# Patient Record
Sex: FEMALE | Race: WHITE | NOT HISPANIC OR LATINO | Employment: OTHER | URBAN - METROPOLITAN AREA
[De-identification: names, ages, dates, MRNs, and addresses within clinical notes are randomized per-mention and may not be internally consistent; named-entity substitution may affect disease eponyms.]

---

## 2019-01-01 RX ORDER — LISINOPRIL 20 MG/1
TABLET ORAL
Qty: 90 TABLET | Refills: 2 | Status: SHIPPED | OUTPATIENT
Start: 2019-01-01 | End: 2020-01-01

## 2019-01-17 ENCOUNTER — HOSPITAL ENCOUNTER (OUTPATIENT)
Dept: CARDIOLOGY | Facility: HOSPITAL | Age: 84
Discharge: HOME | End: 2019-01-17
Attending: INTERNAL MEDICINE | Admitting: INTERNAL MEDICINE

## 2019-05-14 ENCOUNTER — HOSPITAL ENCOUNTER (OUTPATIENT)
Dept: MAMMOGRAPHY | Facility: HOSPITAL | Age: 84
Discharge: HOME | End: 2019-05-14
Attending: FAMILY MEDICINE | Admitting: FAMILY MEDICINE

## 2019-07-03 ENCOUNTER — TELEPHONE (OUTPATIENT)
Dept: CARDIOLOGY | Facility: CLINIC | Age: 84
End: 2019-07-03

## 2019-07-03 RX ORDER — LISINOPRIL 20 MG/1
20 TABLET ORAL DAILY
Qty: 90 TABLET | Refills: 0 | Status: SHIPPED | OUTPATIENT
Start: 2019-07-03 | End: 2019-10-07 | Stop reason: SDUPTHER

## 2019-07-03 RX ORDER — LISINOPRIL 20 MG/1
TABLET ORAL
COMMUNITY
Start: 2019-04-01 | End: 2019-07-03 | Stop reason: SDUPTHER

## 2019-07-22 ENCOUNTER — CLINICAL SUPPORT NO REQUIREMENTS (OUTPATIENT)
Dept: CARDIOLOGY | Facility: CLINIC | Age: 84
End: 2019-07-22

## 2019-07-22 DIAGNOSIS — Z95.0 PACEMAKER: Primary | ICD-10-CM

## 2019-07-22 DIAGNOSIS — R00.1 BRADYCARDIA: ICD-10-CM

## 2019-07-22 PROCEDURE — 93296 REM INTERROG EVL PM/IDS: CPT | Performed by: INTERNAL MEDICINE

## 2019-07-22 PROCEDURE — 93294 REM INTERROG EVL PM/LDLS PM: CPT | Performed by: INTERNAL MEDICINE

## 2019-09-09 ENCOUNTER — OFFICE VISIT (OUTPATIENT)
Dept: ORTHOPEDIC SURGERY | Facility: CLINIC | Age: 84
End: 2019-09-09

## 2019-09-09 VITALS
HEART RATE: 62 BPM | BODY MASS INDEX: 21.34 KG/M2 | SYSTOLIC BLOOD PRESSURE: 145 MMHG | DIASTOLIC BLOOD PRESSURE: 72 MMHG | HEIGHT: 66 IN | WEIGHT: 132.8 LBS

## 2019-09-09 DIAGNOSIS — M19.012 PRIMARY LOCALIZED OSTEOARTHROSIS OF LEFT SHOULDER REGION: ICD-10-CM

## 2019-09-09 DIAGNOSIS — M25.512 ACUTE PAIN OF LEFT SHOULDER: Primary | ICD-10-CM

## 2019-09-09 PROBLEM — Z95.0 PRESENCE OF CARDIAC PACEMAKER: Status: ACTIVE | Noted: 2017-08-14

## 2019-09-09 PROCEDURE — 99203 OFFICE O/P NEW LOW 30 MIN: CPT | Performed by: FAMILY MEDICINE

## 2019-09-09 PROCEDURE — 20610 DRAIN/INJ JOINT/BURSA W/O US: CPT | Performed by: FAMILY MEDICINE

## 2019-09-09 RX ORDER — ACEBUTOLOL HYDROCHLORIDE 200 MG/1
200 CAPSULE ORAL 2 TIMES DAILY
COMMUNITY
Start: 2019-08-12 | End: 2020-01-01

## 2019-09-09 RX ORDER — PRAVASTATIN SODIUM 20 MG
20 TABLET ORAL DAILY
COMMUNITY
Start: 2019-08-26 | End: 2019-12-02 | Stop reason: SDUPTHER

## 2019-09-09 RX ORDER — TRAMADOL HYDROCHLORIDE 50 MG/1
TABLET ORAL AS NEEDED
COMMUNITY
Start: 2019-08-28 | End: 2019-10-09

## 2019-09-09 RX ORDER — ASPIRIN 81 MG/1
TABLET ORAL
COMMUNITY
Start: 2014-10-28 | End: 2019-10-09

## 2019-09-09 NOTE — PROGRESS NOTES
"Primary Care Sports Medicine Office Visit Note     Patient ID: Olga Curtis is a 84 y.o. female.    Chief Complaint:  Chief Complaint   Patient presents with   • Left Shoulder - Initial Evaluation     HPI:    Ms. Olga Curtis is a 84 y.o. female who presents to the clinic today for L shoulder decreased ROM and pain. She states that she fell off of a ten foot later, many years ago and has had pain and decreased strength and range of motion of the shoulder since then.  Most recently she was evaluated by her PCP who took x-ray and MRI.  Unfortunately cannot see these images today.      Past Medical History:   Diagnosis Date   • Aortic stenosis 10/28/2014   • Atrial premature complex 12/18/2015   • Chronic coronary artery disease 10/28/2014   • Dyslipidemia 10/28/2014   • Hypertension 10/28/2014   • Nonsustained ventricular tachycardia (CMS/HCC) 12/18/2015   • Shortness of breath 9/12/2016       Past Surgical History:   Procedure Laterality Date   • APPENDECTOMY     • CARDIAC PACEMAKER PLACEMENT  2017   • CARDIAC SURGERY  1999    Bypass surgery   • HERNIA REPAIR         History reviewed. No pertinent family history.  Social History     Occupational History   • Not on file   Tobacco Use   • Smoking status: Never Smoker   Substance and Sexual Activity   • Alcohol use: No     Frequency: Never   • Drug use: Not on file   • Sexual activity: Not on file      Review of Systems   Constitutional: Negative for activity change and fever.   Respiratory: Negative for cough and shortness of breath.    Cardiovascular: Negative for chest pain.   Gastrointestinal: Negative for constipation, diarrhea, nausea and vomiting.   Musculoskeletal: Positive for arthralgias.   Skin: Negative for color change and rash.   Neurological: Negative for weakness.   Hematological: Does not bruise/bleed easily.     Objective:    /72 (BP Location: Left arm, Patient Position: Sitting, Cuff Size: Adult)   Pulse 62   Ht 167.6 cm (66\")   Wt 60.2 kg " (132 lb 12.8 oz)   BMI 21.43 kg/m²     Physical Examination:  Physical Exam   Constitutional: She appears well-developed and well-nourished. No distress.   HENT:   Head: Normocephalic and atraumatic.   Eyes: Conjunctivae are normal.   Cardiovascular: Intact distal pulses.   Pulmonary/Chest: Effort normal. No respiratory distress.   Neurological: She is alert.   Skin: Skin is warm. Capillary refill takes less than 2 seconds. She is not diaphoretic.   Nursing note and vitals reviewed.    Left Shoulder Exam     Tenderness   Left shoulder tenderness location: globally to the L shoulder.    Range of Motion   Active abduction: 80   Passive abduction: 120   Extension: 50   External rotation: 70   Forward flexion: 100   Internal rotation 0 degrees: normal     Muscle Strength   Abduction: 3/5   Internal rotation: 5/5   External rotation: 0/5   Supraspinatus: 0/5   Subscapularis: 4/5   Biceps: 5/5     Tests   Apprehension: negative  Rodrigez test: positive  Cross arm: negative  Impingement: positive  Drop arm: negative    Other   Erythema: absent  Sensation: normal  Pulse: present     Comments:  Moderate palpable fluid collection of the anterior and posterior portions of there GH joint. 2cm x 2cm anterior the the humeral head, and similar sized fluctuant lesion to the posterior aspect, large joint effusion.           Imaging:  Repeat x-ray here in our office reveals severe and significant osteoarthritis with teardrop osteophytes anterior aspect of the humeral head.  She also has humeralization of the inferior aspect of the acromion.  There is no rotator interval.    Assessment and Plan:    1. Acute pain of left shoulder  - XR Shoulder 2+ View Left    2. Primary localized osteoarthrosis of left shoulder region  After discussion of risk and benefits and other options, patient would like to move forward with ultrasound-guided glenohumeral joint aspiration injection.  7 cc of clear nonpurulent nonbloody fluid was removed.  Please  "see procedure note.  Otherwise will continue conservative management, gentle ROM exercises.  RTC in 3 months or sooner as needed.      Yohan VEGA \"Chance\" Radu RAVI, , CAQSM  09/09/19  2:01 PM    Disclaimer: Please note that areas of this note were completed with computer voice recognition software.  Quite often unanticipated grammatical, syntax, homophones, and other interpretive errors are inadvertently transcribed by the computer software. Please excuse any errors that have escaped final proofreading.  "

## 2019-09-09 NOTE — PROCEDURES
"Large Joint Arthrocentesis: L glenohumeral  Date/Time: 9/9/2019 3:08 PM  Consent given by: patient  Site marked: site marked  Timeout: Immediately prior to procedure a time out was called to verify the correct patient, procedure, equipment, support staff and site/side marked as required   Supporting Documentation  Indications: pain and joint swelling   Procedure Details  Location: shoulder - L glenohumeral  Preparation: Patient was prepped and draped in the usual sterile fashion  Needle size: 22 G  Approach: posterior  Medication group details: 6cc of 1% lidocaine without epi, and 2cc of 40mg Kenalog.  Aspirate amount: 8 mL  Aspirate: clear and yellow  Patient tolerance: patient tolerated the procedure well with no immediate complications (blood loss, negligable. Pt admits near immediate pain relief with gentle ROM post-injection.)      Yohan VEGA \"Rainer\" Radu RAVI DO, CAQSM  09/09/19  3:09 PM            "

## 2019-09-30 PROBLEM — I49.5 SICK SINUS SYNDROME (HCC): Status: ACTIVE | Noted: 2017-08-14

## 2019-10-07 ENCOUNTER — TELEPHONE (OUTPATIENT)
Dept: CARDIOLOGY | Facility: CLINIC | Age: 84
End: 2019-10-07

## 2019-10-07 RX ORDER — LISINOPRIL 20 MG/1
20 TABLET ORAL DAILY
Qty: 90 TABLET | Refills: 0 | Status: SHIPPED | OUTPATIENT
Start: 2019-10-07 | End: 2019-01-01

## 2019-10-07 NOTE — TELEPHONE ENCOUNTER
Requesting refill on lisinopril. [facility]. Said she called last week, and has not heard back.

## 2019-10-09 ENCOUNTER — HOSPITAL ENCOUNTER (OUTPATIENT)
Facility: HOSPITAL | Age: 84
Setting detail: OBSERVATION
Discharge: HOME | End: 2019-10-09
Attending: INTERNAL MEDICINE | Admitting: INTERNAL MEDICINE

## 2019-10-09 ENCOUNTER — APPOINTMENT (OUTPATIENT)
Dept: GENERAL RADIOLOGY | Facility: HOSPITAL | Age: 84
End: 2019-10-09

## 2019-10-09 VITALS
OXYGEN SATURATION: 91 % | SYSTOLIC BLOOD PRESSURE: 122 MMHG | HEART RATE: 69 BPM | DIASTOLIC BLOOD PRESSURE: 67 MMHG | BODY MASS INDEX: 20.73 KG/M2 | RESPIRATION RATE: 18 BRPM | HEIGHT: 66 IN | TEMPERATURE: 97.6 F | WEIGHT: 128.97 LBS

## 2019-10-09 DIAGNOSIS — L29.9 PRURITUS: ICD-10-CM

## 2019-10-09 DIAGNOSIS — E87.1 HYPONATREMIA: ICD-10-CM

## 2019-10-09 DIAGNOSIS — R06.2 WHEEZING: ICD-10-CM

## 2019-10-09 DIAGNOSIS — T78.40XA ALLERGIC REACTION, INITIAL ENCOUNTER: Primary | ICD-10-CM

## 2019-10-09 LAB
ANION GAP SERPL CALCULATED.3IONS-SCNC: 15.6 MMOL/L (ref 5–15)
ANION GAP SERPL CALCULATED.3IONS-SCNC: 16.5 MMOL/L (ref 5–15)
BASOPHILS # BLD AUTO: 0.1 10*3/MM3 (ref 0–0.2)
BASOPHILS NFR BLD AUTO: 0.9 % (ref 0–1.5)
BUN BLD-MCNC: 15 MG/DL (ref 8–20)
BUN BLD-MCNC: 18 MG/DL (ref 8–20)
BUN/CREAT SERPL: 15 (ref 5.4–26.2)
BUN/CREAT SERPL: 16.4 (ref 5.4–26.2)
CALCIUM SPEC-SCNC: 9.2 MG/DL (ref 8.9–10.3)
CALCIUM SPEC-SCNC: 9.8 MG/DL (ref 8.9–10.3)
CHLORIDE SERPL-SCNC: 93 MMOL/L (ref 101–111)
CHLORIDE SERPL-SCNC: 96 MMOL/L (ref 101–111)
CO2 SERPL-SCNC: 22 MMOL/L (ref 22–32)
CO2 SERPL-SCNC: 24 MMOL/L (ref 22–32)
CREAT BLD-MCNC: 1 MG/DL (ref 0.4–1)
CREAT BLD-MCNC: 1.1 MG/DL (ref 0.4–1)
DEPRECATED RDW RBC AUTO: 47.3 FL (ref 37–54)
EOSINOPHIL # BLD AUTO: 0 10*3/MM3 (ref 0–0.4)
EOSINOPHIL NFR BLD AUTO: 0.2 % (ref 0.3–6.2)
ERYTHROCYTE [DISTWIDTH] IN BLOOD BY AUTOMATED COUNT: 14.6 % (ref 12.3–15.4)
GFR SERPL CREATININE-BSD FRML MDRD: 47 ML/MIN/1.73
GFR SERPL CREATININE-BSD FRML MDRD: 53 ML/MIN/1.73
GLUCOSE BLD-MCNC: 107 MG/DL (ref 65–99)
GLUCOSE BLD-MCNC: 144 MG/DL (ref 65–99)
HCT VFR BLD AUTO: 43.2 % (ref 34–46.6)
HGB BLD-MCNC: 14.5 G/DL (ref 12–15.9)
LYMPHOCYTES # BLD AUTO: 1.9 10*3/MM3 (ref 0.7–3.1)
LYMPHOCYTES NFR BLD AUTO: 14 % (ref 19.6–45.3)
MCH RBC QN AUTO: 31.5 PG (ref 26.6–33)
MCHC RBC AUTO-ENTMCNC: 33.6 G/DL (ref 31.5–35.7)
MCV RBC AUTO: 93.6 FL (ref 79–97)
MONOCYTES # BLD AUTO: 1.2 10*3/MM3 (ref 0.1–0.9)
MONOCYTES NFR BLD AUTO: 8.8 % (ref 5–12)
NEUTROPHILS # BLD AUTO: 10.2 10*3/MM3 (ref 1.7–7)
NEUTROPHILS NFR BLD AUTO: 76.1 % (ref 42.7–76)
NRBC BLD AUTO-RTO: 0 /100 WBC (ref 0–0.2)
PLATELET # BLD AUTO: 307 10*3/MM3 (ref 140–450)
PMV BLD AUTO: 8.4 FL (ref 6–12)
POTASSIUM BLD-SCNC: 4.6 MMOL/L (ref 3.6–5.1)
POTASSIUM BLD-SCNC: 5.5 MMOL/L (ref 3.6–5.1)
RBC # BLD AUTO: 4.62 10*6/MM3 (ref 3.77–5.28)
SODIUM BLD-SCNC: 128 MMOL/L (ref 136–144)
SODIUM BLD-SCNC: 129 MMOL/L (ref 136–144)
WBC NRBC COR # BLD: 13.4 10*3/MM3 (ref 3.4–10.8)

## 2019-10-09 PROCEDURE — 85025 COMPLETE CBC W/AUTO DIFF WBC: CPT | Performed by: NURSE PRACTITIONER

## 2019-10-09 PROCEDURE — 96375 TX/PRO/DX INJ NEW DRUG ADDON: CPT

## 2019-10-09 PROCEDURE — 94640 AIRWAY INHALATION TREATMENT: CPT

## 2019-10-09 PROCEDURE — 99235 HOSP IP/OBS SAME DATE MOD 70: CPT | Performed by: INTERNAL MEDICINE

## 2019-10-09 PROCEDURE — 25010000002 METHYLPREDNISOLONE PER 125 MG: Performed by: NURSE PRACTITIONER

## 2019-10-09 PROCEDURE — 96374 THER/PROPH/DIAG INJ IV PUSH: CPT

## 2019-10-09 PROCEDURE — G0378 HOSPITAL OBSERVATION PER HR: HCPCS

## 2019-10-09 PROCEDURE — 80048 BASIC METABOLIC PNL TOTAL CA: CPT | Performed by: INTERNAL MEDICINE

## 2019-10-09 PROCEDURE — 80048 BASIC METABOLIC PNL TOTAL CA: CPT | Performed by: NURSE PRACTITIONER

## 2019-10-09 PROCEDURE — 25010000002 DIPHENHYDRAMINE PER 50 MG: Performed by: NURSE PRACTITIONER

## 2019-10-09 PROCEDURE — 99284 EMERGENCY DEPT VISIT MOD MDM: CPT

## 2019-10-09 PROCEDURE — 71045 X-RAY EXAM CHEST 1 VIEW: CPT

## 2019-10-09 RX ORDER — ACETAMINOPHEN 160 MG/5ML
650 SOLUTION ORAL EVERY 4 HOURS PRN
Status: DISCONTINUED | OUTPATIENT
Start: 2019-10-09 | End: 2019-10-09 | Stop reason: HOSPADM

## 2019-10-09 RX ORDER — SODIUM CHLORIDE 0.9 % (FLUSH) 0.9 %
10 SYRINGE (ML) INJECTION EVERY 12 HOURS SCHEDULED
Status: DISCONTINUED | OUTPATIENT
Start: 2019-10-09 | End: 2019-10-09 | Stop reason: HOSPADM

## 2019-10-09 RX ORDER — ASPIRIN 81 MG/1
81 TABLET ORAL DAILY
Status: DISCONTINUED | OUTPATIENT
Start: 2019-10-09 | End: 2019-10-09 | Stop reason: HOSPADM

## 2019-10-09 RX ORDER — LISINOPRIL 20 MG/1
20 TABLET ORAL DAILY
Status: DISCONTINUED | OUTPATIENT
Start: 2019-10-09 | End: 2019-10-09 | Stop reason: HOSPADM

## 2019-10-09 RX ORDER — ONDANSETRON 2 MG/ML
4 INJECTION INTRAMUSCULAR; INTRAVENOUS EVERY 6 HOURS PRN
Status: DISCONTINUED | OUTPATIENT
Start: 2019-10-09 | End: 2019-10-09 | Stop reason: HOSPADM

## 2019-10-09 RX ORDER — SODIUM CHLORIDE 0.9 % (FLUSH) 0.9 %
10 SYRINGE (ML) INJECTION AS NEEDED
Status: DISCONTINUED | OUTPATIENT
Start: 2019-10-09 | End: 2019-10-09 | Stop reason: HOSPADM

## 2019-10-09 RX ORDER — DIPHENHYDRAMINE HCL 25 MG
25 TABLET ORAL 3 TIMES DAILY
Qty: 6 TABLET | Refills: 0 | Status: SHIPPED | OUTPATIENT
Start: 2019-10-09 | End: 2019-10-11

## 2019-10-09 RX ORDER — FAMOTIDINE 20 MG/1
20 TABLET, FILM COATED ORAL
Status: DISCONTINUED | OUTPATIENT
Start: 2019-10-09 | End: 2019-10-09 | Stop reason: HOSPADM

## 2019-10-09 RX ORDER — ASPIRIN 81 MG/1
81 TABLET ORAL DAILY
COMMUNITY
End: 2019-12-09

## 2019-10-09 RX ORDER — METHYLPREDNISOLONE SODIUM SUCCINATE 125 MG/2ML
125 INJECTION, POWDER, LYOPHILIZED, FOR SOLUTION INTRAMUSCULAR; INTRAVENOUS ONCE
Status: COMPLETED | OUTPATIENT
Start: 2019-10-09 | End: 2019-10-09

## 2019-10-09 RX ORDER — ALBUTEROL SULFATE 2.5 MG/3ML
2.5 SOLUTION RESPIRATORY (INHALATION) ONCE
Status: COMPLETED | OUTPATIENT
Start: 2019-10-09 | End: 2019-10-09

## 2019-10-09 RX ORDER — PREDNISONE 10 MG/1
TABLET ORAL
Qty: 7 TABLET | Refills: 0 | Status: SHIPPED | OUTPATIENT
Start: 2019-10-09 | End: 2019-10-14

## 2019-10-09 RX ORDER — ACETAMINOPHEN 650 MG/1
650 SUPPOSITORY RECTAL EVERY 4 HOURS PRN
Status: DISCONTINUED | OUTPATIENT
Start: 2019-10-09 | End: 2019-10-09 | Stop reason: HOSPADM

## 2019-10-09 RX ORDER — DIPHENHYDRAMINE HCL 25 MG
25 TABLET ORAL EVERY 4 HOURS PRN
Status: DISCONTINUED | OUTPATIENT
Start: 2019-10-09 | End: 2019-10-09 | Stop reason: HOSPADM

## 2019-10-09 RX ORDER — DIPHENHYDRAMINE HYDROCHLORIDE 50 MG/ML
25 INJECTION INTRAMUSCULAR; INTRAVENOUS ONCE
Status: COMPLETED | OUTPATIENT
Start: 2019-10-09 | End: 2019-10-09

## 2019-10-09 RX ORDER — ACETAMINOPHEN 325 MG/1
650 TABLET ORAL EVERY 4 HOURS PRN
Status: DISCONTINUED | OUTPATIENT
Start: 2019-10-09 | End: 2019-10-09 | Stop reason: HOSPADM

## 2019-10-09 RX ORDER — ONDANSETRON 4 MG/1
4 TABLET, FILM COATED ORAL EVERY 6 HOURS PRN
Status: DISCONTINUED | OUTPATIENT
Start: 2019-10-09 | End: 2019-10-09 | Stop reason: HOSPADM

## 2019-10-09 RX ORDER — ATORVASTATIN CALCIUM 10 MG/1
10 TABLET, FILM COATED ORAL NIGHTLY
Status: DISCONTINUED | OUTPATIENT
Start: 2019-10-09 | End: 2019-10-09 | Stop reason: HOSPADM

## 2019-10-09 RX ADMIN — Medication 10 ML: at 08:23

## 2019-10-09 RX ADMIN — ASPIRIN 81 MG: 81 TABLET, DELAYED RELEASE ORAL at 08:23

## 2019-10-09 RX ADMIN — LISINOPRIL 20 MG: 20 TABLET ORAL at 08:23

## 2019-10-09 RX ADMIN — ALBUTEROL SULFATE 2.5 MG: 2.5 SOLUTION RESPIRATORY (INHALATION) at 00:52

## 2019-10-09 RX ADMIN — DIPHENHYDRAMINE HYDROCHLORIDE 25 MG: 50 INJECTION, SOLUTION INTRAMUSCULAR; INTRAVENOUS at 00:55

## 2019-10-09 RX ADMIN — FAMOTIDINE 20 MG: 20 TABLET ORAL at 08:23

## 2019-10-09 RX ADMIN — METHYLPREDNISOLONE SODIUM SUCCINATE 125 MG: 125 INJECTION, POWDER, FOR SOLUTION INTRAMUSCULAR; INTRAVENOUS at 00:55

## 2019-10-09 NOTE — DISCHARGE SUMMARY
Date of Admission: 10/9/2019    Date of Discharge:  10/9/2019    Length of stay:  LOS: 0 days     Presenting Problem/History of Present Illness  Active Hospital Problems    Diagnosis  POA   • Allergic reaction [T78.40XA]  Yes      Resolved Hospital Problems   No resolved problems to display.          Hospital Course  Patient is admission/discharge same day.  Refer to H&P for details.  Chief Complaint   Patient presents with   • Rash         Allergic reaction to unknown inciting factor.  Patient's cannot pinpoint any new change in her schedule or diet.  She denies any new medications.  I would like to blame lisinopril but there is no classic findings of angioedema.  She really took her lisinopril this morning and has no reaction so far.  Also she took lisinopril early yesterday morning her symptoms started 7 PM in the evening.  She is convinced that nothing is clear causing her skin rash or type I allergic reaction.  I will continue steroids and and a tapering fashion in a few more days of Benadryl.  She is advised not to drive.  Her daughter at the bedside and aware of the findings of hyponatremia which is unexplained at this time but repeat evaluation is pending and will follow up closely with primary care physician is strongly advised.  Patient is quite eager to go home today before lunch.  She is aware of the plan and the consequences of possible recurrences of her allergies need to follow hyponatremia and its important to avoid significant changes.      Patient has chronic medical problems including aortic stenosis history of nonsustained V. tach's coronary artery disease bypass surgery pacemaker and hypertension.  She is a stable from that standpoint and no reported arrhythmias so far.      Past Medical History:     Past Medical History:   Diagnosis Date   • Aortic stenosis 10/28/2014   • Atrial premature complex 12/18/2015   • Chronic coronary artery disease 10/28/2014   • Dyslipidemia 10/28/2014   •  Hypertension 10/28/2014   • Nonsustained ventricular tachycardia (CMS/HCC) 12/18/2015   • Shortness of breath 9/12/2016       Past Surgical History:     Past Surgical History:   Procedure Laterality Date   • APPENDECTOMY     • CARDIAC PACEMAKER PLACEMENT  2017   • CARDIAC SURGERY  1999    Bypass surgery   • HERNIA REPAIR         Social History:   Social History     Socioeconomic History   • Marital status:      Spouse name: Not on file   • Number of children: Not on file   • Years of education: Not on file   • Highest education level: Not on file   Tobacco Use   • Smoking status: Never Smoker   • Smokeless tobacco: Never Used   Substance and Sexual Activity   • Alcohol use: No     Frequency: Never   • Drug use: No   • Sexual activity: Defer       Procedures Performed         Vital Signs  Temp:  [97 °F (36.1 °C)-98.2 °F (36.8 °C)] 98.2 °F (36.8 °C)  Heart Rate:  [69-76] 69  Resp:  [16-18] 18  BP: (119-146)/(58-98) 138/71    Physical Exam:  Physical Exam   Constitutional: She is oriented to person, place, and time. She appears well-developed and well-nourished. No distress.   HENT:   Head: Normocephalic and atraumatic.   Right Ear: External ear normal.   Left Ear: External ear normal.   Nose: Nose normal.   Mouth/Throat: Oropharynx is clear and moist. No oropharyngeal exudate.   Eyes: Conjunctivae and EOM are normal. Pupils are equal, round, and reactive to light. Right eye exhibits no discharge. Left eye exhibits no discharge. No scleral icterus.   Neck: Normal range of motion. No JVD present. No tracheal deviation present. No thyromegaly present.   Cardiovascular: Normal rate, regular rhythm, normal heart sounds and intact distal pulses. Exam reveals no gallop and no friction rub.   No murmur heard.  Pulmonary/Chest: Effort normal and breath sounds normal. No stridor. No respiratory distress. She has no wheezes. She has no rales. She exhibits no tenderness.   Abdominal: Soft. Bowel sounds are normal. She  exhibits no distension and no mass. There is no tenderness. There is no rebound and no guarding. No hernia.   Musculoskeletal: Normal range of motion. She exhibits no edema, tenderness or deformity.   Lymphadenopathy:     She has no cervical adenopathy.   Neurological: She is alert and oriented to person, place, and time. No cranial nerve deficit or sensory deficit. She exhibits normal muscle tone. Coordination normal.   Skin: Skin is warm and dry. No rash noted. She is not diaphoretic. No erythema.   Psychiatric: She has a normal mood and affect. Her behavior is normal.   Nursing note and vitals reviewed.      Discharge Diagnosis:     Allergic reaction      Present on Admission:  • Allergic reaction      Discharge Disposition  Home or Self Care       Discharge Medications      New Medications      Instructions Start Date   diphenhydrAMINE 25 MG tablet  Commonly known as:  BENADRYL   25 mg, Oral, 3 Times Daily      predniSONE 10 MG tablet  Commonly known as:  DELTASONE   Take 4 tablets by mouth Daily for 2 days, THEN 2 tablets Daily for 2 days, THEN 1 tablet Daily for 2 days.   Start Date:  10/9/2019        Continue These Medications      Instructions Start Date   acebutolol 200 MG capsule  Commonly known as:  SECTRAL   200 mg, Oral, 2 Times Daily      aspirin 81 MG EC tablet   81 mg, Oral, Daily      lisinopril 20 MG tablet  Commonly known as:  PRINIVIL,ZESTRIL   20 mg, Oral, Daily      pravastatin 20 MG tablet  Commonly known as:  PRAVACHOL   20 mg, Oral, Daily                 Consults:   Consults     Date and Time Order Name Status Description    10/9/2019 0222 Hospitalist (on-call MD unless specified) Completed           Pertinent Test Results:     I reviewed the patient's new clinical results.    Results from last 7 days   Lab Units 10/09/19  0054   WBC 10*3/mm3 13.40*   HEMOGLOBIN g/dL 14.5   HEMATOCRIT % 43.2   PLATELETS 10*3/mm3 307     Results from last 7 days   Lab Units 10/09/19  0054   SODIUM mmol/L 128*    POTASSIUM mmol/L 4.6   CHLORIDE mmol/L 93*   CO2 mmol/L 24.0   BUN mg/dL 15   CREATININE mg/dL 1.00   GLUCOSE mg/dL 107*   CALCIUM mg/dL 9.2     Results from last 7 days   Lab Units 10/09/19  0054   SODIUM mmol/L 128*   POTASSIUM mmol/L 4.6   CHLORIDE mmol/L 93*   CO2 mmol/L 24.0   BUN mg/dL 15   CREATININE mg/dL 1.00   CALCIUM mg/dL 9.2   GLUCOSE mg/dL 107*         Lab Results   Component Value Date    CALCIUM 9.2 10/09/2019    PHOS 4.7 2017     No results found for: HGBA1C          Microbiology Results (last 10 days)     ** No results found for the last 240 hours. **          ECG/EMG Results (most recent)     None               Results for orders placed during the hospital encounter of 19   Adult Transthoracic Echo Complete W/ Cont if Necessary Per Protocol    Narrative                           Adult Echocardiogram Report        [facility]  [address] [place] [address]    [id number]        Name: TENA LANTIGUA EStudy Date: 2019 08:54 AM            BP: 129/78 mmHg  MRN: [id number]      Patient Location:   : 1935     Gender: Female                             Height: 66 in  Age: 83 yrs         Account#: [id number]                      Weight: 140 lb  Reason For Study: PM,AS,CAD,CAG                                BSA: 1.7 m2  Ordering Physician:  SAVANNAH REINA  Referring Physician:  KEVIN WRIGHT  Performed By: MB      M-Mode/2-D Measurements:  LVIDd: 4.3 cm       (3.7-5.7) LVPWd: 1.00 cm       (0.8-1.2)  LVIDs: 2.1 cm       (2.3-3.9)   ACS:               (1.6-3.7) IVSd: 1.1 cm         (0.7-1.2)  LA dimension: 5.0 cm(1.9-4.0) RVDd: 2.4 cm         (0.7-2.4)  FS: 51.5 %          (21-40%)  Ao root diam: 3.1 cm (2.0-3.7)    Comments  Technically adequate study.  Mitral valve is calcified with moderate mitral regurgitation.  Tricuspid valve is normal with mild to moderate tricuspid regurgitation.  Aortic valve is calcified with moderate to severe aortic stenosis  and moderate  aortic insufficiency.  Left atrium is severely dilated. Aortic root is normal in size. Right  ventricle is normal in size.  LV size and contractility appears normal. EF is about 60-65%.  No pericardial effusion noted.      Interpretation    MMode/2D Measurements & Calculations  ESV(Teich): 13.9 ml  EF(Teich): 83.0 %                       Ao root area: 7.3 cm2  Asc Aorta Diam: 3.7 cm                  LVOT diam: 2.1 cm    EDV(MOD-sp4): 57.0 ml  ESV(MOD-sp4): 11.7 ml  EF(MOD-sp4): 79.5 %    Doppler Measurements & Calculations  MV E max deon: 98.8 cm/sec               MV max P.9 mmHg  MV A max deon: 66.9 cm/sec               MV mean P.4 mmHg  MV E/A: 1.5  MV dec slope: 475.5 cm/sec2             Ao V2 max: 439.6 cm/sec  MV dec time: 0.21 sec                   Ao max P.3 mmHg                                          Ao V2 mean: 325.7 cm/sec                                          Ao mean P.8 mmHg                                          Ao V2 VTI: 110.0 cm                                          CHARIS(I,D): 0.95 cm2                                            CHARIS(V,D): 0.99 cm2  AI max deon: 435.1 cm/sec                LV V1 max P.3 mmHg  AI max P.7 mmHg                    LV V1 mean PG: 3.3 mmHg  AI dec slope: 309.8 cm/sec2             LV V1 max: 125.0 cm/sec  AI dec time: 1.4 sec                    LV V1 mean: 85.7 cm/sec  AI P1/2t: 411.4 msec                    LV V1 VTI: 30.0 cm  PA max P.3 mmHg                     PI end-d deon: 115.7 cm/sec  TR max deon: 294.6 cm/sec  TR max P.1 mmHg  RVSP(TR): 45.1 mmHg      _______________________________________________________________________________    Electronically signed by: Donato Cevallos MD  on 2019 03:50  PM         Xr Chest 1 View    Result Date: 10/9/2019  1.No definite acute cardiopulmonary process identified. 2.Interstitial markings within left lower lung appear stable from 2017, likely representing  chronic pulmonary changes.  Electronically Signed By-DR. Dick Castro MD On:10/9/2019 7:25 AM This report was finalized on [id number] by DR. Dick Castro MD.        Condition on Discharge:    Stable    Discharge Diet:     Activity at Discharge:     Follow-up Appointments  Future Appointments   Date Time Provider Department Center   10/14/2019  2:00 PM [facility] [place] [facility]   10/14/2019  2:30 PM Wyatt Kwok MD [facility]   12/9/2019 12:30 PM [facility],  [facility] None     Additional Instructions for the Follow-ups that You Need to Schedule     Discharge Follow-up with PCP   As directed       Currently Documented PCP:    Tigre Duran MD    PCP Phone Number:    [phone]     Follow Up Details:  in am               Test Results Pending at Discharge   Order Current Status    Basic Metabolic Panel Collected (10/09/19 0950)           Risk for Readmission (LACE) Score: 1 (10/9/2019  6:00 AM)          Connor Lake MD  10/09/19  9:57 AM    Time: Greater than 30 minutes in discharge planning

## 2019-10-09 NOTE — PROGRESS NOTES
Discharge Planning Assessment   Julian     Patient Name: Olga Curtis  MRN: [id number]  Today's Date: 10/9/2019    Admit Date: 10/9/2019    Discharge Needs Assessment     Row Name 10/09/19 1038       Living Environment    Lives With  spouse    Current Living Arrangements  home/apartment/condo    Primary Care Provided by  self    Provides Primary Care For  no one    Family Caregiver if Needed  spouse    Quality of Family Relationships  supportive    Able to Return to Prior Arrangements  yes       Resource/Environmental Concerns    Resource/Environmental Concerns  none    Transportation Concerns  car, none       Transition Planning    Patient/Family Anticipates Transition to  home with family    Patient/Family Anticipated Services at Transition  none    Transportation Anticipated  family or friend will provide       Discharge Needs Assessment    Readmission Within the Last 30 Days  no previous admission in last 30 days    Concerns to be Addressed  no discharge needs identified;denies needs/concerns at this time    Equipment Currently Used at Home  walker, standard;cane, straight    Anticipated Changes Related to Illness  none    Equipment Needed After Discharge  none    Discharge Coordination/Progress  Patient states no trouble affording medications, PCP Tigre Duran and cardiologist Dr. Kwok.        Discharge Plan     Row Name 10/09/19 1039       Plan    Plan  Anticipate routine home, denies needs at this time.    Patient/Family in Agreement with Plan  yes    Plan Comments  Patient states she will have a ride home at [place], states her daughter works here at hospital and will be the one taking her home.        Expected Discharge Date and Time     Expected Discharge Date Expected Discharge Time    Oct 9, 2019         Melody Recinos RN       Office Phone: [phone]  Office Cell: [phone]

## 2019-10-09 NOTE — ED PROVIDER NOTES
Subjective   Context: 84-year-old female patient presents the ER with complaint of acute onset of rash, itching; patient reports she was at her house, she states that she developed some itching to her lower abdomen, she states that whenever she evaluated she noticed it was red, she had a rash; she reports that the rash spread down her legs, in her abdomen and went to her arms and chest.  She states itching locally.  She reports no new contacts with products, medications, she states that she had a usual food for dinner tonight.  She reports that she is recently had sinus congestion and a cough but reports that this was evaluated by her primary care physician, the patient had taken some over the counter cold medication but reports this was 3 weeks ago.  She states that she is continued to have an occasional cough.  Denies anginal equivalent chest pain tachycardia irregular heartbeat no fainting or near fainting episodes.  She denies voice muffling, reports no pulling or drooling of oral secretions      Location:  body  Quality:itching  Timin  Duration: constant  Aggravating:unkn  Alleviating:no relief with benadryl cream            Review of Systems   Constitutional: Negative for chills, fatigue and fever.   HENT: Negative for congestion, dental problem, ear discharge, ear pain, mouth sores, sore throat, trouble swallowing and voice change.    Eyes: Negative for photophobia, pain, discharge and visual disturbance.   Respiratory: Positive for cough and wheezing. Negative for chest tightness and shortness of breath.    Cardiovascular: Negative for chest pain, palpitations and leg swelling.   Gastrointestinal: Negative for abdominal pain, diarrhea, nausea and vomiting.   Genitourinary: Negative for decreased urine volume, difficulty urinating, dysuria, flank pain, hematuria and urgency.   Musculoskeletal: Negative for arthralgias, back pain, myalgias, neck pain and neck stiffness.   Skin: Positive for rash.  Negative for color change, pallor and wound.   Neurological: Negative for dizziness, weakness, light-headedness, numbness and headaches.   Hematological: Negative for adenopathy. Does not bruise/bleed easily.     Prior to Admission medications    Medication Sig Start Date End Date Taking? Authorizing Provider   acebutolol (SECTRAL) 200 MG capsule Take 200 mg by mouth 2 (Two) Times a Day. 8/12/19  Yes Yeyo Bergman MD   aspirin 81 MG EC tablet Take 81 mg by mouth Daily.   Yes Yeyo Bergman MD   lisinopril (PRINIVIL,ZESTRIL) 20 MG tablet Take 1 tablet by mouth Daily. 10/7/19  Yes Wyatt Kwok MD   pravastatin (PRAVACHOL) 20 MG tablet Take 20 mg by mouth Daily. 8/26/19  Yes Yeyo Bergman MD   aspirin (ASPIR-LOW) 81 MG EC tablet ASPIR-LOW 81 MG TBEC 10/28/14 10/9/19  Yeyo Bergman MD   traMADol (ULTRAM) 50 MG tablet Take  by mouth As Needed. 8/28/19 10/9/19  Yeyo Bergman MD       Past Medical History:   Diagnosis Date   • Aortic stenosis 10/28/2014   • Atrial premature complex 12/18/2015   • Chronic coronary artery disease 10/28/2014   • Dyslipidemia 10/28/2014   • Hypertension 10/28/2014   • Nonsustained ventricular tachycardia (CMS/HCC) 12/18/2015   • Shortness of breath 9/12/2016       Allergies   Allergen Reactions   • Sulfa Antibiotics Hives       Past Surgical History:   Procedure Laterality Date   • APPENDECTOMY     • CARDIAC PACEMAKER PLACEMENT  2017   • CARDIAC SURGERY  1999    Bypass surgery   • HERNIA REPAIR         History reviewed. No pertinent family history.    Social History     Socioeconomic History   • Marital status:      Spouse name: Not on file   • Number of children: Not on file   • Years of education: Not on file   • Highest education level: Not on file   Tobacco Use   • Smoking status: Never Smoker   • Smokeless tobacco: Never Used   Substance and Sexual Activity   • Alcohol use: No     Frequency: Never   • Drug use: No   • Sexual activity: Defer            Objective   Physical Exam       Vital signs and triage nurse note reviewed.   Constitutional: Awake, alert; well-developed and well-nourished. No acute distress is noted.   HEENT: Normocephalic, atraumatic, no facial symmetry edema or erythema; pupils are PERRL with intact EOM; oropharynx is pink and moist without exudate or erythema.   Neck: Supple, full range of motion without pain;  Cardiovascular: Regular rate and rhythm, normal S1-S2.   Pulmonary: Respiratory effort regular nonlabored, breath sounds clear to auscultation all fields.   Abdomen: Soft, nontender nondistended with normoactive bowel sounds; no rebound or guarding.   Musculoskeletal: Independent range of motion of all extremities with no palpable tenderness or edema.   Neuro: Alert oriented x3, speech is clear and appropriate, GCS 15   Skin:  Fleshtone warm, dry, intact; no erythematous or petechial rash or lesion    Procedures           ED Course        No radiology results for the last day  Results for orders placed or performed during the hospital encounter of 10/09/19   Basic Metabolic Panel   Result Value Ref Range    Glucose 107 (H) 65 - 99 mg/dL    BUN 15 8 - 20 mg/dL    Creatinine 1.00 0.40 - 1.00 mg/dL    Sodium 128 (L) 136 - 144 mmol/L    Potassium 4.6 3.6 - 5.1 mmol/L    Chloride 93 (L) 101 - 111 mmol/L    CO2 24.0 22.0 - 32.0 mmol/L    Calcium 9.2 8.9 - 10.3 mg/dL    eGFR Non African Amer 53 (L) >60 mL/min/1.73    BUN/Creatinine Ratio 15.0 5.4 - 26.2    Anion Gap 15.6 (H) 5.0 - 15.0 mmol/L   CBC Auto Differential   Result Value Ref Range    WBC 13.40 (H) 3.40 - 10.80 10*3/mm3    RBC 4.62 3.77 - 5.28 10*6/mm3    Hemoglobin 14.5 12.0 - 15.9 g/dL    Hematocrit 43.2 34.0 - 46.6 %    MCV 93.6 79.0 - 97.0 fL    MCH 31.5 26.6 - 33.0 pg    MCHC 33.6 31.5 - 35.7 g/dL    RDW 14.6 12.3 - 15.4 %    RDW-SD 47.3 37.0 - 54.0 fl    MPV 8.4 6.0 - 12.0 fL    Platelets 307 140 - 450 10*3/mm3    Neutrophil % 76.1 (H) 42.7 - 76.0 %    Lymphocyte %  "14.0 (L) 19.6 - 45.3 %    Monocyte % 8.8 5.0 - 12.0 %    Eosinophil % 0.2 (L) 0.3 - 6.2 %    Basophil % 0.9 0.0 - 1.5 %    Neutrophils, Absolute 10.20 (H) 1.70 - 7.00 10*3/mm3    Lymphocytes, Absolute 1.90 0.70 - 3.10 10*3/mm3    Monocytes, Absolute 1.20 (H) 0.10 - 0.90 10*3/mm3    Eosinophils, Absolute 0.00 0.00 - 0.40 10*3/mm3    Basophils, Absolute 0.10 0.00 - 0.20 10*3/mm3    nRBC 0.0 0.0 - 0.2 /100 WBC     Medications   sodium chloride 0.9 % flush 10 mL (not administered)   famotidine (PEPCID) tablet 20 mg (not administered)   influenza vac split quad (FLUZONE,FLUARIX,AFLURIA) injection 0.5 mL (not administered)   albuterol (PROVENTIL) nebulizer solution 0.083% 2.5 mg/3mL (2.5 mg Nebulization Given 10/9/19 0052)   diphenhydrAMINE (BENADRYL) injection 25 mg (25 mg Intravenous Given 10/9/19 0055)   methylPREDNISolone sodium succinate (SOLU-Medrol) injection 125 mg (125 mg Intravenous Given 10/9/19 0055)     /67 (BP Location: Left arm, Patient Position: Lying)   Pulse 70   Temp 97 °F (36.1 °C) (Oral)   Resp 16   Ht 167.6 cm (66\")   Wt 58.5 kg (128 lb 15.5 oz)   SpO2 97%   BMI 20.82 kg/m²             MDM  Number of Diagnoses or Management Options  Allergic reaction, initial encounter:   Pruritus:   Wheezing:      Amount and/or Complexity of Data Reviewed  Clinical lab tests: ordered and reviewed  Discuss the patient with other providers: (Hospitalist:  )    Risk of Complications, Morbidity, and/or Mortality  General comments: Comorbidities:  Past medical history, allergies, current medications family history social history noted above    Review and summarization of lab specimens, radiology:  ED tests reviewed by me    Differentials: Anaphylaxis, allergic reaction, pneumonia, bronchitis, urticaria, pruritus, airway obstruction; this list is not all inclusive and does not constitute the entirety of considered causes              Repeat examination, the patient reports that the itching is improved but " reports she she still has redness.  She denies any anginal equivalent chest pain tachycardia irregular heartbeat or shortness of breath; no further scratching is noted; after DuoNeb treatment the patient has absence of expiratory wheezing but some coarse rhonchi is noted;      Reviewed with patient, at this time recommendation is made for admission the hospital for further evaluation of her wheezing, control of her itching and evaluation to ensure that her allergic reaction does not progress she agrees with this recommendation.    She reports that she has had a cough for some time and reminds that she has had a recent upper respiratory infection; chest x-ray did not show any acute cardiopulmonary abnormalities.  Patient was discussed with hospitalist service will be admitted to the hospitalist service in stable condition for 23-hour observation.      Final diagnoses:   Allergic reaction, initial encounter   Pruritus   Wheezing   Hyponatremia              Honey Washington NP  10/09/19 0411

## 2019-10-09 NOTE — PLAN OF CARE
Problem: Patient Care Overview  Goal: Plan of Care Review  Outcome: Ongoing (interventions implemented as appropriate)   10/09/19 2155   Coping/Psychosocial   Plan of Care Reviewed With patient   Plan of Care Review   Progress improving   OTHER   Outcome Summary patient denies pain, shortness of breath or itching at this time. will monitor     Goal: Individualization and Mutuality  Outcome: Ongoing (interventions implemented as appropriate)    Goal: Discharge Needs Assessment  Outcome: Ongoing (interventions implemented as appropriate)    Goal: Interprofessional Rounds/Family Conf  Outcome: Ongoing (interventions implemented as appropriate)      Problem: Hypertensive Disease/Crisis (Arterial) (Adult)  Goal: Signs and Symptoms of Listed Potential Problems Will be Absent, Minimized or Managed (Hypertensive Disease/Crisis)  Outcome: Ongoing (interventions implemented as appropriate)

## 2019-10-09 NOTE — H&P
[facility] Medicine Services            Primary Care Provider:  Tigre Duran MD    Patient Care Team:  Tigre Duran MD as PCP - General  Tigre Duran MD as PCP - Claims Attributed  Tigre Duran MD as PCP - Family Medicine    CHIEF COMPLAINT:     Chief Complaint   Patient presents with   • Rash           HISTORY OF PRESENT ILLNESS:    HPI    Patient presented with a severe hives and rash involving almost all her body with wheezing yesterday.  She denies any lip swelling or tongue swelling no difficulty with breathing or stridor.  She was evidently ER given steroids Benadryl.  She is not able to pinpoint the etiology of her allergies.  Nothing has changed in her routine so far.  She denies any new medications.  She was admitted overnight and actually took her lisinopril this morning and so far she is doing okay.  She is very eager to go to home and attend a lunch meeting with her  and other friends this afternoon.  It was noted that her sodium level is low but she does not take any diuretics and denies any fluid losses anywhere.  Her symptoms almost resolved by this time.  Patient had been very eager to go home today and repeat BMP is ordered stat to evaluate for sodium level.  Her daughter at the bedside and aware of the plan and she will need to follow-up with PCP recheck her sodium level in the morning.  Patient may be able to go home later today if sodium level is not worsening.  She is advised to avoid driving until seen by primary care physician.  She on a monitor.  She has mild leukocytosis but no fever.  There is no clear signs of infection on clinical examination.  She reported recent pneumonia.  Her chest x-ray showed chronic changes without acute infiltrative process.      Past Medical History:   Diagnosis Date   • Aortic stenosis 10/28/2014   • Atrial premature complex 12/18/2015   • Chronic coronary artery disease 10/28/2014   • Dyslipidemia 10/28/2014   •  "Hypertension 10/28/2014   • Nonsustained ventricular tachycardia (CMS/HCC) 12/18/2015   • Shortness of breath 9/12/2016       Past Surgical History:   Procedure Laterality Date   • APPENDECTOMY     • CARDIAC PACEMAKER PLACEMENT  2017   • CARDIAC SURGERY  1999    Bypass surgery   • HERNIA REPAIR         History reviewed. No pertinent family history.    Social History     Tobacco Use   • Smoking status: Never Smoker   • Smokeless tobacco: Never Used   Substance Use Topics   • Alcohol use: No     Frequency: Never   • Drug use: No       Medications Prior to Admission   Medication Sig Dispense Refill Last Dose   • acebutolol (SECTRAL) 200 MG capsule Take 200 mg by mouth 2 (Two) Times a Day.   Taking   • aspirin 81 MG EC tablet Take 81 mg by mouth Daily.      • lisinopril (PRINIVIL,ZESTRIL) 20 MG tablet Take 1 tablet by mouth Daily. 90 tablet 0    • pravastatin (PRAVACHOL) 20 MG tablet Take 20 mg by mouth Daily.   Taking       Allergies:  Sulfa antibiotics    There is no immunization history for the selected administration types on file for this patient.        REVIEW OF SYSTEMS:     Review of Systems   Constitution: Negative.   HENT: Negative.    Eyes: Negative.    Cardiovascular: Negative.    Respiratory: Negative.    Endocrine: Negative.    Hematologic/Lymphatic: Negative.    Skin: Negative.    Musculoskeletal: Negative.    Gastrointestinal: Negative.    Genitourinary: Negative.    Neurological: Negative.    Psychiatric/Behavioral: Negative.    Allergic/Immunologic: Negative.    All other systems reviewed and are negative.      Vital Signs  Temp:  [97 °F (36.1 °C)-98.2 °F (36.8 °C)] 98.2 °F (36.8 °C)  Heart Rate:  [69-76] 69  Resp:  [16-18] 18  BP: (119-146)/(58-98) 138/71    Flowsheet Rows      First Filed Value   Admission Height  167.6 cm (66\") Documented at 10/09/2019 0021   Admission Weight  59 kg (130 lb) Documented at 10/09/2019 0021           Physical Exam:    Physical Exam   Constitutional: She is oriented to " person, place, and time. She appears well-developed and well-nourished. No distress.   HENT:   Head: Normocephalic and atraumatic.   Right Ear: External ear normal.   Left Ear: External ear normal.   Nose: Nose normal.   Mouth/Throat: Oropharynx is clear and moist. No oropharyngeal exudate.   Eyes: Conjunctivae and EOM are normal. Pupils are equal, round, and reactive to light. Right eye exhibits no discharge. Left eye exhibits no discharge. No scleral icterus.   Neck: Normal range of motion. No JVD present. No tracheal deviation present. No thyromegaly present.   Cardiovascular: Normal rate, regular rhythm, normal heart sounds and intact distal pulses. Exam reveals no gallop and no friction rub.   No murmur heard.  Pulmonary/Chest: Effort normal and breath sounds normal. No stridor. No respiratory distress. She has no wheezes. She has no rales. She exhibits no tenderness.   Only wheezing mild wheezing.  No rales.   Abdominal: Soft. Bowel sounds are normal. She exhibits no distension and no mass. There is no tenderness. There is no rebound and no guarding. No hernia.   Musculoskeletal: Normal range of motion. She exhibits no edema, tenderness or deformity.   Lymphadenopathy:     She has no cervical adenopathy.   Neurological: She is alert and oriented to person, place, and time. No cranial nerve deficit or sensory deficit. She exhibits normal muscle tone. Coordination normal.   Skin: Skin is warm and dry. No rash noted. She is not diaphoretic. No erythema.   No hives or angioedema.  Patient has multiple areas of ecchymotic/bruised patches on upper extremities.  She relates that to aspirin   Psychiatric: She has a normal mood and affect. Her behavior is normal.   Nursing note and vitals reviewed.      Emotional Behavior:   Normal   Debilities:  Age appropriate      Results Review:      I reviewed the patient's new clinical results.    Lab Results (most recent)     Procedure Component Value Units Date/Time    Basic  Metabolic Panel [[id number]]  (Abnormal) Collected:  10/09/19 0054    Specimen:  Blood Updated:  10/09/19 0117     Glucose 107 mg/dL      BUN 15 mg/dL      Creatinine 1.00 mg/dL      Sodium 128 mmol/L      Potassium 4.6 mmol/L      Chloride 93 mmol/L      CO2 24.0 mmol/L      Calcium 9.2 mg/dL      eGFR Non African Amer 53 mL/min/1.73      BUN/Creatinine Ratio 15.0     Anion Gap 15.6 mmol/L     Narrative:       The MDRD GFR formula is only valid for adults with stable renal function between ages 18 and 70.    CBC & Differential [[id number]] Collected:  10/09/19 0054    Specimen:  Blood Updated:  10/09/19 0101    Narrative:       The following orders were created for panel order CBC & Differential.  Procedure                               Abnormality         Status                     ---------                               -----------         ------                     CBC Auto Differential[[id number]]        Abnormal            Final result                 Please view results for these tests on the individual orders.    CBC Auto Differential [[id number]]  (Abnormal) Collected:  10/09/19 0054    Specimen:  Blood Updated:  10/09/19 0101     WBC 13.40 10*3/mm3      RBC 4.62 10*6/mm3      Hemoglobin 14.5 g/dL      Hematocrit 43.2 %      MCV 93.6 fL      MCH 31.5 pg      MCHC 33.6 g/dL      RDW 14.6 %      RDW-SD 47.3 fl      MPV 8.4 fL      Platelets 307 10*3/mm3      Neutrophil % 76.1 %      Lymphocyte % 14.0 %      Monocyte % 8.8 %      Eosinophil % 0.2 %      Basophil % 0.9 %      Neutrophils, Absolute 10.20 10*3/mm3      Lymphocytes, Absolute 1.90 10*3/mm3      Monocytes, Absolute 1.20 10*3/mm3      Eosinophils, Absolute 0.00 10*3/mm3      Basophils, Absolute 0.10 10*3/mm3      nRBC 0.0 /100 WBC           Imaging Results (most recent)     Procedure Component Value Units Date/Time    XR Chest 1 View [[id number]] Collected:  10/09/19 0722     Updated:  10/09/19 0727    Narrative:       XR CHEST 1 VW-     Date of Exam:  10/9/2019 1:45 AM     Indication: wheezing, allergic reaction; T78.40XA-Allergy, unspecified,  initial encounter; L29.9-Pruritus, unspecified; R06.2-Wheezing.     Comparison Exams: 2017     Technique: Single AP chest radiograph     FINDINGS:  Median sternotomy wires appear intact. A left subclavian pacemaker is in  place. There are interstitial markings within the left lower lung that  appear stable from prior exam. No new focal airspace consolidation is  identified. The heart and mediastinal contours appear stable. The  pulmonary vasculature appears normal. There are degenerative changes  along both shoulders.       Impression:       1.No definite acute cardiopulmonary process identified.  2.Interstitial markings within left lower lung appear stable from 2017,  likely representing chronic pulmonary changes.     Electronically Signed By-DR. Dick Castro MD On:10/9/2019 7:25 AM  This report was finalized on [id number] by DR. Dick Castro MD.        reviewed    ECG/EMG Results (most recent)     None        reviewed         Results for orders placed during the hospital encounter of 19   Adult Transthoracic Echo Complete W/ Cont if Necessary Per Protocol    Narrative                           Adult Echocardiogram Report        [facility]  [address] [place] [address]    [id number]        Name: TENA LANTIGUA EStudy Date: 2019 08:54 AM            BP: 129/78 mmHg  MRN: [id number]      Patient Location:   : 1935     Gender: Female                             Height: 66 in  Age: 83 yrs         Account#: [id number]                      Weight: 140 lb  Reason For Study: PM,AS,CAD,CAG                                BSA: 1.7 m2  Ordering Physician:  SAVANNAH REINA  Referring Physician:  KEVIN WRIGHT  Performed By: MB      M-Mode/2-D Measurements:  LVIDd: 4.3 cm       (3.7-5.7) LVPWd: 1.00 cm       (0.8-1.2)  LVIDs: 2.1 cm       (2.3-3.9)   ACS:                (1.6-3.7) IVSd: 1.1 cm         (0.7-1.2)  LA dimension: 5.0 cm(1.9-4.0) RVDd: 2.4 cm         (0.7-2.4)  FS: 51.5 %          (21-40%)  Ao root diam: 3.1 cm (2.0-3.7)    Comments  Technically adequate study.  Mitral valve is calcified with moderate mitral regurgitation.  Tricuspid valve is normal with mild to moderate tricuspid regurgitation.  Aortic valve is calcified with moderate to severe aortic stenosis and moderate  aortic insufficiency.  Left atrium is severely dilated. Aortic root is normal in size. Right  ventricle is normal in size.  LV size and contractility appears normal. EF is about 60-65%.  No pericardial effusion noted.      Interpretation    MMode/2D Measurements & Calculations  ESV(Teich): 13.9 ml  EF(Teich): 83.0 %                       Ao root area: 7.3 cm2  Asc Aorta Diam: 3.7 cm                  LVOT diam: 2.1 cm    EDV(MOD-sp4): 57.0 ml  ESV(MOD-sp4): 11.7 ml  EF(MOD-sp4): 79.5 %    Doppler Measurements & Calculations  MV E max deon: 98.8 cm/sec               MV max P.9 mmHg  MV A max deon: 66.9 cm/sec               MV mean P.4 mmHg  MV E/A: 1.5  MV dec slope: 475.5 cm/sec2             Ao V2 max: 439.6 cm/sec  MV dec time: 0.21 sec                   Ao max P.3 mmHg                                          Ao V2 mean: 325.7 cm/sec                                          Ao mean P.8 mmHg                                          Ao V2 VTI: 110.0 cm                                          CHARIS(I,D): 0.95 cm2                                            CHARIS(V,D): 0.99 cm2  AI max deon: 435.1 cm/sec                LV V1 max P.3 mmHg  AI max P.7 mmHg                    LV V1 mean PG: 3.3 mmHg  AI dec slope: 309.8 cm/sec2             LV V1 max: 125.0 cm/sec  AI dec time: 1.4 sec                    LV V1 mean: 85.7 cm/sec  AI P1/2t: 411.4 msec                    LV V1 VTI: 30.0 cm  PA max P.3 mmHg                     PI end-d deon: 115.7 cm/sec  TR max deon: 294.6 cm/sec  TR  max P.1 mmHg  RVSP(TR): 45.1 mmHg      _______________________________________________________________________________    Electronically signed by: Donato Cevallos MD  on 2019 03:50  PM         XR Chest 1 View  Narrative: XR CHEST 1 VW-     Date of Exam: 10/9/2019 1:45 AM     Indication: wheezing, allergic reaction; T78.40XA-Allergy, unspecified,  initial encounter; L29.9-Pruritus, unspecified; R06.2-Wheezing.     Comparison Exams: 2017     Technique: Single AP chest radiograph     FINDINGS:  Median sternotomy wires appear intact. A left subclavian pacemaker is in  place. There are interstitial markings within the left lower lung that  appear stable from prior exam. No new focal airspace consolidation is  identified. The heart and mediastinal contours appear stable. The  pulmonary vasculature appears normal. There are degenerative changes  along both shoulders.     Impression: 1.No definite acute cardiopulmonary process identified.  2.Interstitial markings within left lower lung appear stable from 2017,  likely representing chronic pulmonary changes.     Electronically Signed By-DR. Dick Castro MD On:10/9/2019 7:25 AM  This report was finalized on [id number] by DR. Dick Castro MD.      Assessment/Plan       Allergic reaction          Assessment/Plan:     Allergic reaction to unknown inciting factor.  Patient's cannot pinpoint any new change in her schedule or diet.  She denies any new medications.  I would like to blame lisinopril but there is no classic findings of angioedema.  She really took her lisinopril this morning and has no reaction so far.  Also she took lisinopril early yesterday morning her symptoms started 7 PM in the evening.  She is convinced that nothing is clear causing her skin rash or type I allergic reaction.  I will continue steroids and and a tapering fashion in a few more days of Benadryl.  She is advised not to drive.  Her daughter at the  bedside and aware of the findings of hyponatremia which is unexplained at this time but repeat evaluation is pending and will follow up closely with primary care physician is strongly advised.  Patient is quite eager to go home today before lunch.  She is aware of the plan and the consequences of possible recurrences of her allergies need to follow hyponatremia and its important to avoid significant changes.      Patient has chronic medical problems including aortic stenosis history of nonsustained V. tach's coronary artery disease bypass surgery pacemaker and hypertension.  She is a stable from that standpoint and no reported arrhythmias so far.    Disposition    Home with family    I discussed the patients findings and my recommendations with patient and family.     Connor aLke MD  10/09/19  9:33 AM

## 2019-10-11 ENCOUNTER — CLINICAL SUPPORT NO REQUIREMENTS (OUTPATIENT)
Dept: CARDIOLOGY | Facility: CLINIC | Age: 84
End: 2019-10-11

## 2019-10-11 DIAGNOSIS — Z95.0 PACEMAKER: Primary | ICD-10-CM

## 2019-10-11 DIAGNOSIS — I49.5 SICK SINUS SYNDROME (HCC): ICD-10-CM

## 2019-10-14 ENCOUNTER — CLINICAL SUPPORT NO REQUIREMENTS (OUTPATIENT)
Dept: CARDIOLOGY | Facility: CLINIC | Age: 84
End: 2019-10-14

## 2019-10-14 ENCOUNTER — OFFICE VISIT (OUTPATIENT)
Dept: CARDIOLOGY | Facility: CLINIC | Age: 84
End: 2019-10-14

## 2019-10-14 VITALS
DIASTOLIC BLOOD PRESSURE: 68 MMHG | WEIGHT: 130.5 LBS | OXYGEN SATURATION: 97 % | HEART RATE: 69 BPM | SYSTOLIC BLOOD PRESSURE: 142 MMHG | HEIGHT: 66 IN | BODY MASS INDEX: 20.97 KG/M2

## 2019-10-14 DIAGNOSIS — E78.5 DYSLIPIDEMIA: ICD-10-CM

## 2019-10-14 DIAGNOSIS — I10 ESSENTIAL HYPERTENSION: ICD-10-CM

## 2019-10-14 DIAGNOSIS — Z95.0 PRESENCE OF CARDIAC PACEMAKER: ICD-10-CM

## 2019-10-14 DIAGNOSIS — I49.5 TACHY-BRADY SYNDROME (HCC): Primary | ICD-10-CM

## 2019-10-14 DIAGNOSIS — I49.5 SICK SINUS SYNDROME (HCC): ICD-10-CM

## 2019-10-14 DIAGNOSIS — Z95.0 PRESENCE OF CARDIAC PACEMAKER: Primary | ICD-10-CM

## 2019-10-14 DIAGNOSIS — I35.0 NONRHEUMATIC AORTIC VALVE STENOSIS: ICD-10-CM

## 2019-10-14 DIAGNOSIS — I49.5 TACHY-BRADY SYNDROME (HCC): ICD-10-CM

## 2019-10-14 DIAGNOSIS — I25.10 CHRONIC CORONARY ARTERY DISEASE: ICD-10-CM

## 2019-10-14 PROCEDURE — 93280 PM DEVICE PROGR EVAL DUAL: CPT | Performed by: INTERNAL MEDICINE

## 2019-10-14 PROCEDURE — 99214 OFFICE O/P EST MOD 30 MIN: CPT | Performed by: INTERNAL MEDICINE

## 2019-10-14 NOTE — PROGRESS NOTES
"    Subjective:     Encounter Date:10/14/2019      Patient ID: Olga Curtis is a 84 y.o. female.    Chief Complaint:  History of Present Illness 84-year-old white female with history of  tachycardia syndrome status post pacemaker placement history of aortic valve stenosis coronary disease hypertension dyslipidemia presents to my office for follow-up.  Patient recently had a fall when she tripped and fell down.  Patient does not have any symptoms of chest pain or shortness of breath at rest on exertion.  No complaint of any PND orthopnea.  No palpitations dizziness syncope or swelling of the feet.  She had a pacemaker checked and was noted to have atrial fibrillation for more than 13 hours on occasions.  She is taking her medicines regularly.    The following portions of the patient's history were reviewed and updated as appropriate: allergies, current medications, past family history, past medical history, past social history, past surgical history and problem list.  Past Medical History:   Diagnosis Date   • Aortic stenosis 10/28/2014   • Atrial premature complex 12/18/2015   • Chronic coronary artery disease 10/28/2014   • Dyslipidemia 10/28/2014   • Hypertension 10/28/2014   • Nonsustained ventricular tachycardia (CMS/HCC) 12/18/2015   • Shortness of breath 9/12/2016     Past Surgical History:   Procedure Laterality Date   • APPENDECTOMY     • CARDIAC PACEMAKER PLACEMENT  2017   • CARDIAC SURGERY  1999    Bypass surgery   • HERNIA REPAIR       /68   Pulse 69   Ht 167.6 cm (66\")   Wt 59.2 kg (130 lb 8 oz)   SpO2 97%   BMI 21.06 kg/m²   History reviewed. No pertinent family history.    Current Outpatient Medications:   •  acebutolol (SECTRAL) 200 MG capsule, Take 200 mg by mouth 2 (Two) Times a Day., Disp: , Rfl:   •  aspirin 81 MG EC tablet, Take 81 mg by mouth Daily., Disp: , Rfl:   •  lisinopril (PRINIVIL,ZESTRIL) 20 MG tablet, Take 1 tablet by mouth Daily., Disp: 90 tablet, Rfl: 0  •  pravastatin " (PRAVACHOL) 20 MG tablet, Take 20 mg by mouth Daily., Disp: , Rfl:   Allergies   Allergen Reactions   • Sulfa Antibiotics Hives     Social History     Socioeconomic History   • Marital status:      Spouse name: Not on file   • Number of children: Not on file   • Years of education: Not on file   • Highest education level: Not on file   Tobacco Use   • Smoking status: Never Smoker   • Smokeless tobacco: Never Used   Substance and Sexual Activity   • Alcohol use: No     Frequency: Never   • Drug use: No   • Sexual activity: Defer     Review of Systems   Constitution: Negative for fever and malaise/fatigue.   HENT: Negative for ear pain and nosebleeds.    Eyes: Negative for blurred vision and double vision.   Cardiovascular: Negative for chest pain, dyspnea on exertion and palpitations.   Respiratory: Negative for cough and shortness of breath.    Skin: Negative for rash.   Musculoskeletal: Negative for joint pain.   Gastrointestinal: Negative for abdominal pain, nausea and vomiting.   Neurological: Negative for focal weakness and headaches.   Psychiatric/Behavioral: Negative for depression. The patient is not nervous/anxious.    All other systems reviewed and are negative.             Objective:     Physical Exam   Constitutional: She appears well-developed and well-nourished.   HENT:   Head: Normocephalic and atraumatic.   Eyes: Conjunctivae and EOM are normal. Pupils are equal, round, and reactive to light. No scleral icterus.   Neck: Normal range of motion. Neck supple. No JVD present. Carotid bruit is not present.   Cardiovascular: Normal rate, regular rhythm, S1 normal, S2 normal, normal heart sounds and intact distal pulses. PMI is not displaced.   Pulmonary/Chest: Effort normal and breath sounds normal. She has no wheezes. She has no rales.   Abdominal: Soft. Bowel sounds are normal.   Musculoskeletal: Normal range of motion.   Neurological: She is alert. She has normal strength.   No focal deficits    Skin: Skin is warm and dry. No rash noted.   Psychiatric: She has a normal mood and affect.     Procedures    Lab Review:       Assessment:          Diagnosis Plan   1. Tachy-martin syndrome (CMS/HCC)     2. Presence of cardiac pacemaker     3. Sick sinus syndrome (CMS/HCC)     4. Nonrheumatic aortic valve stenosis     5. Chronic coronary artery disease     6. Essential hypertension     7. Dyslipidemia            Plan:       Patient has new onset atrial fibrillation on pacemaker check and has been having approximately  Patient will be placed on anticoagulation  Patient has history of sick sinus syndrome tachybradycardia syndrome status post pacemaker placement.  Patient's pacemaker is working very well  Patient's blood pressure and heart are stable  Patient's lipid levels are followed by the primary care doctor  Patient has moderate to severe aortic valve stenosis with normal LV function.  We will continue her current medicines and follow her in 6 months.

## 2019-10-15 ENCOUNTER — CLINICAL SUPPORT NO REQUIREMENTS (OUTPATIENT)
Dept: CARDIOLOGY | Facility: CLINIC | Age: 84
End: 2019-10-15

## 2019-10-15 ENCOUNTER — TELEPHONE (OUTPATIENT)
Dept: CARDIOLOGY | Facility: CLINIC | Age: 84
End: 2019-10-15

## 2019-10-15 DIAGNOSIS — I49.5 SICK SINUS SYNDROME (HCC): Primary | ICD-10-CM

## 2019-10-15 DIAGNOSIS — Z95.0 PACEMAKER: ICD-10-CM

## 2019-10-15 NOTE — TELEPHONE ENCOUNTER
PT REPORTING SHE IS CHOOSING XARELTO OR ELIQUIS FOR HER THINNER, TOLS TO CALL PER Dr Kwok   [phone]

## 2019-10-16 ENCOUNTER — CLINICAL SUPPORT NO REQUIREMENTS (OUTPATIENT)
Dept: CARDIOLOGY | Facility: CLINIC | Age: 84
End: 2019-10-16

## 2019-10-16 DIAGNOSIS — Z95.0 PACEMAKER: Primary | ICD-10-CM

## 2019-10-16 DIAGNOSIS — I49.5 TACHY-BRADY SYNDROME (HCC): ICD-10-CM

## 2019-12-02 RX ORDER — PRAVASTATIN SODIUM 20 MG
20 TABLET ORAL DAILY
Qty: 90 TABLET | Refills: 3 | Status: SHIPPED | OUTPATIENT
Start: 2019-12-02 | End: 2020-01-01

## 2019-12-09 ENCOUNTER — OFFICE VISIT (OUTPATIENT)
Dept: ORTHOPEDIC SURGERY | Facility: CLINIC | Age: 84
End: 2019-12-09

## 2019-12-09 VITALS
SYSTOLIC BLOOD PRESSURE: 151 MMHG | DIASTOLIC BLOOD PRESSURE: 79 MMHG | BODY MASS INDEX: 20.28 KG/M2 | HEIGHT: 66 IN | WEIGHT: 126.2 LBS | HEART RATE: 69 BPM

## 2019-12-09 DIAGNOSIS — M25.412 SHOULDER EFFUSION, LEFT: ICD-10-CM

## 2019-12-09 DIAGNOSIS — M19.012 PRIMARY LOCALIZED OSTEOARTHROSIS OF LEFT SHOULDER REGION: Primary | ICD-10-CM

## 2019-12-09 PROCEDURE — 99213 OFFICE O/P EST LOW 20 MIN: CPT | Performed by: FAMILY MEDICINE

## 2019-12-09 RX ORDER — TRAMADOL HYDROCHLORIDE 50 MG/1
50 TABLET ORAL EVERY 6 HOURS PRN
COMMUNITY
End: 2020-01-01

## 2019-12-09 NOTE — PROGRESS NOTES
Primary Care Sports Medicine Office Visit Note     Patient ID: Olga Curtis is a 84 y.o. female.    Chief Complaint:  Chief Complaint   Patient presents with   • Left Shoulder - Follow-up     HPI:    Ms. Olga Curtis is a 84 y.o. female who presents to the clinic today for recurrence of known severe osteoarthritis of the left shoulder.  She states that since previous injection, she did incredibly well for the first 1.5 to 2 months.  Unfortunately over the last month or so she had insidious onset return and worsening of her chronic shoulder pain.  She has a moderate amount of bony limitation to abduction and forward flexion.  Osteoarthritis on imaging is severe.  She now states that she also feels a fluctuant area in the anterior shoulder that is able to be seen even with a shirt on.  There is a large amount of swelling in the shoulder today.  She is accompanied by her  and daughter today.  At the previous visit 3 months ago, aspiration and injection of her shoulder was done under ultrasound guidance, she had an excellent result and requests this modality again today.    Past Medical History:   Diagnosis Date   • Aortic stenosis 10/28/2014   • Atrial premature complex 12/18/2015   • Chronic coronary artery disease 10/28/2014   • Dyslipidemia 10/28/2014   • Hypertension 10/28/2014   • Nonsustained ventricular tachycardia (CMS/HCC) 12/18/2015   • Shortness of breath 9/12/2016       Past Surgical History:   Procedure Laterality Date   • APPENDECTOMY     • CARDIAC PACEMAKER PLACEMENT  2017   • CARDIAC SURGERY  1999    Bypass surgery   • HERNIA REPAIR         Family History   Problem Relation Age of Onset   • No Known Problems Mother    • No Known Problems Father    • No Known Problems Sister    • No Known Problems Brother      Social History     Occupational History   • Not on file   Tobacco Use   • Smoking status: Never Smoker   • Smokeless tobacco: Never Used   Substance and Sexual Activity   • Alcohol use: No  "    Frequency: Never   • Drug use: No   • Sexual activity: Defer      Review of Systems   Constitutional: Negative for activity change and fever.   Respiratory: Negative for cough and shortness of breath.    Cardiovascular: Negative for chest pain.   Gastrointestinal: Negative for constipation, diarrhea, nausea and vomiting.   Musculoskeletal: Positive for arthralgias.   Skin: Negative for color change and rash.   Neurological: Negative for weakness.   Hematological: Does not bruise/bleed easily.       Objective:    /79   Pulse 69   Ht 167.6 cm (66\")   Wt 57.2 kg (126 lb 3.2 oz)   BMI 20.37 kg/m²     Physical Examination:  Physical Exam   Constitutional: She appears well-developed and well-nourished. No distress.   HENT:   Head: Normocephalic and atraumatic.   Eyes: Conjunctivae are normal.   Cardiovascular: Intact distal pulses.   Pulmonary/Chest: Effort normal. No respiratory distress.   Neurological: She is alert.   Skin: Skin is warm. Capillary refill takes less than 2 seconds. She is not diaphoretic.   Nursing note and vitals reviewed.    Left Shoulder Exam     Comments:  Special tests are extremely difficult in this patient limited in range of motion by bony constraints.  She has about 80 degrees of abduction, similarly 80 degrees of forward flexion.  She has limited internal and external rotation due to crunching popping and incredibly painful crepitus.  Today there is an obvious effusion about the entirety of the shoulder with large tight fluctuant area anteriorly.  No erythema, no warmth.          Imaging and other tests:  No new imaging today.    Assessment and Plan:    1. Primary localized osteoarthrosis of left shoulder region    2. Shoulder effusion, left    After discussion of risks and benefits with the patient and her family members, and all other treatment options, they elected to proceed with aspiration and corticosteroid injection of the left shoulder.  The patient tolerated this well " "with near immediate improvement in tightness, range of motion, and pain post aspiration.  This gave her moderately durable relief for almost 2 months at last injection, but we did discuss today the possibility of total shoulder replacement in the near future as this continues to worsen.  Patient will think about this, but otherwise RTC in 3 months for repeat injection if warranted at that time.    Yohan VEGA \"Chance\" Radu RAVI DO, CAQSM  12/10/19  4:37 PM    Disclaimer: Please note that areas of this note were completed with computer voice recognition software.  Quite often unanticipated grammatical, syntax, homophones, and other interpretive errors are inadvertently transcribed by the computer software. Please excuse any errors that have escaped final proofreading.  "

## 2019-12-10 PROCEDURE — 20610 DRAIN/INJ JOINT/BURSA W/O US: CPT | Performed by: FAMILY MEDICINE

## 2019-12-10 RX ORDER — TRIAMCINOLONE ACETONIDE 40 MG/ML
80 INJECTION, SUSPENSION INTRA-ARTICULAR; INTRAMUSCULAR
Status: COMPLETED | OUTPATIENT
Start: 2019-12-10 | End: 2019-12-10

## 2019-12-10 RX ADMIN — TRIAMCINOLONE ACETONIDE 80 MG: 40 INJECTION, SUSPENSION INTRA-ARTICULAR; INTRAMUSCULAR at 16:38

## 2019-12-10 NOTE — PROGRESS NOTES
"Procedure   Large Joint Arthrocentesis: L glenohumeral  Date/Time: 12/10/2019 4:38 PM  Consent given by: patient  Timeout: Immediately prior to procedure a time out was called to verify the correct patient, procedure, equipment, support staff and site/side marked as required   Supporting Documentation  Indications: pain and joint swelling   Procedure Details  Location: shoulder - L glenohumeral  Preparation: Patient was prepped and draped in the usual sterile fashion  Needle size: 18 G (3cc of 1% lidocaine without epinepherin on a 25 ga 1.5\" needle was used to anesthetize the posterior tract.)  Approach: posterior  Medications administered: 80 mg triamcinolone acetonide 40 MG/ML (2cc of 1% lidocaine without epinepherine and 2cc of 40mg Kenalog)  Aspirate amount: 61 mL  Aspirate: clear, yellow and blood-tinged  Patient tolerance: patient tolerated the procedure well with no immediate complications (Blood loss negligable, pt admits to almost immediate pain reflief post injection with gentle ROM.)           "

## 2020-01-01 ENCOUNTER — READMISSION MANAGEMENT (OUTPATIENT)
Dept: CALL CENTER | Facility: HOSPITAL | Age: 85
End: 2020-01-01

## 2020-01-01 ENCOUNTER — OFFICE VISIT (OUTPATIENT)
Dept: ORTHOPEDIC SURGERY | Facility: CLINIC | Age: 85
End: 2020-01-01

## 2020-01-01 ENCOUNTER — HOSPITAL ENCOUNTER (INPATIENT)
Facility: HOSPITAL | Age: 85
LOS: 9 days | Discharge: NURSING, REHAB OR LONG-TERM CARE | End: 2020-12-22
Attending: EMERGENCY MEDICINE | Admitting: STUDENT IN AN ORGANIZED HEALTH CARE EDUCATION/TRAINING PROGRAM

## 2020-01-01 ENCOUNTER — TELEPHONE (OUTPATIENT)
Dept: CARDIOLOGY | Facility: CLINIC | Age: 85
End: 2020-01-01

## 2020-01-01 ENCOUNTER — HOSPITAL ENCOUNTER (OUTPATIENT)
Dept: CT IMAGING | Facility: HOSPITAL | Age: 85
Discharge: HOME | End: 2020-02-27
Admitting: ORTHOPAEDIC SURGERY

## 2020-01-01 ENCOUNTER — APPOINTMENT (OUTPATIENT)
Dept: GENERAL RADIOLOGY | Facility: HOSPITAL | Age: 85
End: 2020-01-01

## 2020-01-01 ENCOUNTER — PREP FOR SURGERY (OUTPATIENT)
Dept: OTHER | Facility: HOSPITAL | Age: 85
End: 2020-01-01

## 2020-01-01 ENCOUNTER — HOSPITAL ENCOUNTER (INPATIENT)
Facility: HOSPITAL | Age: 85
LOS: 4 days | Discharge: NURSING, REHAB OR LONG-TERM CARE | End: 2020-12-06
Attending: EMERGENCY MEDICINE | Admitting: INTERNAL MEDICINE

## 2020-01-01 ENCOUNTER — CLINICAL SUPPORT NO REQUIREMENTS (OUTPATIENT)
Dept: CARDIOLOGY | Facility: CLINIC | Age: 85
End: 2020-01-01

## 2020-01-01 ENCOUNTER — HOSPITAL ENCOUNTER (OUTPATIENT)
Dept: GENERAL RADIOLOGY | Facility: HOSPITAL | Age: 85
Discharge: HOME | End: 2020-09-30

## 2020-01-01 ENCOUNTER — APPOINTMENT (OUTPATIENT)
Dept: CT IMAGING | Facility: HOSPITAL | Age: 85
End: 2020-01-01

## 2020-01-01 ENCOUNTER — HOSPITAL ENCOUNTER (OUTPATIENT)
Dept: CARDIOLOGY | Facility: HOSPITAL | Age: 85
Discharge: HOME | End: 2020-09-30

## 2020-01-01 ENCOUNTER — OFFICE VISIT (OUTPATIENT)
Dept: CARDIOLOGY | Facility: CLINIC | Age: 85
End: 2020-01-01

## 2020-01-01 ENCOUNTER — APPOINTMENT (OUTPATIENT)
Dept: RESPIRATORY THERAPY | Facility: HOSPITAL | Age: 85
End: 2020-01-01

## 2020-01-01 ENCOUNTER — APPOINTMENT (OUTPATIENT)
Dept: CARDIOLOGY | Facility: HOSPITAL | Age: 85
End: 2020-01-01

## 2020-01-01 ENCOUNTER — HOSPITAL ENCOUNTER (OUTPATIENT)
Dept: GENERAL RADIOLOGY | Facility: HOSPITAL | Age: 85
Discharge: HOME | End: 2020-05-21
Admitting: NURSE PRACTITIONER

## 2020-01-01 ENCOUNTER — HOSPITAL ENCOUNTER (INPATIENT)
Facility: HOSPITAL | Age: 85
LOS: 1 days | Discharge: HOME HEALTH | End: 2020-10-07
Attending: ORTHOPAEDIC SURGERY | Admitting: ORTHOPAEDIC SURGERY

## 2020-01-01 ENCOUNTER — TELEPHONE (OUTPATIENT)
Dept: CARDIAC REHAB | Facility: HOSPITAL | Age: 85
End: 2020-01-01

## 2020-01-01 ENCOUNTER — LAB (OUTPATIENT)
Dept: LAB | Facility: HOSPITAL | Age: 85
End: 2020-01-01

## 2020-01-01 ENCOUNTER — TRANSCRIBE ORDERS (OUTPATIENT)
Dept: SLEEP MEDICINE | Facility: HOSPITAL | Age: 85
End: 2020-01-01

## 2020-01-01 ENCOUNTER — HOSPITAL ENCOUNTER (OUTPATIENT)
Dept: CT IMAGING | Facility: HOSPITAL | Age: 85
Discharge: HOME | End: 2020-03-20
Admitting: INTERNAL MEDICINE

## 2020-01-01 ENCOUNTER — ANESTHESIA (OUTPATIENT)
Dept: PERIOP | Facility: HOSPITAL | Age: 85
End: 2020-01-01

## 2020-01-01 ENCOUNTER — HOSPITAL ENCOUNTER (OUTPATIENT)
Dept: CARDIOLOGY | Facility: HOSPITAL | Age: 85
Discharge: HOME | End: 2020-07-02
Admitting: INTERNAL MEDICINE

## 2020-01-01 ENCOUNTER — HOSPITAL ENCOUNTER (OUTPATIENT)
Facility: HOSPITAL | Age: 85
Setting detail: SURGERY ADMIT
End: 2020-01-01
Attending: ORTHOPAEDIC SURGERY | Admitting: ORTHOPAEDIC SURGERY

## 2020-01-01 ENCOUNTER — DOCUMENTATION (OUTPATIENT)
Dept: CARDIOLOGY | Facility: HOSPITAL | Age: 85
End: 2020-01-01

## 2020-01-01 ENCOUNTER — HOSPITAL ENCOUNTER (INPATIENT)
Facility: HOSPITAL | Age: 85
LOS: 2 days | Discharge: HOME | End: 2020-03-03
Attending: EMERGENCY MEDICINE | Admitting: INTERNAL MEDICINE

## 2020-01-01 ENCOUNTER — APPOINTMENT (OUTPATIENT)
Dept: MRI IMAGING | Facility: HOSPITAL | Age: 85
End: 2020-01-01

## 2020-01-01 ENCOUNTER — TELEPHONE (OUTPATIENT)
Dept: ORTHOPEDIC SURGERY | Facility: CLINIC | Age: 85
End: 2020-01-01

## 2020-01-01 ENCOUNTER — HOSPITAL ENCOUNTER (OUTPATIENT)
Dept: GENERAL RADIOLOGY | Facility: HOSPITAL | Age: 85
Discharge: HOME | End: 2020-03-20

## 2020-01-01 ENCOUNTER — ANESTHESIA EVENT (OUTPATIENT)
Dept: PERIOP | Facility: HOSPITAL | Age: 85
End: 2020-01-01

## 2020-01-01 ENCOUNTER — APPOINTMENT (OUTPATIENT)
Dept: PREADMISSION TESTING | Facility: HOSPITAL | Age: 85
End: 2020-01-01

## 2020-01-01 ENCOUNTER — HOSPITAL ENCOUNTER (INPATIENT)
Facility: HOSPITAL | Age: 85
LOS: 1 days | Discharge: HOME | End: 2020-05-27
Attending: THORACIC SURGERY (CARDIOTHORACIC VASCULAR SURGERY) | Admitting: THORACIC SURGERY (CARDIOTHORACIC VASCULAR SURGERY)

## 2020-01-01 ENCOUNTER — ANCILLARY PROCEDURE (OUTPATIENT)
Dept: PERIOP | Facility: HOSPITAL | Age: 85
End: 2020-01-01

## 2020-01-01 ENCOUNTER — HOSPITAL ENCOUNTER (INPATIENT)
Facility: HOSPITAL | Age: 85
LOS: 1 days | End: 2020-12-25
Attending: EMERGENCY MEDICINE | Admitting: STUDENT IN AN ORGANIZED HEALTH CARE EDUCATION/TRAINING PROGRAM

## 2020-01-01 VITALS
WEIGHT: 125 LBS | HEART RATE: 71 BPM | SYSTOLIC BLOOD PRESSURE: 137 MMHG | HEIGHT: 66 IN | BODY MASS INDEX: 20.09 KG/M2 | DIASTOLIC BLOOD PRESSURE: 72 MMHG

## 2020-01-01 VITALS
TEMPERATURE: 96.7 F | OXYGEN SATURATION: 100 % | DIASTOLIC BLOOD PRESSURE: 47 MMHG | SYSTOLIC BLOOD PRESSURE: 169 MMHG | RESPIRATION RATE: 16 BRPM | WEIGHT: 125.22 LBS | BODY MASS INDEX: 20.12 KG/M2 | HEIGHT: 66 IN | HEART RATE: 70 BPM

## 2020-01-01 VITALS
TEMPERATURE: 98.1 F | HEART RATE: 76 BPM | OXYGEN SATURATION: 100 % | SYSTOLIC BLOOD PRESSURE: 110 MMHG | BODY MASS INDEX: 19.21 KG/M2 | RESPIRATION RATE: 15 BRPM | DIASTOLIC BLOOD PRESSURE: 68 MMHG | HEIGHT: 64 IN | WEIGHT: 112.5 LBS

## 2020-01-01 VITALS
OXYGEN SATURATION: 96 % | WEIGHT: 115.6 LBS | DIASTOLIC BLOOD PRESSURE: 68 MMHG | HEART RATE: 70 BPM | HEIGHT: 66 IN | BODY MASS INDEX: 18.58 KG/M2 | SYSTOLIC BLOOD PRESSURE: 128 MMHG | RESPIRATION RATE: 16 BRPM

## 2020-01-01 VITALS
WEIGHT: 114 LBS | DIASTOLIC BLOOD PRESSURE: 80 MMHG | BODY MASS INDEX: 18.32 KG/M2 | SYSTOLIC BLOOD PRESSURE: 122 MMHG | HEART RATE: 70 BPM | HEIGHT: 66 IN

## 2020-01-01 VITALS
HEART RATE: 70 BPM | BODY MASS INDEX: 21.38 KG/M2 | WEIGHT: 133 LBS | DIASTOLIC BLOOD PRESSURE: 79 MMHG | HEIGHT: 66 IN | SYSTOLIC BLOOD PRESSURE: 144 MMHG

## 2020-01-01 VITALS
WEIGHT: 115 LBS | DIASTOLIC BLOOD PRESSURE: 66 MMHG | BODY MASS INDEX: 18.48 KG/M2 | SYSTOLIC BLOOD PRESSURE: 136 MMHG | HEIGHT: 66 IN | HEART RATE: 61 BPM

## 2020-01-01 VITALS
HEART RATE: 70 BPM | DIASTOLIC BLOOD PRESSURE: 63 MMHG | WEIGHT: 126.1 LBS | SYSTOLIC BLOOD PRESSURE: 171 MMHG | HEIGHT: 66 IN | RESPIRATION RATE: 17 BRPM | TEMPERATURE: 97.6 F | BODY MASS INDEX: 20.27 KG/M2 | OXYGEN SATURATION: 100 %

## 2020-01-01 VITALS
BODY MASS INDEX: 18.32 KG/M2 | DIASTOLIC BLOOD PRESSURE: 62 MMHG | SYSTOLIC BLOOD PRESSURE: 120 MMHG | WEIGHT: 114 LBS | HEART RATE: 70 BPM | HEIGHT: 66 IN

## 2020-01-01 VITALS
HEIGHT: 66 IN | RESPIRATION RATE: 24 BRPM | HEART RATE: 70 BPM | DIASTOLIC BLOOD PRESSURE: 76 MMHG | OXYGEN SATURATION: 100 % | SYSTOLIC BLOOD PRESSURE: 134 MMHG | TEMPERATURE: 97 F | BODY MASS INDEX: 18.32 KG/M2 | WEIGHT: 114 LBS

## 2020-01-01 VITALS
TEMPERATURE: 97.6 F | HEART RATE: 70 BPM | WEIGHT: 125.22 LBS | SYSTOLIC BLOOD PRESSURE: 114 MMHG | RESPIRATION RATE: 16 BRPM | HEIGHT: 60 IN | DIASTOLIC BLOOD PRESSURE: 38 MMHG | BODY MASS INDEX: 24.58 KG/M2 | OXYGEN SATURATION: 99 %

## 2020-01-01 VITALS
BODY MASS INDEX: 18.16 KG/M2 | DIASTOLIC BLOOD PRESSURE: 85 MMHG | HEIGHT: 66 IN | WEIGHT: 113 LBS | HEART RATE: 66 BPM | SYSTOLIC BLOOD PRESSURE: 141 MMHG

## 2020-01-01 VITALS
RESPIRATION RATE: 16 BRPM | SYSTOLIC BLOOD PRESSURE: 135 MMHG | DIASTOLIC BLOOD PRESSURE: 73 MMHG | HEIGHT: 66 IN | HEART RATE: 72 BPM | BODY MASS INDEX: 18.48 KG/M2 | TEMPERATURE: 97.3 F | WEIGHT: 115 LBS | OXYGEN SATURATION: 97 %

## 2020-01-01 VITALS
RESPIRATION RATE: 16 BRPM | DIASTOLIC BLOOD PRESSURE: 47 MMHG | SYSTOLIC BLOOD PRESSURE: 116 MMHG | OXYGEN SATURATION: 95 % | HEART RATE: 66 BPM | HEIGHT: 66 IN | TEMPERATURE: 98 F | BODY MASS INDEX: 18.19 KG/M2 | WEIGHT: 113.2 LBS

## 2020-01-01 VITALS
DIASTOLIC BLOOD PRESSURE: 80 MMHG | WEIGHT: 115 LBS | HEART RATE: 53 BPM | BODY MASS INDEX: 18.48 KG/M2 | SYSTOLIC BLOOD PRESSURE: 132 MMHG | OXYGEN SATURATION: 98 % | HEIGHT: 66 IN

## 2020-01-01 DIAGNOSIS — R79.9 ABNORMAL FINDING OF BLOOD CHEMISTRY, UNSPECIFIED: ICD-10-CM

## 2020-01-01 DIAGNOSIS — R94.5 ABNORMAL RESULTS OF LIVER FUNCTION STUDIES: ICD-10-CM

## 2020-01-01 DIAGNOSIS — I35.0 SEVERE AORTIC STENOSIS: Primary | Chronic | ICD-10-CM

## 2020-01-01 DIAGNOSIS — I50.32 CHRONIC DIASTOLIC CONGESTIVE HEART FAILURE (HCC): ICD-10-CM

## 2020-01-01 DIAGNOSIS — M19.012 OSTEOARTHRITIS OF LEFT GLENOHUMERAL JOINT: Primary | ICD-10-CM

## 2020-01-01 DIAGNOSIS — I13.0 HYPERTENSIVE HEART AND CHRONIC KIDNEY DISEASE WITH HEART FAILURE AND STAGE 1 THROUGH STAGE 4 CHRONIC KIDNEY DISEASE, OR UNSPECIFIED CHRONIC KIDNEY DISEASE (HCC): ICD-10-CM

## 2020-01-01 DIAGNOSIS — I50.9 ACUTE ON CHRONIC CONGESTIVE HEART FAILURE, UNSPECIFIED HEART FAILURE TYPE (HCC): ICD-10-CM

## 2020-01-01 DIAGNOSIS — I48.21 PERMANENT ATRIAL FIBRILLATION (HCC): ICD-10-CM

## 2020-01-01 DIAGNOSIS — J90 PLEURAL EFFUSION: ICD-10-CM

## 2020-01-01 DIAGNOSIS — I35.0 NONRHEUMATIC AORTIC VALVE STENOSIS: ICD-10-CM

## 2020-01-01 DIAGNOSIS — M19.012 PRIMARY LOCALIZED OSTEOARTHROSIS OF LEFT SHOULDER REGION: ICD-10-CM

## 2020-01-01 DIAGNOSIS — I35.0 NONRHEUMATIC AORTIC VALVE STENOSIS: Primary | ICD-10-CM

## 2020-01-01 DIAGNOSIS — R79.1 ABNORMAL COAGULATION PROFILE: ICD-10-CM

## 2020-01-01 DIAGNOSIS — M19.012 PRIMARY LOCALIZED OSTEOARTHROSIS OF LEFT SHOULDER REGION: Primary | ICD-10-CM

## 2020-01-01 DIAGNOSIS — R55 SYNCOPE AND COLLAPSE: ICD-10-CM

## 2020-01-01 DIAGNOSIS — Z95.2 S/P TAVR (TRANSCATHETER AORTIC VALVE REPLACEMENT): ICD-10-CM

## 2020-01-01 DIAGNOSIS — M19.012 OSTEOARTHRITIS OF LEFT GLENOHUMERAL JOINT: ICD-10-CM

## 2020-01-01 DIAGNOSIS — Z01.818 OTHER SPECIFIED PRE-OPERATIVE EXAMINATION: Primary | ICD-10-CM

## 2020-01-01 DIAGNOSIS — I48.21 PERMANENT ATRIAL FIBRILLATION (HCC): Chronic | ICD-10-CM

## 2020-01-01 DIAGNOSIS — I49.5 SICK SINUS SYNDROME (HCC): ICD-10-CM

## 2020-01-01 DIAGNOSIS — R06.00 DYSPNEA, UNSPECIFIED TYPE: Primary | ICD-10-CM

## 2020-01-01 DIAGNOSIS — Z47.89 ORTHOPEDIC AFTERCARE: ICD-10-CM

## 2020-01-01 DIAGNOSIS — I49.5 TACHY-BRADY SYNDROME (HCC): ICD-10-CM

## 2020-01-01 DIAGNOSIS — Z95.0 PACEMAKER: ICD-10-CM

## 2020-01-01 DIAGNOSIS — Z95.0 PRESENCE OF CARDIAC PACEMAKER: ICD-10-CM

## 2020-01-01 DIAGNOSIS — Z95.0 PACEMAKER: Primary | ICD-10-CM

## 2020-01-01 DIAGNOSIS — I25.10 CHRONIC CORONARY ARTERY DISEASE: ICD-10-CM

## 2020-01-01 DIAGNOSIS — R00.1 BRADYCARDIA: ICD-10-CM

## 2020-01-01 DIAGNOSIS — I50.32 CHRONIC DIASTOLIC CONGESTIVE HEART FAILURE (HCC): Primary | Chronic | ICD-10-CM

## 2020-01-01 DIAGNOSIS — Z01.818 OTHER SPECIFIED PRE-OPERATIVE EXAMINATION: ICD-10-CM

## 2020-01-01 DIAGNOSIS — N39.0 ACUTE UTI: ICD-10-CM

## 2020-01-01 DIAGNOSIS — I10 ESSENTIAL HYPERTENSION: ICD-10-CM

## 2020-01-01 DIAGNOSIS — Z47.89 ORTHOPEDIC AFTERCARE: Primary | ICD-10-CM

## 2020-01-01 DIAGNOSIS — J96.00 ACUTE RESPIRATORY FAILURE, UNSPECIFIED WHETHER WITH HYPOXIA OR HYPERCAPNIA (HCC): Primary | ICD-10-CM

## 2020-01-01 DIAGNOSIS — J81.0 ACUTE PULMONARY EDEMA (HCC): ICD-10-CM

## 2020-01-01 DIAGNOSIS — I50.9 ACUTE CONGESTIVE HEART FAILURE, UNSPECIFIED HEART FAILURE TYPE (HCC): ICD-10-CM

## 2020-01-01 DIAGNOSIS — R19.7 DIARRHEA, UNSPECIFIED TYPE: ICD-10-CM

## 2020-01-01 DIAGNOSIS — I49.5 SICK SINUS SYNDROME (HCC): Primary | ICD-10-CM

## 2020-01-01 LAB
A-TOCOPHEROL VIT E SERPL-MCNC: 8.9 MG/L (ref 9–29)
ABO GROUP BLD: NORMAL
ABO GROUP BLD: NORMAL
ACT BLD: 103 SECONDS (ref 82–152)
ACT BLD: 120 SECONDS (ref 82–152)
ACT BLD: 180 SECONDS (ref 82–152)
ACT BLD: 219 SECONDS (ref 82–152)
ADV 40+41 DNA STL QL NAA+NON-PROBE: NOT DETECTED
ALBUMIN SERPL-MCNC: 2.4 G/DL (ref 3.5–5.2)
ALBUMIN SERPL-MCNC: 2.6 G/DL (ref 3.5–5.2)
ALBUMIN SERPL-MCNC: 2.6 G/DL (ref 3.5–5.2)
ALBUMIN SERPL-MCNC: 2.9 G/DL (ref 3.5–5.2)
ALBUMIN SERPL-MCNC: 3.2 G/DL (ref 3.5–5.2)
ALBUMIN SERPL-MCNC: 3.3 G/DL (ref 3.5–5.2)
ALBUMIN SERPL-MCNC: 3.5 G/DL (ref 3.5–5.2)
ALBUMIN SERPL-MCNC: 3.6 G/DL (ref 3.5–5.2)
ALBUMIN SERPL-MCNC: 4 G/DL (ref 3.5–5.2)
ALBUMIN/GLOB SERPL: 0.8 G/DL
ALBUMIN/GLOB SERPL: 0.8 G/DL
ALBUMIN/GLOB SERPL: 0.9 G/DL
ALBUMIN/GLOB SERPL: 1 G/DL
ALBUMIN/GLOB SERPL: 1.1 G/DL
ALBUMIN/GLOB SERPL: 1.1 G/DL
ALBUMIN/GLOB SERPL: 1.3 G/DL
ALP SERPL-CCNC: 104 U/L (ref 39–117)
ALP SERPL-CCNC: 142 U/L (ref 39–117)
ALP SERPL-CCNC: 198 U/L (ref 39–117)
ALP SERPL-CCNC: 63 U/L (ref 39–117)
ALP SERPL-CCNC: 76 U/L (ref 39–117)
ALP SERPL-CCNC: 77 U/L (ref 39–117)
ALP SERPL-CCNC: 86 U/L (ref 39–117)
ALP SERPL-CCNC: 88 U/L (ref 39–117)
ALP SERPL-CCNC: 90 U/L (ref 39–117)
ALT SERPL W P-5'-P-CCNC: 10 U/L (ref 1–33)
ALT SERPL W P-5'-P-CCNC: 10 U/L (ref 1–33)
ALT SERPL W P-5'-P-CCNC: 11 U/L (ref 1–33)
ALT SERPL W P-5'-P-CCNC: 12 U/L (ref 1–33)
ALT SERPL W P-5'-P-CCNC: 13 U/L (ref 1–33)
ALT SERPL W P-5'-P-CCNC: 14 U/L (ref 1–33)
ALT SERPL W P-5'-P-CCNC: 7 U/L (ref 1–33)
AMMONIA BLD-SCNC: 12 UMOL/L (ref 11–51)
AMMONIA BLD-SCNC: 16 UMOL/L (ref 11–51)
ANION GAP SERPL CALCULATED.3IONS-SCNC: 10 MMOL/L (ref 5–15)
ANION GAP SERPL CALCULATED.3IONS-SCNC: 11 MMOL/L (ref 5–15)
ANION GAP SERPL CALCULATED.3IONS-SCNC: 13 MMOL/L (ref 5–15)
ANION GAP SERPL CALCULATED.3IONS-SCNC: 14 MMOL/L (ref 5–15)
ANION GAP SERPL CALCULATED.3IONS-SCNC: 2 MMOL/L (ref 5–15)
ANION GAP SERPL CALCULATED.3IONS-SCNC: 4 MMOL/L (ref 5–15)
ANION GAP SERPL CALCULATED.3IONS-SCNC: 5 MMOL/L (ref 5–15)
ANION GAP SERPL CALCULATED.3IONS-SCNC: 6 MMOL/L (ref 5–15)
ANION GAP SERPL CALCULATED.3IONS-SCNC: 7 MMOL/L (ref 5–15)
ANION GAP SERPL CALCULATED.3IONS-SCNC: 7 MMOL/L (ref 5–15)
ANION GAP SERPL CALCULATED.3IONS-SCNC: 8 MMOL/L (ref 5–15)
ANION GAP SERPL CALCULATED.3IONS-SCNC: 9 MMOL/L (ref 5–15)
ANION GAP SERPL CALCULATED.3IONS-SCNC: 9 MMOL/L (ref 5–15)
ANION GAP SERPL CALCULATED.3IONS-SCNC: 9.1 MMOL/L (ref 5–15)
ANION GAP SERPL CALCULATED.3IONS-SCNC: 9.8 MMOL/L (ref 5–15)
AORTIC ARCH: 2.7 CM
AORTIC DIMENSIONLESS INDEX: 0.4 (DI)
AORTIC DIMENSIONLESS INDEX: 0.4 (DI)
APTT PPP: 25.5 SECONDS (ref 24–31)
APTT PPP: 26.4 SECONDS (ref 24–31)
APTT PPP: 26.5 SECONDS (ref 24–31)
APTT PPP: 26.9 SECONDS (ref 24–31)
APTT PPP: 28.9 SECONDS (ref 22.7–35.4)
APTT PPP: <20 SECONDS (ref 24–31)
ARTERIAL PATENCY WRIST A: POSITIVE
ASCENDING AORTA: 3.5 CM
AST SERPL-CCNC: 16 U/L (ref 1–32)
AST SERPL-CCNC: 17 U/L (ref 1–32)
AST SERPL-CCNC: 18 U/L (ref 1–32)
AST SERPL-CCNC: 19 U/L (ref 1–32)
AST SERPL-CCNC: 22 U/L (ref 1–32)
AST SERPL-CCNC: 23 U/L (ref 1–32)
AST SERPL-CCNC: 23 U/L (ref 1–32)
AST SERPL-CCNC: 25 U/L (ref 1–32)
AST SERPL-CCNC: 26 U/L (ref 1–32)
ASTRO TYP 1-8 RNA STL QL NAA+NON-PROBE: NOT DETECTED
ATMOSPHERIC PRESS: ABNORMAL MM[HG]
B PARAPERT DNA SPEC QL NAA+PROBE: NOT DETECTED
B PARAPERT DNA SPEC QL NAA+PROBE: NOT DETECTED
B PERT DNA SPEC QL NAA+PROBE: NOT DETECTED
B PERT DNA SPEC QL NAA+PROBE: NOT DETECTED
BACTERIA SPEC AEROBE CULT: ABNORMAL
BACTERIA SPEC AEROBE CULT: ABNORMAL
BACTERIA SPEC AEROBE CULT: NORMAL
BACTERIA SPEC AEROBE CULT: NORMAL
BACTERIA UR QL AUTO: ABNORMAL /HPF
BASE EXCESS BLDA CALC-SCNC: -1 MMOL/L (ref -5–5)
BASE EXCESS BLDA CALC-SCNC: 0.5 MMOL/L (ref 0–3)
BASE EXCESS BLDA CALC-SCNC: 3 MMOL/L (ref -5–5)
BASE EXCESS BLDA CALC-SCNC: 4.6 MMOL/L (ref 0–3)
BASE EXCESS BLDA CALC-SCNC: 4.8 MMOL/L (ref 0–3)
BASE EXCESS BLDA CALC-SCNC: 6.7 MMOL/L (ref 0–3)
BASOPHILS # BLD AUTO: 0 10*3/MM3 (ref 0–0.2)
BASOPHILS # BLD AUTO: 0.1 10*3/MM3 (ref 0–0.2)
BASOPHILS # BLD AUTO: 0.12 10*3/MM3 (ref 0–0.2)
BASOPHILS # BLD AUTO: 0.12 10*3/MM3 (ref 0–0.2)
BASOPHILS # BLD AUTO: 0.2 10*3/MM3 (ref 0–0.2)
BASOPHILS # BLD AUTO: 0.2 10*3/MM3 (ref 0–0.2)
BASOPHILS # BLD AUTO: 0.3 10*3/MM3 (ref 0–0.2)
BASOPHILS NFR BLD AUTO: 0.1 % (ref 0–1.5)
BASOPHILS NFR BLD AUTO: 0.1 % (ref 0–1.5)
BASOPHILS NFR BLD AUTO: 0.2 % (ref 0–1.5)
BASOPHILS NFR BLD AUTO: 0.2 % (ref 0–1.5)
BASOPHILS NFR BLD AUTO: 0.3 % (ref 0–1.5)
BASOPHILS NFR BLD AUTO: 0.3 % (ref 0–1.5)
BASOPHILS NFR BLD AUTO: 0.4 % (ref 0–1.5)
BASOPHILS NFR BLD AUTO: 0.6 % (ref 0–1.5)
BASOPHILS NFR BLD AUTO: 0.7 % (ref 0–1.5)
BASOPHILS NFR BLD AUTO: 0.7 % (ref 0–1.5)
BASOPHILS NFR BLD AUTO: 0.8 % (ref 0–1.5)
BASOPHILS NFR BLD AUTO: 1 % (ref 0–1.5)
BASOPHILS NFR BLD AUTO: 1 % (ref 0–1.5)
BASOPHILS NFR BLD AUTO: 1.3 % (ref 0–1.5)
BASOPHILS NFR BLD AUTO: 1.4 % (ref 0–1.5)
BASOPHILS NFR BLD AUTO: 1.4 % (ref 0–1.5)
BASOPHILS NFR BLD AUTO: 1.5 % (ref 0–1.5)
BASOPHILS NFR BLD AUTO: 2.2 % (ref 0–1.5)
BASOPHILS NFR BLD AUTO: 2.7 % (ref 0–1.5)
BASOPHILS NFR BLD AUTO: 3 % (ref 0–1.5)
BDY SITE: ABNORMAL
BH BB BLOOD EXPIRATION DATE: NORMAL
BH BB BLOOD EXPIRATION DATE: NORMAL
BH BB BLOOD TYPE BARCODE: 5100
BH BB BLOOD TYPE BARCODE: 5100
BH BB DISPENSE STATUS: NORMAL
BH BB DISPENSE STATUS: NORMAL
BH BB PRODUCT CODE: NORMAL
BH BB PRODUCT CODE: NORMAL
BH BB UNIT NUMBER: NORMAL
BH BB UNIT NUMBER: NORMAL
BH CV ECHO AV AORTIC VALVE AT ACCEL TIME CALCULATED: NORMAL MSEC
BH CV ECHO MEAS - % IVS THICK: 60.7 %
BH CV ECHO MEAS - % LVPW THICK: 63.4 %
BH CV ECHO MEAS - AI DEC SLOPE: 136 CM/SEC^2
BH CV ECHO MEAS - AI MAX PG: 35.8 MMHG
BH CV ECHO MEAS - AI MAX VEL: 299 CM/SEC
BH CV ECHO MEAS - AI P1/2T: 643.9 MSEC
BH CV ECHO MEAS - AO ACC TIME: 0.08 SEC
BH CV ECHO MEAS - AO MAX PG (FULL): 17.5 MMHG
BH CV ECHO MEAS - AO MAX PG (FULL): 63.9 MMHG
BH CV ECHO MEAS - AO MAX PG (FULL): 7.6 MMHG
BH CV ECHO MEAS - AO MAX PG: 13.8 MMHG
BH CV ECHO MEAS - AO MAX PG: 23.6 MMHG
BH CV ECHO MEAS - AO MAX PG: 30 MMHG
BH CV ECHO MEAS - AO MAX PG: 70.1 MMHG
BH CV ECHO MEAS - AO MEAN PG (FULL): 10.3 MMHG
BH CV ECHO MEAS - AO MEAN PG (FULL): 14 MMHG
BH CV ECHO MEAS - AO MEAN PG (FULL): 36.5 MMHG
BH CV ECHO MEAS - AO MEAN PG (FULL): 4.6 MMHG
BH CV ECHO MEAS - AO MEAN PG: 13.3 MMHG
BH CV ECHO MEAS - AO MEAN PG: 16 MMHG
BH CV ECHO MEAS - AO MEAN PG: 40 MMHG
BH CV ECHO MEAS - AO MEAN PG: 8.4 MMHG
BH CV ECHO MEAS - AO ROOT AREA (BSA CORRECTED): 1.4
BH CV ECHO MEAS - AO ROOT AREA (BSA CORRECTED): 1.4
BH CV ECHO MEAS - AO ROOT AREA (BSA CORRECTED): 1.6
BH CV ECHO MEAS - AO ROOT AREA (BSA CORRECTED): 2.1
BH CV ECHO MEAS - AO ROOT AREA: 3.6 CM^2
BH CV ECHO MEAS - AO ROOT AREA: 3.8 CM^2
BH CV ECHO MEAS - AO ROOT AREA: 5 CM^2
BH CV ECHO MEAS - AO ROOT AREA: 8.4 CM^2
BH CV ECHO MEAS - AO ROOT DIAM: 2.1 CM
BH CV ECHO MEAS - AO ROOT DIAM: 2.2 CM
BH CV ECHO MEAS - AO ROOT DIAM: 2.5 CM
BH CV ECHO MEAS - AO ROOT DIAM: 3.3 CM
BH CV ECHO MEAS - AO V2 MAX: 185.9 CM/SEC
BH CV ECHO MEAS - AO V2 MAX: 243 CM/SEC
BH CV ECHO MEAS - AO V2 MAX: 276 CM/SEC
BH CV ECHO MEAS - AO V2 MAX: 413.3 CM/SEC
BH CV ECHO MEAS - AO V2 MEAN: 134.9 CM/SEC
BH CV ECHO MEAS - AO V2 MEAN: 172.2 CM/SEC
BH CV ECHO MEAS - AO V2 MEAN: 184 CM/SEC
BH CV ECHO MEAS - AO V2 MEAN: 293.7 CM/SEC
BH CV ECHO MEAS - AO V2 VTI: 37 CM
BH CV ECHO MEAS - AO V2 VTI: 48.2 CM
BH CV ECHO MEAS - AO V2 VTI: 52.9 CM
BH CV ECHO MEAS - AO V2 VTI: 87.7 CM
BH CV ECHO MEAS - AORTIC HR: 59.5 BPM
BH CV ECHO MEAS - AORTIC R-R: 1 SEC
BH CV ECHO MEAS - ASC AORTA: 2 CM
BH CV ECHO MEAS - ASC AORTA: 3.5 CM
BH CV ECHO MEAS - ASC AORTA: 3.7 CM
BH CV ECHO MEAS - AT: 82 SEC
BH CV ECHO MEAS - AVA(I,A): 0.92 CM^2
BH CV ECHO MEAS - AVA(I,A): 1.1 CM^2
BH CV ECHO MEAS - AVA(I,A): 1.3 CM^2
BH CV ECHO MEAS - AVA(I,A): 2 CM^2
BH CV ECHO MEAS - AVA(I,D): 0.92 CM^2
BH CV ECHO MEAS - AVA(I,D): 1.1 CM^2
BH CV ECHO MEAS - AVA(I,D): 1.3 CM^2
BH CV ECHO MEAS - AVA(I,D): 2 CM^2
BH CV ECHO MEAS - AVA(V,A): 0.87 CM^2
BH CV ECHO MEAS - AVA(V,A): 1.6 CM^2
BH CV ECHO MEAS - AVA(V,A): 1.7 CM^2
BH CV ECHO MEAS - AVA(V,D): 0.87 CM^2
BH CV ECHO MEAS - AVA(V,D): 1.6 CM^2
BH CV ECHO MEAS - AVA(V,D): 1.7 CM^2
BH CV ECHO MEAS - BSA(HAYCOCK): 1.5 M^2
BH CV ECHO MEAS - BSA(HAYCOCK): 1.6 M^2
BH CV ECHO MEAS - BSA: 1.5 M^2
BH CV ECHO MEAS - BSA: 1.6 M^2
BH CV ECHO MEAS - BZI_BMI: 18.6 KILOGRAMS/M^2
BH CV ECHO MEAS - BZI_BMI: 18.6 KILOGRAMS/M^2
BH CV ECHO MEAS - BZI_BMI: 19.6 KILOGRAMS/M^2
BH CV ECHO MEAS - BZI_BMI: 25.8 KILOGRAMS/M^2
BH CV ECHO MEAS - BZI_METRIC_HEIGHT: 152.4 CM
BH CV ECHO MEAS - BZI_METRIC_HEIGHT: 162.6 CM
BH CV ECHO MEAS - BZI_METRIC_HEIGHT: 167.6 CM
BH CV ECHO MEAS - BZI_METRIC_HEIGHT: 167.6 CM
BH CV ECHO MEAS - BZI_METRIC_WEIGHT: 51.7 KG
BH CV ECHO MEAS - BZI_METRIC_WEIGHT: 52.2 KG
BH CV ECHO MEAS - BZI_METRIC_WEIGHT: 52.2 KG
BH CV ECHO MEAS - BZI_METRIC_WEIGHT: 59.9 KG
BH CV ECHO MEAS - CI(AO): 5.2 L/MIN/M^2
BH CV ECHO MEAS - CI(LVOT): 2.9 L/MIN/M^2
BH CV ECHO MEAS - CO(AO): 8 L/MIN
BH CV ECHO MEAS - CO(LVOT): 4.5 L/MIN
BH CV ECHO MEAS - EDV(CUBED): 30.3 ML
BH CV ECHO MEAS - EDV(CUBED): 42.9 ML
BH CV ECHO MEAS - EDV(CUBED): 48.2 ML
BH CV ECHO MEAS - EDV(MOD-SP2): 46 ML
BH CV ECHO MEAS - EDV(MOD-SP2): 55.4 ML
BH CV ECHO MEAS - EDV(MOD-SP2): 65 ML
BH CV ECHO MEAS - EDV(MOD-SP4): 40.2 ML
BH CV ECHO MEAS - EDV(MOD-SP4): 41.1 ML
BH CV ECHO MEAS - EDV(MOD-SP4): 48 ML
BH CV ECHO MEAS - EDV(MOD-SP4): 75 ML
BH CV ECHO MEAS - EDV(TEICH): 38.4 ML
BH CV ECHO MEAS - EDV(TEICH): 46.8 ML
BH CV ECHO MEAS - EDV(TEICH): 50.9 ML
BH CV ECHO MEAS - EDV(TEICH): 55.9 ML
BH CV ECHO MEAS - EF(CUBED): 70.7 %
BH CV ECHO MEAS - EF(CUBED): 78.1 %
BH CV ECHO MEAS - EF(CUBED): 78.4 %
BH CV ECHO MEAS - EF(CUBED): 87.7 %
BH CV ECHO MEAS - EF(MOD-BP): 58 %
BH CV ECHO MEAS - EF(MOD-BP): 60.4 %
BH CV ECHO MEAS - EF(MOD-BP): 65 %
BH CV ECHO MEAS - EF(MOD-BP): 85 %
BH CV ECHO MEAS - EF(MOD-SP2): 56.5 %
BH CV ECHO MEAS - EF(MOD-SP2): 65.7 %
BH CV ECHO MEAS - EF(MOD-SP2): 84.6 %
BH CV ECHO MEAS - EF(MOD-SP4): 58.2 %
BH CV ECHO MEAS - EF(MOD-SP4): 61.9 %
BH CV ECHO MEAS - EF(MOD-SP4): 62.5 %
BH CV ECHO MEAS - EF(MOD-SP4): 86.7 %
BH CV ECHO MEAS - EF(TEICH): 63.6 %
BH CV ECHO MEAS - EF(TEICH): 71.7 %
BH CV ECHO MEAS - EF(TEICH): 71.8 %
BH CV ECHO MEAS - EF(TEICH): 82.4 %
BH CV ECHO MEAS - ESV(CUBED): 5.9 ML
BH CV ECHO MEAS - ESV(CUBED): 6.6 ML
BH CV ECHO MEAS - ESV(CUBED): 9.3 ML
BH CV ECHO MEAS - ESV(MOD-SP2): 10 ML
BH CV ECHO MEAS - ESV(MOD-SP2): 19 ML
BH CV ECHO MEAS - ESV(MOD-SP2): 20 ML
BH CV ECHO MEAS - ESV(MOD-SP4): 10 ML
BH CV ECHO MEAS - ESV(MOD-SP4): 15.7 ML
BH CV ECHO MEAS - ESV(MOD-SP4): 16.8 ML
BH CV ECHO MEAS - ESV(MOD-SP4): 18 ML
BH CV ECHO MEAS - ESV(TEICH): 10.8 ML
BH CV ECHO MEAS - ESV(TEICH): 14.4 ML
BH CV ECHO MEAS - ESV(TEICH): 17 ML
BH CV ECHO MEAS - ESV(TEICH): 9.8 ML
BH CV ECHO MEAS - FS: 33.6 %
BH CV ECHO MEAS - FS: 39.8 %
BH CV ECHO MEAS - FS: 40 %
BH CV ECHO MEAS - FS: 50.3 %
BH CV ECHO MEAS - IVS/LVPW: 0.99
BH CV ECHO MEAS - IVS/LVPW: 1
BH CV ECHO MEAS - IVS/LVPW: 1
BH CV ECHO MEAS - IVS/LVPW: 1.1
BH CV ECHO MEAS - IVSD: 1.1 CM
BH CV ECHO MEAS - IVSD: 1.2 CM
BH CV ECHO MEAS - IVSD: 1.2 CM
BH CV ECHO MEAS - IVSD: 1.3 CM
BH CV ECHO MEAS - IVSS: 2 CM
BH CV ECHO MEAS - LA DIMENSION(2D): 3.2 CM
BH CV ECHO MEAS - LA DIMENSION(2D): 4.2 CM
BH CV ECHO MEAS - LAT PEAK E' VEL: 6.1 CM/SEC
BH CV ECHO MEAS - LAT PEAK E' VEL: 9.9 CM/SEC
BH CV ECHO MEAS - LV DIASTOLIC VOL/BSA (35-75): 26.1 ML/M^2
BH CV ECHO MEAS - LV DIASTOLIC VOL/BSA (35-75): 26.3 ML/M^2
BH CV ECHO MEAS - LV DIASTOLIC VOL/BSA (35-75): 30.4 ML/M^2
BH CV ECHO MEAS - LV DIASTOLIC VOL/BSA (35-75): 47.4 ML/M^2
BH CV ECHO MEAS - LV MASS(C)D: 119 GRAMS
BH CV ECHO MEAS - LV MASS(C)D: 133.5 GRAMS
BH CV ECHO MEAS - LV MASS(C)D: 134.8 GRAMS
BH CV ECHO MEAS - LV MASS(C)D: 138.1 GRAMS
BH CV ECHO MEAS - LV MASS(C)DI: 75.3 GRAMS/M^2
BH CV ECHO MEAS - LV MASS(C)DI: 84.5 GRAMS/M^2
BH CV ECHO MEAS - LV MASS(C)DI: 87.5 GRAMS/M^2
BH CV ECHO MEAS - LV MASS(C)DI: 88.2 GRAMS/M^2
BH CV ECHO MEAS - LV MASS(C)S: 141.3 GRAMS
BH CV ECHO MEAS - LV MASS(C)SI: 90.3 GRAMS/M^2
BH CV ECHO MEAS - LV MAX PG: 6.1 MMHG
BH CV ECHO MEAS - LV MAX PG: 6.2 MMHG
BH CV ECHO MEAS - LV MAX PG: 6.2 MMHG
BH CV ECHO MEAS - LV MEAN PG: 2 MMHG
BH CV ECHO MEAS - LV MEAN PG: 3 MMHG
BH CV ECHO MEAS - LV MEAN PG: 3.4 MMHG
BH CV ECHO MEAS - LV MEAN PG: 3.8 MMHG
BH CV ECHO MEAS - LV SYSTOLIC VOL/BSA (12-30): 10 ML/M^2
BH CV ECHO MEAS - LV SYSTOLIC VOL/BSA (12-30): 10.9 ML/M^2
BH CV ECHO MEAS - LV SYSTOLIC VOL/BSA (12-30): 11.4 ML/M^2
BH CV ECHO MEAS - LV SYSTOLIC VOL/BSA (12-30): 6.3 ML/M^2
BH CV ECHO MEAS - LV V1 MAX: 123.6 CM/SEC
BH CV ECHO MEAS - LV V1 MAX: 124.5 CM/SEC
BH CV ECHO MEAS - LV V1 MAX: 124.8 CM/SEC
BH CV ECHO MEAS - LV V1 MAX: 92 CM/SEC
BH CV ECHO MEAS - LV V1 MEAN: 74.4 CM/SEC
BH CV ECHO MEAS - LV V1 MEAN: 79.7 CM/SEC
BH CV ECHO MEAS - LV V1 MEAN: 86.9 CM/SEC
BH CV ECHO MEAS - LV V1 MEAN: 90.7 CM/SEC
BH CV ECHO MEAS - LV V1 VTI: 19.3 CM
BH CV ECHO MEAS - LV V1 VTI: 20.2 CM
BH CV ECHO MEAS - LV V1 VTI: 28 CM
BH CV ECHO MEAS - LV V1 VTI: 29 CM
BH CV ECHO MEAS - LVIDD: 3.1 CM
BH CV ECHO MEAS - LVIDD: 3.4 CM
BH CV ECHO MEAS - LVIDD: 3.5 CM
BH CV ECHO MEAS - LVIDD: 3.6 CM
BH CV ECHO MEAS - LVIDS: 1.8 CM
BH CV ECHO MEAS - LVIDS: 1.9 CM
BH CV ECHO MEAS - LVIDS: 2.1 CM
BH CV ECHO MEAS - LVIDS: 2.2 CM
BH CV ECHO MEAS - LVLD AP2: 5.4 CM
BH CV ECHO MEAS - LVLD AP2: 6.7 CM
BH CV ECHO MEAS - LVLD AP4: 5.6 CM
BH CV ECHO MEAS - LVLD AP4: 6.5 CM
BH CV ECHO MEAS - LVLS AP2: 4.5 CM
BH CV ECHO MEAS - LVLS AP2: 4.8 CM
BH CV ECHO MEAS - LVLS AP4: 4.9 CM
BH CV ECHO MEAS - LVLS AP4: 5.1 CM
BH CV ECHO MEAS - LVOT AREA (M): 3.1 CM^2
BH CV ECHO MEAS - LVOT AREA (M): 3.1 CM^2
BH CV ECHO MEAS - LVOT AREA: 2.6 CM^2
BH CV ECHO MEAS - LVOT AREA: 2.9 CM^2
BH CV ECHO MEAS - LVOT AREA: 3.1 CM^2
BH CV ECHO MEAS - LVOT AREA: 3.1 CM^2
BH CV ECHO MEAS - LVOT DIAM: 1.8 CM
BH CV ECHO MEAS - LVOT DIAM: 1.9 CM
BH CV ECHO MEAS - LVOT DIAM: 2 CM
BH CV ECHO MEAS - LVOT DIAM: 2 CM
BH CV ECHO MEAS - LVPWD: 1.1 CM
BH CV ECHO MEAS - LVPWD: 1.1 CM
BH CV ECHO MEAS - LVPWD: 1.2 CM
BH CV ECHO MEAS - LVPWD: 1.3 CM
BH CV ECHO MEAS - LVPWS: 1.8 CM
BH CV ECHO MEAS - MED PEAK E' VEL: 4.7 CM/SEC
BH CV ECHO MEAS - MED PEAK E' VEL: 8 CM/SEC
BH CV ECHO MEAS - MR MAX PG: 80.7 MMHG
BH CV ECHO MEAS - MR MAX PG: 88.9 MMHG
BH CV ECHO MEAS - MR MAX VEL: 449.3 CM/SEC
BH CV ECHO MEAS - MR MAX VEL: 471.5 CM/SEC
BH CV ECHO MEAS - MV A DUR: 0.1 SEC
BH CV ECHO MEAS - MV A DUR: 0.19 SEC
BH CV ECHO MEAS - MV A MAX VEL: 31.3 CM/SEC
BH CV ECHO MEAS - MV A MAX VEL: 32.6 CM/SEC
BH CV ECHO MEAS - MV A MAX VEL: 36.8 CM/SEC
BH CV ECHO MEAS - MV A MAX VEL: 46.7 CM/SEC
BH CV ECHO MEAS - MV DEC SLOPE: 1492 CM/SEC^2
BH CV ECHO MEAS - MV DEC SLOPE: 301.3 CM/SEC^2
BH CV ECHO MEAS - MV DEC SLOPE: 547.3 CM/SEC^2
BH CV ECHO MEAS - MV DEC SLOPE: 614 CM/SEC^2
BH CV ECHO MEAS - MV DEC TIME: 0.16 SEC
BH CV ECHO MEAS - MV DEC TIME: 0.18 SEC
BH CV ECHO MEAS - MV DEC TIME: 0.2 SEC
BH CV ECHO MEAS - MV DEC TIME: 0.22 SEC
BH CV ECHO MEAS - MV E MAX VEL: 109 CM/SEC
BH CV ECHO MEAS - MV E MAX VEL: 109 CM/SEC
BH CV ECHO MEAS - MV E MAX VEL: 66 CM/SEC
BH CV ECHO MEAS - MV E MAX VEL: 93.4 CM/SEC
BH CV ECHO MEAS - MV E/A: 1.4
BH CV ECHO MEAS - MV E/A: 3
BH CV ECHO MEAS - MV E/A: 3
BH CV ECHO MEAS - MV E/A: 3.3
BH CV ECHO MEAS - MV MAX PG: 2.7 MMHG
BH CV ECHO MEAS - MV MAX PG: 6.5 MMHG
BH CV ECHO MEAS - MV MAX PG: 8.3 MMHG
BH CV ECHO MEAS - MV MEAN PG: 0.81 MMHG
BH CV ECHO MEAS - MV MEAN PG: 1.6 MMHG
BH CV ECHO MEAS - MV MEAN PG: 2.2 MMHG
BH CV ECHO MEAS - MV MEAN PG: 3 MMHG
BH CV ECHO MEAS - MV P1/2T MAX VEL: 124.2 CM/SEC
BH CV ECHO MEAS - MV P1/2T MAX VEL: 170 CM/SEC
BH CV ECHO MEAS - MV P1/2T: 33.4 MSEC
BH CV ECHO MEAS - MV P1/2T: 66.5 MSEC
BH CV ECHO MEAS - MV V2 MAX: 127.6 CM/SEC
BH CV ECHO MEAS - MV V2 MAX: 144 CM/SEC
BH CV ECHO MEAS - MV V2 MAX: 82.5 CM/SEC
BH CV ECHO MEAS - MV V2 MEAN: 41 CM/SEC
BH CV ECHO MEAS - MV V2 MEAN: 53.5 CM/SEC
BH CV ECHO MEAS - MV V2 MEAN: 61.5 CM/SEC
BH CV ECHO MEAS - MV V2 MEAN: 75.8 CM/SEC
BH CV ECHO MEAS - MV V2 VTI: 20.9 CM
BH CV ECHO MEAS - MV V2 VTI: 22.8 CM
BH CV ECHO MEAS - MV V2 VTI: 26 CM
BH CV ECHO MEAS - MV V2 VTI: 29.6 CM
BH CV ECHO MEAS - MVA P1/2T LCG: 1.3 CM^2
BH CV ECHO MEAS - MVA P1/2T LCG: 1.8 CM^2
BH CV ECHO MEAS - MVA(P1/2T): 3.3 CM^2
BH CV ECHO MEAS - MVA(P1/2T): 6.6 CM^2
BH CV ECHO MEAS - MVA(VTI): 2 CM^2
BH CV ECHO MEAS - MVA(VTI): 2.4 CM^2
BH CV ECHO MEAS - MVA(VTI): 3.3 CM^2
BH CV ECHO MEAS - MVA(VTI): 3.9 CM^2
BH CV ECHO MEAS - PA ACC SLOPE: 1038 CM/SEC^2
BH CV ECHO MEAS - PA ACC TIME: 0.08 SEC
BH CV ECHO MEAS - PA ACC TIME: 0.09 SEC
BH CV ECHO MEAS - PA MAX PG (FULL): 0.28 MMHG
BH CV ECHO MEAS - PA MAX PG (FULL): 0.73 MMHG
BH CV ECHO MEAS - PA MAX PG (FULL): 2 MMHG
BH CV ECHO MEAS - PA MAX PG: 3.1 MMHG
BH CV ECHO MEAS - PA MAX PG: 3.7 MMHG
BH CV ECHO MEAS - PA MAX PG: 3.8 MMHG
BH CV ECHO MEAS - PA MAX PG: 4.7 MMHG
BH CV ECHO MEAS - PA MEAN PG (FULL): 0.57 MMHG
BH CV ECHO MEAS - PA MEAN PG (FULL): 1.1 MMHG
BH CV ECHO MEAS - PA MEAN PG: 2.1 MMHG
BH CV ECHO MEAS - PA MEAN PG: 2.2 MMHG
BH CV ECHO MEAS - PA PR(ACCEL): 37.4 MMHG
BH CV ECHO MEAS - PA PR(ACCEL): 37.6 MMHG
BH CV ECHO MEAS - PA PR(ACCEL): 40.1 MMHG
BH CV ECHO MEAS - PA PR(ACCEL): 41.3 MMHG
BH CV ECHO MEAS - PA V2 MAX: 108.7 CM/SEC
BH CV ECHO MEAS - PA V2 MAX: 88.2 CM/SEC
BH CV ECHO MEAS - PA V2 MAX: 96 CM/SEC
BH CV ECHO MEAS - PA V2 MAX: 97.3 CM/SEC
BH CV ECHO MEAS - PA V2 MEAN: 65 CM/SEC
BH CV ECHO MEAS - PA V2 MEAN: 72.2 CM/SEC
BH CV ECHO MEAS - PA V2 VTI: 15.7 CM
BH CV ECHO MEAS - PA V2 VTI: 17.8 CM
BH CV ECHO MEAS - PI END-D VEL: 141 CM/SEC
BH CV ECHO MEAS - PULM A REVS DUR: 0.17 SEC
BH CV ECHO MEAS - PULM A REVS VEL: 30.1 CM/SEC
BH CV ECHO MEAS - PULM DIAS VEL: 55.8 CM/SEC
BH CV ECHO MEAS - PULM S/D: 0.61
BH CV ECHO MEAS - PULM SYS VEL: 34.1 CM/SEC
BH CV ECHO MEAS - PVA(I,A): 3.8 CM^2
BH CV ECHO MEAS - PVA(I,D): 3.8 CM^2
BH CV ECHO MEAS - PVA(V,A): 2.6 CM^2
BH CV ECHO MEAS - PVA(V,A): 3.5 CM^2
BH CV ECHO MEAS - PVA(V,D): 2.6 CM^2
BH CV ECHO MEAS - PVA(V,D): 3.5 CM^2
BH CV ECHO MEAS - QP/QS: 0.53
BH CV ECHO MEAS - QP/QS: 0.75
BH CV ECHO MEAS - QP/QS: 0.76
BH CV ECHO MEAS - RAP SYSTOLE: 3 MMHG
BH CV ECHO MEAS - RV BASE (<4.1) - OBSOLETE: 3.2 CM
BH CV ECHO MEAS - RV LENGTH (<8.5) - OBSOLETE: 5.8 CM
BH CV ECHO MEAS - RV MAX PG: 2.4 MMHG
BH CV ECHO MEAS - RV MAX PG: 2.8 MMHG
BH CV ECHO MEAS - RV MAX PG: 3.5 MMHG
BH CV ECHO MEAS - RV MEAN PG: 1 MMHG
BH CV ECHO MEAS - RV MEAN PG: 1 MMHG
BH CV ECHO MEAS - RV MEAN PG: 1.7 MMHG
BH CV ECHO MEAS - RV MEAN PG: 1.7 MMHG
BH CV ECHO MEAS - RV V1 MAX: 77.1 CM/SEC
BH CV ECHO MEAS - RV V1 MAX: 83.3 CM/SEC
BH CV ECHO MEAS - RV V1 MAX: 93.7 CM/SEC
BH CV ECHO MEAS - RV V1 MEAN: 45.5 CM/SEC
BH CV ECHO MEAS - RV V1 MEAN: 46.7 CM/SEC
BH CV ECHO MEAS - RV V1 MEAN: 59.8 CM/SEC
BH CV ECHO MEAS - RV V1 MEAN: 63.3 CM/SEC
BH CV ECHO MEAS - RV V1 VTI: 12.7 CM
BH CV ECHO MEAS - RV V1 VTI: 15.2 CM
BH CV ECHO MEAS - RV V1 VTI: 16.2 CM
BH CV ECHO MEAS - RV V1 VTI: 16.6 CM
BH CV ECHO MEAS - RVDD: 1.8 CM
BH CV ECHO MEAS - RVDD: 2 CM
BH CV ECHO MEAS - RVOT AREA: 2.5 CM^2
BH CV ECHO MEAS - RVOT AREA: 2.9 CM^2
BH CV ECHO MEAS - RVOT AREA: 3.7 CM^2
BH CV ECHO MEAS - RVOT DIAM: 1.8 CM
BH CV ECHO MEAS - RVOT DIAM: 1.9 CM
BH CV ECHO MEAS - RVOT DIAM: 2.2 CM
BH CV ECHO MEAS - RVSP: 38.7 MMHG
BH CV ECHO MEAS - RVSP: 40.8 MMHG
BH CV ECHO MEAS - RVSP: 56.1 MMHG
BH CV ECHO MEAS - RVSP: 60 MMHG
BH CV ECHO MEAS - SI(AO): 127.2 ML/M^2
BH CV ECHO MEAS - SI(AO): 151.1 ML/M^2
BH CV ECHO MEAS - SI(AO): 470.2 ML/M^2
BH CV ECHO MEAS - SI(AO): 86.9 ML/M^2
BH CV ECHO MEAS - SI(CUBED): 15.4 ML/M^2
BH CV ECHO MEAS - SI(CUBED): 17.3 ML/M^2
BH CV ECHO MEAS - SI(CUBED): 21.3 ML/M^2
BH CV ECHO MEAS - SI(CUBED): 27 ML/M^2
BH CV ECHO MEAS - SI(LVOT): 38.4 ML/M^2
BH CV ECHO MEAS - SI(LVOT): 39.4 ML/M^2
BH CV ECHO MEAS - SI(LVOT): 49 ML/M^2
BH CV ECHO MEAS - SI(LVOT): 51.7 ML/M^2
BH CV ECHO MEAS - SI(MOD-SP2): 16.4 ML/M^2
BH CV ECHO MEAS - SI(MOD-SP2): 23.3 ML/M^2
BH CV ECHO MEAS - SI(MOD-SP2): 34.8 ML/M^2
BH CV ECHO MEAS - SI(MOD-SP4): 15.2 ML/M^2
BH CV ECHO MEAS - SI(MOD-SP4): 16.3 ML/M^2
BH CV ECHO MEAS - SI(MOD-SP4): 19 ML/M^2
BH CV ECHO MEAS - SI(MOD-SP4): 41.1 ML/M^2
BH CV ECHO MEAS - SI(TEICH): 17.9 ML/M^2
BH CV ECHO MEAS - SI(TEICH): 18.8 ML/M^2
BH CV ECHO MEAS - SI(TEICH): 23.1 ML/M^2
BH CV ECHO MEAS - SI(TEICH): 29.4 ML/M^2
BH CV ECHO MEAS - SUP REN AO DIAM: 1.9 CM
BH CV ECHO MEAS - SV(AO): 133.9 ML
BH CV ECHO MEAS - SV(AO): 201.1 ML
BH CV ECHO MEAS - SV(AO): 239 ML
BH CV ECHO MEAS - SV(AO): 735.7 ML
BH CV ECHO MEAS - SV(CUBED): 23.6 ML
BH CV ECHO MEAS - SV(CUBED): 27.3 ML
BH CV ECHO MEAS - SV(CUBED): 33.6 ML
BH CV ECHO MEAS - SV(CUBED): 42.3 ML
BH CV ECHO MEAS - SV(LVOT): 60.6 ML
BH CV ECHO MEAS - SV(LVOT): 62.2 ML
BH CV ECHO MEAS - SV(LVOT): 75.5 ML
BH CV ECHO MEAS - SV(LVOT): 80.9 ML
BH CV ECHO MEAS - SV(MOD-SP2): 26 ML
BH CV ECHO MEAS - SV(MOD-SP2): 36.4 ML
BH CV ECHO MEAS - SV(MOD-SP2): 55 ML
BH CV ECHO MEAS - SV(MOD-SP4): 23.4 ML
BH CV ECHO MEAS - SV(MOD-SP4): 25.4 ML
BH CV ECHO MEAS - SV(MOD-SP4): 30 ML
BH CV ECHO MEAS - SV(MOD-SP4): 65 ML
BH CV ECHO MEAS - SV(RVOT): 32.3 ML
BH CV ECHO MEAS - SV(RVOT): 47.5 ML
BH CV ECHO MEAS - SV(RVOT): 60.5 ML
BH CV ECHO MEAS - SV(TEICH): 27.6 ML
BH CV ECHO MEAS - SV(TEICH): 29.7 ML
BH CV ECHO MEAS - SV(TEICH): 36.5 ML
BH CV ECHO MEAS - SV(TEICH): 46 ML
BH CV ECHO MEAS - TAPSE (>1.6): 1.1 CM2
BH CV ECHO MEAS - TAPSE (>1.6): 1.3 CM2
BH CV ECHO MEAS - TR MAX VEL: 298.6 CM/SEC
BH CV ECHO MEAS - TR MAX VEL: 307.5 CM/SEC
BH CV ECHO MEAS - TR MAX VEL: 362.9 CM/SEC
BH CV ECHO MEAS - TR MAX VEL: 377 CM/SEC
BH CV ECHO MEAS - TV MAX PG: 9.1 MMHG
BH CV ECHO MEAS - TV V2 MAX: 151 CM/SEC
BH CV ECHO MEASUREMENTS AVERAGE E/E' RATIO: 12.18
BH CV ECHO MEASUREMENTS AVERAGE E/E' RATIO: 17.3
BH CV UPPER VENOUS LEFT AXILLARY AUGMENT: NORMAL
BH CV UPPER VENOUS LEFT AXILLARY COMPETENT: NORMAL
BH CV UPPER VENOUS LEFT AXILLARY COMPRESS: NORMAL
BH CV UPPER VENOUS LEFT AXILLARY PHASIC: NORMAL
BH CV UPPER VENOUS LEFT AXILLARY SPONT: NORMAL
BH CV UPPER VENOUS LEFT BASILIC FOREARM COMPRESS: NORMAL
BH CV UPPER VENOUS LEFT BASILIC UPPER COMPRESS: NORMAL
BH CV UPPER VENOUS LEFT BRACHIAL COMPRESS: NORMAL
BH CV UPPER VENOUS LEFT CEPHALIC FOREARM COMPRESS: NORMAL
BH CV UPPER VENOUS LEFT INTERNAL JUGULAR AUGMENT: NORMAL
BH CV UPPER VENOUS LEFT INTERNAL JUGULAR COMPETENT: NORMAL
BH CV UPPER VENOUS LEFT INTERNAL JUGULAR COMPRESS: NORMAL
BH CV UPPER VENOUS LEFT INTERNAL JUGULAR PHASIC: NORMAL
BH CV UPPER VENOUS LEFT INTERNAL JUGULAR SPONT: NORMAL
BH CV UPPER VENOUS LEFT RADIAL COMPRESS: NORMAL
BH CV UPPER VENOUS LEFT SUBCLAVIAN AUGMENT: NORMAL
BH CV UPPER VENOUS LEFT SUBCLAVIAN COMPETENT: NORMAL
BH CV UPPER VENOUS LEFT SUBCLAVIAN PHASIC: NORMAL
BH CV UPPER VENOUS LEFT SUBCLAVIAN SPONT: NORMAL
BH CV UPPER VENOUS LEFT ULNAR COMPRESS: NORMAL
BH CV UPPER VENOUS RIGHT INTERNAL JUGULAR AUGMENT: NORMAL
BH CV UPPER VENOUS RIGHT INTERNAL JUGULAR COMPETENT: NORMAL
BH CV UPPER VENOUS RIGHT INTERNAL JUGULAR COMPRESS: NORMAL
BH CV UPPER VENOUS RIGHT INTERNAL JUGULAR PHASIC: NORMAL
BH CV UPPER VENOUS RIGHT INTERNAL JUGULAR SPONT: NORMAL
BH CV UPPER VENOUS RIGHT SUBCLAVIAN AUGMENT: NORMAL
BH CV UPPER VENOUS RIGHT SUBCLAVIAN COMPETENT: NORMAL
BH CV UPPER VENOUS RIGHT SUBCLAVIAN COMPRESS: NORMAL
BH CV UPPER VENOUS RIGHT SUBCLAVIAN PHASIC: NORMAL
BH CV UPPER VENOUS RIGHT SUBCLAVIAN SPONT: NORMAL
BH CV XLRA - RV BASE: 3.2 CM
BH CV XLRA - RV BASE: 3.8 CM
BH CV XLRA - RV LENGTH: 5.7 CM
BH CV XLRA - RV LENGTH: 5.8 CM
BH CV XLRA - RV MID: 2.3 CM
BH CV XLRA - RV MID: 2.9 CM
BH CV XLRA - TDI S': 10 CM/SEC
BH CV XLRA - TDI S': 11.8 CM/SEC
BH CV XLRA MEAS LEFT DIST CCA EDV: 12.1 CM/SEC
BH CV XLRA MEAS LEFT DIST CCA PSV: 58.7 CM/SEC
BH CV XLRA MEAS LEFT DIST ICA EDV: -18.6 CM/SEC
BH CV XLRA MEAS LEFT DIST ICA PSV: -83.4 CM/SEC
BH CV XLRA MEAS LEFT ICA/CCA RATIO: 1.6
BH CV XLRA MEAS LEFT PROX CCA EDV: 12.6 CM/SEC
BH CV XLRA MEAS LEFT PROX CCA PSV: 58.7 CM/SEC
BH CV XLRA MEAS LEFT PROX ECA PSV: -147 CM/SEC
BH CV XLRA MEAS LEFT PROX ICA EDV: 20.9 CM/SEC
BH CV XLRA MEAS LEFT PROX ICA PSV: 93.3 CM/SEC
BH CV XLRA MEAS LEFT PROX SCLA PSV: -82.1 CM/SEC
BH CV XLRA MEAS LEFT VERTEBRAL A PSV: 26.9 CM/SEC
BH CV XLRA MEAS RIGHT DIST CCA EDV: 6.2 CM/SEC
BH CV XLRA MEAS RIGHT DIST CCA PSV: 35.4 CM/SEC
BH CV XLRA MEAS RIGHT DIST ICA EDV: -20.4 CM/SEC
BH CV XLRA MEAS RIGHT DIST ICA PSV: -91.7 CM/SEC
BH CV XLRA MEAS RIGHT ICA/CCA RATIO: 2.3
BH CV XLRA MEAS RIGHT PROX CCA EDV: 0 CM/SEC
BH CV XLRA MEAS RIGHT PROX CCA PSV: -55.9 CM/SEC
BH CV XLRA MEAS RIGHT PROX ECA PSV: -66.5 CM/SEC
BH CV XLRA MEAS RIGHT PROX ICA EDV: 26.7 CM/SEC
BH CV XLRA MEAS RIGHT PROX ICA PSV: 126 CM/SEC
BH CV XLRA MEAS RIGHT PROX SCLA PSV: -73.5 CM/SEC
BH CV XLRA MEAS RIGHT VERTEBRAL A PSV: -47 CM/SEC
BILIRUB SERPL-MCNC: 0.3 MG/DL (ref 0–1.2)
BILIRUB SERPL-MCNC: 0.3 MG/DL (ref 0–1.2)
BILIRUB SERPL-MCNC: 0.4 MG/DL (ref 0–1.2)
BILIRUB SERPL-MCNC: 0.6 MG/DL (ref 0–1.2)
BILIRUB SERPL-MCNC: 0.6 MG/DL (ref 0–1.2)
BILIRUB SERPL-MCNC: 0.7 MG/DL (ref 0.2–1.2)
BILIRUB UR QL STRIP: NEGATIVE
BLD GP AB SCN SERPL QL: NEGATIVE
BLD GP AB SCN SERPL QL: NEGATIVE
BUN BLD-MCNC: 10 MG/DL (ref 8–23)
BUN BLD-MCNC: 11 MG/DL (ref 8–23)
BUN BLD-MCNC: 12 MG/DL (ref 8–23)
BUN BLD-MCNC: 20 MG/DL (ref 8–23)
BUN BLD-MCNC: 21 MG/DL (ref 8–23)
BUN SERPL-MCNC: 10 MG/DL (ref 8–23)
BUN SERPL-MCNC: 11 MG/DL (ref 8–23)
BUN SERPL-MCNC: 12 MG/DL (ref 8–23)
BUN SERPL-MCNC: 12 MG/DL (ref 8–23)
BUN SERPL-MCNC: 14 MG/DL (ref 8–23)
BUN SERPL-MCNC: 16 MG/DL (ref 8–23)
BUN SERPL-MCNC: 17 MG/DL (ref 8–23)
BUN SERPL-MCNC: 18 MG/DL (ref 8–23)
BUN SERPL-MCNC: 18 MG/DL (ref 8–23)
BUN SERPL-MCNC: 22 MG/DL (ref 8–23)
BUN SERPL-MCNC: 23 MG/DL (ref 8–23)
BUN SERPL-MCNC: 25 MG/DL (ref 8–23)
BUN SERPL-MCNC: 26 MG/DL (ref 8–23)
BUN SERPL-MCNC: 28 MG/DL (ref 8–23)
BUN SERPL-MCNC: 35 MG/DL (ref 8–23)
BUN SERPL-MCNC: 36 MG/DL (ref 8–23)
BUN SERPL-MCNC: 39 MG/DL (ref 8–23)
BUN/CREAT SERPL: 11.8 (ref 7–25)
BUN/CREAT SERPL: 12.5 (ref 7–25)
BUN/CREAT SERPL: 13.1 (ref 7–25)
BUN/CREAT SERPL: 14 (ref 7–25)
BUN/CREAT SERPL: 15 (ref 7–25)
BUN/CREAT SERPL: 16.6 (ref 7–25)
BUN/CREAT SERPL: 16.7 (ref 7–25)
BUN/CREAT SERPL: 16.9 (ref 7–25)
BUN/CREAT SERPL: 18 (ref 7–25)
BUN/CREAT SERPL: 18.3 (ref 7–25)
BUN/CREAT SERPL: 18.5 (ref 7–25)
BUN/CREAT SERPL: 18.8 (ref 7–25)
BUN/CREAT SERPL: 19 (ref 7–25)
BUN/CREAT SERPL: 19.4 (ref 7–25)
BUN/CREAT SERPL: 20 (ref 7–25)
BUN/CREAT SERPL: 20 (ref 7–25)
BUN/CREAT SERPL: 20.3 (ref 7–25)
BUN/CREAT SERPL: 21.2 (ref 7–25)
BUN/CREAT SERPL: 21.3 (ref 7–25)
BUN/CREAT SERPL: 21.4 (ref 7–25)
BUN/CREAT SERPL: 21.7 (ref 7–25)
BUN/CREAT SERPL: 25.8 (ref 7–25)
BUN/CREAT SERPL: 26.2 (ref 7–25)
BUN/CREAT SERPL: 29.5 (ref 7–25)
BUN/CREAT SERPL: 29.9 (ref 7–25)
BUN/CREAT SERPL: 30.4 (ref 7–25)
BUN/CREAT SERPL: 32.1 (ref 7–25)
BUN/CREAT SERPL: 37.9 (ref 7–25)
C CAYETANENSIS DNA STL QL NAA+NON-PROBE: NOT DETECTED
C DIFF GDH STL QL: POSITIVE
C PNEUM DNA NPH QL NAA+NON-PROBE: NOT DETECTED
C PNEUM DNA NPH QL NAA+NON-PROBE: NOT DETECTED
CA-I BLDA-SCNC: 1.03 MMOL/L (ref 1.15–1.33)
CA-I SERPL ISE-MCNC: 1.27 MMOL/L (ref 1.2–1.3)
CALCIUM SPEC-SCNC: 10.3 MG/DL (ref 8.6–10.5)
CALCIUM SPEC-SCNC: 7.7 MG/DL (ref 8.6–10.5)
CALCIUM SPEC-SCNC: 7.8 MG/DL (ref 8.6–10.5)
CALCIUM SPEC-SCNC: 8.2 MG/DL (ref 8.6–10.5)
CALCIUM SPEC-SCNC: 8.3 MG/DL (ref 8.6–10.5)
CALCIUM SPEC-SCNC: 8.4 MG/DL (ref 8.6–10.5)
CALCIUM SPEC-SCNC: 8.5 MG/DL (ref 8.6–10.5)
CALCIUM SPEC-SCNC: 8.6 MG/DL (ref 8.6–10.5)
CALCIUM SPEC-SCNC: 8.7 MG/DL (ref 8.6–10.5)
CALCIUM SPEC-SCNC: 8.8 MG/DL (ref 8.6–10.5)
CALCIUM SPEC-SCNC: 8.8 MG/DL (ref 8.6–10.5)
CALCIUM SPEC-SCNC: 8.9 MG/DL (ref 8.6–10.5)
CALCIUM SPEC-SCNC: 9 MG/DL (ref 8.6–10.5)
CALCIUM SPEC-SCNC: 9.2 MG/DL (ref 8.6–10.5)
CALCIUM SPEC-SCNC: 9.2 MG/DL (ref 8.6–10.5)
CALCIUM SPEC-SCNC: 9.3 MG/DL (ref 8.6–10.5)
CALCIUM SPEC-SCNC: 9.4 MG/DL (ref 8.6–10.5)
CALCIUM SPEC-SCNC: 9.5 MG/DL (ref 8.6–10.5)
CALCIUM SPEC-SCNC: 9.6 MG/DL (ref 8.6–10.5)
CALCIUM SPEC-SCNC: 9.8 MG/DL (ref 8.6–10.5)
CAMPY SP DNA.DIARRHEA STL QL NAA+PROBE: NOT DETECTED
CHLORIDE SERPL-SCNC: 100 MMOL/L (ref 98–107)
CHLORIDE SERPL-SCNC: 104 MMOL/L (ref 98–107)
CHLORIDE SERPL-SCNC: 89 MMOL/L (ref 98–107)
CHLORIDE SERPL-SCNC: 90 MMOL/L (ref 98–107)
CHLORIDE SERPL-SCNC: 90 MMOL/L (ref 98–107)
CHLORIDE SERPL-SCNC: 91 MMOL/L (ref 98–107)
CHLORIDE SERPL-SCNC: 91 MMOL/L (ref 98–107)
CHLORIDE SERPL-SCNC: 92 MMOL/L (ref 98–107)
CHLORIDE SERPL-SCNC: 93 MMOL/L (ref 98–107)
CHLORIDE SERPL-SCNC: 94 MMOL/L (ref 98–107)
CHLORIDE SERPL-SCNC: 94 MMOL/L (ref 98–107)
CHLORIDE SERPL-SCNC: 95 MMOL/L (ref 98–107)
CHLORIDE SERPL-SCNC: 95 MMOL/L (ref 98–107)
CHLORIDE SERPL-SCNC: 96 MMOL/L (ref 98–107)
CHLORIDE SERPL-SCNC: 97 MMOL/L (ref 98–107)
CHLORIDE SERPL-SCNC: 98 MMOL/L (ref 98–107)
CHLORIDE SERPL-SCNC: 98 MMOL/L (ref 98–107)
CHLORIDE SERPL-SCNC: 99 MMOL/L (ref 98–107)
CLARITY UR: ABNORMAL
CLARITY UR: CLEAR
CLOSE TME COLL+ADP + EPINEP PNL BLD: 97 %
CO2 BLDA-SCNC: 28 MMOL/L (ref 24–29)
CO2 BLDA-SCNC: 29 MMOL/L (ref 24–29)
CO2 BLDA-SCNC: 30.7 MMOL/L (ref 22–29)
CO2 BLDA-SCNC: 33.5 MMOL/L (ref 22–29)
CO2 BLDA-SCNC: 35 MMOL/L (ref 22–29)
CO2 BLDA-SCNC: 38.2 MMOL/L (ref 22–29)
CO2 SERPL-SCNC: 23 MMOL/L (ref 22–29)
CO2 SERPL-SCNC: 24 MMOL/L (ref 22–29)
CO2 SERPL-SCNC: 24 MMOL/L (ref 22–29)
CO2 SERPL-SCNC: 25 MMOL/L (ref 22–29)
CO2 SERPL-SCNC: 26 MMOL/L (ref 22–29)
CO2 SERPL-SCNC: 26 MMOL/L (ref 22–29)
CO2 SERPL-SCNC: 27 MMOL/L (ref 22–29)
CO2 SERPL-SCNC: 27.9 MMOL/L (ref 22–29)
CO2 SERPL-SCNC: 28 MMOL/L (ref 22–29)
CO2 SERPL-SCNC: 28 MMOL/L (ref 22–29)
CO2 SERPL-SCNC: 29 MMOL/L (ref 22–29)
CO2 SERPL-SCNC: 29.2 MMOL/L (ref 22–29)
CO2 SERPL-SCNC: 30 MMOL/L (ref 22–29)
CO2 SERPL-SCNC: 31 MMOL/L (ref 22–29)
CO2 SERPL-SCNC: 32 MMOL/L (ref 22–29)
CO2 SERPL-SCNC: 34 MMOL/L (ref 22–29)
CO2 SERPL-SCNC: 34 MMOL/L (ref 22–29)
COLOR UR: YELLOW
CORTIS SERPL-MCNC: 13.89 MCG/DL
CORTIS SERPL-MCNC: 17.51 MCG/DL
CORTIS SERPL-MCNC: 22.16 MCG/DL
CREAT BLD-MCNC: 0.77 MG/DL (ref 0.57–1)
CREAT BLD-MCNC: 0.84 MG/DL (ref 0.57–1)
CREAT BLD-MCNC: 0.85 MG/DL (ref 0.57–1)
CREAT BLD-MCNC: 0.86 MG/DL (ref 0.57–1)
CREAT BLD-MCNC: 0.94 MG/DL (ref 0.57–1)
CREAT BLD-MCNC: 1.05 MG/DL (ref 0.57–1)
CREAT BLDA-MCNC: 1 MG/DL (ref 0.6–1.3)
CREAT SERPL-MCNC: 0.52 MG/DL (ref 0.57–1)
CREAT SERPL-MCNC: 0.56 MG/DL (ref 0.57–1)
CREAT SERPL-MCNC: 0.58 MG/DL (ref 0.57–1)
CREAT SERPL-MCNC: 0.6 MG/DL (ref 0.57–1)
CREAT SERPL-MCNC: 0.61 MG/DL (ref 0.57–1)
CREAT SERPL-MCNC: 0.62 MG/DL (ref 0.57–1)
CREAT SERPL-MCNC: 0.62 MG/DL (ref 0.57–1)
CREAT SERPL-MCNC: 0.65 MG/DL (ref 0.57–1)
CREAT SERPL-MCNC: 0.66 MG/DL (ref 0.57–1)
CREAT SERPL-MCNC: 0.69 MG/DL (ref 0.57–1)
CREAT SERPL-MCNC: 0.8 MG/DL (ref 0.57–1)
CREAT SERPL-MCNC: 0.81 MG/DL (ref 0.57–1)
CREAT SERPL-MCNC: 0.83 MG/DL (ref 0.57–1)
CREAT SERPL-MCNC: 0.96 MG/DL (ref 0.57–1)
CREAT SERPL-MCNC: 1.03 MG/DL (ref 0.57–1)
CREAT SERPL-MCNC: 1.03 MG/DL (ref 0.57–1)
CREAT SERPL-MCNC: 1.07 MG/DL (ref 0.57–1)
CREAT SERPL-MCNC: 1.17 MG/DL (ref 0.57–1)
CREAT SERPL-MCNC: 1.2 MG/DL (ref 0.57–1)
CREAT SERPL-MCNC: 1.22 MG/DL (ref 0.57–1)
CREAT SERPL-MCNC: 1.24 MG/DL (ref 0.57–1)
CREAT SERPL-MCNC: 1.47 MG/DL (ref 0.57–1)
CREAT SERPL-MCNC: 1.51 MG/DL (ref 0.57–1)
CREAT UR-MCNC: 52.7 MG/DL
CROSSMATCH INTERPRETATION: NORMAL
CROSSMATCH INTERPRETATION: NORMAL
CRP SERPL-MCNC: 0.61 MG/DL (ref 0–0.5)
CRP SERPL-MCNC: 1.48 MG/DL (ref 0–0.5)
CRYPTOSP STL CULT: NOT DETECTED
D DIMER PPP FEU-MCNC: 1.07 MG/L (FEU) (ref 0–0.59)
D DIMER PPP FEU-MCNC: 1.57 MG/L (FEU) (ref 0–0.59)
D DIMER PPP FEU-MCNC: 1.59 MG/L (FEU) (ref 0–0.59)
D DIMER PPP FEU-MCNC: 2.04 MG/L (FEU) (ref 0–0.59)
D DIMER PPP FEU-MCNC: 2.96 MCGFEU/ML (ref 0.17–0.59)
D-LACTATE SERPL-SCNC: 0.7 MMOL/L (ref 0.5–2)
D-LACTATE SERPL-SCNC: 1 MMOL/L (ref 0.5–2)
D-LACTATE SERPL-SCNC: 1.6 MMOL/L (ref 0.5–2)
DEPRECATED RDW RBC AUTO: 41.7 FL (ref 37–54)
DEPRECATED RDW RBC AUTO: 44.9 FL (ref 37–54)
DEPRECATED RDW RBC AUTO: 45.1 FL (ref 37–54)
DEPRECATED RDW RBC AUTO: 45.6 FL (ref 37–54)
DEPRECATED RDW RBC AUTO: 45.9 FL (ref 37–54)
DEPRECATED RDW RBC AUTO: 46.4 FL (ref 37–54)
DEPRECATED RDW RBC AUTO: 46.8 FL (ref 37–54)
DEPRECATED RDW RBC AUTO: 46.8 FL (ref 37–54)
DEPRECATED RDW RBC AUTO: 47.3 FL (ref 37–54)
DEPRECATED RDW RBC AUTO: 47.7 FL (ref 37–54)
DEPRECATED RDW RBC AUTO: 48.1 FL (ref 37–54)
DEPRECATED RDW RBC AUTO: 48.6 FL (ref 37–54)
DEPRECATED RDW RBC AUTO: 49.4 FL (ref 37–54)
DEPRECATED RDW RBC AUTO: 49.4 FL (ref 37–54)
DEPRECATED RDW RBC AUTO: 49.9 FL (ref 37–54)
E HISTOLYT AG STL-ACNC: NOT DETECTED
EAEC PAA PLAS AGGR+AATA ST NAA+NON-PRB: NOT DETECTED
EC STX1 + STX2 GENES STL NAA+PROBE: NOT DETECTED
EOSINOPHIL # BLD AUTO: 0 10*3/MM3 (ref 0–0.4)
EOSINOPHIL # BLD AUTO: 0 10*3/MM3 (ref 0–0.4)
EOSINOPHIL # BLD AUTO: 0.1 10*3/MM3 (ref 0–0.4)
EOSINOPHIL # BLD AUTO: 0.14 10*3/MM3 (ref 0–0.4)
EOSINOPHIL # BLD AUTO: 0.16 10*3/MM3 (ref 0–0.4)
EOSINOPHIL # BLD AUTO: 0.2 10*3/MM3 (ref 0–0.4)
EOSINOPHIL # BLD AUTO: 0.3 10*3/MM3 (ref 0–0.4)
EOSINOPHIL NFR BLD AUTO: 0 % (ref 0.3–6.2)
EOSINOPHIL NFR BLD AUTO: 0.1 % (ref 0.3–6.2)
EOSINOPHIL NFR BLD AUTO: 0.6 % (ref 0.3–6.2)
EOSINOPHIL NFR BLD AUTO: 0.7 % (ref 0.3–6.2)
EOSINOPHIL NFR BLD AUTO: 1 % (ref 0.3–6.2)
EOSINOPHIL NFR BLD AUTO: 1 % (ref 0.3–6.2)
EOSINOPHIL NFR BLD AUTO: 1.1 % (ref 0.3–6.2)
EOSINOPHIL NFR BLD AUTO: 1.5 % (ref 0.3–6.2)
EOSINOPHIL NFR BLD AUTO: 1.7 % (ref 0.3–6.2)
EOSINOPHIL NFR BLD AUTO: 1.7 % (ref 0.3–6.2)
EOSINOPHIL NFR BLD AUTO: 2 % (ref 0.3–6.2)
EOSINOPHIL NFR BLD AUTO: 2.1 % (ref 0.3–6.2)
EOSINOPHIL NFR BLD AUTO: 2.2 % (ref 0.3–6.2)
EOSINOPHIL NFR BLD AUTO: 2.6 % (ref 0.3–6.2)
EOSINOPHIL NFR BLD AUTO: 3 % (ref 0.3–6.2)
EOSINOPHIL NFR BLD AUTO: 3.5 % (ref 0.3–6.2)
EOSINOPHIL NFR BLD AUTO: 3.8 % (ref 0.3–6.2)
EPEC EAE GENE STL QL NAA+NON-PROBE: NOT DETECTED
ERYTHROCYTE [DISTWIDTH] IN BLOOD BY AUTOMATED COUNT: 12.6 % (ref 12.3–15.4)
ERYTHROCYTE [DISTWIDTH] IN BLOOD BY AUTOMATED COUNT: 13.6 % (ref 12.3–15.4)
ERYTHROCYTE [DISTWIDTH] IN BLOOD BY AUTOMATED COUNT: 13.7 % (ref 12.3–15.4)
ERYTHROCYTE [DISTWIDTH] IN BLOOD BY AUTOMATED COUNT: 13.8 % (ref 12.3–15.4)
ERYTHROCYTE [DISTWIDTH] IN BLOOD BY AUTOMATED COUNT: 13.8 % (ref 12.3–15.4)
ERYTHROCYTE [DISTWIDTH] IN BLOOD BY AUTOMATED COUNT: 14 % (ref 12.3–15.4)
ERYTHROCYTE [DISTWIDTH] IN BLOOD BY AUTOMATED COUNT: 14.1 % (ref 12.3–15.4)
ERYTHROCYTE [DISTWIDTH] IN BLOOD BY AUTOMATED COUNT: 14.2 % (ref 12.3–15.4)
ERYTHROCYTE [DISTWIDTH] IN BLOOD BY AUTOMATED COUNT: 14.3 % (ref 12.3–15.4)
ERYTHROCYTE [DISTWIDTH] IN BLOOD BY AUTOMATED COUNT: 14.4 % (ref 12.3–15.4)
ERYTHROCYTE [DISTWIDTH] IN BLOOD BY AUTOMATED COUNT: 14.5 % (ref 12.3–15.4)
ETEC LTA+ST1A+ST1B TOX ST NAA+NON-PROBE: NOT DETECTED
FLUAV SUBTYP SPEC NAA+PROBE: NOT DETECTED
FLUAV SUBTYP SPEC NAA+PROBE: NOT DETECTED
FLUBV RNA ISLT QL NAA+PROBE: NOT DETECTED
FLUBV RNA ISLT QL NAA+PROBE: NOT DETECTED
FOLATE SERPL-MCNC: 7.66 NG/ML (ref 4.78–24.2)
G LAMBLIA DNA SPEC QL NAA+PROBE: NOT DETECTED
GAMMA TOCOPHEROL SERPL-MCNC: 0.7 MG/L (ref 0.5–4.9)
GFR SERPL CREATININE-BSD FRML MDRD: 103 ML/MIN/1.73
GFR SERPL CREATININE-BSD FRML MDRD: 112 ML/MIN/1.73
GFR SERPL CREATININE-BSD FRML MDRD: 33 ML/MIN/1.73
GFR SERPL CREATININE-BSD FRML MDRD: 34 ML/MIN/1.73
GFR SERPL CREATININE-BSD FRML MDRD: 41 ML/MIN/1.73
GFR SERPL CREATININE-BSD FRML MDRD: 42 ML/MIN/1.73
GFR SERPL CREATININE-BSD FRML MDRD: 43 ML/MIN/1.73
GFR SERPL CREATININE-BSD FRML MDRD: 44 ML/MIN/1.73
GFR SERPL CREATININE-BSD FRML MDRD: 49 ML/MIN/1.73
GFR SERPL CREATININE-BSD FRML MDRD: 50 ML/MIN/1.73
GFR SERPL CREATININE-BSD FRML MDRD: 51 ML/MIN/1.73
GFR SERPL CREATININE-BSD FRML MDRD: 51 ML/MIN/1.73
GFR SERPL CREATININE-BSD FRML MDRD: 55 ML/MIN/1.73
GFR SERPL CREATININE-BSD FRML MDRD: 57 ML/MIN/1.73
GFR SERPL CREATININE-BSD FRML MDRD: 63 ML/MIN/1.73
GFR SERPL CREATININE-BSD FRML MDRD: 64 ML/MIN/1.73
GFR SERPL CREATININE-BSD FRML MDRD: 65 ML/MIN/1.73
GFR SERPL CREATININE-BSD FRML MDRD: 65 ML/MIN/1.73
GFR SERPL CREATININE-BSD FRML MDRD: 67 ML/MIN/1.73
GFR SERPL CREATININE-BSD FRML MDRD: 68 ML/MIN/1.73
GFR SERPL CREATININE-BSD FRML MDRD: 72 ML/MIN/1.73
GFR SERPL CREATININE-BSD FRML MDRD: 81 ML/MIN/1.73
GFR SERPL CREATININE-BSD FRML MDRD: 85 ML/MIN/1.73
GFR SERPL CREATININE-BSD FRML MDRD: 87 ML/MIN/1.73
GFR SERPL CREATININE-BSD FRML MDRD: 92 ML/MIN/1.73
GFR SERPL CREATININE-BSD FRML MDRD: 92 ML/MIN/1.73
GFR SERPL CREATININE-BSD FRML MDRD: 93 ML/MIN/1.73
GFR SERPL CREATININE-BSD FRML MDRD: 95 ML/MIN/1.73
GFR SERPL CREATININE-BSD FRML MDRD: 99 ML/MIN/1.73
GLOBULIN UR ELPH-MCNC: 2.5 GM/DL
GLOBULIN UR ELPH-MCNC: 2.9 GM/DL
GLOBULIN UR ELPH-MCNC: 3 GM/DL
GLOBULIN UR ELPH-MCNC: 3.1 GM/DL
GLOBULIN UR ELPH-MCNC: 3.2 GM/DL
GLOBULIN UR ELPH-MCNC: 3.4 GM/DL
GLOBULIN UR ELPH-MCNC: 3.6 GM/DL
GLUCOSE BLD-MCNC: 101 MG/DL (ref 65–99)
GLUCOSE BLD-MCNC: 102 MG/DL (ref 65–99)
GLUCOSE BLD-MCNC: 69 MG/DL (ref 65–99)
GLUCOSE BLD-MCNC: 85 MG/DL (ref 65–99)
GLUCOSE BLD-MCNC: 88 MG/DL (ref 65–99)
GLUCOSE BLDC GLUCOMTR-MCNC: 101 MG/DL (ref 70–105)
GLUCOSE BLDC GLUCOMTR-MCNC: 103 MG/DL (ref 70–130)
GLUCOSE BLDC GLUCOMTR-MCNC: 105 MG/DL (ref 70–105)
GLUCOSE BLDC GLUCOMTR-MCNC: 106 MG/DL (ref 70–105)
GLUCOSE BLDC GLUCOMTR-MCNC: 106 MG/DL (ref 70–105)
GLUCOSE BLDC GLUCOMTR-MCNC: 108 MG/DL (ref 70–105)
GLUCOSE BLDC GLUCOMTR-MCNC: 108 MG/DL (ref 70–130)
GLUCOSE BLDC GLUCOMTR-MCNC: 110 MG/DL (ref 70–105)
GLUCOSE BLDC GLUCOMTR-MCNC: 118 MG/DL (ref 70–105)
GLUCOSE BLDC GLUCOMTR-MCNC: 131 MG/DL (ref 70–105)
GLUCOSE BLDC GLUCOMTR-MCNC: 146 MG/DL (ref 70–105)
GLUCOSE BLDC GLUCOMTR-MCNC: 148 MG/DL (ref 70–105)
GLUCOSE BLDC GLUCOMTR-MCNC: 182 MG/DL (ref 70–105)
GLUCOSE BLDC GLUCOMTR-MCNC: 192 MG/DL (ref 70–105)
GLUCOSE BLDC GLUCOMTR-MCNC: 197 MG/DL (ref 70–105)
GLUCOSE BLDC GLUCOMTR-MCNC: 206 MG/DL (ref 70–105)
GLUCOSE BLDC GLUCOMTR-MCNC: 21 MG/DL (ref 70–105)
GLUCOSE BLDC GLUCOMTR-MCNC: 22 MG/DL (ref 70–105)
GLUCOSE BLDC GLUCOMTR-MCNC: 27 MG/DL (ref 70–105)
GLUCOSE BLDC GLUCOMTR-MCNC: 30 MG/DL (ref 70–105)
GLUCOSE BLDC GLUCOMTR-MCNC: 35 MG/DL (ref 70–105)
GLUCOSE BLDC GLUCOMTR-MCNC: 42 MG/DL (ref 70–105)
GLUCOSE BLDC GLUCOMTR-MCNC: 46 MG/DL (ref 70–105)
GLUCOSE BLDC GLUCOMTR-MCNC: 49 MG/DL (ref 70–105)
GLUCOSE BLDC GLUCOMTR-MCNC: 49 MG/DL (ref 70–105)
GLUCOSE BLDC GLUCOMTR-MCNC: 60 MG/DL (ref 70–105)
GLUCOSE BLDC GLUCOMTR-MCNC: 74 MG/DL (ref 70–105)
GLUCOSE BLDC GLUCOMTR-MCNC: 80 MG/DL (ref 70–105)
GLUCOSE BLDC GLUCOMTR-MCNC: 80 MG/DL (ref 70–105)
GLUCOSE BLDC GLUCOMTR-MCNC: 87 MG/DL (ref 70–105)
GLUCOSE BLDC GLUCOMTR-MCNC: 88 MG/DL (ref 70–105)
GLUCOSE BLDC GLUCOMTR-MCNC: 92 MG/DL (ref 70–105)
GLUCOSE BLDC GLUCOMTR-MCNC: 95 MG/DL (ref 70–105)
GLUCOSE BLDC GLUCOMTR-MCNC: 97 MG/DL (ref 70–105)
GLUCOSE BLDC GLUCOMTR-MCNC: 97 MG/DL (ref 74–100)
GLUCOSE BLDC GLUCOMTR-MCNC: 97 MG/DL (ref 74–100)
GLUCOSE SERPL-MCNC: 100 MG/DL (ref 65–99)
GLUCOSE SERPL-MCNC: 101 MG/DL (ref 65–99)
GLUCOSE SERPL-MCNC: 101 MG/DL (ref 65–99)
GLUCOSE SERPL-MCNC: 103 MG/DL (ref 65–99)
GLUCOSE SERPL-MCNC: 103 MG/DL (ref 65–99)
GLUCOSE SERPL-MCNC: 104 MG/DL (ref 65–99)
GLUCOSE SERPL-MCNC: 104 MG/DL (ref 65–99)
GLUCOSE SERPL-MCNC: 113 MG/DL (ref 65–99)
GLUCOSE SERPL-MCNC: 126 MG/DL (ref 65–99)
GLUCOSE SERPL-MCNC: 134 MG/DL (ref 65–99)
GLUCOSE SERPL-MCNC: 142 MG/DL (ref 65–99)
GLUCOSE SERPL-MCNC: 157 MG/DL (ref 65–99)
GLUCOSE SERPL-MCNC: 467 MG/DL (ref 65–99)
GLUCOSE SERPL-MCNC: 73 MG/DL (ref 65–99)
GLUCOSE SERPL-MCNC: 75 MG/DL (ref 65–99)
GLUCOSE SERPL-MCNC: 80 MG/DL (ref 65–99)
GLUCOSE SERPL-MCNC: 83 MG/DL (ref 65–99)
GLUCOSE SERPL-MCNC: 87 MG/DL (ref 65–99)
GLUCOSE SERPL-MCNC: 88 MG/DL (ref 65–99)
GLUCOSE SERPL-MCNC: 90 MG/DL (ref 65–99)
GLUCOSE SERPL-MCNC: 99 MG/DL (ref 65–99)
GLUCOSE UR STRIP-MCNC: NEGATIVE MG/DL
HADV DNA SPEC NAA+PROBE: NOT DETECTED
HADV DNA SPEC NAA+PROBE: NOT DETECTED
HBA1C MFR BLD: 5.2 % (ref 3.5–5.6)
HBA1C MFR BLD: 5.5 % (ref 3.5–5.6)
HBA1C MFR BLD: 5.9 % (ref 4.8–5.6)
HCO3 BLDA-SCNC: 26.4 MMOL/L (ref 22–26)
HCO3 BLDA-SCNC: 27.7 MMOL/L (ref 22–26)
HCO3 BLDA-SCNC: 28.7 MMOL/L (ref 21–28)
HCO3 BLDA-SCNC: 31.7 MMOL/L (ref 21–28)
HCO3 BLDA-SCNC: 32.9 MMOL/L (ref 21–28)
HCO3 BLDA-SCNC: 35.8 MMOL/L (ref 21–28)
HCOV 229E RNA SPEC QL NAA+PROBE: NOT DETECTED
HCOV 229E RNA SPEC QL NAA+PROBE: NOT DETECTED
HCOV HKU1 RNA SPEC QL NAA+PROBE: NOT DETECTED
HCOV HKU1 RNA SPEC QL NAA+PROBE: NOT DETECTED
HCOV NL63 RNA SPEC QL NAA+PROBE: NOT DETECTED
HCOV NL63 RNA SPEC QL NAA+PROBE: NOT DETECTED
HCOV OC43 RNA SPEC QL NAA+PROBE: NOT DETECTED
HCOV OC43 RNA SPEC QL NAA+PROBE: NOT DETECTED
HCT VFR BLD AUTO: 24.8 % (ref 34–46.6)
HCT VFR BLD AUTO: 28.1 % (ref 34–46.6)
HCT VFR BLD AUTO: 28.5 % (ref 34–46.6)
HCT VFR BLD AUTO: 29.4 % (ref 34–46.6)
HCT VFR BLD AUTO: 29.5 % (ref 34–46.6)
HCT VFR BLD AUTO: 29.6 % (ref 34–46.6)
HCT VFR BLD AUTO: 30.5 % (ref 34–46.6)
HCT VFR BLD AUTO: 30.6 % (ref 34–46.6)
HCT VFR BLD AUTO: 30.7 % (ref 34–46.6)
HCT VFR BLD AUTO: 31.8 % (ref 34–46.6)
HCT VFR BLD AUTO: 32.1 % (ref 34–46.6)
HCT VFR BLD AUTO: 32.6 % (ref 34–46.6)
HCT VFR BLD AUTO: 33.8 % (ref 34–46.6)
HCT VFR BLD AUTO: 34.4 % (ref 34–46.6)
HCT VFR BLD AUTO: 34.5 % (ref 34–46.6)
HCT VFR BLD AUTO: 35.4 % (ref 34–46.6)
HCT VFR BLD AUTO: 35.4 % (ref 34–46.6)
HCT VFR BLD AUTO: 35.5 % (ref 34–46.6)
HCT VFR BLD AUTO: 36.2 % (ref 34–46.6)
HCT VFR BLD AUTO: 36.4 % (ref 34–46.6)
HCT VFR BLD AUTO: 37.1 % (ref 34–46.6)
HCT VFR BLD AUTO: 37.2 % (ref 34–46.6)
HCT VFR BLD AUTO: 38.2 % (ref 34–46.6)
HCT VFR BLDA CALC: 29 % (ref 38–51)
HCT VFR BLDA CALC: 29 % (ref 38–51)
HCT VFR BLDA CALC: 33 % (ref 38–51)
HEMODILUTION: NO
HGB BLD-MCNC: 10 G/DL (ref 12–15.9)
HGB BLD-MCNC: 10.3 G/DL (ref 12–15.9)
HGB BLD-MCNC: 10.5 G/DL (ref 12–15.9)
HGB BLD-MCNC: 10.6 G/DL (ref 12–15.9)
HGB BLD-MCNC: 11.1 G/DL (ref 12–15.9)
HGB BLD-MCNC: 11.3 G/DL (ref 12–15.9)
HGB BLD-MCNC: 11.3 G/DL (ref 12–15.9)
HGB BLD-MCNC: 11.4 G/DL (ref 12–15.9)
HGB BLD-MCNC: 11.7 G/DL (ref 12–15.9)
HGB BLD-MCNC: 11.7 G/DL (ref 12–15.9)
HGB BLD-MCNC: 12.1 G/DL (ref 12–15.9)
HGB BLD-MCNC: 12.4 G/DL (ref 12–15.9)
HGB BLD-MCNC: 12.6 G/DL (ref 12–15.9)
HGB BLD-MCNC: 12.7 G/DL (ref 12–15.9)
HGB BLD-MCNC: 12.9 G/DL (ref 12–15.9)
HGB BLD-MCNC: 8 G/DL (ref 12–15.9)
HGB BLD-MCNC: 8.9 G/DL (ref 12–15.9)
HGB BLD-MCNC: 9.4 G/DL (ref 12–15.9)
HGB BLD-MCNC: 9.5 G/DL (ref 12–15.9)
HGB BLD-MCNC: 9.6 G/DL (ref 12–15.9)
HGB BLD-MCNC: 9.6 G/DL (ref 12–15.9)
HGB BLD-MCNC: 9.7 G/DL (ref 12–15.9)
HGB BLD-MCNC: 9.7 G/DL (ref 12–15.9)
HGB BLDA-MCNC: 11.1 G/DL (ref 12–17)
HGB BLDA-MCNC: 9.9 G/DL (ref 12–17)
HGB BLDA-MCNC: 9.9 G/DL (ref 12–17)
HGB UR QL STRIP.AUTO: ABNORMAL
HGB UR QL STRIP.AUTO: NEGATIVE
HMPV RNA NPH QL NAA+NON-PROBE: NOT DETECTED
HMPV RNA NPH QL NAA+NON-PROBE: NOT DETECTED
HOLD SPECIMEN: NORMAL
HOLD SPECIMEN: NORMAL
HPIV1 RNA SPEC QL NAA+PROBE: NOT DETECTED
HPIV1 RNA SPEC QL NAA+PROBE: NOT DETECTED
HPIV2 RNA SPEC QL NAA+PROBE: NOT DETECTED
HPIV2 RNA SPEC QL NAA+PROBE: NOT DETECTED
HPIV3 RNA NPH QL NAA+PROBE: NOT DETECTED
HPIV3 RNA NPH QL NAA+PROBE: NOT DETECTED
HPIV4 P GENE NPH QL NAA+PROBE: NOT DETECTED
HPIV4 P GENE NPH QL NAA+PROBE: NOT DETECTED
HYALINE CASTS UR QL AUTO: ABNORMAL /LPF
IMM GRANULOCYTES # BLD AUTO: 0.03 10*3/MM3 (ref 0–0.05)
IMM GRANULOCYTES # BLD AUTO: 0.06 10*3/MM3 (ref 0–0.05)
IMM GRANULOCYTES NFR BLD AUTO: 0.3 % (ref 0–0.5)
IMM GRANULOCYTES NFR BLD AUTO: 0.8 % (ref 0–0.5)
INHALED O2 CONCENTRATION: 32 %
INHALED O2 CONCENTRATION: 32 %
INHALED O2 CONCENTRATION: 44 %
INHALED O2 CONCENTRATION: 70 %
INR PPP: 0.97 (ref 0.93–1.1)
INR PPP: 1 (ref 0.93–1.1)
INR PPP: 1.02 (ref 0.93–1.1)
INR PPP: 1.02 (ref 0.93–1.1)
INR PPP: 1.1 (ref 0.9–1.1)
INR PPP: 1.19 (ref 0.9–1.1)
KETONES UR QL STRIP: NEGATIVE
L PNEUMO1 AG UR QL IA: NEGATIVE
L PNEUMO1 AG UR QL IA: NEGATIVE
LARGE PLATELETS: NORMAL
LEFT ATRIUM VOLUME INDEX: 31 ML/M2
LEFT ATRIUM VOLUME INDEX: 42 ML/M2
LEUKOCYTE ESTERASE UR QL STRIP.AUTO: ABNORMAL
LEUKOCYTE ESTERASE UR QL STRIP.AUTO: NEGATIVE
LV EF 2D ECHO EST: 65 %
LV EF 2D ECHO EST: 85 %
LYMPHOCYTES # BLD AUTO: 0.7 10*3/MM3 (ref 0.7–3.1)
LYMPHOCYTES # BLD AUTO: 0.8 10*3/MM3 (ref 0.7–3.1)
LYMPHOCYTES # BLD AUTO: 0.8 10*3/MM3 (ref 0.7–3.1)
LYMPHOCYTES # BLD AUTO: 0.9 10*3/MM3 (ref 0.7–3.1)
LYMPHOCYTES # BLD AUTO: 1 10*3/MM3 (ref 0.7–3.1)
LYMPHOCYTES # BLD AUTO: 1.1 10*3/MM3 (ref 0.7–3.1)
LYMPHOCYTES # BLD AUTO: 1.1 10*3/MM3 (ref 0.7–3.1)
LYMPHOCYTES # BLD AUTO: 1.2 10*3/MM3 (ref 0.7–3.1)
LYMPHOCYTES # BLD AUTO: 1.3 10*3/MM3 (ref 0.7–3.1)
LYMPHOCYTES # BLD AUTO: 1.33 10*3/MM3 (ref 0.7–3.1)
LYMPHOCYTES # BLD AUTO: 1.7 10*3/MM3 (ref 0.7–3.1)
LYMPHOCYTES # BLD AUTO: 1.8 10*3/MM3 (ref 0.7–3.1)
LYMPHOCYTES # BLD AUTO: 1.92 10*3/MM3 (ref 0.7–3.1)
LYMPHOCYTES # BLD MANUAL: 0.31 10*3/MM3 (ref 0.7–3.1)
LYMPHOCYTES # BLD MANUAL: 0.57 10*3/MM3 (ref 0.7–3.1)
LYMPHOCYTES NFR BLD AUTO: 10.7 % (ref 19.6–45.3)
LYMPHOCYTES NFR BLD AUTO: 11.1 % (ref 19.6–45.3)
LYMPHOCYTES NFR BLD AUTO: 11.1 % (ref 19.6–45.3)
LYMPHOCYTES NFR BLD AUTO: 12.6 % (ref 19.6–45.3)
LYMPHOCYTES NFR BLD AUTO: 13.5 % (ref 19.6–45.3)
LYMPHOCYTES NFR BLD AUTO: 14.5 % (ref 19.6–45.3)
LYMPHOCYTES NFR BLD AUTO: 14.5 % (ref 19.6–45.3)
LYMPHOCYTES NFR BLD AUTO: 14.9 % (ref 19.6–45.3)
LYMPHOCYTES NFR BLD AUTO: 16.9 % (ref 19.6–45.3)
LYMPHOCYTES NFR BLD AUTO: 17.4 % (ref 19.6–45.3)
LYMPHOCYTES NFR BLD AUTO: 2.6 % (ref 19.6–45.3)
LYMPHOCYTES NFR BLD AUTO: 20.1 % (ref 19.6–45.3)
LYMPHOCYTES NFR BLD AUTO: 3.3 % (ref 19.6–45.3)
LYMPHOCYTES NFR BLD AUTO: 6.5 % (ref 19.6–45.3)
LYMPHOCYTES NFR BLD AUTO: 7 % (ref 19.6–45.3)
LYMPHOCYTES NFR BLD AUTO: 7.3 % (ref 19.6–45.3)
LYMPHOCYTES NFR BLD AUTO: 7.6 % (ref 19.6–45.3)
LYMPHOCYTES NFR BLD AUTO: 7.9 % (ref 19.6–45.3)
LYMPHOCYTES NFR BLD AUTO: 8.9 % (ref 19.6–45.3)
LYMPHOCYTES NFR BLD AUTO: 9.8 % (ref 19.6–45.3)
LYMPHOCYTES NFR BLD MANUAL: 1 % (ref 19.6–45.3)
LYMPHOCYTES NFR BLD MANUAL: 2 % (ref 19.6–45.3)
LYMPHOCYTES NFR BLD MANUAL: 2 % (ref 5–12)
LYMPHOCYTES NFR BLD MANUAL: 4 % (ref 5–12)
M PNEUMO IGG SER IA-ACNC: NOT DETECTED
M PNEUMO IGG SER IA-ACNC: NOT DETECTED
MAGNESIUM SERPL-MCNC: 1.6 MG/DL (ref 1.6–2.4)
MAGNESIUM SERPL-MCNC: 1.7 MG/DL (ref 1.6–2.4)
MAGNESIUM SERPL-MCNC: 1.7 MG/DL (ref 1.6–2.4)
MAGNESIUM SERPL-MCNC: 2.2 MG/DL (ref 1.6–2.4)
MAGNESIUM SERPL-MCNC: 2.4 MG/DL (ref 1.6–2.4)
MAXIMAL PREDICTED HEART RATE: 136 BPM
MAXIMAL PREDICTED HEART RATE: 136 BPM
MAXIMAL PREDICTED HEART RATE: 137 BPM
MCH RBC QN AUTO: 29.9 PG (ref 26.6–33)
MCH RBC QN AUTO: 30.2 PG (ref 26.6–33)
MCH RBC QN AUTO: 30.4 PG (ref 26.6–33)
MCH RBC QN AUTO: 30.5 PG (ref 26.6–33)
MCH RBC QN AUTO: 30.5 PG (ref 26.6–33)
MCH RBC QN AUTO: 30.7 PG (ref 26.6–33)
MCH RBC QN AUTO: 30.7 PG (ref 26.6–33)
MCH RBC QN AUTO: 30.8 PG (ref 26.6–33)
MCH RBC QN AUTO: 30.9 PG (ref 26.6–33)
MCH RBC QN AUTO: 31 PG (ref 26.6–33)
MCH RBC QN AUTO: 31 PG (ref 26.6–33)
MCH RBC QN AUTO: 31.1 PG (ref 26.6–33)
MCH RBC QN AUTO: 31.1 PG (ref 26.6–33)
MCH RBC QN AUTO: 31.2 PG (ref 26.6–33)
MCH RBC QN AUTO: 31.2 PG (ref 26.6–33)
MCH RBC QN AUTO: 31.3 PG (ref 26.6–33)
MCH RBC QN AUTO: 31.4 PG (ref 26.6–33)
MCH RBC QN AUTO: 31.5 PG (ref 26.6–33)
MCH RBC QN AUTO: 31.8 PG (ref 26.6–33)
MCH RBC QN AUTO: 31.9 PG (ref 26.6–33)
MCH RBC QN AUTO: 31.9 PG (ref 26.6–33)
MCH RBC QN AUTO: 32 PG (ref 26.6–33)
MCH RBC QN AUTO: 32.2 PG (ref 26.6–33)
MCHC RBC AUTO-ENTMCNC: 31.3 G/DL (ref 31.5–35.7)
MCHC RBC AUTO-ENTMCNC: 31.4 G/DL (ref 31.5–35.7)
MCHC RBC AUTO-ENTMCNC: 31.6 G/DL (ref 31.5–35.7)
MCHC RBC AUTO-ENTMCNC: 31.8 G/DL (ref 31.5–35.7)
MCHC RBC AUTO-ENTMCNC: 31.8 G/DL (ref 31.5–35.7)
MCHC RBC AUTO-ENTMCNC: 31.9 G/DL (ref 31.5–35.7)
MCHC RBC AUTO-ENTMCNC: 32 G/DL (ref 31.5–35.7)
MCHC RBC AUTO-ENTMCNC: 32.1 G/DL (ref 31.5–35.7)
MCHC RBC AUTO-ENTMCNC: 32.2 G/DL (ref 31.5–35.7)
MCHC RBC AUTO-ENTMCNC: 32.3 G/DL (ref 31.5–35.7)
MCHC RBC AUTO-ENTMCNC: 32.5 G/DL (ref 31.5–35.7)
MCHC RBC AUTO-ENTMCNC: 32.6 G/DL (ref 31.5–35.7)
MCHC RBC AUTO-ENTMCNC: 32.7 G/DL (ref 31.5–35.7)
MCHC RBC AUTO-ENTMCNC: 32.8 G/DL (ref 31.5–35.7)
MCHC RBC AUTO-ENTMCNC: 33 G/DL (ref 31.5–35.7)
MCHC RBC AUTO-ENTMCNC: 33.1 G/DL (ref 31.5–35.7)
MCHC RBC AUTO-ENTMCNC: 33.4 G/DL (ref 31.5–35.7)
MCHC RBC AUTO-ENTMCNC: 33.8 G/DL (ref 31.5–35.7)
MCHC RBC AUTO-ENTMCNC: 33.9 G/DL (ref 31.5–35.7)
MCHC RBC AUTO-ENTMCNC: 34.1 G/DL (ref 31.5–35.7)
MCHC RBC AUTO-ENTMCNC: 34.8 G/DL (ref 31.5–35.7)
MCHC RBC AUTO-ENTMCNC: 35.2 G/DL (ref 31.5–35.7)
MCV RBC AUTO: 90.1 FL (ref 79–97)
MCV RBC AUTO: 91.4 FL (ref 79–97)
MCV RBC AUTO: 91.5 FL (ref 79–97)
MCV RBC AUTO: 94 FL (ref 79–97)
MCV RBC AUTO: 94.4 FL (ref 79–97)
MCV RBC AUTO: 94.4 FL (ref 79–97)
MCV RBC AUTO: 94.5 FL (ref 79–97)
MCV RBC AUTO: 94.9 FL (ref 79–97)
MCV RBC AUTO: 95.2 FL (ref 79–97)
MCV RBC AUTO: 95.3 FL (ref 79–97)
MCV RBC AUTO: 95.3 FL (ref 79–97)
MCV RBC AUTO: 95.5 FL (ref 79–97)
MCV RBC AUTO: 95.5 FL (ref 79–97)
MCV RBC AUTO: 95.6 FL (ref 79–97)
MCV RBC AUTO: 95.7 FL (ref 79–97)
MCV RBC AUTO: 95.8 FL (ref 79–97)
MCV RBC AUTO: 95.8 FL (ref 79–97)
MCV RBC AUTO: 96 FL (ref 79–97)
MCV RBC AUTO: 96.2 FL (ref 79–97)
MCV RBC AUTO: 96.4 FL (ref 79–97)
MCV RBC AUTO: 96.5 FL (ref 79–97)
MCV RBC AUTO: 96.5 FL (ref 79–97)
MCV RBC AUTO: 96.8 FL (ref 79–97)
MCV RBC AUTO: 97.1 FL (ref 79–97)
MCV RBC AUTO: 97.7 FL (ref 79–97)
MODALITY: ABNORMAL
MONOCYTES # BLD AUTO: 0.57 10*3/MM3 (ref 0.1–0.9)
MONOCYTES # BLD AUTO: 0.6 10*3/MM3 (ref 0.1–0.9)
MONOCYTES # BLD AUTO: 0.83 10*3/MM3 (ref 0.1–0.9)
MONOCYTES # BLD AUTO: 0.9 10*3/MM3 (ref 0.1–0.9)
MONOCYTES # BLD AUTO: 1 10*3/MM3 (ref 0.1–0.9)
MONOCYTES # BLD AUTO: 1.1 10*3/MM3 (ref 0.1–0.9)
MONOCYTES # BLD AUTO: 1.1 10*3/MM3 (ref 0.1–0.9)
MONOCYTES # BLD AUTO: 1.2 10*3/MM3 (ref 0.1–0.9)
MONOCYTES # BLD AUTO: 1.2 10*3/MM3 (ref 0.1–0.9)
MONOCYTES # BLD AUTO: 1.25 10*3/MM3 (ref 0.1–0.9)
MONOCYTES # BLD AUTO: 1.3 10*3/MM3 (ref 0.1–0.9)
MONOCYTES # BLD AUTO: 1.5 10*3/MM3 (ref 0.1–0.9)
MONOCYTES # BLD AUTO: 1.5 10*3/MM3 (ref 0.1–0.9)
MONOCYTES # BLD AUTO: 1.8 10*3/MM3 (ref 0.1–0.9)
MONOCYTES # BLD AUTO: 1.9 10*3/MM3 (ref 0.1–0.9)
MONOCYTES # BLD AUTO: 2 10*3/MM3 (ref 0.1–0.9)
MONOCYTES # BLD AUTO: 2 10*3/MM3 (ref 0.1–0.9)
MONOCYTES # BLD AUTO: 2.3 10*3/MM3 (ref 0.1–0.9)
MONOCYTES NFR BLD AUTO: 10.5 % (ref 5–12)
MONOCYTES NFR BLD AUTO: 10.8 % (ref 5–12)
MONOCYTES NFR BLD AUTO: 11.2 % (ref 5–12)
MONOCYTES NFR BLD AUTO: 11.3 % (ref 5–12)
MONOCYTES NFR BLD AUTO: 11.5 % (ref 5–12)
MONOCYTES NFR BLD AUTO: 11.8 % (ref 5–12)
MONOCYTES NFR BLD AUTO: 12.2 % (ref 5–12)
MONOCYTES NFR BLD AUTO: 12.3 % (ref 5–12)
MONOCYTES NFR BLD AUTO: 12.4 % (ref 5–12)
MONOCYTES NFR BLD AUTO: 12.4 % (ref 5–12)
MONOCYTES NFR BLD AUTO: 12.5 % (ref 5–12)
MONOCYTES NFR BLD AUTO: 13.6 % (ref 5–12)
MONOCYTES NFR BLD AUTO: 15.4 % (ref 5–12)
MONOCYTES NFR BLD AUTO: 16.1 % (ref 5–12)
MONOCYTES NFR BLD AUTO: 5.7 % (ref 5–12)
MONOCYTES NFR BLD AUTO: 8.1 % (ref 5–12)
MONOCYTES NFR BLD AUTO: 8.9 % (ref 5–12)
MONOCYTES NFR BLD AUTO: 9.4 % (ref 5–12)
MONOCYTES NFR BLD AUTO: 9.4 % (ref 5–12)
MONOCYTES NFR BLD AUTO: 9.6 % (ref 5–12)
MRSA DNA SPEC QL NAA+PROBE: NORMAL
MRSA DNA SPEC QL NAA+PROBE: NORMAL
NEUTROPHILS # BLD AUTO: 27.36 10*3/MM3 (ref 1.7–7)
NEUTROPHILS # BLD AUTO: 29.74 10*3/MM3 (ref 1.7–7)
NEUTROPHILS # BLD AUTO: 5.6 10*3/MM3 (ref 1.7–7)
NEUTROPHILS # BLD AUTO: 5.7 10*3/MM3 (ref 1.7–7)
NEUTROPHILS # BLD AUTO: 6.24 10*3/MM3 (ref 1.7–7)
NEUTROPHILS # BLD AUTO: 6.5 10*3/MM3 (ref 1.7–7)
NEUTROPHILS NFR BLD AUTO: 10.1 10*3/MM3 (ref 1.7–7)
NEUTROPHILS NFR BLD AUTO: 12.7 10*3/MM3 (ref 1.7–7)
NEUTROPHILS NFR BLD AUTO: 12.7 10*3/MM3 (ref 1.7–7)
NEUTROPHILS NFR BLD AUTO: 13.6 10*3/MM3 (ref 1.7–7)
NEUTROPHILS NFR BLD AUTO: 17.4 10*3/MM3 (ref 1.7–7)
NEUTROPHILS NFR BLD AUTO: 22.2 10*3/MM3 (ref 1.7–7)
NEUTROPHILS NFR BLD AUTO: 5.1 10*3/MM3 (ref 1.7–7)
NEUTROPHILS NFR BLD AUTO: 5.37 10*3/MM3 (ref 1.7–7)
NEUTROPHILS NFR BLD AUTO: 6 10*3/MM3 (ref 1.7–7)
NEUTROPHILS NFR BLD AUTO: 6.2 10*3/MM3 (ref 1.7–7)
NEUTROPHILS NFR BLD AUTO: 60.5 % (ref 42.7–76)
NEUTROPHILS NFR BLD AUTO: 65.3 % (ref 42.7–76)
NEUTROPHILS NFR BLD AUTO: 66.2 % (ref 42.7–76)
NEUTROPHILS NFR BLD AUTO: 68.3 % (ref 42.7–76)
NEUTROPHILS NFR BLD AUTO: 68.3 % (ref 42.7–76)
NEUTROPHILS NFR BLD AUTO: 7 10*3/MM3 (ref 1.7–7)
NEUTROPHILS NFR BLD AUTO: 7.3 10*3/MM3 (ref 1.7–7)
NEUTROPHILS NFR BLD AUTO: 7.4 10*3/MM3 (ref 1.7–7)
NEUTROPHILS NFR BLD AUTO: 71.1 % (ref 42.7–76)
NEUTROPHILS NFR BLD AUTO: 71.7 % (ref 42.7–76)
NEUTROPHILS NFR BLD AUTO: 71.9 % (ref 42.7–76)
NEUTROPHILS NFR BLD AUTO: 74.3 % (ref 42.7–76)
NEUTROPHILS NFR BLD AUTO: 75.3 % (ref 42.7–76)
NEUTROPHILS NFR BLD AUTO: 77.2 % (ref 42.7–76)
NEUTROPHILS NFR BLD AUTO: 77.9 % (ref 42.7–76)
NEUTROPHILS NFR BLD AUTO: 78 % (ref 42.7–76)
NEUTROPHILS NFR BLD AUTO: 79.1 % (ref 42.7–76)
NEUTROPHILS NFR BLD AUTO: 79.6 % (ref 42.7–76)
NEUTROPHILS NFR BLD AUTO: 79.7 % (ref 42.7–76)
NEUTROPHILS NFR BLD AUTO: 80.5 % (ref 42.7–76)
NEUTROPHILS NFR BLD AUTO: 85.8 % (ref 42.7–76)
NEUTROPHILS NFR BLD AUTO: 87 % (ref 42.7–76)
NEUTROPHILS NFR BLD AUTO: 87.6 % (ref 42.7–76)
NEUTROPHILS NFR BLD AUTO: 9.2 10*3/MM3 (ref 1.7–7)
NEUTROPHILS NFR BLD AUTO: 9.7 10*3/MM3 (ref 1.7–7)
NEUTROPHILS NFR BLD AUTO: 9.9 10*3/MM3 (ref 1.7–7)
NEUTROPHILS NFR BLD MANUAL: 80 % (ref 42.7–76)
NEUTROPHILS NFR BLD MANUAL: 86 % (ref 42.7–76)
NEUTS BAND NFR BLD MANUAL: 10 % (ref 0–5)
NEUTS BAND NFR BLD MANUAL: 15 % (ref 0–5)
NITRITE UR QL STRIP: NEGATIVE
NOROVIRUS GI+II RNA STL QL NAA+NON-PROBE: NOT DETECTED
NRBC BLD AUTO-RTO: 0 /100 WBC (ref 0–0.2)
NRBC BLD AUTO-RTO: 0.1 /100 WBC (ref 0–0.2)
NT-PROBNP SERPL-MCNC: 1872 PG/ML (ref 0–1800)
NT-PROBNP SERPL-MCNC: 2866 PG/ML (ref 5–1800)
NT-PROBNP SERPL-MCNC: 4443 PG/ML (ref 0–1800)
NT-PROBNP SERPL-MCNC: 7990 PG/ML (ref 5–1800)
NT-PROBNP SERPL-MCNC: 9741 PG/ML (ref 0–1800)
NT-PROBNP SERPL-MCNC: ABNORMAL PG/ML (ref 0–1800)
OSMOLALITY SERPL: 283 MOSM/KG (ref 280–300)
OSMOLALITY UR: 254 MOSM/KG (ref 300–800)
OSMOLALITY UR: 280 MOSM/KG (ref 300–800)
P SHIGELLOIDES DNA STL QL NAA+PROBE: NOT DETECTED
PCO2 BLDA: 46.7 MM HG (ref 35–45)
PCO2 BLDA: 60.8 MM HG (ref 35–48)
PCO2 BLDA: 66.1 MM HG (ref 35–48)
PCO2 BLDA: 66.5 MM HG (ref 35–45)
PCO2 BLDA: 70.1 MM HG (ref 35–48)
PCO2 BLDA: 77.8 MM HG (ref 35–48)
PEEP RESPIRATORY: 6 CM[H2O]
PH BLDA: 7.21 PH UNITS (ref 7.35–7.6)
PH BLDA: 7.25 PH UNITS (ref 7.35–7.45)
PH BLDA: 7.27 PH UNITS (ref 7.35–7.45)
PH BLDA: 7.28 PH UNITS (ref 7.35–7.45)
PH BLDA: 7.32 PH UNITS (ref 7.35–7.45)
PH BLDA: 7.38 PH UNITS (ref 7.35–7.6)
PH UR STRIP.AUTO: 6 [PH] (ref 5–8)
PH UR STRIP.AUTO: 6.5 [PH] (ref 5–8)
PHOSPHATE SERPL-MCNC: 2.6 MG/DL (ref 2.5–4.5)
PHOSPHATE SERPL-MCNC: 3.1 MG/DL (ref 2.5–4.5)
PHOSPHATE SERPL-MCNC: 3.8 MG/DL (ref 2.5–4.5)
PLATELET # BLD AUTO: 202 10*3/MM3 (ref 140–450)
PLATELET # BLD AUTO: 217 10*3/MM3 (ref 140–450)
PLATELET # BLD AUTO: 227 10*3/MM3 (ref 140–450)
PLATELET # BLD AUTO: 230 10*3/MM3 (ref 140–450)
PLATELET # BLD AUTO: 241 10*3/MM3 (ref 140–450)
PLATELET # BLD AUTO: 244 10*3/MM3 (ref 140–450)
PLATELET # BLD AUTO: 263 10*3/MM3 (ref 140–450)
PLATELET # BLD AUTO: 265 10*3/MM3 (ref 140–450)
PLATELET # BLD AUTO: 268 10*3/MM3 (ref 140–450)
PLATELET # BLD AUTO: 279 10*3/MM3 (ref 140–450)
PLATELET # BLD AUTO: 284 10*3/MM3 (ref 140–450)
PLATELET # BLD AUTO: 297 10*3/MM3 (ref 140–450)
PLATELET # BLD AUTO: 299 10*3/MM3 (ref 140–450)
PLATELET # BLD AUTO: 301 10*3/MM3 (ref 140–450)
PLATELET # BLD AUTO: 309 10*3/MM3 (ref 140–450)
PLATELET # BLD AUTO: 337 10*3/MM3 (ref 140–450)
PLATELET # BLD AUTO: 339 10*3/MM3 (ref 140–450)
PLATELET # BLD AUTO: 353 10*3/MM3 (ref 140–450)
PLATELET # BLD AUTO: 355 10*3/MM3 (ref 140–450)
PLATELET # BLD AUTO: 364 10*3/MM3 (ref 140–450)
PLATELET # BLD AUTO: 364 10*3/MM3 (ref 140–450)
PLATELET # BLD AUTO: 365 10*3/MM3 (ref 140–450)
PLATELET # BLD AUTO: 383 10*3/MM3 (ref 140–450)
PLATELET # BLD AUTO: 410 10*3/MM3 (ref 140–450)
PLATELET # BLD AUTO: 454 10*3/MM3 (ref 140–450)
PMV BLD AUTO: 10.5 FL (ref 6–12)
PMV BLD AUTO: 10.6 FL (ref 6–12)
PMV BLD AUTO: 10.7 FL (ref 6–12)
PMV BLD AUTO: 7 FL (ref 6–12)
PMV BLD AUTO: 7.1 FL (ref 6–12)
PMV BLD AUTO: 7.2 FL (ref 6–12)
PMV BLD AUTO: 7.3 FL (ref 6–12)
PMV BLD AUTO: 7.3 FL (ref 6–12)
PMV BLD AUTO: 7.8 FL (ref 6–12)
PMV BLD AUTO: 7.9 FL (ref 6–12)
PMV BLD AUTO: 7.9 FL (ref 6–12)
PMV BLD AUTO: 8 FL (ref 6–12)
PMV BLD AUTO: 8 FL (ref 6–12)
PMV BLD AUTO: 8.2 FL (ref 6–12)
PMV BLD AUTO: 8.3 FL (ref 6–12)
PMV BLD AUTO: 8.3 FL (ref 6–12)
PMV BLD AUTO: 8.4 FL (ref 6–12)
PMV BLD AUTO: 8.4 FL (ref 6–12)
PMV BLD AUTO: 8.6 FL (ref 6–12)
PMV BLD AUTO: 8.7 FL (ref 6–12)
PMV BLD AUTO: 8.9 FL (ref 6–12)
PMV BLD AUTO: 9.1 FL (ref 6–12)
PMV BLD AUTO: 9.1 FL (ref 6–12)
PO2 BLDA: 135 MMHG (ref 80–105)
PO2 BLDA: 149.1 MM HG (ref 83–108)
PO2 BLDA: 169.9 MM HG (ref 83–108)
PO2 BLDA: 214 MMHG (ref 80–105)
PO2 BLDA: 228.9 MM HG (ref 83–108)
PO2 BLDA: 284.5 MM HG (ref 83–108)
POIKILOCYTOSIS BLD QL SMEAR: ABNORMAL
POTASSIUM BLD-SCNC: 3.4 MMOL/L (ref 3.5–5.2)
POTASSIUM BLD-SCNC: 3.6 MMOL/L (ref 3.5–5.2)
POTASSIUM BLD-SCNC: 3.7 MMOL/L (ref 3.5–5.2)
POTASSIUM BLD-SCNC: 3.8 MMOL/L (ref 3.5–5.2)
POTASSIUM BLD-SCNC: 4 MMOL/L (ref 3.5–5.2)
POTASSIUM BLD-SCNC: 4.4 MMOL/L (ref 3.5–5.2)
POTASSIUM BLD-SCNC: 4.5 MMOL/L (ref 3.5–5.2)
POTASSIUM BLD-SCNC: 4.8 MMOL/L (ref 3.5–5.2)
POTASSIUM BLD-SCNC: 5 MMOL/L (ref 3.5–5.2)
POTASSIUM BLDA-SCNC: 3.7 MMOL/L (ref 3.5–4.9)
POTASSIUM BLDA-SCNC: 3.9 MMOL/L (ref 3.5–4.9)
POTASSIUM BLDA-SCNC: 5.3 MMOL/L (ref 3.5–4.5)
POTASSIUM SERPL-SCNC: 4 MMOL/L (ref 3.5–5.2)
POTASSIUM SERPL-SCNC: 4.1 MMOL/L (ref 3.5–5.2)
POTASSIUM SERPL-SCNC: 4.3 MMOL/L (ref 3.5–5.2)
POTASSIUM SERPL-SCNC: 4.3 MMOL/L (ref 3.5–5.2)
POTASSIUM SERPL-SCNC: 4.4 MMOL/L (ref 3.5–5.2)
POTASSIUM SERPL-SCNC: 4.4 MMOL/L (ref 3.5–5.2)
POTASSIUM SERPL-SCNC: 4.5 MMOL/L (ref 3.5–5.2)
POTASSIUM SERPL-SCNC: 4.6 MMOL/L (ref 3.5–5.2)
POTASSIUM SERPL-SCNC: 4.7 MMOL/L (ref 3.5–5.2)
POTASSIUM SERPL-SCNC: 4.7 MMOL/L (ref 3.5–5.2)
POTASSIUM SERPL-SCNC: 4.9 MMOL/L (ref 3.5–5.2)
POTASSIUM SERPL-SCNC: 5 MMOL/L (ref 3.5–5.2)
POTASSIUM SERPL-SCNC: 5.2 MMOL/L (ref 3.5–5.2)
POTASSIUM SERPL-SCNC: 5.3 MMOL/L (ref 3.5–5.2)
POTASSIUM SERPL-SCNC: 5.4 MMOL/L (ref 3.5–5.2)
POTASSIUM SERPL-SCNC: 5.7 MMOL/L (ref 3.5–5.2)
POTASSIUM SERPL-SCNC: 5.7 MMOL/L (ref 3.5–5.2)
POTASSIUM SERPL-SCNC: 5.9 MMOL/L (ref 3.5–5.2)
PROCALCITONIN SERPL-MCNC: 0.04 NG/ML (ref 0–0.25)
PROCALCITONIN SERPL-MCNC: 0.06 NG/ML (ref 0–0.25)
PROCALCITONIN SERPL-MCNC: 0.08 NG/ML (ref 0–0.25)
PROCALCITONIN SERPL-MCNC: 1.4 NG/ML (ref 0–0.25)
PROT SERPL-MCNC: 5.1 G/DL (ref 6–8.5)
PROT SERPL-MCNC: 5.3 G/DL (ref 6–8.5)
PROT SERPL-MCNC: 5.7 G/DL (ref 6–8.5)
PROT SERPL-MCNC: 6.2 G/DL (ref 6–8.5)
PROT SERPL-MCNC: 6.3 G/DL (ref 6–8.5)
PROT SERPL-MCNC: 6.5 G/DL (ref 6–8.5)
PROT SERPL-MCNC: 6.7 G/DL (ref 6–8.5)
PROT SERPL-MCNC: 7.2 G/DL (ref 6–8.5)
PROT SERPL-MCNC: 7.2 G/DL (ref 6–8.5)
PROT UR QL STRIP: NEGATIVE
PROTHROMBIN TIME: 10.7 SECONDS (ref 9.6–11.7)
PROTHROMBIN TIME: 11 SECONDS (ref 9.6–11.7)
PROTHROMBIN TIME: 11.2 SECONDS (ref 9.6–11.7)
PROTHROMBIN TIME: 11.2 SECONDS (ref 9.6–11.7)
PROTHROMBIN TIME: 11.4 SECONDS (ref 9.6–11.7)
PROTHROMBIN TIME: 14.9 SECONDS (ref 11.7–14.2)
PSV: 12 CMH2O
QT INTERVAL: 419 MS
QT INTERVAL: 425 MS
QT INTERVAL: 472 MS
RBC # BLD AUTO: 2.57 10*6/MM3 (ref 3.77–5.28)
RBC # BLD AUTO: 2.94 10*6/MM3 (ref 3.77–5.28)
RBC # BLD AUTO: 2.98 10*6/MM3 (ref 3.77–5.28)
RBC # BLD AUTO: 3.04 10*6/MM3 (ref 3.77–5.28)
RBC # BLD AUTO: 3.07 10*6/MM3 (ref 3.77–5.28)
RBC # BLD AUTO: 3.11 10*6/MM3 (ref 3.77–5.28)
RBC # BLD AUTO: 3.15 10*6/MM3 (ref 3.77–5.28)
RBC # BLD AUTO: 3.17 10*6/MM3 (ref 3.77–5.28)
RBC # BLD AUTO: 3.2 10*6/MM3 (ref 3.77–5.28)
RBC # BLD AUTO: 3.26 10*6/MM3 (ref 3.77–5.28)
RBC # BLD AUTO: 3.33 10*6/MM3 (ref 3.77–5.28)
RBC # BLD AUTO: 3.33 10*6/MM3 (ref 3.77–5.28)
RBC # BLD AUTO: 3.39 10*6/MM3 (ref 3.77–5.28)
RBC # BLD AUTO: 3.51 10*6/MM3 (ref 3.77–5.28)
RBC # BLD AUTO: 3.55 10*6/MM3 (ref 3.77–5.28)
RBC # BLD AUTO: 3.56 10*6/MM3 (ref 3.77–5.28)
RBC # BLD AUTO: 3.65 10*6/MM3 (ref 3.77–5.28)
RBC # BLD AUTO: 3.73 10*6/MM3 (ref 3.77–5.28)
RBC # BLD AUTO: 3.75 10*6/MM3 (ref 3.77–5.28)
RBC # BLD AUTO: 3.78 10*6/MM3 (ref 3.77–5.28)
RBC # BLD AUTO: 3.82 10*6/MM3 (ref 3.77–5.28)
RBC # BLD AUTO: 3.88 10*6/MM3 (ref 3.77–5.28)
RBC # BLD AUTO: 3.96 10*6/MM3 (ref 3.77–5.28)
RBC # BLD AUTO: 4.04 10*6/MM3 (ref 3.77–5.28)
RBC # BLD AUTO: 4.13 10*6/MM3 (ref 3.77–5.28)
RBC # UR: ABNORMAL /HPF
RBC MORPH BLD: NORMAL
RBC MORPH BLD: NORMAL
REF LAB TEST METHOD: ABNORMAL
REF LAB TEST METHOD: NORMAL
RESPIRATORY RATE: 14
RH BLD: POSITIVE
RH BLD: POSITIVE
RHINOVIRUS RNA SPEC NAA+PROBE: NOT DETECTED
RHINOVIRUS RNA SPEC NAA+PROBE: NOT DETECTED
RSV RNA NPH QL NAA+NON-PROBE: NOT DETECTED
RSV RNA NPH QL NAA+NON-PROBE: NOT DETECTED
RV RNA STL NAA+PROBE: NOT DETECTED
S PNEUM AG SPEC QL LA: NEGATIVE
S PNEUM AG SPEC QL LA: NEGATIVE
SALMONELLA DNA SPEC QL NAA+PROBE: NOT DETECTED
SAO2 % BLDA: 100 % (ref 95–98)
SAO2 % BLDA: 98 % (ref 95–98)
SAO2 % BLDCOA: 98.8 % (ref 94–98)
SAO2 % BLDCOA: 99.2 % (ref 94–98)
SAO2 % BLDCOA: 99.7 % (ref 94–98)
SAO2 % BLDCOA: 99.8 % (ref 94–98)
SAPO I+II+IV+V RNA STL QL NAA+NON-PROBE: NOT DETECTED
SARS-COV-2 RNA NOSE QL NAA+PROBE: NOT DETECTED
SARS-COV-2 RNA NPH QL NAA+NON-PROBE: NOT DETECTED
SARS-COV-2 RNA NPH QL NAA+NON-PROBE: NOT DETECTED
SARS-COV-2 RNA PNL SPEC NAA+PROBE: NOT DETECTED
SARS-COV-2 RNA RESP QL NAA+PROBE: NOT DETECTED
SCAN SLIDE: NORMAL
SCAN SLIDE: NORMAL
SHIGELLA SP+EIEC IPAH STL QL NAA+PROBE: NOT DETECTED
SINUS: 2.8 CM
SMALL PLATELETS BLD QL SMEAR: ABNORMAL
SMALL PLATELETS BLD QL SMEAR: ADEQUATE
SMALL PLATELETS BLD QL SMEAR: ADEQUATE
SODIUM BLD-SCNC: 132 MMOL/L (ref 138–146)
SODIUM BLD-SCNC: 134 MMOL/L (ref 136–145)
SODIUM BLD-SCNC: 134 MMOL/L (ref 136–145)
SODIUM BLD-SCNC: 135 MMOL/L (ref 136–145)
SODIUM BLD-SCNC: 135 MMOL/L (ref 136–145)
SODIUM BLD-SCNC: 138 MMOL/L (ref 136–145)
SODIUM SERPL-SCNC: 125 MMOL/L (ref 136–145)
SODIUM SERPL-SCNC: 126 MMOL/L (ref 136–145)
SODIUM SERPL-SCNC: 127 MMOL/L (ref 136–145)
SODIUM SERPL-SCNC: 128 MMOL/L (ref 136–145)
SODIUM SERPL-SCNC: 129 MMOL/L (ref 136–145)
SODIUM SERPL-SCNC: 129 MMOL/L (ref 136–145)
SODIUM SERPL-SCNC: 130 MMOL/L (ref 136–145)
SODIUM SERPL-SCNC: 131 MMOL/L (ref 136–145)
SODIUM SERPL-SCNC: 132 MMOL/L (ref 136–145)
SODIUM SERPL-SCNC: 132 MMOL/L (ref 136–145)
SODIUM SERPL-SCNC: 133 MMOL/L (ref 136–145)
SODIUM SERPL-SCNC: 133 MMOL/L (ref 136–145)
SODIUM SERPL-SCNC: 134 MMOL/L (ref 136–145)
SODIUM SERPL-SCNC: 135 MMOL/L (ref 136–145)
SODIUM SERPL-SCNC: 137 MMOL/L (ref 136–145)
SODIUM UR-SCNC: 22 MMOL/L
SODIUM UR-SCNC: 82 MMOL/L
SP GR UR STRIP: 1.01 (ref 1–1.03)
SP GR UR STRIP: 1.02 (ref 1–1.03)
SQUAMOUS #/AREA URNS HPF: ABNORMAL /HPF
STARCH GRANULES URNS QL MICRO: ABNORMAL /HPF
STJ: 3.3 CM
STRESS TARGET HR: 116 BPM
T&S EXPIRATION DATE: NORMAL
T&S EXPIRATION DATE: NORMAL
TOXIC GRANULATION: ABNORMAL
TROPONIN T SERPL-MCNC: 0.02 NG/ML (ref 0–0.03)
TROPONIN T SERPL-MCNC: 0.06 NG/ML (ref 0–0.03)
TROPONIN T SERPL-MCNC: 0.07 NG/ML (ref 0–0.03)
TROPONIN T SERPL-MCNC: 0.07 NG/ML (ref 0–0.03)
TROPONIN T SERPL-MCNC: <0.01 NG/ML (ref 0–0.03)
TSH SERPL DL<=0.05 MIU/L-ACNC: 0.66 UIU/ML (ref 0.27–4.2)
TSH SERPL DL<=0.05 MIU/L-ACNC: 2.05 UIU/ML (ref 0.27–4.2)
UNIT  ABO: NORMAL
UNIT  ABO: NORMAL
UNIT  RH: NORMAL
UNIT  RH: NORMAL
UROBILINOGEN UR QL STRIP: ABNORMAL
UROBILINOGEN UR QL STRIP: NORMAL
V CHOLERAE DNA SPEC QL NAA+PROBE: NOT DETECTED
VIBRIO DNA SPEC NAA+PROBE: NOT DETECTED
VIT B12 BLD-MCNC: 326 PG/ML (ref 211–946)
WBC # BLD AUTO: 11.3 10*3/MM3 (ref 3.4–10.8)
WBC # BLD AUTO: 11.8 10*3/MM3 (ref 3.4–10.8)
WBC # BLD AUTO: 12.3 10*3/MM3 (ref 3.4–10.8)
WBC # BLD AUTO: 12.6 10*3/MM3 (ref 3.4–10.8)
WBC # BLD AUTO: 16.3 10*3/MM3 (ref 3.4–10.8)
WBC # BLD AUTO: 16.4 10*3/MM3 (ref 3.4–10.8)
WBC # BLD AUTO: 17 10*3/MM3 (ref 3.4–10.8)
WBC # BLD AUTO: 20 10*3/MM3 (ref 3.4–10.8)
WBC # BLD AUTO: 25.4 10*3/MM3 (ref 3.4–10.8)
WBC # BLD AUTO: 27 10*3/MM3 (ref 3.4–10.8)
WBC # BLD AUTO: 28.5 10*3/MM3 (ref 3.4–10.8)
WBC # BLD AUTO: 31.3 10*3/MM3 (ref 3.4–10.8)
WBC # BLD AUTO: 7.5 10*3/MM3 (ref 3.4–10.8)
WBC # BLD AUTO: 7.87 10*3/MM3 (ref 3.4–10.8)
WBC # BLD AUTO: 8 10*3/MM3 (ref 3.4–10.8)
WBC # BLD AUTO: 8.4 10*3/MM3 (ref 3.4–10.8)
WBC # BLD AUTO: 8.7 10*3/MM3 (ref 3.4–10.8)
WBC # BLD AUTO: 9.3 10*3/MM3 (ref 3.4–10.8)
WBC # BLD AUTO: 9.4 10*3/MM3 (ref 3.4–10.8)
WBC # BLD AUTO: 9.8 10*3/MM3 (ref 3.4–10.8)
WBC MORPH BLD: NORMAL
WBC MORPH BLD: NORMAL
WBC NRBC COR # BLD: 8.4 10*3/MM3 (ref 3.4–10.8)
WBC NRBC COR # BLD: 9 10*3/MM3 (ref 3.4–10.8)
WBC NRBC COR # BLD: 9.23 10*3/MM3 (ref 3.4–10.8)
WBC NRBC COR # BLD: 9.5 10*3/MM3 (ref 3.4–10.8)
WBC NRBC COR # BLD: 9.55 10*3/MM3 (ref 3.4–10.8)
WBC UR QL AUTO: ABNORMAL /HPF
WHOLE BLOOD HOLD SPECIMEN: NORMAL
WHOLE BLOOD HOLD SPECIMEN: NORMAL
YERSINIA STL CULT: NOT DETECTED

## 2020-01-01 PROCEDURE — 99231 SBSQ HOSP IP/OBS SF/LOW 25: CPT | Performed by: PSYCHIATRY & NEUROLOGY

## 2020-01-01 PROCEDURE — C1894 INTRO/SHEATH, NON-LASER: HCPCS | Performed by: THORACIC SURGERY (CARDIOTHORACIC VASCULAR SURGERY)

## 2020-01-01 PROCEDURE — 71045 X-RAY EXAM CHEST 1 VIEW: CPT

## 2020-01-01 PROCEDURE — 83880 ASSAY OF NATRIURETIC PEPTIDE: CPT | Performed by: PHYSICIAN ASSISTANT

## 2020-01-01 PROCEDURE — 83735 ASSAY OF MAGNESIUM: CPT | Performed by: NURSE PRACTITIONER

## 2020-01-01 PROCEDURE — 0 IOPAMIDOL PER 1 ML: Performed by: INTERNAL MEDICINE

## 2020-01-01 PROCEDURE — 83036 HEMOGLOBIN GLYCOSYLATED A1C: CPT

## 2020-01-01 PROCEDURE — 85025 COMPLETE CBC W/AUTO DIFF WBC: CPT | Performed by: PHYSICIAN ASSISTANT

## 2020-01-01 PROCEDURE — 99223 1ST HOSP IP/OBS HIGH 75: CPT | Performed by: INTERNAL MEDICINE

## 2020-01-01 PROCEDURE — 99239 HOSP IP/OBS DSCHRG MGMT >30: CPT | Performed by: INTERNAL MEDICINE

## 2020-01-01 PROCEDURE — 80048 BASIC METABOLIC PNL TOTAL CA: CPT | Performed by: NURSE PRACTITIONER

## 2020-01-01 PROCEDURE — 85025 COMPLETE CBC W/AUTO DIFF WBC: CPT | Performed by: INTERNAL MEDICINE

## 2020-01-01 PROCEDURE — 94799 UNLISTED PULMONARY SVC/PX: CPT

## 2020-01-01 PROCEDURE — 82565 ASSAY OF CREATININE: CPT | Performed by: INTERNAL MEDICINE

## 2020-01-01 PROCEDURE — 80048 BASIC METABOLIC PNL TOTAL CA: CPT | Performed by: EMERGENCY MEDICINE

## 2020-01-01 PROCEDURE — 85027 COMPLETE CBC AUTOMATED: CPT | Performed by: INTERNAL MEDICINE

## 2020-01-01 PROCEDURE — 82607 VITAMIN B-12: CPT | Performed by: PSYCHIATRY & NEUROLOGY

## 2020-01-01 PROCEDURE — 25010000002 FUROSEMIDE PER 20 MG: Performed by: EMERGENCY MEDICINE

## 2020-01-01 PROCEDURE — 25010000002 PHENYLEPHRINE PER 1 ML: Performed by: ANESTHESIOLOGY

## 2020-01-01 PROCEDURE — 99221 1ST HOSP IP/OBS SF/LOW 40: CPT | Performed by: NURSE PRACTITIONER

## 2020-01-01 PROCEDURE — 99232 SBSQ HOSP IP/OBS MODERATE 35: CPT | Performed by: INTERNAL MEDICINE

## 2020-01-01 PROCEDURE — 84484 ASSAY OF TROPONIN QUANT: CPT | Performed by: NURSE PRACTITIONER

## 2020-01-01 PROCEDURE — 93306 TTE W/DOPPLER COMPLETE: CPT

## 2020-01-01 PROCEDURE — C9290 INJ, BUPIVACAINE LIPOSOME: HCPCS | Performed by: ANESTHESIOLOGY

## 2020-01-01 PROCEDURE — 85610 PROTHROMBIN TIME: CPT | Performed by: NURSE PRACTITIONER

## 2020-01-01 PROCEDURE — C1776 JOINT DEVICE (IMPLANTABLE): HCPCS | Performed by: ORTHOPAEDIC SURGERY

## 2020-01-01 PROCEDURE — 93296 REM INTERROG EVL PM/IDS: CPT | Performed by: INTERNAL MEDICINE

## 2020-01-01 PROCEDURE — 93010 ELECTROCARDIOGRAM REPORT: CPT | Performed by: INTERNAL MEDICINE

## 2020-01-01 PROCEDURE — 85610 PROTHROMBIN TIME: CPT

## 2020-01-01 PROCEDURE — 97116 GAIT TRAINING THERAPY: CPT

## 2020-01-01 PROCEDURE — 84132 ASSAY OF SERUM POTASSIUM: CPT | Performed by: NURSE PRACTITIONER

## 2020-01-01 PROCEDURE — 99214 OFFICE O/P EST MOD 30 MIN: CPT | Performed by: INTERNAL MEDICINE

## 2020-01-01 PROCEDURE — 94729 DIFFUSING CAPACITY: CPT

## 2020-01-01 PROCEDURE — 84145 PROCALCITONIN (PCT): CPT | Performed by: PHYSICIAN ASSISTANT

## 2020-01-01 PROCEDURE — 84484 ASSAY OF TROPONIN QUANT: CPT | Performed by: EMERGENCY MEDICINE

## 2020-01-01 PROCEDURE — 71046 X-RAY EXAM CHEST 2 VIEWS: CPT

## 2020-01-01 PROCEDURE — 86850 RBC ANTIBODY SCREEN: CPT | Performed by: ORTHOPAEDIC SURGERY

## 2020-01-01 PROCEDURE — 87899 AGENT NOS ASSAY W/OPTIC: CPT | Performed by: NURSE PRACTITIONER

## 2020-01-01 PROCEDURE — 85025 COMPLETE CBC W/AUTO DIFF WBC: CPT | Performed by: EMERGENCY MEDICINE

## 2020-01-01 PROCEDURE — 80048 BASIC METABOLIC PNL TOTAL CA: CPT

## 2020-01-01 PROCEDURE — 73030 X-RAY EXAM OF SHOULDER: CPT

## 2020-01-01 PROCEDURE — 83935 ASSAY OF URINE OSMOLALITY: CPT | Performed by: NURSE PRACTITIONER

## 2020-01-01 PROCEDURE — 36415 COLL VENOUS BLD VENIPUNCTURE: CPT

## 2020-01-01 PROCEDURE — 97110 THERAPEUTIC EXERCISES: CPT

## 2020-01-01 PROCEDURE — G0378 HOSPITAL OBSERVATION PER HR: HCPCS

## 2020-01-01 PROCEDURE — 86140 C-REACTIVE PROTEIN: CPT | Performed by: NURSE PRACTITIONER

## 2020-01-01 PROCEDURE — 86850 RBC ANTIBODY SCREEN: CPT | Performed by: NURSE PRACTITIONER

## 2020-01-01 PROCEDURE — 99222 1ST HOSP IP/OBS MODERATE 55: CPT | Performed by: INTERNAL MEDICINE

## 2020-01-01 PROCEDURE — 25010000003 MAGNESIUM SULFATE 4 GM/100ML SOLUTION: Performed by: PHYSICIAN ASSISTANT

## 2020-01-01 PROCEDURE — 25010000002 MIDAZOLAM PER 1 MG: Performed by: ANESTHESIOLOGY

## 2020-01-01 PROCEDURE — 74174 CTA ABD&PLVS W/CONTRAST: CPT

## 2020-01-01 PROCEDURE — 97162 PT EVAL MOD COMPLEX 30 MIN: CPT

## 2020-01-01 PROCEDURE — 99233 SBSQ HOSP IP/OBS HIGH 50: CPT | Performed by: INTERNAL MEDICINE

## 2020-01-01 PROCEDURE — 99231 SBSQ HOSP IP/OBS SF/LOW 25: CPT | Performed by: PHYSICIAN ASSISTANT

## 2020-01-01 PROCEDURE — 99232 SBSQ HOSP IP/OBS MODERATE 35: CPT | Performed by: STUDENT IN AN ORGANIZED HEALTH CARE EDUCATION/TRAINING PROGRAM

## 2020-01-01 PROCEDURE — 84145 PROCALCITONIN (PCT): CPT | Performed by: NURSE PRACTITIONER

## 2020-01-01 PROCEDURE — 99024 POSTOP FOLLOW-UP VISIT: CPT | Performed by: ORTHOPAEDIC SURGERY

## 2020-01-01 PROCEDURE — 80053 COMPREHEN METABOLIC PANEL: CPT | Performed by: INTERNAL MEDICINE

## 2020-01-01 PROCEDURE — 71275 CT ANGIOGRAPHY CHEST: CPT

## 2020-01-01 PROCEDURE — 99220 PR INITIAL OBSERVATION CARE/DAY 70 MINUTES: CPT | Performed by: INTERNAL MEDICINE

## 2020-01-01 PROCEDURE — C9803 HOPD COVID-19 SPEC COLLECT: HCPCS

## 2020-01-01 PROCEDURE — 25010000002 FUROSEMIDE PER 20 MG: Performed by: NURSE PRACTITIONER

## 2020-01-01 PROCEDURE — 74018 RADEX ABDOMEN 1 VIEW: CPT

## 2020-01-01 PROCEDURE — 86901 BLOOD TYPING SEROLOGIC RH(D): CPT

## 2020-01-01 PROCEDURE — 97530 THERAPEUTIC ACTIVITIES: CPT

## 2020-01-01 PROCEDURE — 80048 BASIC METABOLIC PNL TOTAL CA: CPT | Performed by: INTERNAL MEDICINE

## 2020-01-01 PROCEDURE — 83880 ASSAY OF NATRIURETIC PEPTIDE: CPT | Performed by: NURSE PRACTITIONER

## 2020-01-01 PROCEDURE — 0 IOPAMIDOL PER 1 ML: Performed by: EMERGENCY MEDICINE

## 2020-01-01 PROCEDURE — 93005 ELECTROCARDIOGRAM TRACING: CPT | Performed by: INTERNAL MEDICINE

## 2020-01-01 PROCEDURE — 25010000002 PROPOFOL 10 MG/ML EMULSION: Performed by: NURSE ANESTHETIST, CERTIFIED REGISTERED

## 2020-01-01 PROCEDURE — 80053 COMPREHEN METABOLIC PANEL: CPT | Performed by: NURSE PRACTITIONER

## 2020-01-01 PROCEDURE — 84132 ASSAY OF SERUM POTASSIUM: CPT | Performed by: INTERNAL MEDICINE

## 2020-01-01 PROCEDURE — 73200 CT UPPER EXTREMITY W/O DYE: CPT

## 2020-01-01 PROCEDURE — 82533 TOTAL CORTISOL: CPT | Performed by: INTERNAL MEDICINE

## 2020-01-01 PROCEDURE — C1769 GUIDE WIRE: HCPCS | Performed by: THORACIC SURGERY (CARDIOTHORACIC VASCULAR SURGERY)

## 2020-01-01 PROCEDURE — C1894 INTRO/SHEATH, NON-LASER: HCPCS | Performed by: INTERNAL MEDICINE

## 2020-01-01 PROCEDURE — 76942 ECHO GUIDE FOR BIOPSY: CPT | Performed by: ORTHOPAEDIC SURGERY

## 2020-01-01 PROCEDURE — 99239 HOSP IP/OBS DSCHRG MGMT >30: CPT | Performed by: STUDENT IN AN ORGANIZED HEALTH CARE EDUCATION/TRAINING PROGRAM

## 2020-01-01 PROCEDURE — 70450 CT HEAD/BRAIN W/O DYE: CPT

## 2020-01-01 PROCEDURE — 36600 WITHDRAWAL OF ARTERIAL BLOOD: CPT

## 2020-01-01 PROCEDURE — 82962 GLUCOSE BLOOD TEST: CPT

## 2020-01-01 PROCEDURE — 99222 1ST HOSP IP/OBS MODERATE 55: CPT | Performed by: NURSE PRACTITIONER

## 2020-01-01 PROCEDURE — 82803 BLOOD GASES ANY COMBINATION: CPT

## 2020-01-01 PROCEDURE — 93000 ELECTROCARDIOGRAM COMPLETE: CPT | Performed by: INTERNAL MEDICINE

## 2020-01-01 PROCEDURE — 25010000002 METHYLPREDNISOLONE PER 80 MG: Performed by: ORTHOPAEDIC SURGERY

## 2020-01-01 PROCEDURE — 25010000002 VANCOMYCIN 1 G RECONSTITUTED SOLUTION 1 EACH VIAL: Performed by: NURSE PRACTITIONER

## 2020-01-01 PROCEDURE — 82140 ASSAY OF AMMONIA: CPT | Performed by: PHYSICIAN ASSISTANT

## 2020-01-01 PROCEDURE — C1769 GUIDE WIRE: HCPCS | Performed by: INTERNAL MEDICINE

## 2020-01-01 PROCEDURE — 86920 COMPATIBILITY TEST SPIN: CPT

## 2020-01-01 PROCEDURE — 87324 CLOSTRIDIUM AG IA: CPT | Performed by: EMERGENCY MEDICINE

## 2020-01-01 PROCEDURE — 83605 ASSAY OF LACTIC ACID: CPT

## 2020-01-01 PROCEDURE — B2011ZZ PLAIN RADIOGRAPHY OF MULTIPLE CORONARY ARTERIES USING LOW OSMOLAR CONTRAST: ICD-10-PCS | Performed by: INTERNAL MEDICINE

## 2020-01-01 PROCEDURE — C1760 CLOSURE DEV, VASC: HCPCS | Performed by: THORACIC SURGERY (CARDIOTHORACIC VASCULAR SURGERY)

## 2020-01-01 PROCEDURE — 85730 THROMBOPLASTIN TIME PARTIAL: CPT | Performed by: EMERGENCY MEDICINE

## 2020-01-01 PROCEDURE — 25010000002 FUROSEMIDE PER 20 MG: Performed by: INTERNAL MEDICINE

## 2020-01-01 PROCEDURE — 85027 COMPLETE CBC AUTOMATED: CPT | Performed by: STUDENT IN AN ORGANIZED HEALTH CARE EDUCATION/TRAINING PROGRAM

## 2020-01-01 PROCEDURE — 25010000002 PERFLUTREN (DEFINITY) 8.476 MG IN SODIUM CHLORIDE 0.9 % 10 ML INJECTION: Performed by: INTERNAL MEDICINE

## 2020-01-01 PROCEDURE — 81001 URINALYSIS AUTO W/SCOPE: CPT | Performed by: INTERNAL MEDICINE

## 2020-01-01 PROCEDURE — 25010000002 CALCIUM GLUCONATE-NACL 1-0.675 GM/50ML-% SOLUTION: Performed by: NURSE PRACTITIONER

## 2020-01-01 PROCEDURE — 97166 OT EVAL MOD COMPLEX 45 MIN: CPT

## 2020-01-01 PROCEDURE — 99152 MOD SED SAME PHYS/QHP 5/>YRS: CPT | Performed by: INTERNAL MEDICINE

## 2020-01-01 PROCEDURE — 84145 PROCALCITONIN (PCT): CPT | Performed by: EMERGENCY MEDICINE

## 2020-01-01 PROCEDURE — 94760 N-INVAS EAR/PLS OXIMETRY 1: CPT

## 2020-01-01 PROCEDURE — 25010000002 ENOXAPARIN PER 10 MG: Performed by: INTERNAL MEDICINE

## 2020-01-01 PROCEDURE — 25010000002 ONDANSETRON PER 1 MG: Performed by: NURSE PRACTITIONER

## 2020-01-01 PROCEDURE — 85379 FIBRIN DEGRADATION QUANT: CPT | Performed by: INTERNAL MEDICINE

## 2020-01-01 PROCEDURE — 87086 URINE CULTURE/COLONY COUNT: CPT | Performed by: EMERGENCY MEDICINE

## 2020-01-01 PROCEDURE — 87040 BLOOD CULTURE FOR BACTERIA: CPT | Performed by: EMERGENCY MEDICINE

## 2020-01-01 PROCEDURE — 86900 BLOOD TYPING SEROLOGIC ABO: CPT | Performed by: ORTHOPAEDIC SURGERY

## 2020-01-01 PROCEDURE — 25010000002 INFLUENZA VAC SPLIT QUAD 0.5 ML SUSPENSION PREFILLED SYRINGE: Performed by: INTERNAL MEDICINE

## 2020-01-01 PROCEDURE — 25010000002 LORAZEPAM PER 2 MG: Performed by: STUDENT IN AN ORGANIZED HEALTH CARE EDUCATION/TRAINING PROGRAM

## 2020-01-01 PROCEDURE — 99233 SBSQ HOSP IP/OBS HIGH 50: CPT | Performed by: STUDENT IN AN ORGANIZED HEALTH CARE EDUCATION/TRAINING PROGRAM

## 2020-01-01 PROCEDURE — B2031ZZ PLAIN RADIOGRAPHY OF MULTIPLE CORONARY ARTERY BYPASS GRAFTS USING LOW OSMOLAR CONTRAST: ICD-10-PCS | Performed by: INTERNAL MEDICINE

## 2020-01-01 PROCEDURE — 85018 HEMOGLOBIN: CPT

## 2020-01-01 PROCEDURE — 99223 1ST HOSP IP/OBS HIGH 75: CPT | Performed by: HOSPITALIST

## 2020-01-01 PROCEDURE — 25010000002 MORPHINE PER 10 MG: Performed by: STUDENT IN AN ORGANIZED HEALTH CARE EDUCATION/TRAINING PROGRAM

## 2020-01-01 PROCEDURE — 85025 COMPLETE CBC W/AUTO DIFF WBC: CPT

## 2020-01-01 PROCEDURE — 87449 NOS EACH ORGANISM AG IA: CPT | Performed by: EMERGENCY MEDICINE

## 2020-01-01 PROCEDURE — 25010000002 DOPAMINE PER 40 MG: Performed by: NURSE PRACTITIONER

## 2020-01-01 PROCEDURE — 25010000002 CEFAZOLIN PER 500 MG: Performed by: ORTHOPAEDIC SURGERY

## 2020-01-01 PROCEDURE — 25010000002 HEPARIN (PORCINE) PER 1000 UNITS: Performed by: ANESTHESIOLOGY

## 2020-01-01 PROCEDURE — 85610 PROTHROMBIN TIME: CPT | Performed by: EMERGENCY MEDICINE

## 2020-01-01 PROCEDURE — 82330 ASSAY OF CALCIUM: CPT | Performed by: PHYSICIAN ASSISTANT

## 2020-01-01 PROCEDURE — 99291 CRITICAL CARE FIRST HOUR: CPT

## 2020-01-01 PROCEDURE — 93005 ELECTROCARDIOGRAM TRACING: CPT | Performed by: NURSE PRACTITIONER

## 2020-01-01 PROCEDURE — 93005 ELECTROCARDIOGRAM TRACING: CPT

## 2020-01-01 PROCEDURE — 85347 COAGULATION TIME ACTIVATED: CPT

## 2020-01-01 PROCEDURE — 25010000002 CEFTRIAXONE PER 250 MG: Performed by: INTERNAL MEDICINE

## 2020-01-01 PROCEDURE — 99221 1ST HOSP IP/OBS SF/LOW 40: CPT | Performed by: PSYCHIATRY & NEUROLOGY

## 2020-01-01 PROCEDURE — 25010000002 FUROSEMIDE PER 20 MG: Performed by: PHYSICIAN ASSISTANT

## 2020-01-01 PROCEDURE — 93005 ELECTROCARDIOGRAM TRACING: CPT | Performed by: PHYSICIAN ASSISTANT

## 2020-01-01 PROCEDURE — 25010000002 DEXAMETHASONE PER 1 MG: Performed by: NURSE ANESTHETIST, CERTIFIED REGISTERED

## 2020-01-01 PROCEDURE — 97165 OT EVAL LOW COMPLEX 30 MIN: CPT

## 2020-01-01 PROCEDURE — 87088 URINE BACTERIA CULTURE: CPT | Performed by: NURSE PRACTITIONER

## 2020-01-01 PROCEDURE — 94060 EVALUATION OF WHEEZING: CPT

## 2020-01-01 PROCEDURE — 83935 ASSAY OF URINE OSMOLALITY: CPT | Performed by: INTERNAL MEDICINE

## 2020-01-01 PROCEDURE — 83735 ASSAY OF MAGNESIUM: CPT | Performed by: HOSPITALIST

## 2020-01-01 PROCEDURE — 99214 OFFICE O/P EST MOD 30 MIN: CPT | Performed by: ORTHOPAEDIC SURGERY

## 2020-01-01 PROCEDURE — 84100 ASSAY OF PHOSPHORUS: CPT | Performed by: INTERNAL MEDICINE

## 2020-01-01 PROCEDURE — 25010000002 CEFEPIME PER 500 MG: Performed by: NURSE PRACTITIONER

## 2020-01-01 PROCEDURE — 82746 ASSAY OF FOLIC ACID SERUM: CPT | Performed by: PSYCHIATRY & NEUROLOGY

## 2020-01-01 PROCEDURE — 85025 COMPLETE CBC W/AUTO DIFF WBC: CPT | Performed by: NURSE PRACTITIONER

## 2020-01-01 PROCEDURE — 80051 ELECTROLYTE PANEL: CPT

## 2020-01-01 PROCEDURE — 93306 TTE W/DOPPLER COMPLETE: CPT | Performed by: INTERNAL MEDICINE

## 2020-01-01 PROCEDURE — 83036 HEMOGLOBIN GLYCOSYLATED A1C: CPT | Performed by: PHYSICIAN ASSISTANT

## 2020-01-01 PROCEDURE — 87040 BLOOD CULTURE FOR BACTERIA: CPT | Performed by: NURSE PRACTITIONER

## 2020-01-01 PROCEDURE — 25010000002 DEXAMETHASONE PER 1 MG: Performed by: ANESTHESIOLOGY

## 2020-01-01 PROCEDURE — 93455 CORONARY ART/GRFT ANGIO S&I: CPT | Performed by: INTERNAL MEDICINE

## 2020-01-01 PROCEDURE — 93005 ELECTROCARDIOGRAM TRACING: CPT | Performed by: EMERGENCY MEDICINE

## 2020-01-01 PROCEDURE — 84300 ASSAY OF URINE SODIUM: CPT | Performed by: NURSE PRACTITIONER

## 2020-01-01 PROCEDURE — U0004 COV-19 TEST NON-CDC HGH THRU: HCPCS

## 2020-01-01 PROCEDURE — 85576 BLOOD PLATELET AGGREGATION: CPT | Performed by: NURSE PRACTITIONER

## 2020-01-01 PROCEDURE — P9612 CATHETERIZE FOR URINE SPEC: HCPCS

## 2020-01-01 PROCEDURE — 83735 ASSAY OF MAGNESIUM: CPT | Performed by: INTERNAL MEDICINE

## 2020-01-01 PROCEDURE — 94660 CPAP INITIATION&MGMT: CPT

## 2020-01-01 PROCEDURE — 25010000003 CEFAZOLIN 1-4 GM/50ML-% SOLUTION: Performed by: INTERNAL MEDICINE

## 2020-01-01 PROCEDURE — 81001 URINALYSIS AUTO W/SCOPE: CPT | Performed by: EMERGENCY MEDICINE

## 2020-01-01 PROCEDURE — 80048 BASIC METABOLIC PNL TOTAL CA: CPT | Performed by: PHYSICIAN ASSISTANT

## 2020-01-01 PROCEDURE — 87186 SC STD MICRODIL/AGAR DIL: CPT | Performed by: NURSE PRACTITIONER

## 2020-01-01 PROCEDURE — 85379 FIBRIN DEGRADATION QUANT: CPT | Performed by: NURSE PRACTITIONER

## 2020-01-01 PROCEDURE — 99232 SBSQ HOSP IP/OBS MODERATE 35: CPT | Performed by: HOSPITALIST

## 2020-01-01 PROCEDURE — 51702 INSERT TEMP BLADDER CATH: CPT

## 2020-01-01 PROCEDURE — 83930 ASSAY OF BLOOD OSMOLALITY: CPT | Performed by: NURSE PRACTITIONER

## 2020-01-01 PROCEDURE — 25010000002 ENOXAPARIN PER 10 MG: Performed by: NURSE PRACTITIONER

## 2020-01-01 PROCEDURE — 87899 AGENT NOS ASSAY W/OPTIC: CPT | Performed by: PHYSICIAN ASSISTANT

## 2020-01-01 PROCEDURE — 87641 MR-STAPH DNA AMP PROBE: CPT | Performed by: STUDENT IN AN ORGANIZED HEALTH CARE EDUCATION/TRAINING PROGRAM

## 2020-01-01 PROCEDURE — 93294 REM INTERROG EVL PM/LDLS PM: CPT | Performed by: INTERNAL MEDICINE

## 2020-01-01 PROCEDURE — 84446 ASSAY OF VITAMIN E: CPT | Performed by: PSYCHIATRY & NEUROLOGY

## 2020-01-01 PROCEDURE — 99238 HOSP IP/OBS DSCHRG MGMT 30/<: CPT | Performed by: STUDENT IN AN ORGANIZED HEALTH CARE EDUCATION/TRAINING PROGRAM

## 2020-01-01 PROCEDURE — 81003 URINALYSIS AUTO W/O SCOPE: CPT | Performed by: INTERNAL MEDICINE

## 2020-01-01 PROCEDURE — 0RRK00Z REPLACEMENT OF LEFT SHOULDER JOINT WITH REVERSE BALL AND SOCKET SYNTHETIC SUBSTITUTE, OPEN APPROACH: ICD-10-PCS | Performed by: ORTHOPAEDIC SURGERY

## 2020-01-01 PROCEDURE — 85007 BL SMEAR W/DIFF WBC COUNT: CPT | Performed by: INTERNAL MEDICINE

## 2020-01-01 PROCEDURE — 25010000002 PERFLUTREN (DEFINITY) 8.476 MG IN SODIUM CHLORIDE (PF) 0.9 % 10 ML INJECTION: Performed by: INTERNAL MEDICINE

## 2020-01-01 PROCEDURE — 93880 EXTRACRANIAL BILAT STUDY: CPT

## 2020-01-01 PROCEDURE — 97535 SELF CARE MNGMENT TRAINING: CPT

## 2020-01-01 PROCEDURE — 86900 BLOOD TYPING SEROLOGIC ABO: CPT | Performed by: NURSE PRACTITIONER

## 2020-01-01 PROCEDURE — 25010000002 VANCOMYCIN 1 G RECONSTITUTED SOLUTION 1 EACH VIAL: Performed by: INTERNAL MEDICINE

## 2020-01-01 PROCEDURE — 0097U HC BIOFIRE FILMARRAY GI PANEL: CPT | Performed by: EMERGENCY MEDICINE

## 2020-01-01 PROCEDURE — 0202U NFCT DS 22 TRGT SARS-COV-2: CPT | Performed by: NURSE PRACTITIONER

## 2020-01-01 PROCEDURE — 25010000002 CEFTRIAXONE PER 250 MG: Performed by: NURSE PRACTITIONER

## 2020-01-01 PROCEDURE — 84244 ASSAY OF RENIN: CPT | Performed by: INTERNAL MEDICINE

## 2020-01-01 PROCEDURE — 87086 URINE CULTURE/COLONY COUNT: CPT | Performed by: NURSE PRACTITIONER

## 2020-01-01 PROCEDURE — 85379 FIBRIN DEGRADATION QUANT: CPT | Performed by: EMERGENCY MEDICINE

## 2020-01-01 PROCEDURE — 25010000003 BUPIVACAINE LIPOSOME 1.3 % SUSPENSION: Performed by: ANESTHESIOLOGY

## 2020-01-01 PROCEDURE — C1713 ANCHOR/SCREW BN/BN,TIS/BN: HCPCS | Performed by: ORTHOPAEDIC SURGERY

## 2020-01-01 PROCEDURE — 86900 BLOOD TYPING SEROLOGIC ABO: CPT

## 2020-01-01 PROCEDURE — 86901 BLOOD TYPING SEROLOGIC RH(D): CPT | Performed by: NURSE PRACTITIONER

## 2020-01-01 PROCEDURE — 94727 GAS DIL/WSHOT DETER LNG VOL: CPT

## 2020-01-01 PROCEDURE — 85730 THROMBOPLASTIN TIME PARTIAL: CPT | Performed by: NURSE PRACTITIONER

## 2020-01-01 PROCEDURE — 25010000002 VANCOMYCIN 10 G RECONSTITUTED SOLUTION: Performed by: EMERGENCY MEDICINE

## 2020-01-01 PROCEDURE — 25010000002 COSYNTROPIN PER 0.25 MG: Performed by: INTERNAL MEDICINE

## 2020-01-01 PROCEDURE — 25010000002 CEFTRIAXONE PER 250 MG: Performed by: EMERGENCY MEDICINE

## 2020-01-01 PROCEDURE — 25010000002 PROPOFOL 10 MG/ML EMULSION: Performed by: ANESTHESIOLOGY

## 2020-01-01 PROCEDURE — 3E0U33Z INTRODUCTION OF ANTI-INFLAMMATORY INTO JOINTS, PERCUTANEOUS APPROACH: ICD-10-PCS | Performed by: ORTHOPAEDIC SURGERY

## 2020-01-01 PROCEDURE — 99285 EMERGENCY DEPT VISIT HI MDM: CPT

## 2020-01-01 PROCEDURE — 86140 C-REACTIVE PROTEIN: CPT | Performed by: INTERNAL MEDICINE

## 2020-01-01 PROCEDURE — 25010000002 VANCOMYCIN 1 G RECONSTITUTED SOLUTION 1 EACH VIAL: Performed by: ORTHOPAEDIC SURGERY

## 2020-01-01 PROCEDURE — 83880 ASSAY OF NATRIURETIC PEPTIDE: CPT | Performed by: EMERGENCY MEDICINE

## 2020-01-01 PROCEDURE — 99225 PR SBSQ OBSERVATION CARE/DAY 25 MINUTES: CPT | Performed by: INTERNAL MEDICINE

## 2020-01-01 PROCEDURE — 93000 ELECTROCARDIOGRAM COMPLETE: CPT | Performed by: NURSE PRACTITIONER

## 2020-01-01 PROCEDURE — 83880 ASSAY OF NATRIURETIC PEPTIDE: CPT | Performed by: STUDENT IN AN ORGANIZED HEALTH CARE EDUCATION/TRAINING PROGRAM

## 2020-01-01 PROCEDURE — 80053 COMPREHEN METABOLIC PANEL: CPT | Performed by: EMERGENCY MEDICINE

## 2020-01-01 PROCEDURE — 93005 ELECTROCARDIOGRAM TRACING: CPT | Performed by: THORACIC SURGERY (CARDIOTHORACIC VASCULAR SURGERY)

## 2020-01-01 PROCEDURE — 83880 ASSAY OF NATRIURETIC PEPTIDE: CPT | Performed by: INTERNAL MEDICINE

## 2020-01-01 PROCEDURE — 93005 ELECTROCARDIOGRAM TRACING: CPT | Performed by: ORTHOPAEDIC SURGERY

## 2020-01-01 PROCEDURE — 25010000002 CEFEPIME PER 500 MG: Performed by: EMERGENCY MEDICINE

## 2020-01-01 PROCEDURE — 87641 MR-STAPH DNA AMP PROBE: CPT

## 2020-01-01 PROCEDURE — G0008 ADMIN INFLUENZA VIRUS VAC: HCPCS | Performed by: INTERNAL MEDICINE

## 2020-01-01 PROCEDURE — 82565 ASSAY OF CREATININE: CPT

## 2020-01-01 PROCEDURE — 0202U NFCT DS 22 TRGT SARS-COV-2: CPT | Performed by: EMERGENCY MEDICINE

## 2020-01-01 PROCEDURE — 82947 ASSAY GLUCOSE BLOOD QUANT: CPT

## 2020-01-01 PROCEDURE — 85014 HEMATOCRIT: CPT

## 2020-01-01 PROCEDURE — 25010000002 CEFTRIAXONE PER 250 MG: Performed by: ORTHOPAEDIC SURGERY

## 2020-01-01 PROCEDURE — 25010000002 METHYLPREDNISOLONE PER 40 MG: Performed by: INTERNAL MEDICINE

## 2020-01-01 PROCEDURE — 25010000002 MIDAZOLAM PER 1 MG: Performed by: INTERNAL MEDICINE

## 2020-01-01 PROCEDURE — 81001 URINALYSIS AUTO W/SCOPE: CPT | Performed by: NURSE PRACTITIONER

## 2020-01-01 PROCEDURE — 85025 COMPLETE CBC W/AUTO DIFF WBC: CPT | Performed by: HOSPITALIST

## 2020-01-01 PROCEDURE — 82570 ASSAY OF URINE CREATININE: CPT | Performed by: INTERNAL MEDICINE

## 2020-01-01 PROCEDURE — 99238 HOSP IP/OBS DSCHRG MGMT 30/<: CPT | Performed by: NURSE PRACTITIONER

## 2020-01-01 PROCEDURE — 87635 SARS-COV-2 COVID-19 AMP PRB: CPT | Performed by: EMERGENCY MEDICINE

## 2020-01-01 PROCEDURE — 84132 ASSAY OF SERUM POTASSIUM: CPT | Performed by: STUDENT IN AN ORGANIZED HEALTH CARE EDUCATION/TRAINING PROGRAM

## 2020-01-01 PROCEDURE — 93971 EXTREMITY STUDY: CPT

## 2020-01-01 PROCEDURE — 99284 EMERGENCY DEPT VISIT MOD MDM: CPT

## 2020-01-01 PROCEDURE — 25010000002 FENTANYL CITRATE (PF) 100 MCG/2ML SOLUTION: Performed by: ANESTHESIOLOGY

## 2020-01-01 PROCEDURE — 85730 THROMBOPLASTIN TIME PARTIAL: CPT

## 2020-01-01 PROCEDURE — 84443 ASSAY THYROID STIM HORMONE: CPT | Performed by: PSYCHIATRY & NEUROLOGY

## 2020-01-01 PROCEDURE — 84443 ASSAY THYROID STIM HORMONE: CPT | Performed by: INTERNAL MEDICINE

## 2020-01-01 PROCEDURE — 63710000001 INSULIN REGULAR HUMAN PER 5 UNITS: Performed by: NURSE PRACTITIONER

## 2020-01-01 PROCEDURE — 84100 ASSAY OF PHOSPHORUS: CPT | Performed by: HOSPITALIST

## 2020-01-01 PROCEDURE — 0 IOPAMIDOL PER 1 ML: Performed by: THORACIC SURGERY (CARDIOTHORACIC VASCULAR SURGERY)

## 2020-01-01 PROCEDURE — 82330 ASSAY OF CALCIUM: CPT

## 2020-01-01 PROCEDURE — 23472 RECONSTRUCT SHOULDER JOINT: CPT | Performed by: ORTHOPAEDIC SURGERY

## 2020-01-01 PROCEDURE — 25010000002 FENTANYL CITRATE (PF) 100 MCG/2ML SOLUTION: Performed by: NURSE ANESTHETIST, CERTIFIED REGISTERED

## 2020-01-01 PROCEDURE — 80053 COMPREHEN METABOLIC PANEL: CPT | Performed by: PHYSICIAN ASSISTANT

## 2020-01-01 PROCEDURE — 82088 ASSAY OF ALDOSTERONE: CPT | Performed by: INTERNAL MEDICINE

## 2020-01-01 PROCEDURE — 84145 PROCALCITONIN (PCT): CPT | Performed by: INTERNAL MEDICINE

## 2020-01-01 PROCEDURE — 90686 IIV4 VACC NO PRSV 0.5 ML IM: CPT | Performed by: INTERNAL MEDICINE

## 2020-01-01 PROCEDURE — 25010000002 PROTAMINE SULFATE PER 10 MG: Performed by: ANESTHESIOLOGY

## 2020-01-01 PROCEDURE — 4A023N7 MEASUREMENT OF CARDIAC SAMPLING AND PRESSURE, LEFT HEART, PERCUTANEOUS APPROACH: ICD-10-PCS | Performed by: INTERNAL MEDICINE

## 2020-01-01 PROCEDURE — 84300 ASSAY OF URINE SODIUM: CPT | Performed by: INTERNAL MEDICINE

## 2020-01-01 PROCEDURE — 86901 BLOOD TYPING SEROLOGIC RH(D): CPT | Performed by: ORTHOPAEDIC SURGERY

## 2020-01-01 PROCEDURE — 25010000002 ONDANSETRON PER 1 MG: Performed by: ANESTHESIOLOGY

## 2020-01-01 PROCEDURE — 99214 OFFICE O/P EST MOD 30 MIN: CPT | Performed by: NURSE PRACTITIONER

## 2020-01-01 PROCEDURE — 83036 HEMOGLOBIN GLYCOSYLATED A1C: CPT | Performed by: NURSE PRACTITIONER

## 2020-01-01 PROCEDURE — 02RF38Z REPLACEMENT OF AORTIC VALVE WITH ZOOPLASTIC TISSUE, PERCUTANEOUS APPROACH: ICD-10-PCS | Performed by: THORACIC SURGERY (CARDIOTHORACIC VASCULAR SURGERY)

## 2020-01-01 PROCEDURE — 33361 REPLACE AORTIC VALVE PERQ: CPT | Performed by: THORACIC SURGERY (CARDIOTHORACIC VASCULAR SURGERY)

## 2020-01-01 PROCEDURE — 80048 BASIC METABOLIC PNL TOTAL CA: CPT | Performed by: HOSPITALIST

## 2020-01-01 DEVICE — DEV CONTRL TISS STRATAFIX SPIRAL MNCRYL UD 3/0 PLS 30CM: Type: IMPLANTABLE DEVICE | Status: FUNCTIONAL

## 2020-01-01 DEVICE — SUT NONABS MAXBRAID/PE NMBR2 HC5 38IN BLU 900334: Type: IMPLANTABLE DEVICE | Status: FUNCTIONAL

## 2020-01-01 DEVICE — INVERSE/REVERSE SCREW SYSTEM, 4.5-30
Type: IMPLANTABLE DEVICE | Site: SHOULDER | Status: FUNCTIONAL
Brand: INVERSE/REVERSE

## 2020-01-01 DEVICE — TOTL SHLDER REV: Type: IMPLANTABLE DEVICE | Site: SHOULDER | Status: FUNCTIONAL

## 2020-01-01 DEVICE — VLV HEART TRNSCATH SAPIEN3 23MM: Type: IMPLANTABLE DEVICE | Site: HEART | Status: FUNCTIONAL

## 2020-01-01 DEVICE — LINER HUM/SHLDR TRABECULARMETAL REV POLY 60DEG 36MM PLS3: Type: IMPLANTABLE DEVICE | Site: SHOULDER | Status: FUNCTIONAL

## 2020-01-01 DEVICE — BASEPLT GLEN TRABECULARMETAL REV 15MM: Type: IMPLANTABLE DEVICE | Site: SHOULDER | Status: FUNCTIONAL

## 2020-01-01 DEVICE — STEM HUM/SHLDR TRABECULARMETAL REV 14X130MM: Type: IMPLANTABLE DEVICE | Site: SHOULDER | Status: FUNCTIONAL

## 2020-01-01 DEVICE — KT SHLDR DRL PIN SPG: Type: IMPLANTABLE DEVICE | Site: SHOULDER | Status: FUNCTIONAL

## 2020-01-01 DEVICE — GLENSPHR TRABECULARMETAL REV 36MM: Type: IMPLANTABLE DEVICE | Site: SHOULDER | Status: FUNCTIONAL

## 2020-01-01 RX ORDER — CHLORHEXIDINE GLUCONATE 0.12 MG/ML
15 RINSE ORAL ONCE
Status: CANCELLED | OUTPATIENT
Start: 2020-01-01 | End: 2020-01-01

## 2020-01-01 RX ORDER — SODIUM POLYSTYRENE SULFONATE 15 G/60ML
15 SUSPENSION ORAL; RECTAL ONCE
Status: COMPLETED | OUTPATIENT
Start: 2020-01-01 | End: 2020-01-01

## 2020-01-01 RX ORDER — ATORVASTATIN CALCIUM 10 MG/1
10 TABLET, FILM COATED ORAL DAILY
Status: DISCONTINUED | OUTPATIENT
Start: 2020-01-01 | End: 2020-01-01 | Stop reason: HOSPADM

## 2020-01-01 RX ORDER — SODIUM CHLORIDE, SODIUM LACTATE, POTASSIUM CHLORIDE, CALCIUM CHLORIDE 600; 310; 30; 20 MG/100ML; MG/100ML; MG/100ML; MG/100ML
1000 INJECTION, SOLUTION INTRAVENOUS CONTINUOUS
Status: DISCONTINUED | OUTPATIENT
Start: 2020-01-01 | End: 2020-01-01 | Stop reason: HOSPADM

## 2020-01-01 RX ORDER — VANCOMYCIN HYDROCHLORIDE 250 MG/1
250 CAPSULE ORAL EVERY 6 HOURS SCHEDULED
Status: DISCONTINUED | OUTPATIENT
Start: 2020-01-01 | End: 2020-01-01 | Stop reason: HOSPADM

## 2020-01-01 RX ORDER — PROTAMINE SULFATE 10 MG/ML
INJECTION, SOLUTION INTRAVENOUS AS NEEDED
Status: DISCONTINUED | OUTPATIENT
Start: 2020-01-01 | End: 2020-01-01 | Stop reason: SURG

## 2020-01-01 RX ORDER — ACETAMINOPHEN 160 MG/5ML
650 SOLUTION ORAL EVERY 4 HOURS PRN
Status: DISCONTINUED | OUTPATIENT
Start: 2020-01-01 | End: 2020-01-01 | Stop reason: HOSPADM

## 2020-01-01 RX ORDER — SODIUM CHLORIDE 0.9 % (FLUSH) 0.9 %
10 SYRINGE (ML) INJECTION AS NEEDED
Status: DISCONTINUED | OUTPATIENT
Start: 2020-01-01 | End: 2020-01-01 | Stop reason: HOSPADM

## 2020-01-01 RX ORDER — MAGNESIUM SULFATE 1 G/100ML
1 INJECTION INTRAVENOUS AS NEEDED
Status: DISCONTINUED | OUTPATIENT
Start: 2020-01-01 | End: 2020-01-01 | Stop reason: HOSPADM

## 2020-01-01 RX ORDER — DEXMEDETOMIDINE HYDROCHLORIDE 4 UG/ML
INJECTION INTRAVENOUS CONTINUOUS PRN
Status: DISCONTINUED | OUTPATIENT
Start: 2020-01-01 | End: 2020-01-01 | Stop reason: SURG

## 2020-01-01 RX ORDER — FUROSEMIDE 10 MG/ML
40 INJECTION INTRAMUSCULAR; INTRAVENOUS ONCE
Status: COMPLETED | OUTPATIENT
Start: 2020-01-01 | End: 2020-01-01

## 2020-01-01 RX ORDER — CLOPIDOGREL BISULFATE 75 MG/1
75 TABLET ORAL DAILY
Status: DISCONTINUED | OUTPATIENT
Start: 2020-01-01 | End: 2020-01-01 | Stop reason: HOSPADM

## 2020-01-01 RX ORDER — OXYCODONE HCL 10 MG/1
10 TABLET, FILM COATED, EXTENDED RELEASE ORAL ONCE
Status: CANCELLED | OUTPATIENT
Start: 2020-01-01 | End: 2020-01-01

## 2020-01-01 RX ORDER — ACETAMINOPHEN 325 MG/1
650 TABLET ORAL EVERY 4 HOURS PRN
Status: DISCONTINUED | OUTPATIENT
Start: 2020-01-01 | End: 2020-01-01 | Stop reason: HOSPADM

## 2020-01-01 RX ORDER — LIDOCAINE HYDROCHLORIDE 10 MG/ML
0.5 INJECTION, SOLUTION EPIDURAL; INFILTRATION; INTRACAUDAL; PERINEURAL ONCE AS NEEDED
Status: DISCONTINUED | OUTPATIENT
Start: 2020-01-01 | End: 2020-01-01

## 2020-01-01 RX ORDER — ACEBUTOLOL HYDROCHLORIDE 200 MG/1
200 CAPSULE ORAL 2 TIMES DAILY
Status: DISCONTINUED | OUTPATIENT
Start: 2020-01-01 | End: 2020-01-01 | Stop reason: HOSPADM

## 2020-01-01 RX ORDER — FLUDROCORTISONE ACETATE 0.1 MG/1
100 TABLET ORAL DAILY
Status: DISCONTINUED | OUTPATIENT
Start: 2020-01-01 | End: 2020-01-01 | Stop reason: HOSPADM

## 2020-01-01 RX ORDER — PROPOFOL 10 MG/ML
VIAL (ML) INTRAVENOUS CONTINUOUS PRN
Status: DISCONTINUED | OUTPATIENT
Start: 2020-01-01 | End: 2020-01-01 | Stop reason: SURG

## 2020-01-01 RX ORDER — VANCOMYCIN HYDROCHLORIDE 250 MG/1
250 CAPSULE ORAL EVERY 6 HOURS SCHEDULED
Qty: 10 CAPSULE | Refills: 0 | Status: SHIPPED | OUTPATIENT
Start: 2020-01-01 | End: 2020-01-01 | Stop reason: HOSPADM

## 2020-01-01 RX ORDER — GLYCOPYRROLATE 1 MG/5 ML
SYRINGE (ML) INTRAVENOUS AS NEEDED
Status: DISCONTINUED | OUTPATIENT
Start: 2020-01-01 | End: 2020-01-01 | Stop reason: SURG

## 2020-01-01 RX ORDER — SODIUM CHLORIDE 0.9 % (FLUSH) 0.9 %
3-10 SYRINGE (ML) INJECTION AS NEEDED
Status: DISCONTINUED | OUTPATIENT
Start: 2020-01-01 | End: 2020-01-01 | Stop reason: HOSPADM

## 2020-01-01 RX ORDER — DEXTROSE MONOHYDRATE 25 G/50ML
50 INJECTION, SOLUTION INTRAVENOUS ONCE
Status: COMPLETED | OUTPATIENT
Start: 2020-01-01 | End: 2020-01-01

## 2020-01-01 RX ORDER — NITROGLYCERIN 0.4 MG/1
0.4 TABLET SUBLINGUAL
Status: DISCONTINUED | OUTPATIENT
Start: 2020-01-01 | End: 2020-01-01 | Stop reason: HOSPADM

## 2020-01-01 RX ORDER — ATROPINE SULFATE 1 MG/ML
0.5 INJECTION, SOLUTION INTRAMUSCULAR; INTRAVENOUS; SUBCUTANEOUS
Status: DISCONTINUED | OUTPATIENT
Start: 2020-01-01 | End: 2020-01-01 | Stop reason: HOSPADM

## 2020-01-01 RX ORDER — SODIUM CHLORIDE 0.9 % (FLUSH) 0.9 %
3-10 SYRINGE (ML) INJECTION AS NEEDED
Status: DISCONTINUED | OUTPATIENT
Start: 2020-01-01 | End: 2020-01-01

## 2020-01-01 RX ORDER — HYDROMORPHONE HCL 110MG/55ML
0.2 PATIENT CONTROLLED ANALGESIA SYRINGE INTRAVENOUS
Status: DISCONTINUED | OUTPATIENT
Start: 2020-01-01 | End: 2020-01-01 | Stop reason: HOSPADM

## 2020-01-01 RX ORDER — QUETIAPINE FUMARATE 25 MG/1
25 TABLET, FILM COATED ORAL DAILY PRN
Status: DISCONTINUED | OUTPATIENT
Start: 2020-01-01 | End: 2020-01-01

## 2020-01-01 RX ORDER — COLESEVELAM 180 1/1
625 TABLET ORAL 2 TIMES DAILY WITH MEALS
Status: DISCONTINUED | OUTPATIENT
Start: 2020-01-01 | End: 2020-01-01

## 2020-01-01 RX ORDER — ONDANSETRON 4 MG/1
4 TABLET, FILM COATED ORAL EVERY 6 HOURS PRN
Status: DISCONTINUED | OUTPATIENT
Start: 2020-01-01 | End: 2020-01-01 | Stop reason: HOSPADM

## 2020-01-01 RX ORDER — CLINDAMYCIN PHOSPHATE 900 MG/50ML
900 INJECTION, SOLUTION INTRAVENOUS ONCE
Status: CANCELLED | OUTPATIENT
Start: 2020-01-01 | End: 2020-01-01

## 2020-01-01 RX ORDER — FUROSEMIDE 10 MG/ML
20 INJECTION INTRAMUSCULAR; INTRAVENOUS ONCE
Status: DISCONTINUED | OUTPATIENT
Start: 2020-01-01 | End: 2020-01-01

## 2020-01-01 RX ORDER — METHYLPREDNISOLONE ACETATE 80 MG/ML
40 INJECTION, SUSPENSION INTRA-ARTICULAR; INTRALESIONAL; INTRAMUSCULAR; SOFT TISSUE ONCE
Status: COMPLETED | OUTPATIENT
Start: 2020-01-01 | End: 2020-01-01

## 2020-01-01 RX ORDER — COSYNTROPIN 0.25 MG/ML
0.25 INJECTION, POWDER, FOR SOLUTION INTRAMUSCULAR; INTRAVENOUS ONCE
Status: DISCONTINUED | OUTPATIENT
Start: 2020-01-01 | End: 2020-01-01

## 2020-01-01 RX ORDER — ACETAMINOPHEN 650 MG/1
650 SUPPOSITORY RECTAL EVERY 4 HOURS PRN
Status: DISCONTINUED | OUTPATIENT
Start: 2020-01-01 | End: 2020-01-01 | Stop reason: HOSPADM

## 2020-01-01 RX ORDER — QUETIAPINE FUMARATE 25 MG/1
25 TABLET, FILM COATED ORAL EVERY 8 HOURS SCHEDULED
Status: DISCONTINUED | OUTPATIENT
Start: 2020-01-01 | End: 2020-01-01 | Stop reason: HOSPADM

## 2020-01-01 RX ORDER — CEFDINIR 300 MG/1
300 CAPSULE ORAL 2 TIMES DAILY
Qty: 10 CAPSULE | Refills: 0
Start: 2020-01-01 | End: 2020-01-01 | Stop reason: HOSPADM

## 2020-01-01 RX ORDER — CLINDAMYCIN PHOSPHATE 900 MG/50ML
900 INJECTION, SOLUTION INTRAVENOUS ONCE
Status: COMPLETED | OUTPATIENT
Start: 2020-01-01 | End: 2020-01-01

## 2020-01-01 RX ORDER — FLUDROCORTISONE ACETATE 0.1 MG/1
0.1 TABLET ORAL DAILY
Status: DISCONTINUED | OUTPATIENT
Start: 2020-01-01 | End: 2020-01-01

## 2020-01-01 RX ORDER — MIDODRINE HYDROCHLORIDE 5 MG/1
10 TABLET ORAL ONCE
Status: COMPLETED | OUTPATIENT
Start: 2020-01-01 | End: 2020-01-01

## 2020-01-01 RX ORDER — LISINOPRIL 20 MG/1
20 TABLET ORAL DAILY
Status: DISCONTINUED | OUTPATIENT
Start: 2020-01-01 | End: 2020-01-01 | Stop reason: HOSPADM

## 2020-01-01 RX ORDER — NOREPINEPHRINE BITARTRATE 1 MG/ML
INJECTION, SOLUTION INTRAVENOUS CONTINUOUS PRN
Status: DISCONTINUED | OUTPATIENT
Start: 2020-01-01 | End: 2020-01-01 | Stop reason: SURG

## 2020-01-01 RX ORDER — MIDAZOLAM HYDROCHLORIDE 1 MG/ML
1 INJECTION INTRAMUSCULAR; INTRAVENOUS ONCE
Status: DISCONTINUED | OUTPATIENT
Start: 2020-01-01 | End: 2020-01-01 | Stop reason: HOSPADM

## 2020-01-01 RX ORDER — FUROSEMIDE 40 MG/1
40 TABLET ORAL DAILY
Status: DISCONTINUED | OUTPATIENT
Start: 2020-01-01 | End: 2020-01-01 | Stop reason: HOSPADM

## 2020-01-01 RX ORDER — CHLORHEXIDINE GLUCONATE 0.12 MG/ML
15 RINSE ORAL ONCE
Status: COMPLETED | OUTPATIENT
Start: 2020-01-01 | End: 2020-01-01

## 2020-01-01 RX ORDER — MAGNESIUM SULFATE HEPTAHYDRATE 40 MG/ML
2 INJECTION, SOLUTION INTRAVENOUS AS NEEDED
Status: DISCONTINUED | OUTPATIENT
Start: 2020-01-01 | End: 2020-01-01 | Stop reason: HOSPADM

## 2020-01-01 RX ORDER — LIDOCAINE HYDROCHLORIDE 20 MG/ML
INJECTION, SOLUTION INFILTRATION; PERINEURAL AS NEEDED
Status: DISCONTINUED | OUTPATIENT
Start: 2020-01-01 | End: 2020-01-01 | Stop reason: HOSPADM

## 2020-01-01 RX ORDER — FLUCONAZOLE 100 MG/1
100 TABLET ORAL EVERY 24 HOURS
Status: DISCONTINUED | OUTPATIENT
Start: 2020-01-01 | End: 2020-01-01

## 2020-01-01 RX ORDER — NYSTATIN 100000 [USP'U]/G
POWDER TOPICAL EVERY 8 HOURS SCHEDULED
Status: DISCONTINUED | OUTPATIENT
Start: 2020-01-01 | End: 2020-01-01 | Stop reason: HOSPADM

## 2020-01-01 RX ORDER — CHOLECALCIFEROL (VITAMIN D3) 125 MCG
5 CAPSULE ORAL NIGHTLY PRN
Status: DISCONTINUED | OUTPATIENT
Start: 2020-01-01 | End: 2020-01-01 | Stop reason: HOSPADM

## 2020-01-01 RX ORDER — FUROSEMIDE 40 MG/1
40 TABLET ORAL DAILY
Qty: 30 TABLET | Refills: 3 | Status: SHIPPED | OUTPATIENT
Start: 2020-01-01 | End: 2020-01-01 | Stop reason: HOSPADM

## 2020-01-01 RX ORDER — CHLORHEXIDINE GLUCONATE 0.12 MG/ML
15 RINSE ORAL 2 TIMES DAILY
COMMUNITY
End: 2020-01-01 | Stop reason: HOSPADM

## 2020-01-01 RX ORDER — APIXABAN 2.5 MG/1
TABLET, FILM COATED ORAL
Qty: 180 TABLET | Refills: 3 | Status: SHIPPED | OUTPATIENT
Start: 2020-01-01 | End: 2020-01-01 | Stop reason: HOSPADM

## 2020-01-01 RX ORDER — LISINOPRIL 20 MG/1
20 TABLET ORAL DAILY
Status: DISCONTINUED | OUTPATIENT
Start: 2020-01-01 | End: 2020-01-01

## 2020-01-01 RX ORDER — DEXTROSE AND SODIUM CHLORIDE 5; .45 G/100ML; G/100ML
50 INJECTION, SOLUTION INTRAVENOUS CONTINUOUS
Status: DISCONTINUED | OUTPATIENT
Start: 2020-01-01 | End: 2020-01-01

## 2020-01-01 RX ORDER — DEXTROSE MONOHYDRATE 25 G/50ML
50 INJECTION, SOLUTION INTRAVENOUS ONCE
Status: DISCONTINUED | OUTPATIENT
Start: 2020-01-01 | End: 2020-01-01 | Stop reason: HOSPADM

## 2020-01-01 RX ORDER — DEXTROSE MONOHYDRATE 25 G/50ML
INJECTION, SOLUTION INTRAVENOUS
Status: COMPLETED
Start: 2020-01-01 | End: 2020-01-01

## 2020-01-01 RX ORDER — MELOXICAM 15 MG/1
15 TABLET ORAL DAILY
Status: DISCONTINUED | OUTPATIENT
Start: 2020-01-01 | End: 2020-01-01 | Stop reason: HOSPADM

## 2020-01-01 RX ORDER — ONDANSETRON 2 MG/ML
4 INJECTION INTRAMUSCULAR; INTRAVENOUS EVERY 6 HOURS PRN
Status: DISCONTINUED | OUTPATIENT
Start: 2020-01-01 | End: 2020-01-01 | Stop reason: HOSPADM

## 2020-01-01 RX ORDER — HALOPERIDOL 5 MG/ML
1 INJECTION INTRAMUSCULAR EVERY 6 HOURS PRN
Status: DISCONTINUED | OUTPATIENT
Start: 2020-01-01 | End: 2020-01-01 | Stop reason: HOSPADM

## 2020-01-01 RX ORDER — CAPSAICIN 0.75 MG/G
CREAM TOPICAL 3 TIMES DAILY
COMMUNITY
End: 2020-01-01

## 2020-01-01 RX ORDER — PREGABALIN 75 MG/1
150 CAPSULE ORAL ONCE
Status: COMPLETED | OUTPATIENT
Start: 2020-01-01 | End: 2020-01-01

## 2020-01-01 RX ORDER — DEXTROSE MONOHYDRATE 50 MG/ML
50 INJECTION, SOLUTION INTRAVENOUS CONTINUOUS
Status: DISCONTINUED | OUTPATIENT
Start: 2020-01-01 | End: 2020-01-01 | Stop reason: HOSPADM

## 2020-01-01 RX ORDER — FUROSEMIDE 10 MG/ML
40 INJECTION INTRAMUSCULAR; INTRAVENOUS EVERY 12 HOURS
Status: DISCONTINUED | OUTPATIENT
Start: 2020-01-01 | End: 2020-01-01

## 2020-01-01 RX ORDER — MIDODRINE HYDROCHLORIDE 2.5 MG/1
2.5 TABLET ORAL
Status: DISCONTINUED | OUTPATIENT
Start: 2020-01-01 | End: 2020-01-01

## 2020-01-01 RX ORDER — FUROSEMIDE 10 MG/ML
40 INJECTION INTRAMUSCULAR; INTRAVENOUS EVERY 12 HOURS
Status: COMPLETED | OUTPATIENT
Start: 2020-01-01 | End: 2020-01-01

## 2020-01-01 RX ORDER — PRAVASTATIN SODIUM 20 MG
20 TABLET ORAL NIGHTLY
Status: DISCONTINUED | OUTPATIENT
Start: 2020-01-01 | End: 2020-01-01 | Stop reason: HOSPADM

## 2020-01-01 RX ORDER — CIPROFLOXACIN 250 MG/1
250 TABLET, FILM COATED ORAL EVERY 12 HOURS
Qty: 6 TABLET | Refills: 0 | Status: SHIPPED | OUTPATIENT
Start: 2020-01-01 | End: 2020-01-01

## 2020-01-01 RX ORDER — COSYNTROPIN 0.25 MG/ML
0.25 INJECTION, POWDER, FOR SOLUTION INTRAMUSCULAR; INTRAVENOUS ONCE
Status: COMPLETED | OUTPATIENT
Start: 2020-01-01 | End: 2020-01-01

## 2020-01-01 RX ORDER — MAGNESIUM SULFATE HEPTAHYDRATE 40 MG/ML
4 INJECTION, SOLUTION INTRAVENOUS AS NEEDED
Status: DISCONTINUED | OUTPATIENT
Start: 2020-01-01 | End: 2020-01-01 | Stop reason: HOSPADM

## 2020-01-01 RX ORDER — CEFAZOLIN SODIUM 1 G/50ML
1 INJECTION, SOLUTION INTRAVENOUS EVERY 8 HOURS
Status: COMPLETED | OUTPATIENT
Start: 2020-01-01 | End: 2020-01-01

## 2020-01-01 RX ORDER — SODIUM CHLORIDE 9 MG/ML
75 INJECTION, SOLUTION INTRAVENOUS CONTINUOUS
Status: DISCONTINUED | OUTPATIENT
Start: 2020-01-01 | End: 2020-01-01 | Stop reason: HOSPADM

## 2020-01-01 RX ORDER — HYDRALAZINE HYDROCHLORIDE 20 MG/ML
10 INJECTION INTRAMUSCULAR; INTRAVENOUS EVERY 6 HOURS PRN
Status: DISCONTINUED | OUTPATIENT
Start: 2020-01-01 | End: 2020-01-01 | Stop reason: HOSPADM

## 2020-01-01 RX ORDER — MIDAZOLAM HYDROCHLORIDE 1 MG/ML
2 INJECTION INTRAMUSCULAR; INTRAVENOUS
Status: DISCONTINUED | OUTPATIENT
Start: 2020-01-01 | End: 2020-01-01

## 2020-01-01 RX ORDER — CEFDINIR 300 MG/1
300 CAPSULE ORAL 2 TIMES DAILY
Qty: 10 CAPSULE | Refills: 0
Start: 2020-01-01 | End: 2020-01-01 | Stop reason: SDUPTHER

## 2020-01-01 RX ORDER — SODIUM CHLORIDE 9 MG/ML
75 INJECTION, SOLUTION INTRAVENOUS CONTINUOUS
Status: DISCONTINUED | OUTPATIENT
Start: 2020-01-01 | End: 2020-01-01

## 2020-01-01 RX ORDER — SODIUM CHLORIDE 1000 MG
1 TABLET, SOLUBLE MISCELLANEOUS
Status: DISCONTINUED | OUTPATIENT
Start: 2020-01-01 | End: 2020-01-01

## 2020-01-01 RX ORDER — SODIUM CHLORIDE 9 MG/ML
250 INJECTION, SOLUTION INTRAVENOUS ONCE AS NEEDED
Status: DISCONTINUED | OUTPATIENT
Start: 2020-01-01 | End: 2020-01-01 | Stop reason: HOSPADM

## 2020-01-01 RX ORDER — BUPIVACAINE HYDROCHLORIDE 5 MG/ML
INJECTION, SOLUTION EPIDURAL; INTRACAUDAL
Status: COMPLETED | OUTPATIENT
Start: 2020-01-01 | End: 2020-01-01

## 2020-01-01 RX ORDER — DEXAMETHASONE SODIUM PHOSPHATE 4 MG/ML
INJECTION, SOLUTION INTRA-ARTICULAR; INTRALESIONAL; INTRAMUSCULAR; INTRAVENOUS; SOFT TISSUE
Status: COMPLETED | OUTPATIENT
Start: 2020-01-01 | End: 2020-01-01

## 2020-01-01 RX ORDER — CHOLECALCIFEROL (VITAMIN D3) 125 MCG
5 CAPSULE ORAL NIGHTLY PRN
COMMUNITY
End: 2020-01-01

## 2020-01-01 RX ORDER — DIPHENHYDRAMINE HCL 25 MG
25 CAPSULE ORAL NIGHTLY PRN
Status: DISCONTINUED | OUTPATIENT
Start: 2020-01-01 | End: 2020-01-01 | Stop reason: HOSPADM

## 2020-01-01 RX ORDER — LORAZEPAM 2 MG/ML
1 INJECTION INTRAMUSCULAR EVERY 4 HOURS PRN
Status: DISCONTINUED | OUTPATIENT
Start: 2020-01-01 | End: 2020-01-01 | Stop reason: HOSPADM

## 2020-01-01 RX ORDER — SODIUM CHLORIDE 0.9 % (FLUSH) 0.9 %
10 SYRINGE (ML) INJECTION EVERY 12 HOURS SCHEDULED
Status: DISCONTINUED | OUTPATIENT
Start: 2020-01-01 | End: 2020-01-01 | Stop reason: HOSPADM

## 2020-01-01 RX ORDER — ROCURONIUM BROMIDE 10 MG/ML
INJECTION, SOLUTION INTRAVENOUS AS NEEDED
Status: DISCONTINUED | OUTPATIENT
Start: 2020-01-01 | End: 2020-01-01 | Stop reason: SURG

## 2020-01-01 RX ORDER — ASPIRIN 81 MG/1
81 TABLET, CHEWABLE ORAL DAILY
Qty: 90 TABLET | Refills: 0 | Status: SHIPPED | OUTPATIENT
Start: 2020-01-01 | End: 2020-01-01 | Stop reason: HOSPADM

## 2020-01-01 RX ORDER — PREGABALIN 75 MG/1
150 CAPSULE ORAL ONCE
Status: CANCELLED | OUTPATIENT
Start: 2020-01-01 | End: 2020-01-01

## 2020-01-01 RX ORDER — SODIUM CHLORIDE 9 MG/ML
500 INJECTION, SOLUTION INTRAVENOUS ONCE
Status: COMPLETED | OUTPATIENT
Start: 2020-01-01 | End: 2020-01-01

## 2020-01-01 RX ORDER — FENTANYL CITRATE 50 UG/ML
INJECTION, SOLUTION INTRAMUSCULAR; INTRAVENOUS AS NEEDED
Status: DISCONTINUED | OUTPATIENT
Start: 2020-01-01 | End: 2020-01-01 | Stop reason: SURG

## 2020-01-01 RX ORDER — METHYLPREDNISOLONE SODIUM SUCCINATE 40 MG/ML
20 INJECTION, POWDER, LYOPHILIZED, FOR SOLUTION INTRAMUSCULAR; INTRAVENOUS DAILY
Status: DISCONTINUED | OUTPATIENT
Start: 2020-01-01 | End: 2020-01-01

## 2020-01-01 RX ORDER — SODIUM CHLORIDE 0.9 % (FLUSH) 0.9 %
3 SYRINGE (ML) INJECTION EVERY 12 HOURS SCHEDULED
Status: DISCONTINUED | OUTPATIENT
Start: 2020-01-01 | End: 2020-01-01 | Stop reason: HOSPADM

## 2020-01-01 RX ORDER — ATORVASTATIN CALCIUM 10 MG/1
10 TABLET, FILM COATED ORAL DAILY
Status: DISCONTINUED | OUTPATIENT
Start: 2020-01-01 | End: 2020-01-01

## 2020-01-01 RX ORDER — PROPOFOL 10 MG/ML
VIAL (ML) INTRAVENOUS AS NEEDED
Status: DISCONTINUED | OUTPATIENT
Start: 2020-01-01 | End: 2020-01-01 | Stop reason: SURG

## 2020-01-01 RX ORDER — DEXTROSE AND SODIUM CHLORIDE 5; .9 G/100ML; G/100ML
75 INJECTION, SOLUTION INTRAVENOUS CONTINUOUS
Status: DISCONTINUED | OUTPATIENT
Start: 2020-01-01 | End: 2020-01-01

## 2020-01-01 RX ORDER — FUROSEMIDE 40 MG/1
40 TABLET ORAL DAILY
Qty: 30 TABLET | Refills: 0 | Status: SHIPPED | OUTPATIENT
Start: 2020-01-01 | End: 2020-01-01

## 2020-01-01 RX ORDER — HYDROCODONE BITARTRATE AND ACETAMINOPHEN 5; 325 MG/1; MG/1
1 TABLET ORAL EVERY 6 HOURS PRN
Qty: 28 TABLET | Refills: 0 | Status: SHIPPED | OUTPATIENT
Start: 2020-01-01 | End: 2020-01-01

## 2020-01-01 RX ORDER — BUDESONIDE 0.5 MG/2ML
0.5 INHALANT ORAL
Status: DISCONTINUED | OUTPATIENT
Start: 2020-01-01 | End: 2020-01-01 | Stop reason: HOSPADM

## 2020-01-01 RX ORDER — LISINOPRIL 20 MG/1
TABLET ORAL
Qty: 90 TABLET | Refills: 1 | Status: SHIPPED | OUTPATIENT
Start: 2020-01-01 | End: 2020-01-01 | Stop reason: HOSPADM

## 2020-01-01 RX ORDER — SODIUM CHLORIDE 9 MG/ML
1-3 INJECTION, SOLUTION INTRAVENOUS CONTINUOUS
Status: DISCONTINUED | OUTPATIENT
Start: 2020-01-01 | End: 2020-01-01 | Stop reason: HOSPADM

## 2020-01-01 RX ORDER — ONDANSETRON 2 MG/ML
INJECTION INTRAMUSCULAR; INTRAVENOUS AS NEEDED
Status: DISCONTINUED | OUTPATIENT
Start: 2020-01-01 | End: 2020-01-01 | Stop reason: SURG

## 2020-01-01 RX ORDER — BISACODYL 10 MG
10 SUPPOSITORY, RECTAL RECTAL DAILY PRN
Status: DISCONTINUED | OUTPATIENT
Start: 2020-01-01 | End: 2020-01-01 | Stop reason: HOSPADM

## 2020-01-01 RX ORDER — SACCHAROMYCES BOULARDII 250 MG
250 CAPSULE ORAL 2 TIMES DAILY
Status: DISCONTINUED | OUTPATIENT
Start: 2020-01-01 | End: 2020-01-01

## 2020-01-01 RX ORDER — SACCHAROMYCES BOULARDII 250 MG
500 CAPSULE ORAL 2 TIMES DAILY
Status: DISCONTINUED | OUTPATIENT
Start: 2020-01-01 | End: 2020-01-01 | Stop reason: HOSPADM

## 2020-01-01 RX ORDER — HEPARIN SODIUM 1000 [USP'U]/ML
INJECTION, SOLUTION INTRAVENOUS; SUBCUTANEOUS AS NEEDED
Status: DISCONTINUED | OUTPATIENT
Start: 2020-01-01 | End: 2020-01-01 | Stop reason: SURG

## 2020-01-01 RX ORDER — COLESEVELAM 180 1/1
1250 TABLET ORAL 2 TIMES DAILY WITH MEALS
Status: DISCONTINUED | OUTPATIENT
Start: 2020-01-01 | End: 2020-01-01 | Stop reason: HOSPADM

## 2020-01-01 RX ORDER — ASPIRIN 81 MG/1
81 TABLET, CHEWABLE ORAL DAILY
Status: DISCONTINUED | OUTPATIENT
Start: 2020-01-01 | End: 2020-01-01

## 2020-01-01 RX ORDER — MELOXICAM 15 MG/1
15 TABLET ORAL ONCE
Status: CANCELLED | OUTPATIENT
Start: 2020-01-01 | End: 2020-01-01

## 2020-01-01 RX ORDER — DEXAMETHASONE SODIUM PHOSPHATE 4 MG/ML
INJECTION, SOLUTION INTRA-ARTICULAR; INTRALESIONAL; INTRAMUSCULAR; INTRAVENOUS; SOFT TISSUE AS NEEDED
Status: DISCONTINUED | OUTPATIENT
Start: 2020-01-01 | End: 2020-01-01 | Stop reason: SURG

## 2020-01-01 RX ORDER — PRAVASTATIN SODIUM 20 MG
20 TABLET ORAL NIGHTLY
COMMUNITY
End: 2020-01-01

## 2020-01-01 RX ORDER — QUETIAPINE FUMARATE 25 MG/1
25 TABLET, FILM COATED ORAL
COMMUNITY
End: 2020-01-01 | Stop reason: HOSPADM

## 2020-01-01 RX ORDER — VANCOMYCIN HYDROCHLORIDE 125 MG/1
125 CAPSULE ORAL EVERY 6 HOURS SCHEDULED
Status: DISCONTINUED | OUTPATIENT
Start: 2020-01-01 | End: 2020-01-01

## 2020-01-01 RX ORDER — COSYNTROPIN 0.25 MG/ML
0.25 INJECTION, POWDER, FOR SOLUTION INTRAMUSCULAR; INTRAVENOUS ONCE
Status: DISCONTINUED | OUTPATIENT
Start: 2020-01-01 | End: 2020-01-01 | Stop reason: HOSPADM

## 2020-01-01 RX ORDER — ONDANSETRON 2 MG/ML
4 INJECTION INTRAMUSCULAR; INTRAVENOUS ONCE AS NEEDED
Status: DISCONTINUED | OUTPATIENT
Start: 2020-01-01 | End: 2020-01-01 | Stop reason: HOSPADM

## 2020-01-01 RX ORDER — FUROSEMIDE 40 MG/1
40 TABLET ORAL DAILY
Qty: 30 TABLET | Refills: 0 | Status: SHIPPED | OUTPATIENT
Start: 2020-01-01 | End: 2020-01-01 | Stop reason: SDUPTHER

## 2020-01-01 RX ORDER — ATORVASTATIN CALCIUM 10 MG/1
10 TABLET, FILM COATED ORAL NIGHTLY
Status: DISCONTINUED | OUTPATIENT
Start: 2020-01-01 | End: 2020-01-01 | Stop reason: HOSPADM

## 2020-01-01 RX ORDER — SODIUM CHLORIDE, SODIUM LACTATE, POTASSIUM CHLORIDE, CALCIUM CHLORIDE 600; 310; 30; 20 MG/100ML; MG/100ML; MG/100ML; MG/100ML
9 INJECTION, SOLUTION INTRAVENOUS CONTINUOUS
Status: DISCONTINUED | OUTPATIENT
Start: 2020-01-01 | End: 2020-01-01

## 2020-01-01 RX ORDER — LIDOCAINE HYDROCHLORIDE 10 MG/ML
0.5 INJECTION, SOLUTION INFILTRATION; PERINEURAL ONCE AS NEEDED
Status: DISCONTINUED | OUTPATIENT
Start: 2020-01-01 | End: 2020-01-01 | Stop reason: HOSPADM

## 2020-01-01 RX ORDER — HYDROMORPHONE HCL 110MG/55ML
0.5 PATIENT CONTROLLED ANALGESIA SYRINGE INTRAVENOUS
Status: DISCONTINUED | OUTPATIENT
Start: 2020-01-01 | End: 2020-01-01 | Stop reason: HOSPADM

## 2020-01-01 RX ORDER — SODIUM CHLORIDE 9 MG/ML
INJECTION, SOLUTION INTRAVENOUS CONTINUOUS PRN
Status: DISCONTINUED | OUTPATIENT
Start: 2020-01-01 | End: 2020-01-01 | Stop reason: SURG

## 2020-01-01 RX ORDER — HALOPERIDOL 1 MG/1
0.5 TABLET ORAL EVERY 6 HOURS PRN
Status: DISCONTINUED | OUTPATIENT
Start: 2020-01-01 | End: 2020-01-01

## 2020-01-01 RX ORDER — ACEBUTOLOL HYDROCHLORIDE 200 MG/1
CAPSULE ORAL
Qty: 180 CAPSULE | Refills: 2 | Status: SHIPPED | OUTPATIENT
Start: 2020-01-01 | End: 2020-01-01 | Stop reason: HOSPADM

## 2020-01-01 RX ORDER — MORPHINE SULFATE 4 MG/ML
1 INJECTION, SOLUTION INTRAMUSCULAR; INTRAVENOUS
Status: DISCONTINUED | OUTPATIENT
Start: 2020-01-01 | End: 2020-01-01 | Stop reason: HOSPADM

## 2020-01-01 RX ORDER — MORPHINE SULFATE 4 MG/ML
2 INJECTION, SOLUTION INTRAMUSCULAR; INTRAVENOUS EVERY 4 HOURS PRN
Status: DISCONTINUED | OUTPATIENT
Start: 2020-01-01 | End: 2020-01-01 | Stop reason: HOSPADM

## 2020-01-01 RX ORDER — DEXTROSE MONOHYDRATE 25 G/50ML
50 INJECTION, SOLUTION INTRAVENOUS ONCE
Status: DISCONTINUED | OUTPATIENT
Start: 2020-01-01 | End: 2020-01-01

## 2020-01-01 RX ORDER — SODIUM CHLORIDE 9 MG/ML
100 INJECTION, SOLUTION INTRAVENOUS CONTINUOUS
Status: ACTIVE | OUTPATIENT
Start: 2020-01-01 | End: 2020-01-01

## 2020-01-01 RX ORDER — MIDAZOLAM HYDROCHLORIDE 1 MG/ML
INJECTION INTRAMUSCULAR; INTRAVENOUS AS NEEDED
Status: DISCONTINUED | OUTPATIENT
Start: 2020-01-01 | End: 2020-01-01 | Stop reason: HOSPADM

## 2020-01-01 RX ORDER — POTASSIUM CHLORIDE 20 MEQ/1
40 TABLET, EXTENDED RELEASE ORAL AS NEEDED
Status: DISCONTINUED | OUTPATIENT
Start: 2020-01-01 | End: 2020-01-01 | Stop reason: HOSPADM

## 2020-01-01 RX ORDER — FUROSEMIDE 10 MG/ML
20 INJECTION INTRAMUSCULAR; INTRAVENOUS ONCE
Status: COMPLETED | OUTPATIENT
Start: 2020-01-01 | End: 2020-01-01

## 2020-01-01 RX ORDER — DEXTROSE MONOHYDRATE 50 MG/ML
50 INJECTION, SOLUTION INTRAVENOUS CONTINUOUS
Status: DISCONTINUED | OUTPATIENT
Start: 2020-01-01 | End: 2020-01-01

## 2020-01-01 RX ORDER — FUROSEMIDE 20 MG/1
20 TABLET ORAL DAILY
Status: DISCONTINUED | OUTPATIENT
Start: 2020-01-01 | End: 2020-01-01 | Stop reason: HOSPADM

## 2020-01-01 RX ORDER — QUETIAPINE FUMARATE 25 MG/1
25 TABLET, FILM COATED ORAL EVERY 8 HOURS SCHEDULED
Qty: 90 TABLET | Refills: 2 | Status: SHIPPED | OUTPATIENT
Start: 2020-01-01 | End: 2020-01-01 | Stop reason: HOSPADM

## 2020-01-01 RX ORDER — FAMOTIDINE 10 MG/ML
20 INJECTION, SOLUTION INTRAVENOUS ONCE
Status: COMPLETED | OUTPATIENT
Start: 2020-01-01 | End: 2020-01-01

## 2020-01-01 RX ORDER — FUROSEMIDE 20 MG/1
20 TABLET ORAL DAILY
Qty: 30 TABLET | Refills: 0 | Status: SHIPPED | OUTPATIENT
Start: 2020-01-01 | End: 2020-01-01

## 2020-01-01 RX ORDER — OXYCODONE HCL 10 MG/1
10 TABLET, FILM COATED, EXTENDED RELEASE ORAL ONCE
Status: COMPLETED | OUTPATIENT
Start: 2020-01-01 | End: 2020-01-01

## 2020-01-01 RX ORDER — NALOXONE HCL 0.4 MG/ML
0.4 VIAL (ML) INJECTION
Status: DISCONTINUED | OUTPATIENT
Start: 2020-01-01 | End: 2020-01-01 | Stop reason: HOSPADM

## 2020-01-01 RX ORDER — LIDOCAINE HYDROCHLORIDE 20 MG/ML
INJECTION, SOLUTION EPIDURAL; INFILTRATION; INTRACAUDAL; PERINEURAL AS NEEDED
Status: DISCONTINUED | OUTPATIENT
Start: 2020-01-01 | End: 2020-01-01 | Stop reason: SURG

## 2020-01-01 RX ORDER — NALOXONE HCL 0.4 MG/ML
0.4 VIAL (ML) INJECTION AS NEEDED
Status: DISCONTINUED | OUTPATIENT
Start: 2020-01-01 | End: 2020-01-01 | Stop reason: HOSPADM

## 2020-01-01 RX ORDER — PHENYLEPHRINE HCL IN 0.9% NACL 0.5 MG/5ML
SYRINGE (ML) INTRAVENOUS AS NEEDED
Status: DISCONTINUED | OUTPATIENT
Start: 2020-01-01 | End: 2020-01-01 | Stop reason: SURG

## 2020-01-01 RX ORDER — TRAMADOL HYDROCHLORIDE 50 MG/1
50 TABLET ORAL EVERY 6 HOURS PRN
Status: DISCONTINUED | OUTPATIENT
Start: 2020-01-01 | End: 2020-01-01 | Stop reason: HOSPADM

## 2020-01-01 RX ORDER — CLOPIDOGREL BISULFATE 75 MG/1
75 TABLET ORAL DAILY
Qty: 30 TABLET | Refills: 2 | Status: SHIPPED | OUTPATIENT
Start: 2020-01-01 | End: 2020-01-01

## 2020-01-01 RX ORDER — CALCIUM GLUCONATE 20 MG/ML
1 INJECTION, SOLUTION INTRAVENOUS ONCE
Status: COMPLETED | OUTPATIENT
Start: 2020-01-01 | End: 2020-01-01

## 2020-01-01 RX ORDER — FUROSEMIDE 20 MG/1
20 TABLET ORAL DAILY
Status: DISCONTINUED | OUTPATIENT
Start: 2020-01-01 | End: 2020-01-01

## 2020-01-01 RX ORDER — MELOXICAM 15 MG/1
15 TABLET ORAL ONCE
Status: COMPLETED | OUTPATIENT
Start: 2020-01-01 | End: 2020-01-01

## 2020-01-01 RX ORDER — CHLORHEXIDINE GLUCONATE 0.12 MG/ML
15 RINSE ORAL EVERY 12 HOURS
Status: CANCELLED | OUTPATIENT
Start: 2020-01-01 | End: 2020-01-01

## 2020-01-01 RX ORDER — CHLORHEXIDINE GLUCONATE 0.12 MG/ML
15 RINSE ORAL EVERY 12 HOURS
Status: DISPENSED | OUTPATIENT
Start: 2020-01-01 | End: 2020-01-01

## 2020-01-01 RX ORDER — DEXTROSE MONOHYDRATE 25 G/50ML
50 INJECTION, SOLUTION INTRAVENOUS
Status: DISCONTINUED | OUTPATIENT
Start: 2020-01-01 | End: 2020-01-01 | Stop reason: HOSPADM

## 2020-01-01 RX ORDER — SODIUM CHLORIDE 9 MG/ML
50 INJECTION, SOLUTION INTRAVENOUS CONTINUOUS
Status: DISCONTINUED | OUTPATIENT
Start: 2020-01-01 | End: 2020-01-01

## 2020-01-01 RX ORDER — NYSTATIN 100000 [USP'U]/G
POWDER TOPICAL EVERY 8 HOURS SCHEDULED
Qty: 60 G | Refills: 1 | Status: SHIPPED | OUTPATIENT
Start: 2020-01-01 | End: 2020-01-01

## 2020-01-01 RX ORDER — FLUDROCORTISONE ACETATE 0.1 MG/1
0.1 TABLET ORAL DAILY
Qty: 90 TABLET | Refills: 1 | Status: SHIPPED | OUTPATIENT
Start: 2020-01-01 | End: 2020-01-01 | Stop reason: HOSPADM

## 2020-01-01 RX ORDER — IPRATROPIUM BROMIDE AND ALBUTEROL SULFATE 2.5; .5 MG/3ML; MG/3ML
3 SOLUTION RESPIRATORY (INHALATION)
Status: DISCONTINUED | OUTPATIENT
Start: 2020-01-01 | End: 2020-01-01

## 2020-01-01 RX ORDER — MIDAZOLAM HYDROCHLORIDE 1 MG/ML
1 INJECTION INTRAMUSCULAR; INTRAVENOUS
Status: DISCONTINUED | OUTPATIENT
Start: 2020-01-01 | End: 2020-01-01

## 2020-01-01 RX ORDER — FUROSEMIDE 40 MG/1
TABLET ORAL
Qty: 90 TABLET | Refills: 3 | Status: SHIPPED | OUTPATIENT
Start: 2020-01-01 | End: 2020-01-01 | Stop reason: HOSPADM

## 2020-01-01 RX ORDER — ATORVASTATIN CALCIUM 20 MG/1
20 TABLET, FILM COATED ORAL NIGHTLY
Status: DISCONTINUED | OUTPATIENT
Start: 2020-01-01 | End: 2020-01-01 | Stop reason: HOSPADM

## 2020-01-01 RX ORDER — PRAVASTATIN SODIUM 20 MG
TABLET ORAL
Qty: 90 TABLET | Refills: 2 | Status: SHIPPED | OUTPATIENT
Start: 2020-01-01 | End: 2020-01-01 | Stop reason: HOSPADM

## 2020-01-01 RX ORDER — FLUDROCORTISONE ACETATE 0.1 MG/1
100 TABLET ORAL ONCE
Status: COMPLETED | OUTPATIENT
Start: 2020-01-01 | End: 2020-01-01

## 2020-01-01 RX ORDER — FUROSEMIDE 20 MG/1
20 TABLET ORAL DAILY
COMMUNITY
End: 2020-01-01 | Stop reason: HOSPADM

## 2020-01-01 RX ORDER — SODIUM CHLORIDE 0.9 % (FLUSH) 0.9 %
3 SYRINGE (ML) INJECTION EVERY 12 HOURS SCHEDULED
Status: DISCONTINUED | OUTPATIENT
Start: 2020-01-01 | End: 2020-01-01

## 2020-01-01 RX ORDER — KETAMINE HYDROCHLORIDE 10 MG/ML
INJECTION INTRAMUSCULAR; INTRAVENOUS AS NEEDED
Status: DISCONTINUED | OUTPATIENT
Start: 2020-01-01 | End: 2020-01-01 | Stop reason: SURG

## 2020-01-01 RX ORDER — LIDOCAINE HYDROCHLORIDE 10 MG/ML
5 INJECTION, SOLUTION EPIDURAL; INFILTRATION; INTRACAUDAL; PERINEURAL ONCE
Status: COMPLETED | OUTPATIENT
Start: 2020-01-01 | End: 2020-01-01

## 2020-01-01 RX ORDER — FUROSEMIDE 10 MG/ML
40 INJECTION INTRAMUSCULAR; INTRAVENOUS DAILY
Status: DISCONTINUED | OUTPATIENT
Start: 2020-01-01 | End: 2020-01-01

## 2020-01-01 RX ORDER — NITROGLYCERIN 0.4 MG/1
0.4 TABLET SUBLINGUAL
Status: DISCONTINUED | OUTPATIENT
Start: 2020-01-01 | End: 2020-01-01

## 2020-01-01 RX ORDER — DIPHENHYDRAMINE HCL 25 MG
25 CAPSULE ORAL EVERY 6 HOURS PRN
Status: DISCONTINUED | OUTPATIENT
Start: 2020-01-01 | End: 2020-01-01 | Stop reason: HOSPADM

## 2020-01-01 RX ORDER — ACETAMINOPHEN 325 MG/1
650 TABLET ORAL EVERY 6 HOURS PRN
Status: DISCONTINUED | OUTPATIENT
Start: 2020-01-01 | End: 2020-01-01 | Stop reason: HOSPADM

## 2020-01-01 RX ORDER — BISACODYL 5 MG/1
5 TABLET, DELAYED RELEASE ORAL DAILY PRN
Status: DISCONTINUED | OUTPATIENT
Start: 2020-01-01 | End: 2020-01-01 | Stop reason: HOSPADM

## 2020-01-01 RX ORDER — FENTANYL CITRATE 50 UG/ML
50 INJECTION, SOLUTION INTRAMUSCULAR; INTRAVENOUS
Status: DISCONTINUED | OUTPATIENT
Start: 2020-01-01 | End: 2020-01-01

## 2020-01-01 RX ORDER — FUROSEMIDE 10 MG/ML
40 INJECTION INTRAMUSCULAR; INTRAVENOUS
Status: DISCONTINUED | OUTPATIENT
Start: 2020-01-01 | End: 2020-01-01

## 2020-01-01 RX ORDER — CLOPIDOGREL BISULFATE 75 MG/1
TABLET ORAL
Qty: 30 TABLET | Refills: 0 | OUTPATIENT
Start: 2020-01-01

## 2020-01-01 RX ORDER — DIPHENHYDRAMINE HYDROCHLORIDE 50 MG/ML
25 INJECTION INTRAMUSCULAR; INTRAVENOUS NIGHTLY PRN
Status: DISCONTINUED | OUTPATIENT
Start: 2020-01-01 | End: 2020-01-01 | Stop reason: HOSPADM

## 2020-01-01 RX ORDER — ALBUTEROL SULFATE 2.5 MG/3ML
2.5 SOLUTION RESPIRATORY (INHALATION) ONCE
Status: COMPLETED | OUTPATIENT
Start: 2020-01-01 | End: 2020-01-01

## 2020-01-01 RX ORDER — DOPAMINE HYDROCHLORIDE 160 MG/100ML
2-20 INJECTION, SOLUTION INTRAVENOUS
Status: DISCONTINUED | OUTPATIENT
Start: 2020-01-01 | End: 2020-01-01 | Stop reason: HOSPADM

## 2020-01-01 RX ORDER — POTASSIUM CHLORIDE 1.5 G/1.77G
40 POWDER, FOR SOLUTION ORAL AS NEEDED
Status: DISCONTINUED | OUTPATIENT
Start: 2020-01-01 | End: 2020-01-01 | Stop reason: HOSPADM

## 2020-01-01 RX ORDER — ALUMINA, MAGNESIA, AND SIMETHICONE 2400; 2400; 240 MG/30ML; MG/30ML; MG/30ML
15 SUSPENSION ORAL EVERY 6 HOURS PRN
Status: DISCONTINUED | OUTPATIENT
Start: 2020-01-01 | End: 2020-01-01 | Stop reason: HOSPADM

## 2020-01-01 RX ORDER — ACETAMINOPHEN 325 MG/1
650 TABLET ORAL EVERY 4 HOURS PRN
Status: DISCONTINUED | OUTPATIENT
Start: 2020-01-01 | End: 2020-01-01 | Stop reason: SDUPTHER

## 2020-01-01 RX ORDER — NEOSTIGMINE METHYLSULFATE 5 MG/5 ML
SYRINGE (ML) INTRAVENOUS AS NEEDED
Status: DISCONTINUED | OUTPATIENT
Start: 2020-01-01 | End: 2020-01-01 | Stop reason: SURG

## 2020-01-01 RX ORDER — OXYCODONE HCL 10 MG/1
10 TABLET, FILM COATED, EXTENDED RELEASE ORAL EVERY 12 HOURS SCHEDULED
Status: DISCONTINUED | OUTPATIENT
Start: 2020-01-01 | End: 2020-01-01 | Stop reason: HOSPADM

## 2020-01-01 RX ORDER — TRANEXAMIC ACID 100 MG/ML
INJECTION, SOLUTION INTRAVENOUS AS NEEDED
Status: DISCONTINUED | OUTPATIENT
Start: 2020-01-01 | End: 2020-01-01 | Stop reason: HOSPADM

## 2020-01-01 RX ORDER — OXYCODONE HYDROCHLORIDE 5 MG/1
10 TABLET ORAL EVERY 4 HOURS PRN
Status: DISCONTINUED | OUTPATIENT
Start: 2020-01-01 | End: 2020-01-01 | Stop reason: HOSPADM

## 2020-01-01 RX ORDER — QUETIAPINE FUMARATE 25 MG/1
25 TABLET, FILM COATED ORAL NIGHTLY
Status: DISCONTINUED | OUTPATIENT
Start: 2020-01-01 | End: 2020-01-01

## 2020-01-01 RX ORDER — MIDAZOLAM HYDROCHLORIDE 1 MG/ML
INJECTION INTRAMUSCULAR; INTRAVENOUS
Status: COMPLETED | OUTPATIENT
Start: 2020-01-01 | End: 2020-01-01

## 2020-01-01 RX ADMIN — ACEBUTOLOL HYDROCHLORIDE 200 MG: 200 CAPSULE ORAL at 08:11

## 2020-01-01 RX ADMIN — NYSTATIN: 100000 POWDER TOPICAL at 05:09

## 2020-01-01 RX ADMIN — FUROSEMIDE 40 MG: 10 INJECTION, SOLUTION INTRAMUSCULAR; INTRAVENOUS at 04:17

## 2020-01-01 RX ADMIN — FUROSEMIDE 40 MG: 10 INJECTION, SOLUTION INTRAMUSCULAR; INTRAVENOUS at 16:41

## 2020-01-01 RX ADMIN — CEFEPIME HYDROCHLORIDE 2 G: 2 INJECTION, POWDER, FOR SOLUTION INTRAVENOUS at 03:43

## 2020-01-01 RX ADMIN — NYSTATIN: 100000 POWDER TOPICAL at 06:07

## 2020-01-01 RX ADMIN — MIDODRINE HYDROCHLORIDE 2.5 MG: 2.5 TABLET ORAL at 21:28

## 2020-01-01 RX ADMIN — ACEBUTOLOL HYDROCHLORIDE 200 MG: 200 CAPSULE ORAL at 20:02

## 2020-01-01 RX ADMIN — NYSTATIN 500000 UNITS: 100000 SUSPENSION ORAL at 20:04

## 2020-01-01 RX ADMIN — LORAZEPAM 1 MG: 2 INJECTION INTRAMUSCULAR; INTRAVENOUS at 15:36

## 2020-01-01 RX ADMIN — COLESEVELAM HYDROCHLORIDE 1250 MG: 625 TABLET, FILM COATED ORAL at 08:50

## 2020-01-01 RX ADMIN — OXYCODONE HYDROCHLORIDE 10 MG: 10 TABLET, FILM COATED, EXTENDED RELEASE ORAL at 09:56

## 2020-01-01 RX ADMIN — Medication 3 ML: at 08:40

## 2020-01-01 RX ADMIN — NYSTATIN: 100000 POWDER TOPICAL at 21:41

## 2020-01-01 RX ADMIN — Medication 3 ML: at 07:31

## 2020-01-01 RX ADMIN — Medication 250 MG: at 11:27

## 2020-01-01 RX ADMIN — Medication 500 MG: at 07:40

## 2020-01-01 RX ADMIN — Medication 500 MG: at 21:40

## 2020-01-01 RX ADMIN — CHLORHEXIDINE GLUCONATE 15 ML: 1.2 RINSE ORAL at 08:43

## 2020-01-01 RX ADMIN — TRAMADOL HYDROCHLORIDE 50 MG: 50 TABLET, FILM COATED ORAL at 20:39

## 2020-01-01 RX ADMIN — FUROSEMIDE 20 MG: 20 TABLET ORAL at 11:37

## 2020-01-01 RX ADMIN — VANCOMYCIN HYDROCHLORIDE 250 MG: 250 CAPSULE ORAL at 11:16

## 2020-01-01 RX ADMIN — Medication 10 ML: at 16:42

## 2020-01-01 RX ADMIN — LISINOPRIL 20 MG: 20 TABLET ORAL at 09:15

## 2020-01-01 RX ADMIN — NYSTATIN 500000 UNITS: 100000 SUSPENSION ORAL at 20:55

## 2020-01-01 RX ADMIN — ACEBUTOLOL HYDROCHLORIDE 200 MG: 200 CAPSULE ORAL at 09:00

## 2020-01-01 RX ADMIN — ATORVASTATIN CALCIUM 10 MG: 10 TABLET, FILM COATED ORAL at 22:13

## 2020-01-01 RX ADMIN — SODIUM CHLORIDE 75 ML/HR: 900 INJECTION, SOLUTION INTRAVENOUS at 22:19

## 2020-01-01 RX ADMIN — Medication 3 ML: at 21:50

## 2020-01-01 RX ADMIN — FUROSEMIDE 40 MG: 10 INJECTION, SOLUTION INTRAMUSCULAR; INTRAVENOUS at 02:44

## 2020-01-01 RX ADMIN — QUETIAPINE FUMARATE 25 MG: 25 TABLET ORAL at 20:53

## 2020-01-01 RX ADMIN — VANCOMYCIN HYDROCHLORIDE 250 MG: 250 CAPSULE ORAL at 06:11

## 2020-01-01 RX ADMIN — SODIUM CHLORIDE, SODIUM LACTATE, POTASSIUM CHLORIDE, AND CALCIUM CHLORIDE 1000 ML: 600; 310; 30; 20 INJECTION, SOLUTION INTRAVENOUS at 09:56

## 2020-01-01 RX ADMIN — MELOXICAM 15 MG: 15 TABLET ORAL at 09:56

## 2020-01-01 RX ADMIN — METOPROLOL TARTRATE 25 MG: 25 TABLET, FILM COATED ORAL at 20:53

## 2020-01-01 RX ADMIN — ACEBUTOLOL HYDROCHLORIDE 200 MG: 200 CAPSULE ORAL at 08:05

## 2020-01-01 RX ADMIN — QUETIAPINE FUMARATE 25 MG: 25 TABLET ORAL at 21:44

## 2020-01-01 RX ADMIN — ATORVASTATIN CALCIUM 20 MG: 20 TABLET, FILM COATED ORAL at 22:47

## 2020-01-01 RX ADMIN — ATORVASTATIN CALCIUM 20 MG: 20 TABLET, FILM COATED ORAL at 22:09

## 2020-01-01 RX ADMIN — APIXABAN 2.5 MG: 2.5 TABLET, FILM COATED ORAL at 20:29

## 2020-01-01 RX ADMIN — FLUDROCORTISONE ACETATE 0.1 MG: 0.1 TABLET ORAL at 08:56

## 2020-01-01 RX ADMIN — APIXABAN 2.5 MG: 2.5 TABLET, FILM COATED ORAL at 09:06

## 2020-01-01 RX ADMIN — IPRATROPIUM BROMIDE AND ALBUTEROL SULFATE 3 ML: 2.5; .5 SOLUTION RESPIRATORY (INHALATION) at 07:45

## 2020-01-01 RX ADMIN — Medication 10 ML: at 08:44

## 2020-01-01 RX ADMIN — ACEBUTOLOL HYDROCHLORIDE 200 MG: 200 CAPSULE ORAL at 22:48

## 2020-01-01 RX ADMIN — QUETIAPINE FUMARATE 25 MG: 25 TABLET ORAL at 21:33

## 2020-01-01 RX ADMIN — ACEBUTOLOL HYDROCHLORIDE 200 MG: 200 CAPSULE ORAL at 21:52

## 2020-01-01 RX ADMIN — Medication 1 G: at 08:39

## 2020-01-01 RX ADMIN — FENTANYL CITRATE 50 MCG: 50 INJECTION, SOLUTION INTRAMUSCULAR; INTRAVENOUS at 11:12

## 2020-01-01 RX ADMIN — APIXABAN 2.5 MG: 2.5 TABLET, FILM COATED ORAL at 09:30

## 2020-01-01 RX ADMIN — PHENYLEPHRINE HYDROCHLORIDE 200 MCG: 10 INJECTION INTRAVENOUS at 11:37

## 2020-01-01 RX ADMIN — VANCOMYCIN HYDROCHLORIDE 250 MG: 250 CAPSULE ORAL at 03:43

## 2020-01-01 RX ADMIN — IOPAMIDOL 75 ML: 755 INJECTION, SOLUTION INTRAVENOUS at 12:54

## 2020-01-01 RX ADMIN — DEXAMETHASONE SODIUM PHOSPHATE 4 MG: 4 INJECTION, SOLUTION INTRAMUSCULAR; INTRAVENOUS at 11:21

## 2020-01-01 RX ADMIN — Medication 500 MG: at 20:11

## 2020-01-01 RX ADMIN — Medication 10 ML: at 20:22

## 2020-01-01 RX ADMIN — DEXTROSE MONOHYDRATE 50 ML: 25 INJECTION, SOLUTION INTRAVENOUS at 16:43

## 2020-01-01 RX ADMIN — Medication 500 MG: at 07:58

## 2020-01-01 RX ADMIN — HALOPERIDOL 0.5 MG: 1 TABLET ORAL at 15:54

## 2020-01-01 RX ADMIN — Medication 1 G: at 17:08

## 2020-01-01 RX ADMIN — APIXABAN 2.5 MG: 2.5 TABLET, FILM COATED ORAL at 20:11

## 2020-01-01 RX ADMIN — CEFTRIAXONE SODIUM 1 G: 1 INJECTION, POWDER, FOR SOLUTION INTRAMUSCULAR; INTRAVENOUS at 17:11

## 2020-01-01 RX ADMIN — VANCOMYCIN HYDROCHLORIDE 250 MG: 250 CAPSULE ORAL at 13:21

## 2020-01-01 RX ADMIN — MIDAZOLAM 1 MG: 1 INJECTION INTRAMUSCULAR; INTRAVENOUS at 11:27

## 2020-01-01 RX ADMIN — LORAZEPAM 1 MG: 2 INJECTION INTRAMUSCULAR; INTRAVENOUS at 17:31

## 2020-01-01 RX ADMIN — PERFLUTREN 1.5 ML: 6.52 INJECTION, SUSPENSION INTRAVENOUS at 11:36

## 2020-01-01 RX ADMIN — METHYLPREDNISOLONE SODIUM SUCCINATE 20 MG: 40 INJECTION, POWDER, FOR SOLUTION INTRAMUSCULAR; INTRAVENOUS at 07:58

## 2020-01-01 RX ADMIN — INSULIN HUMAN 10 UNITS: 100 INJECTION, SOLUTION PARENTERAL at 16:42

## 2020-01-01 RX ADMIN — CEFAZOLIN SODIUM 2 G: 10 INJECTION, POWDER, FOR SOLUTION INTRAVENOUS at 20:10

## 2020-01-01 RX ADMIN — COLESEVELAM HYDROCHLORIDE 625 MG: 625 TABLET, FILM COATED ORAL at 17:17

## 2020-01-01 RX ADMIN — ACEBUTOLOL HYDROCHLORIDE 200 MG: 200 CAPSULE ORAL at 08:43

## 2020-01-01 RX ADMIN — ENOXAPARIN SODIUM 40 MG: 40 INJECTION SUBCUTANEOUS at 19:08

## 2020-01-01 RX ADMIN — ONDANSETRON 4 MG: 2 INJECTION, SOLUTION INTRAMUSCULAR; INTRAVENOUS at 11:18

## 2020-01-01 RX ADMIN — Medication 10 ML: at 10:34

## 2020-01-01 RX ADMIN — Medication 10 ML: at 20:03

## 2020-01-01 RX ADMIN — SODIUM CHLORIDE 500 ML: 0.9 INJECTION, SOLUTION INTRAVENOUS at 04:01

## 2020-01-01 RX ADMIN — DEXTROSE MONOHYDRATE 50 ML: 25 INJECTION, SOLUTION INTRAVENOUS at 12:19

## 2020-01-01 RX ADMIN — FUROSEMIDE 40 MG: 10 INJECTION, SOLUTION INTRAMUSCULAR; INTRAVENOUS at 18:16

## 2020-01-01 RX ADMIN — DEXTROSE MONOHYDRATE 50 ML/HR: 50 INJECTION, SOLUTION INTRAVENOUS at 15:16

## 2020-01-01 RX ADMIN — NYSTATIN: 100000 POWDER TOPICAL at 02:37

## 2020-01-01 RX ADMIN — MIDODRINE HYDROCHLORIDE 2.5 MG: 2.5 TABLET ORAL at 17:29

## 2020-01-01 RX ADMIN — CALCIUM GLUCONATE 1 G: 20 INJECTION, SOLUTION INTRAVENOUS at 16:42

## 2020-01-01 RX ADMIN — DEXTROSE MONOHYDRATE 50 ML: 25 INJECTION, SOLUTION INTRAVENOUS at 11:37

## 2020-01-01 RX ADMIN — NYSTATIN: 100000 POWDER TOPICAL at 05:28

## 2020-01-01 RX ADMIN — ACEBUTOLOL HYDROCHLORIDE 200 MG: 200 CAPSULE ORAL at 09:30

## 2020-01-01 RX ADMIN — APIXABAN 2.5 MG: 2.5 TABLET, FILM COATED ORAL at 08:39

## 2020-01-01 RX ADMIN — SODIUM BICARBONATE 50 MEQ: 84 INJECTION, SOLUTION INTRAVENOUS at 16:42

## 2020-01-01 RX ADMIN — ONDANSETRON 4 MG: 2 INJECTION, SOLUTION INTRAMUSCULAR; INTRAVENOUS at 20:04

## 2020-01-01 RX ADMIN — CEFEPIME HYDROCHLORIDE 2 G: 2 INJECTION, POWDER, FOR SOLUTION INTRAVENOUS at 03:37

## 2020-01-01 RX ADMIN — QUETIAPINE FUMARATE 25 MG: 25 TABLET ORAL at 20:21

## 2020-01-01 RX ADMIN — NYSTATIN 500000 UNITS: 100000 SUSPENSION ORAL at 12:03

## 2020-01-01 RX ADMIN — COLESEVELAM HYDROCHLORIDE 1250 MG: 625 TABLET, FILM COATED ORAL at 17:08

## 2020-01-01 RX ADMIN — APIXABAN 2.5 MG: 2.5 TABLET, FILM COATED ORAL at 08:54

## 2020-01-01 RX ADMIN — ACEBUTOLOL HYDROCHLORIDE 200 MG: 200 CAPSULE ORAL at 22:14

## 2020-01-01 RX ADMIN — NYSTATIN 500000 UNITS: 100000 SUSPENSION ORAL at 09:33

## 2020-01-01 RX ADMIN — VANCOMYCIN HYDROCHLORIDE 250 MG: 250 CAPSULE ORAL at 09:15

## 2020-01-01 RX ADMIN — Medication 10 ML: at 08:40

## 2020-01-01 RX ADMIN — Medication 10 ML: at 07:27

## 2020-01-01 RX ADMIN — VANCOMYCIN HYDROCHLORIDE 250 MG: 250 CAPSULE ORAL at 23:50

## 2020-01-01 RX ADMIN — FUROSEMIDE 40 MG: 40 TABLET ORAL at 07:28

## 2020-01-01 RX ADMIN — MIDODRINE HYDROCHLORIDE 2.5 MG: 2.5 TABLET ORAL at 08:39

## 2020-01-01 RX ADMIN — FUROSEMIDE 40 MG: 40 TABLET ORAL at 08:57

## 2020-01-01 RX ADMIN — DEXTROSE MONOHYDRATE 50 ML: 25 INJECTION, SOLUTION INTRAVENOUS at 01:00

## 2020-01-01 RX ADMIN — ACEBUTOLOL HYDROCHLORIDE 200 MG: 200 CAPSULE ORAL at 20:30

## 2020-01-01 RX ADMIN — MIDAZOLAM 2 MG: 1 INJECTION INTRAMUSCULAR; INTRAVENOUS at 10:12

## 2020-01-01 RX ADMIN — FUROSEMIDE 40 MG: 40 TABLET ORAL at 08:43

## 2020-01-01 RX ADMIN — DEXTROSE MONOHYDRATE 50 ML/HR: 50 INJECTION, SOLUTION INTRAVENOUS at 10:50

## 2020-01-01 RX ADMIN — SODIUM CHLORIDE 75 ML/HR: 900 INJECTION, SOLUTION INTRAVENOUS at 15:58

## 2020-01-01 RX ADMIN — MELOXICAM 15 MG: 15 TABLET ORAL at 09:27

## 2020-01-01 RX ADMIN — Medication 500 MG: at 11:26

## 2020-01-01 RX ADMIN — CEFTRIAXONE SODIUM 1 G: 1 INJECTION, POWDER, FOR SOLUTION INTRAMUSCULAR; INTRAVENOUS at 14:09

## 2020-01-01 RX ADMIN — VANCOMYCIN HYDROCHLORIDE 250 MG: 250 CAPSULE ORAL at 06:05

## 2020-01-01 RX ADMIN — ATORVASTATIN CALCIUM 10 MG: 10 TABLET, FILM COATED ORAL at 09:06

## 2020-01-01 RX ADMIN — Medication 10 ML: at 20:30

## 2020-01-01 RX ADMIN — ACEBUTOLOL HYDROCHLORIDE 200 MG: 200 CAPSULE ORAL at 10:48

## 2020-01-01 RX ADMIN — ATORVASTATIN CALCIUM 10 MG: 10 TABLET, FILM COATED ORAL at 21:30

## 2020-01-01 RX ADMIN — Medication 3 ML: at 21:40

## 2020-01-01 RX ADMIN — MIDODRINE HYDROCHLORIDE 2.5 MG: 2.5 TABLET ORAL at 13:21

## 2020-01-01 RX ADMIN — SODIUM POLYSTYRENE SULFONATE 15 G: 15 SUSPENSION ORAL; RECTAL at 10:33

## 2020-01-01 RX ADMIN — Medication 3 ML: at 07:21

## 2020-01-01 RX ADMIN — LISINOPRIL 20 MG: 20 TABLET ORAL at 09:33

## 2020-01-01 RX ADMIN — Medication 250 MG: at 20:53

## 2020-01-01 RX ADMIN — VANCOMYCIN HYDROCHLORIDE 250 MG: 250 CAPSULE ORAL at 05:08

## 2020-01-01 RX ADMIN — ATORVASTATIN CALCIUM 10 MG: 10 TABLET, FILM COATED ORAL at 20:22

## 2020-01-01 RX ADMIN — Medication 10 ML: at 09:09

## 2020-01-01 RX ADMIN — VANCOMYCIN HYDROCHLORIDE 250 MG: 250 CAPSULE ORAL at 00:13

## 2020-01-01 RX ADMIN — VANCOMYCIN HYDROCHLORIDE 250 MG: 250 CAPSULE ORAL at 17:08

## 2020-01-01 RX ADMIN — CEFTRIAXONE SODIUM 1 G: 10 INJECTION, POWDER, FOR SOLUTION INTRAVENOUS at 03:29

## 2020-01-01 RX ADMIN — APIXABAN 2.5 MG: 2.5 TABLET, FILM COATED ORAL at 07:30

## 2020-01-01 RX ADMIN — VANCOMYCIN HYDROCHLORIDE 125 MG: 125 CAPSULE ORAL at 00:50

## 2020-01-01 RX ADMIN — SODIUM CHLORIDE 500 ML: 900 INJECTION, SOLUTION INTRAVENOUS at 09:54

## 2020-01-01 RX ADMIN — Medication 500 MG: at 20:30

## 2020-01-01 RX ADMIN — Medication 10 ML: at 21:31

## 2020-01-01 RX ADMIN — Medication 10 ML: at 22:09

## 2020-01-01 RX ADMIN — APIXABAN 2.5 MG: 2.5 TABLET, FILM COATED ORAL at 09:33

## 2020-01-01 RX ADMIN — APIXABAN 2.5 MG: 2.5 TABLET, FILM COATED ORAL at 20:02

## 2020-01-01 RX ADMIN — QUETIAPINE 25 MG: 25 TABLET, FILM COATED ORAL at 14:49

## 2020-01-01 RX ADMIN — APIXABAN 2.5 MG: 2.5 TABLET, FILM COATED ORAL at 08:51

## 2020-01-01 RX ADMIN — APIXABAN 2.5 MG: 2.5 TABLET, FILM COATED ORAL at 20:30

## 2020-01-01 RX ADMIN — BUDESONIDE 0.5 MG: 0.5 INHALANT RESPIRATORY (INHALATION) at 23:24

## 2020-01-01 RX ADMIN — APIXABAN 2.5 MG: 2.5 TABLET, FILM COATED ORAL at 21:51

## 2020-01-01 RX ADMIN — ATORVASTATIN CALCIUM 20 MG: 20 TABLET, FILM COATED ORAL at 20:29

## 2020-01-01 RX ADMIN — VANCOMYCIN HYDROCHLORIDE 250 MG: 250 CAPSULE ORAL at 04:01

## 2020-01-01 RX ADMIN — Medication 5 MG: at 20:56

## 2020-01-01 RX ADMIN — CEFAZOLIN SODIUM 1 G: 1 INJECTION, SOLUTION INTRAVENOUS at 16:03

## 2020-01-01 RX ADMIN — DEXTROSE AND SODIUM CHLORIDE 50 ML/HR: 5; 450 INJECTION, SOLUTION INTRAVENOUS at 01:11

## 2020-01-01 RX ADMIN — Medication 3 ML: at 20:54

## 2020-01-01 RX ADMIN — Medication 3 ML: at 07:40

## 2020-01-01 RX ADMIN — ATORVASTATIN CALCIUM 10 MG: 10 TABLET, FILM COATED ORAL at 07:58

## 2020-01-01 RX ADMIN — COSYNTROPIN 0.25 MG: 0.25 INJECTION, POWDER, LYOPHILIZED, FOR SOLUTION INTRAMUSCULAR; INTRAVENOUS at 09:06

## 2020-01-01 RX ADMIN — APIXABAN 2.5 MG: 2.5 TABLET, FILM COATED ORAL at 20:03

## 2020-01-01 RX ADMIN — Medication 0.4 MG: at 12:20

## 2020-01-01 RX ADMIN — Medication 250 MG: at 20:03

## 2020-01-01 RX ADMIN — Medication 3 ML: at 09:33

## 2020-01-01 RX ADMIN — MIDODRINE HYDROCHLORIDE 2.5 MG: 2.5 TABLET ORAL at 10:35

## 2020-01-01 RX ADMIN — LISINOPRIL 20 MG: 20 TABLET ORAL at 12:44

## 2020-01-01 RX ADMIN — MIDODRINE HYDROCHLORIDE 2.5 MG: 2.5 TABLET ORAL at 17:19

## 2020-01-01 RX ADMIN — BUPIVACAINE HYDROCHLORIDE 7 ML: 5 INJECTION, SOLUTION EPIDURAL; INTRACAUDAL; PERINEURAL at 10:12

## 2020-01-01 RX ADMIN — NYSTATIN: 100000 POWDER TOPICAL at 21:33

## 2020-01-01 RX ADMIN — VANCOMYCIN HYDROCHLORIDE 125 MG: 125 CAPSULE ORAL at 17:17

## 2020-01-01 RX ADMIN — NYSTATIN 1 APPLICATION: 100000 POWDER TOPICAL at 22:25

## 2020-01-01 RX ADMIN — ROCURONIUM BROMIDE 30 MG: 10 INJECTION, SOLUTION INTRAVENOUS at 11:00

## 2020-01-01 RX ADMIN — FUROSEMIDE 40 MG: 10 INJECTION, SOLUTION INTRAMUSCULAR; INTRAVENOUS at 06:11

## 2020-01-01 RX ADMIN — ONDANSETRON 4 MG: 2 INJECTION INTRAMUSCULAR; INTRAVENOUS at 12:21

## 2020-01-01 RX ADMIN — VANCOMYCIN HYDROCHLORIDE 125 MG: 125 CAPSULE ORAL at 17:39

## 2020-01-01 RX ADMIN — DEXTROSE MONOHYDRATE 50 ML: 25 INJECTION, SOLUTION INTRAVENOUS at 01:15

## 2020-01-01 RX ADMIN — CEFEPIME HYDROCHLORIDE 2 G: 2 INJECTION, POWDER, FOR SOLUTION INTRAVENOUS at 14:45

## 2020-01-01 RX ADMIN — ATORVASTATIN CALCIUM 10 MG: 10 TABLET, FILM COATED ORAL at 08:51

## 2020-01-01 RX ADMIN — QUETIAPINE 25 MG: 25 TABLET, FILM COATED ORAL at 06:11

## 2020-01-01 RX ADMIN — NYSTATIN: 100000 POWDER TOPICAL at 21:50

## 2020-01-01 RX ADMIN — Medication 500 MG: at 09:06

## 2020-01-01 RX ADMIN — VANCOMYCIN HYDROCHLORIDE 250 MG: 250 CAPSULE ORAL at 16:41

## 2020-01-01 RX ADMIN — SODIUM CHLORIDE: 9 INJECTION, SOLUTION INTRAVENOUS at 12:36

## 2020-01-01 RX ADMIN — BUDESONIDE 0.5 MG: 0.5 INHALANT RESPIRATORY (INHALATION) at 11:15

## 2020-01-01 RX ADMIN — CEFTRIAXONE SODIUM 1 G: 1 INJECTION, POWDER, FOR SOLUTION INTRAMUSCULAR; INTRAVENOUS at 15:15

## 2020-01-01 RX ADMIN — MIDODRINE HYDROCHLORIDE 2.5 MG: 2.5 TABLET ORAL at 06:26

## 2020-01-01 RX ADMIN — LISINOPRIL 20 MG: 20 TABLET ORAL at 08:43

## 2020-01-01 RX ADMIN — MIDODRINE HYDROCHLORIDE 2.5 MG: 2.5 TABLET ORAL at 11:23

## 2020-01-01 RX ADMIN — FLUCONAZOLE 100 MG: 100 TABLET ORAL at 00:50

## 2020-01-01 RX ADMIN — QUETIAPINE FUMARATE 25 MG: 25 TABLET ORAL at 20:11

## 2020-01-01 RX ADMIN — MIDAZOLAM 1 MG: 1 INJECTION INTRAMUSCULAR; INTRAVENOUS at 11:11

## 2020-01-01 RX ADMIN — Medication 3 ML: at 20:31

## 2020-01-01 RX ADMIN — Medication 10 ML: at 20:07

## 2020-01-01 RX ADMIN — NYSTATIN 500000 UNITS: 100000 SUSPENSION ORAL at 12:16

## 2020-01-01 RX ADMIN — QUETIAPINE FUMARATE 25 MG: 25 TABLET ORAL at 20:30

## 2020-01-01 RX ADMIN — ACEBUTOLOL HYDROCHLORIDE 200 MG: 200 CAPSULE ORAL at 20:56

## 2020-01-01 RX ADMIN — APIXABAN 2.5 MG: 2.5 TABLET, FILM COATED ORAL at 20:31

## 2020-01-01 RX ADMIN — MIDODRINE HYDROCHLORIDE 2.5 MG: 2.5 TABLET ORAL at 10:28

## 2020-01-01 RX ADMIN — MORPHINE SULFATE 1 MG: 4 INJECTION INTRAVENOUS at 17:17

## 2020-01-01 RX ADMIN — QUETIAPINE 25 MG: 25 TABLET, FILM COATED ORAL at 21:34

## 2020-01-01 RX ADMIN — NYSTATIN: 100000 POWDER TOPICAL at 11:21

## 2020-01-01 RX ADMIN — IPRATROPIUM BROMIDE AND ALBUTEROL SULFATE 3 ML: 2.5; .5 SOLUTION RESPIRATORY (INHALATION) at 23:51

## 2020-01-01 RX ADMIN — COLESEVELAM HYDROCHLORIDE 1250 MG: 625 TABLET, FILM COATED ORAL at 09:06

## 2020-01-01 RX ADMIN — Medication 1 G: at 12:54

## 2020-01-01 RX ADMIN — ACEBUTOLOL HYDROCHLORIDE 200 MG: 200 CAPSULE ORAL at 21:43

## 2020-01-01 RX ADMIN — Medication 10 ML: at 21:52

## 2020-01-01 RX ADMIN — NYSTATIN 500000 UNITS: 100000 SUSPENSION ORAL at 17:38

## 2020-01-01 RX ADMIN — BUDESONIDE 0.5 MG: 0.5 INHALANT RESPIRATORY (INHALATION) at 19:07

## 2020-01-01 RX ADMIN — BUPIVACAINE 7 ML: 13.3 INJECTION, SUSPENSION, LIPOSOMAL INFILTRATION at 10:12

## 2020-01-01 RX ADMIN — VANCOMYCIN HYDROCHLORIDE 250 MG: 250 CAPSULE ORAL at 19:09

## 2020-01-01 RX ADMIN — TRAMADOL HYDROCHLORIDE 50 MG: 50 TABLET, FILM COATED ORAL at 15:50

## 2020-01-01 RX ADMIN — Medication 10 ML: at 09:07

## 2020-01-01 RX ADMIN — FLUDROCORTISONE ACETATE 100 MCG: 0.1 TABLET ORAL at 10:04

## 2020-01-01 RX ADMIN — DOPAMINE HYDROCHLORIDE 2 MCG/KG/MIN: 160 INJECTION, SOLUTION INTRAVENOUS at 08:13

## 2020-01-01 RX ADMIN — SODIUM CHLORIDE 50 ML/HR: 9 INJECTION, SOLUTION INTRAVENOUS at 20:19

## 2020-01-01 RX ADMIN — Medication 5 MG: at 22:10

## 2020-01-01 RX ADMIN — VANCOMYCIN HYDROCHLORIDE 250 MG: 250 CAPSULE ORAL at 09:54

## 2020-01-01 RX ADMIN — APIXABAN 2.5 MG: 2.5 TABLET, FILM COATED ORAL at 21:40

## 2020-01-01 RX ADMIN — POTASSIUM CHLORIDE 40 MEQ: 1500 TABLET, EXTENDED RELEASE ORAL at 11:17

## 2020-01-01 RX ADMIN — Medication 10 ML: at 10:57

## 2020-01-01 RX ADMIN — APIXABAN 2.5 MG: 2.5 TABLET, FILM COATED ORAL at 07:22

## 2020-01-01 RX ADMIN — SODIUM CHLORIDE 75 ML/HR: 9 INJECTION, SOLUTION INTRAVENOUS at 21:53

## 2020-01-01 RX ADMIN — ACETAMINOPHEN 650 MG: 325 TABLET, FILM COATED ORAL at 20:52

## 2020-01-01 RX ADMIN — SODIUM CHLORIDE 75 ML/HR: 9 INJECTION, SOLUTION INTRAVENOUS at 03:45

## 2020-01-01 RX ADMIN — Medication 10 ML: at 08:57

## 2020-01-01 RX ADMIN — Medication 10 ML: at 08:55

## 2020-01-01 RX ADMIN — BENZOYL PEROXIDE: 10 SUSPENSION TOPICAL at 10:01

## 2020-01-01 RX ADMIN — PROPOFOL 100 MG: 10 INJECTION, EMULSION INTRAVENOUS at 11:00

## 2020-01-01 RX ADMIN — LISINOPRIL 20 MG: 20 TABLET ORAL at 07:37

## 2020-01-01 RX ADMIN — NYSTATIN 500000 UNITS: 100000 SUSPENSION ORAL at 22:47

## 2020-01-01 RX ADMIN — NYSTATIN 1 APPLICATION: 100000 POWDER TOPICAL at 05:55

## 2020-01-01 RX ADMIN — IOPAMIDOL 100 ML: 755 INJECTION, SOLUTION INTRAVENOUS at 14:01

## 2020-01-01 RX ADMIN — APIXABAN 2.5 MG: 2.5 TABLET, FILM COATED ORAL at 21:50

## 2020-01-01 RX ADMIN — PHENYLEPHRINE HYDROCHLORIDE 100 MCG: 10 INJECTION INTRAVENOUS at 11:07

## 2020-01-01 RX ADMIN — Medication 10 ML: at 09:18

## 2020-01-01 RX ADMIN — SODIUM POLYSTYRENE SULFONATE 15 G: 15 SUSPENSION ORAL; RECTAL at 10:01

## 2020-01-01 RX ADMIN — Medication 500 MG: at 21:24

## 2020-01-01 RX ADMIN — IOPAMIDOL 95 ML: 755 INJECTION, SOLUTION INTRAVENOUS at 10:36

## 2020-01-01 RX ADMIN — COLESEVELAM HYDROCHLORIDE 1250 MG: 625 TABLET, FILM COATED ORAL at 11:25

## 2020-01-01 RX ADMIN — VANCOMYCIN HYDROCHLORIDE 250 MG: 250 CAPSULE ORAL at 00:33

## 2020-01-01 RX ADMIN — VANCOMYCIN HYDROCHLORIDE 125 MG: 125 CAPSULE ORAL at 05:24

## 2020-01-01 RX ADMIN — FUROSEMIDE 40 MG: 10 INJECTION, SOLUTION INTRAMUSCULAR; INTRAVENOUS at 09:15

## 2020-01-01 RX ADMIN — MIDODRINE HYDROCHLORIDE 2.5 MG: 2.5 TABLET ORAL at 13:47

## 2020-01-01 RX ADMIN — ATORVASTATIN CALCIUM 10 MG: 10 TABLET, FILM COATED ORAL at 08:56

## 2020-01-01 RX ADMIN — MIDODRINE HYDROCHLORIDE 2.5 MG: 2.5 TABLET ORAL at 17:09

## 2020-01-01 RX ADMIN — VANCOMYCIN HYDROCHLORIDE 250 MG: 250 CAPSULE ORAL at 10:27

## 2020-01-01 RX ADMIN — ENOXAPARIN SODIUM 60 MG: 60 INJECTION SUBCUTANEOUS at 22:58

## 2020-01-01 RX ADMIN — Medication 1 G: at 11:23

## 2020-01-01 RX ADMIN — OXYCODONE HYDROCHLORIDE 10 MG: 10 TABLET, FILM COATED, EXTENDED RELEASE ORAL at 21:05

## 2020-01-01 RX ADMIN — DOPAMINE HYDROCHLORIDE 9.5 MCG/KG/MIN: 160 INJECTION, SOLUTION INTRAVENOUS at 03:51

## 2020-01-01 RX ADMIN — MORPHINE SULFATE 1 MG: 4 INJECTION INTRAVENOUS at 15:37

## 2020-01-01 RX ADMIN — LISINOPRIL 20 MG: 20 TABLET ORAL at 07:28

## 2020-01-01 RX ADMIN — COLESEVELAM HYDROCHLORIDE 1250 MG: 625 TABLET, FILM COATED ORAL at 08:39

## 2020-01-01 RX ADMIN — VANCOMYCIN HYDROCHLORIDE 250 MG: 250 CAPSULE ORAL at 16:56

## 2020-01-01 RX ADMIN — FENTANYL CITRATE 50 MCG: 50 INJECTION, SOLUTION INTRAMUSCULAR; INTRAVENOUS at 11:30

## 2020-01-01 RX ADMIN — Medication 3 ML: at 09:21

## 2020-01-01 RX ADMIN — FUROSEMIDE 40 MG: 40 TABLET ORAL at 10:55

## 2020-01-01 RX ADMIN — NYSTATIN: 100000 POWDER TOPICAL at 14:18

## 2020-01-01 RX ADMIN — ATORVASTATIN CALCIUM 10 MG: 10 TABLET, FILM COATED ORAL at 08:39

## 2020-01-01 RX ADMIN — PHENYLEPHRINE HYDROCHLORIDE 100 MCG: 10 INJECTION INTRAVENOUS at 12:03

## 2020-01-01 RX ADMIN — Medication 3 ML: at 21:24

## 2020-01-01 RX ADMIN — DEXMEDETOMIDINE HYDROCHLORIDE 1 MCG/KG/HR: 4 INJECTION INTRAVENOUS at 12:44

## 2020-01-01 RX ADMIN — COLESEVELAM HYDROCHLORIDE 1250 MG: 625 TABLET, FILM COATED ORAL at 17:29

## 2020-01-01 RX ADMIN — Medication 500 MG: at 08:11

## 2020-01-01 RX ADMIN — NYSTATIN: 100000 POWDER TOPICAL at 15:05

## 2020-01-01 RX ADMIN — PERFLUTREN 2 ML: 6.52 INJECTION, SUSPENSION INTRAVENOUS at 08:45

## 2020-01-01 RX ADMIN — Medication 10 ML: at 20:57

## 2020-01-01 RX ADMIN — COLESEVELAM HYDROCHLORIDE 1250 MG: 625 TABLET, FILM COATED ORAL at 07:30

## 2020-01-01 RX ADMIN — VANCOMYCIN HYDROCHLORIDE 250 MG: 250 CAPSULE ORAL at 08:51

## 2020-01-01 RX ADMIN — NYSTATIN: 100000 POWDER TOPICAL at 06:15

## 2020-01-01 RX ADMIN — COLESEVELAM HYDROCHLORIDE 1250 MG: 625 TABLET, FILM COATED ORAL at 17:16

## 2020-01-01 RX ADMIN — METHYLPREDNISOLONE ACETATE 40 MG: 80 INJECTION, SUSPENSION INTRA-ARTICULAR; INTRALESIONAL; INTRAMUSCULAR; SOFT TISSUE at 11:18

## 2020-01-01 RX ADMIN — ACEBUTOLOL HYDROCHLORIDE 200 MG: 200 CAPSULE ORAL at 20:42

## 2020-01-01 RX ADMIN — PROTAMINE SULFATE 50 MG: 10 INJECTION, SOLUTION INTRAVENOUS at 14:09

## 2020-01-01 RX ADMIN — LIDOCAINE HYDROCHLORIDE 5 ML: 10 INJECTION, SOLUTION EPIDURAL; INFILTRATION; INTRACAUDAL; PERINEURAL at 11:18

## 2020-01-01 RX ADMIN — IOPAMIDOL 100 ML: 755 INJECTION, SOLUTION INTRAVENOUS at 13:30

## 2020-01-01 RX ADMIN — LIDOCAINE HYDROCHLORIDE 60 MG: 20 INJECTION, SOLUTION EPIDURAL; INFILTRATION; INTRACAUDAL; PERINEURAL at 11:00

## 2020-01-01 RX ADMIN — CLINDAMYCIN PHOSPHATE 900 MG: 900 INJECTION, SOLUTION INTRAVENOUS at 11:13

## 2020-01-01 RX ADMIN — NYSTATIN: 100000 POWDER TOPICAL at 20:31

## 2020-01-01 RX ADMIN — Medication 1 G: at 13:21

## 2020-01-01 RX ADMIN — QUETIAPINE FUMARATE 25 MG: 25 TABLET ORAL at 20:31

## 2020-01-01 RX ADMIN — QUETIAPINE FUMARATE 25 MG: 25 TABLET ORAL at 20:03

## 2020-01-01 RX ADMIN — Medication 2 MG: at 12:20

## 2020-01-01 RX ADMIN — NYSTATIN 500000 UNITS: 100000 SUSPENSION ORAL at 21:50

## 2020-01-01 RX ADMIN — SODIUM CHLORIDE 50 ML/HR: 9 INJECTION, SOLUTION INTRAVENOUS at 03:57

## 2020-01-01 RX ADMIN — POTASSIUM CHLORIDE 40 MEQ: 1500 TABLET, EXTENDED RELEASE ORAL at 07:28

## 2020-01-01 RX ADMIN — APIXABAN 2.5 MG: 2.5 TABLET, FILM COATED ORAL at 20:56

## 2020-01-01 RX ADMIN — Medication 500 MG: at 21:49

## 2020-01-01 RX ADMIN — PRAVASTATIN SODIUM 20 MG: 20 TABLET ORAL at 20:40

## 2020-01-01 RX ADMIN — VANCOMYCIN HYDROCHLORIDE 250 MG: 250 CAPSULE ORAL at 05:10

## 2020-01-01 RX ADMIN — SODIUM CHLORIDE 50 ML/HR: 9 INJECTION, SOLUTION INTRAVENOUS at 11:27

## 2020-01-01 RX ADMIN — IPRATROPIUM BROMIDE AND ALBUTEROL SULFATE 3 ML: 2.5; .5 SOLUTION RESPIRATORY (INHALATION) at 19:07

## 2020-01-01 RX ADMIN — APIXABAN 2.5 MG: 2.5 TABLET, FILM COATED ORAL at 20:53

## 2020-01-01 RX ADMIN — VANCOMYCIN HYDROCHLORIDE 250 MG: 250 CAPSULE ORAL at 17:15

## 2020-01-01 RX ADMIN — COLESEVELAM HYDROCHLORIDE 1250 MG: 625 TABLET, FILM COATED ORAL at 16:57

## 2020-01-01 RX ADMIN — METOPROLOL TARTRATE 25 MG: 25 TABLET, FILM COATED ORAL at 09:34

## 2020-01-01 RX ADMIN — TRAMADOL HYDROCHLORIDE 50 MG: 50 TABLET, FILM COATED ORAL at 11:17

## 2020-01-01 RX ADMIN — Medication 10 ML: at 08:51

## 2020-01-01 RX ADMIN — VANCOMYCIN HYDROCHLORIDE 250 MG: 250 CAPSULE ORAL at 16:36

## 2020-01-01 RX ADMIN — PHENYLEPHRINE HYDROCHLORIDE 100 MCG: 10 INJECTION INTRAVENOUS at 12:12

## 2020-01-01 RX ADMIN — Medication 10 ML: at 22:13

## 2020-01-01 RX ADMIN — Medication 10 ML: at 20:38

## 2020-01-01 RX ADMIN — APIXABAN 2.5 MG: 2.5 TABLET, FILM COATED ORAL at 11:23

## 2020-01-01 RX ADMIN — ACEBUTOLOL HYDROCHLORIDE 200 MG: 200 CAPSULE ORAL at 20:40

## 2020-01-01 RX ADMIN — PROPOFOL 50 MCG/KG/MIN: 10 INJECTION, EMULSION INTRAVENOUS at 12:44

## 2020-01-01 RX ADMIN — NYSTATIN 1 APPLICATION: 100000 POWDER TOPICAL at 22:06

## 2020-01-01 RX ADMIN — PHENYLEPHRINE HYDROCHLORIDE 100 MCG: 10 INJECTION INTRAVENOUS at 13:03

## 2020-01-01 RX ADMIN — APIXABAN 2.5 MG: 2.5 TABLET, FILM COATED ORAL at 08:57

## 2020-01-01 RX ADMIN — ACETAMINOPHEN 650 MG: 325 TABLET, FILM COATED ORAL at 13:51

## 2020-01-01 RX ADMIN — SODIUM CHLORIDE 1250 MG: 9 INJECTION, SOLUTION INTRAVENOUS at 14:52

## 2020-01-01 RX ADMIN — NYSTATIN: 100000 POWDER TOPICAL at 07:31

## 2020-01-01 RX ADMIN — ACEBUTOLOL HYDROCHLORIDE 200 MG: 200 CAPSULE ORAL at 10:36

## 2020-01-01 RX ADMIN — Medication 10 ML: at 22:49

## 2020-01-01 RX ADMIN — MAGNESIUM SULFATE HEPTAHYDRATE 4 G: 40 INJECTION, SOLUTION INTRAVENOUS at 07:39

## 2020-01-01 RX ADMIN — NYSTATIN: 100000 POWDER TOPICAL at 05:47

## 2020-01-01 RX ADMIN — APIXABAN 2.5 MG: 2.5 TABLET, FILM COATED ORAL at 07:41

## 2020-01-01 RX ADMIN — NOREPINEPHRINE BITARTRATE 0.03 MCG/KG/MIN: 1 INJECTION, SOLUTION, CONCENTRATE INTRAVENOUS at 12:58

## 2020-01-01 RX ADMIN — ATORVASTATIN CALCIUM 10 MG: 10 TABLET, FILM COATED ORAL at 09:39

## 2020-01-01 RX ADMIN — IPRATROPIUM BROMIDE AND ALBUTEROL SULFATE 3 ML: 2.5; .5 SOLUTION RESPIRATORY (INHALATION) at 23:24

## 2020-01-01 RX ADMIN — MORPHINE SULFATE 1 MG: 4 INJECTION INTRAVENOUS at 10:24

## 2020-01-01 RX ADMIN — MIDODRINE HYDROCHLORIDE 10 MG: 5 TABLET ORAL at 06:30

## 2020-01-01 RX ADMIN — TRAMADOL HYDROCHLORIDE 50 MG: 50 TABLET, FILM COATED ORAL at 09:20

## 2020-01-01 RX ADMIN — LISINOPRIL 20 MG: 20 TABLET ORAL at 09:08

## 2020-01-01 RX ADMIN — KETAMINE HYDROCHLORIDE 10 MG: 10 INJECTION INTRAMUSCULAR; INTRAVENOUS at 12:48

## 2020-01-01 RX ADMIN — METHYLPREDNISOLONE SODIUM SUCCINATE 20 MG: 40 INJECTION, POWDER, FOR SOLUTION INTRAMUSCULAR; INTRAVENOUS at 11:16

## 2020-01-01 RX ADMIN — DEXTROSE MONOHYDRATE 50 ML: 25 INJECTION, SOLUTION INTRAVENOUS at 19:16

## 2020-01-01 RX ADMIN — COLESEVELAM HYDROCHLORIDE 625 MG: 625 TABLET, FILM COATED ORAL at 17:38

## 2020-01-01 RX ADMIN — ATORVASTATIN CALCIUM 20 MG: 20 TABLET, FILM COATED ORAL at 20:02

## 2020-01-01 RX ADMIN — NYSTATIN: 100000 POWDER TOPICAL at 13:52

## 2020-01-01 RX ADMIN — APIXABAN 2.5 MG: 2.5 TABLET, FILM COATED ORAL at 07:27

## 2020-01-01 RX ADMIN — SODIUM CHLORIDE 75 ML/HR: 9 INJECTION, SOLUTION INTRAVENOUS at 00:06

## 2020-01-01 RX ADMIN — VANCOMYCIN HYDROCHLORIDE 250 MG: 250 CAPSULE ORAL at 17:09

## 2020-01-01 RX ADMIN — NYSTATIN: 100000 POWDER TOPICAL at 05:35

## 2020-01-01 RX ADMIN — FAMOTIDINE 20 MG: 10 INJECTION, SOLUTION INTRAVENOUS at 08:43

## 2020-01-01 RX ADMIN — Medication 10 ML: at 21:28

## 2020-01-01 RX ADMIN — QUETIAPINE 25 MG: 25 TABLET, FILM COATED ORAL at 22:00

## 2020-01-01 RX ADMIN — VANCOMYCIN HYDROCHLORIDE 250 MG: 250 CAPSULE ORAL at 01:01

## 2020-01-01 RX ADMIN — FUROSEMIDE 40 MG: 10 INJECTION, SOLUTION INTRAMUSCULAR; INTRAVENOUS at 03:41

## 2020-01-01 RX ADMIN — MUPIROCIN 1 APPLICATION: 20 OINTMENT TOPICAL at 07:00

## 2020-01-01 RX ADMIN — VANCOMYCIN HYDROCHLORIDE 250 MG: 250 CAPSULE ORAL at 21:28

## 2020-01-01 RX ADMIN — Medication 10 ML: at 07:35

## 2020-01-01 RX ADMIN — QUETIAPINE FUMARATE 25 MG: 25 TABLET ORAL at 13:51

## 2020-01-01 RX ADMIN — VANCOMYCIN HYDROCHLORIDE 125 MG: 125 CAPSULE ORAL at 11:27

## 2020-01-01 RX ADMIN — NYSTATIN 500000 UNITS: 100000 SUSPENSION ORAL at 08:59

## 2020-01-01 RX ADMIN — CEFAZOLIN SODIUM 2 G: 10 INJECTION, POWDER, FOR SOLUTION INTRAVENOUS at 03:44

## 2020-01-01 RX ADMIN — APIXABAN 2.5 MG: 2.5 TABLET, FILM COATED ORAL at 10:33

## 2020-01-01 RX ADMIN — FUROSEMIDE 40 MG: 10 INJECTION, SOLUTION INTRAMUSCULAR; INTRAVENOUS at 15:15

## 2020-01-01 RX ADMIN — ACEBUTOLOL HYDROCHLORIDE 200 MG: 200 CAPSULE ORAL at 09:03

## 2020-01-01 RX ADMIN — VANCOMYCIN HYDROCHLORIDE 250 MG: 250 CAPSULE ORAL at 21:33

## 2020-01-01 RX ADMIN — DOPAMINE HYDROCHLORIDE 9.5 MCG/KG/MIN: 160 INJECTION, SOLUTION INTRAVENOUS at 14:18

## 2020-01-01 RX ADMIN — DEXTROSE MONOHYDRATE 50 ML/HR: 50 INJECTION, SOLUTION INTRAVENOUS at 12:53

## 2020-01-01 RX ADMIN — Medication 1 G: at 09:54

## 2020-01-01 RX ADMIN — PREGABALIN 150 MG: 75 CAPSULE ORAL at 09:56

## 2020-01-01 RX ADMIN — APIXABAN 2.5 MG: 2.5 TABLET, FILM COATED ORAL at 22:09

## 2020-01-01 RX ADMIN — FUROSEMIDE 20 MG: 10 INJECTION, SOLUTION INTRAMUSCULAR; INTRAVENOUS at 11:51

## 2020-01-01 RX ADMIN — IOPAMIDOL 100 ML: 755 INJECTION, SOLUTION INTRAVENOUS at 02:58

## 2020-01-01 RX ADMIN — SODIUM CHLORIDE 250 ML: 9 INJECTION, SOLUTION INTRAVENOUS at 13:09

## 2020-01-01 RX ADMIN — KETAMINE HYDROCHLORIDE 5 MG: 10 INJECTION INTRAMUSCULAR; INTRAVENOUS at 13:25

## 2020-01-01 RX ADMIN — ATORVASTATIN CALCIUM 10 MG: 10 TABLET, FILM COATED ORAL at 08:11

## 2020-01-01 RX ADMIN — COLESEVELAM HYDROCHLORIDE 1250 MG: 625 TABLET, FILM COATED ORAL at 16:36

## 2020-01-01 RX ADMIN — APIXABAN 2.5 MG: 2.5 TABLET, FILM COATED ORAL at 21:28

## 2020-01-01 RX ADMIN — Medication 3 ML: at 20:12

## 2020-01-01 RX ADMIN — ATORVASTATIN CALCIUM 10 MG: 10 TABLET, FILM COATED ORAL at 07:31

## 2020-01-01 RX ADMIN — SODIUM CHLORIDE 1000 MG: 900 INJECTION, SOLUTION INTRAVENOUS at 14:48

## 2020-01-01 RX ADMIN — ATORVASTATIN CALCIUM 10 MG: 10 TABLET, FILM COATED ORAL at 09:17

## 2020-01-01 RX ADMIN — Medication: at 21:33

## 2020-01-01 RX ADMIN — FUROSEMIDE 20 MG: 20 TABLET ORAL at 09:33

## 2020-01-01 RX ADMIN — APIXABAN 2.5 MG: 2.5 TABLET, FILM COATED ORAL at 09:15

## 2020-01-01 RX ADMIN — COLESEVELAM HYDROCHLORIDE 1250 MG: 625 TABLET, FILM COATED ORAL at 07:58

## 2020-01-01 RX ADMIN — CEFAZOLIN SODIUM 2 G: 10 INJECTION, POWDER, FOR SOLUTION INTRAVENOUS at 11:35

## 2020-01-01 RX ADMIN — Medication 3 ML: at 08:12

## 2020-01-01 RX ADMIN — ACETAMINOPHEN 650 MG: 325 TABLET, FILM COATED ORAL at 20:26

## 2020-01-01 RX ADMIN — NYSTATIN 500000 UNITS: 100000 SUSPENSION ORAL at 17:17

## 2020-01-01 RX ADMIN — IPRATROPIUM BROMIDE AND ALBUTEROL SULFATE 3 ML: 2.5; .5 SOLUTION RESPIRATORY (INHALATION) at 11:15

## 2020-01-01 RX ADMIN — NYSTATIN: 100000 POWDER TOPICAL at 05:11

## 2020-01-01 RX ADMIN — PHENYLEPHRINE HYDROCHLORIDE 100 MCG: 10 INJECTION INTRAVENOUS at 12:25

## 2020-01-01 RX ADMIN — VANCOMYCIN HYDROCHLORIDE 250 MG: 250 CAPSULE ORAL at 14:49

## 2020-01-01 RX ADMIN — FLUDROCORTISONE ACETATE 100 MCG: 0.1 TABLET ORAL at 13:21

## 2020-01-01 RX ADMIN — Medication 10 ML: at 20:23

## 2020-01-01 RX ADMIN — DEXTROSE MONOHYDRATE 50 ML: 25 INJECTION, SOLUTION INTRAVENOUS at 13:31

## 2020-01-01 RX ADMIN — Medication 500 MG: at 07:30

## 2020-01-01 RX ADMIN — PHENYLEPHRINE HYDROCHLORIDE 200 MCG: 10 INJECTION INTRAVENOUS at 11:47

## 2020-01-01 RX ADMIN — ENOXAPARIN SODIUM 60 MG: 60 INJECTION SUBCUTANEOUS at 09:24

## 2020-01-01 RX ADMIN — Medication 3 ML: at 21:33

## 2020-01-01 RX ADMIN — DOPAMINE HYDROCHLORIDE 9.5 MCG/KG/MIN: 160 INJECTION, SOLUTION INTRAVENOUS at 05:08

## 2020-01-01 RX ADMIN — VANCOMYCIN HYDROCHLORIDE 250 MG: 250 CAPSULE ORAL at 05:35

## 2020-01-01 RX ADMIN — NYSTATIN: 100000 POWDER TOPICAL at 15:15

## 2020-01-01 RX ADMIN — DEXTROSE AND SODIUM CHLORIDE 75 ML/HR: 5; 900 INJECTION, SOLUTION INTRAVENOUS at 21:48

## 2020-01-01 RX ADMIN — QUETIAPINE FUMARATE 25 MG: 25 TABLET ORAL at 21:53

## 2020-01-01 RX ADMIN — PHENYLEPHRINE HYDROCHLORIDE 200 MCG: 10 INJECTION INTRAVENOUS at 11:29

## 2020-01-01 RX ADMIN — ACEBUTOLOL HYDROCHLORIDE 200 MG: 200 CAPSULE ORAL at 08:40

## 2020-01-01 RX ADMIN — SODIUM CHLORIDE 1000 MG: 900 INJECTION, SOLUTION INTRAVENOUS at 22:10

## 2020-01-01 RX ADMIN — APIXABAN 2.5 MG: 2.5 TABLET, FILM COATED ORAL at 20:22

## 2020-01-01 RX ADMIN — BUDESONIDE 0.5 MG: 0.5 INHALANT RESPIRATORY (INHALATION) at 07:45

## 2020-01-01 RX ADMIN — CEFTRIAXONE SODIUM 1 G: 1 INJECTION, POWDER, FOR SOLUTION INTRAMUSCULAR; INTRAVENOUS at 14:36

## 2020-01-01 RX ADMIN — Medication 1 G: at 17:09

## 2020-01-01 RX ADMIN — VANCOMYCIN HYDROCHLORIDE 250 MG: 250 CAPSULE ORAL at 15:53

## 2020-01-01 RX ADMIN — QUETIAPINE FUMARATE 25 MG: 25 TABLET ORAL at 12:11

## 2020-01-01 RX ADMIN — CEFAZOLIN SODIUM 1 G: 1 INJECTION, SOLUTION INTRAVENOUS at 00:20

## 2020-01-01 RX ADMIN — Medication 500 MG: at 21:33

## 2020-01-01 RX ADMIN — ATORVASTATIN CALCIUM 10 MG: 10 TABLET, FILM COATED ORAL at 07:41

## 2020-01-01 RX ADMIN — LISINOPRIL 20 MG: 20 TABLET ORAL at 08:40

## 2020-01-01 RX ADMIN — METOPROLOL TARTRATE 25 MG: 25 TABLET, FILM COATED ORAL at 21:50

## 2020-01-01 RX ADMIN — NYSTATIN 500000 UNITS: 100000 SUSPENSION ORAL at 16:41

## 2020-01-01 RX ADMIN — Medication 1 G: at 17:15

## 2020-01-01 RX ADMIN — VANCOMYCIN HYDROCHLORIDE 250 MG: 250 CAPSULE ORAL at 17:29

## 2020-01-01 RX ADMIN — QUETIAPINE 25 MG: 25 TABLET, FILM COATED ORAL at 06:30

## 2020-01-01 RX ADMIN — METOPROLOL TARTRATE 25 MG: 25 TABLET, FILM COATED ORAL at 20:04

## 2020-01-01 RX ADMIN — APIXABAN 2.5 MG: 2.5 TABLET, FILM COATED ORAL at 21:33

## 2020-01-01 RX ADMIN — VANCOMYCIN HYDROCHLORIDE 250 MG: 250 CAPSULE ORAL at 11:23

## 2020-01-01 RX ADMIN — VANCOMYCIN HYDROCHLORIDE 250 MG: 250 CAPSULE ORAL at 03:04

## 2020-01-01 RX ADMIN — VANCOMYCIN HYDROCHLORIDE 250 MG: 250 CAPSULE ORAL at 12:54

## 2020-01-01 RX ADMIN — HEPARIN SODIUM 8000 UNITS: 1000 INJECTION, SOLUTION INTRAVENOUS; SUBCUTANEOUS at 13:28

## 2020-01-01 RX ADMIN — APIXABAN 2.5 MG: 2.5 TABLET, FILM COATED ORAL at 08:40

## 2020-01-01 RX ADMIN — ALBUTEROL SULFATE 2.5 MG: 2.5 SOLUTION RESPIRATORY (INHALATION) at 17:44

## 2020-01-01 RX ADMIN — Medication 10 ML: at 09:30

## 2020-01-01 RX ADMIN — LORAZEPAM 1 MG: 2 INJECTION INTRAMUSCULAR; INTRAVENOUS at 10:25

## 2020-01-01 RX ADMIN — Medication 1 G: at 07:30

## 2020-01-01 RX ADMIN — APIXABAN 2.5 MG: 2.5 TABLET, FILM COATED ORAL at 08:11

## 2020-01-01 RX ADMIN — VANCOMYCIN HYDROCHLORIDE 250 MG: 250 CAPSULE ORAL at 23:12

## 2020-01-01 RX ADMIN — QUETIAPINE FUMARATE 25 MG: 25 TABLET ORAL at 21:24

## 2020-01-01 RX ADMIN — MIDODRINE HYDROCHLORIDE 2.5 MG: 2.5 TABLET ORAL at 08:11

## 2020-01-01 RX ADMIN — SODIUM CHLORIDE: 9 INJECTION, SOLUTION INTRAVENOUS at 12:41

## 2020-01-01 RX ADMIN — Medication 500 MG: at 08:51

## 2020-01-01 RX ADMIN — ACEBUTOLOL HYDROCHLORIDE 200 MG: 200 CAPSULE ORAL at 10:56

## 2020-01-01 RX ADMIN — VANCOMYCIN HYDROCHLORIDE 125 MG: 125 CAPSULE ORAL at 00:54

## 2020-01-01 RX ADMIN — NYSTATIN: 100000 POWDER TOPICAL at 21:25

## 2020-01-01 RX ADMIN — Medication 3 ML: at 07:58

## 2020-01-01 RX ADMIN — APIXABAN 2.5 MG: 2.5 TABLET, FILM COATED ORAL at 21:24

## 2020-01-01 RX ADMIN — FUROSEMIDE 20 MG: 20 INJECTION, SOLUTION INTRAMUSCULAR; INTRAVENOUS at 13:04

## 2020-01-01 RX ADMIN — ACETAMINOPHEN 650 MG: 325 TABLET, FILM COATED ORAL at 10:54

## 2020-01-01 RX ADMIN — VANCOMYCIN HYDROCHLORIDE 250 MG: 250 CAPSULE ORAL at 23:39

## 2020-01-01 RX ADMIN — FENTANYL CITRATE 50 MCG: 50 INJECTION, SOLUTION INTRAMUSCULAR; INTRAVENOUS at 11:00

## 2020-01-01 RX ADMIN — FUROSEMIDE 40 MG: 10 INJECTION, SOLUTION INTRAMUSCULAR; INTRAVENOUS at 14:37

## 2020-01-01 RX ADMIN — VANCOMYCIN HYDROCHLORIDE 125 MG: 125 CAPSULE ORAL at 12:03

## 2020-01-01 RX ADMIN — ATORVASTATIN CALCIUM 10 MG: 10 TABLET, FILM COATED ORAL at 16:05

## 2020-01-01 RX ADMIN — INFLUENZA VIRUS VACCINE 0.5 ML: 15; 15; 15; 15 SUSPENSION INTRAMUSCULAR at 15:14

## 2020-01-01 RX ADMIN — ATORVASTATIN CALCIUM 20 MG: 20 TABLET, FILM COATED ORAL at 20:56

## 2020-01-01 RX ADMIN — VANCOMYCIN HYDROCHLORIDE 250 MG: 250 CAPSULE ORAL at 09:06

## 2020-01-01 RX ADMIN — FUROSEMIDE 40 MG: 40 TABLET ORAL at 07:37

## 2020-01-01 RX ADMIN — CEFTRIAXONE SODIUM 1 G: 1 INJECTION, POWDER, FOR SOLUTION INTRAMUSCULAR; INTRAVENOUS at 04:07

## 2020-01-01 RX ADMIN — APIXABAN 2.5 MG: 2.5 TABLET, FILM COATED ORAL at 22:47

## 2020-01-01 RX ADMIN — MIDODRINE HYDROCHLORIDE 2.5 MG: 2.5 TABLET ORAL at 17:08

## 2020-01-01 RX ADMIN — DEXAMETHASONE SODIUM PHOSPHATE 4 MG: 4 INJECTION, SOLUTION INTRAMUSCULAR; INTRAVENOUS at 10:12

## 2020-01-01 RX ADMIN — Medication 1 G: at 17:29

## 2020-01-01 RX ADMIN — APIXABAN 2.5 MG: 2.5 TABLET, FILM COATED ORAL at 07:58

## 2020-01-01 RX ADMIN — ACEBUTOLOL HYDROCHLORIDE 200 MG: 200 CAPSULE ORAL at 22:09

## 2020-01-01 RX ADMIN — Medication 500 MG: at 08:39

## 2020-01-01 RX ADMIN — NYSTATIN: 100000 POWDER TOPICAL at 14:53

## 2020-01-01 RX ADMIN — Medication 500 MG: at 20:31

## 2020-01-01 RX ADMIN — ATORVASTATIN CALCIUM 20 MG: 20 TABLET, FILM COATED ORAL at 21:51

## 2020-01-01 RX ADMIN — FUROSEMIDE 20 MG: 20 TABLET ORAL at 16:05

## 2020-01-01 RX ADMIN — COLESEVELAM HYDROCHLORIDE 1250 MG: 625 TABLET, FILM COATED ORAL at 17:36

## 2020-01-01 RX ADMIN — Medication 250 MG: at 09:33

## 2020-01-01 RX ADMIN — APIXABAN 2.5 MG: 2.5 TABLET, FILM COATED ORAL at 10:55

## 2020-01-01 RX ADMIN — COLESEVELAM HYDROCHLORIDE 625 MG: 625 TABLET, FILM COATED ORAL at 09:33

## 2020-01-01 RX ADMIN — SODIUM CHLORIDE, SODIUM LACTATE, POTASSIUM CHLORIDE, AND CALCIUM CHLORIDE 500 ML: .6; .31; .03; .02 INJECTION, SOLUTION INTRAVENOUS at 04:27

## 2020-01-01 RX ADMIN — Medication 10 ML: at 21:49

## 2020-01-01 RX ADMIN — MIDODRINE HYDROCHLORIDE 2.5 MG: 2.5 TABLET ORAL at 12:54

## 2020-01-01 RX ADMIN — APIXABAN 2.5 MG: 2.5 TABLET, FILM COATED ORAL at 20:21

## 2020-01-01 RX ADMIN — MIDODRINE HYDROCHLORIDE 2.5 MG: 2.5 TABLET ORAL at 07:30

## 2020-01-01 RX ADMIN — Medication 500 MG: at 20:21

## 2020-01-01 RX ADMIN — COLESEVELAM HYDROCHLORIDE 1250 MG: 625 TABLET, FILM COATED ORAL at 08:11

## 2020-01-01 RX ADMIN — VANCOMYCIN HYDROCHLORIDE 125 MG: 125 CAPSULE ORAL at 06:30

## 2020-01-01 RX ADMIN — APIXABAN 2.5 MG: 2.5 TABLET, FILM COATED ORAL at 09:08

## 2020-01-01 RX ADMIN — ASPIRIN 81 MG: 81 TABLET, CHEWABLE ORAL at 12:44

## 2020-01-01 RX ADMIN — ATORVASTATIN CALCIUM 10 MG: 10 TABLET, FILM COATED ORAL at 20:38

## 2020-01-01 RX ADMIN — CEFEPIME HYDROCHLORIDE 2 G: 2 INJECTION, POWDER, FOR SOLUTION INTRAVENOUS at 16:13

## 2020-01-01 RX ADMIN — Medication: at 14:57

## 2020-01-01 RX ADMIN — SODIUM CHLORIDE, SODIUM LACTATE, POTASSIUM CHLORIDE, AND CALCIUM CHLORIDE 250 ML: .6; .31; .03; .02 INJECTION, SOLUTION INTRAVENOUS at 06:51

## 2020-01-01 RX ADMIN — VANCOMYCIN HYDROCHLORIDE 250 MG: 250 CAPSULE ORAL at 05:47

## 2020-01-01 RX ADMIN — Medication 1 G: at 08:11

## 2020-01-01 RX ADMIN — NYSTATIN: 100000 POWDER TOPICAL at 13:21

## 2020-01-01 RX ADMIN — VANCOMYCIN HYDROCHLORIDE 250 MG: 250 CAPSULE ORAL at 05:27

## 2020-01-16 NOTE — TELEPHONE ENCOUNTER
Call to pt with recent pm transmission.    Pt stated seen by rachael at dr valadez office to remove 61ml fluid left shoulder.  Has been getting fluid removed every other month. Pt stated discussing TSR, but has not been scheduled, due to her concerns with pm and blood thinner (eliquis).  Reinforced cardiac clearance usually requested by dr valadez office.    Pt to return f/u visit dr valadez march.     Requesting dr garcia to review prior

## 2020-02-24 PROBLEM — M19.012 OSTEOARTHRITIS OF LEFT GLENOHUMERAL JOINT: Status: ACTIVE | Noted: 2020-01-01

## 2020-02-24 PROBLEM — M19.019 DJD OF SHOULDER: Status: ACTIVE | Noted: 2020-01-01

## 2020-02-24 NOTE — PROGRESS NOTES
"     Patient ID: Olga Curtis is a 84 y.o. female.  Left shoulder pain  Gena is an 84-year-old female here with longstanding left shoulder pain.  She is seen my partner who has done some aspirations and injections but pain and swelling returns almost immediately.  Pain is sharp and 8/10 and worse at night with activity.  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    Review of Systems:  Left shoulder pain  Denies chest pain      Objective:    /72   Pulse 71   Ht 167.6 cm (66\")   Wt 56.7 kg (125 lb)   BMI 20.18 kg/m²     Physical Examination:   She is a pleasant female in no distress. She is alert and oriented x3 and appears her stated age.  Left shoulder demonstrates no scars with supraspinatus infraspinatus atrophy and a moderate effusion.  No redness no warmth.  Passive elevation 80 degrees abduction 20 degrees external rotation 10 degrees.  Deltoid function is intact.  Belly press and liftoff are 3/5.Sensory and motor exam are intact all distributions. Radial pulse is palpable and capillary refill is less than two seconds to all digits    Imaging:   End-stage degenerative joint disease with superior glenoid wear and complete loss of acromiohumeral distance    Assessment:    Left shoulder degenerative joint disease    Plan:  Treatment options discussed and she would like to proceed with reverse total shoulder arthroplasty  Discussed the risks including bleeding, scar, infection, stiffness, nerve artery vein tendon damage, instability, fracture dvt, loss of life or limb. Issues of scapular notching and component revision were discussed. Questions were answered and addressed          Disclaimer: Please note that areas of this note were completed with computer voice recognition software.  Quite often unanticipated grammatical, syntax, homophones, and other interpretive errors are inadvertently transcribed by the computer " software. Please excuse any errors that have escaped final proofreading.

## 2020-02-28 PROBLEM — R06.00 DYSPNEA: Status: ACTIVE | Noted: 2020-01-01

## 2020-03-01 NOTE — CONSULTS
Referring Provider: Hospitalist  Reason for Consultation: Left shoulder pain    Patient Care Team:  Tigre Duran MD as PCP - General  Tigre Duran MD as PCP - Family Medicine  Wyatt Kwok MD as Consulting Physician (Cardiology)      Subjective .     History of present illness:  Olga Curtis is a 84 y.o. female who presents with chronic left shoulder pain.  Is scheduled to have a left reverse shoulder replacement by Dr. Barcenas and on March 10.  Has had shoulder aspirated before but was told not to have injection or aspiration close to the time of surgery due to infection risk.  Pain is severe with overhead motion.  Currently in the hospital for shortness of air with CHF and high-grade aortic stenosis.  Patient is being considered for valve replacement surgery    Review of Systems      History  Past Medical History:   Diagnosis Date   • Aortic stenosis 10/28/2014   • Atrial premature complex 12/18/2015   • Chronic coronary artery disease 10/28/2014   • Dyslipidemia 10/28/2014   • Hypertension 10/28/2014   • Nonsustained ventricular tachycardia (CMS/HCC) 12/18/2015   • Shortness of breath 9/12/2016     Past Surgical History:   Procedure Laterality Date   • APPENDECTOMY     • CARDIAC PACEMAKER PLACEMENT  2017   • CARDIAC SURGERY  1999    Bypass surgery   • HERNIA REPAIR       Family History   Problem Relation Age of Onset   • No Known Problems Mother    • No Known Problems Father    • No Known Problems Sister    • No Known Problems Brother       Social History     Tobacco Use   • Smoking status: Never Smoker   • Smokeless tobacco: Never Used   Substance Use Topics   • Alcohol use: No     Frequency: Never   • Drug use: No     Medications Prior to Admission   Medication Sig Dispense Refill Last Dose   • acebutolol (SECTRAL) 200 MG capsule Take 200 mg by mouth 2 (Two) Times a Day.   2/28/2020 at Unknown time   • apixaban (ELIQUIS) 2.5 MG tablet tablet Take 1 tablet by mouth Every 12 (Twelve) Hours. 180  "tablet 1 2/28/2020 at Unknown time   • lisinopril (PRINIVIL,ZESTRIL) 20 MG tablet TAKE ONE TABLET BY MOUTH DAILY 90 tablet 2 2/28/2020 at Unknown time   • melatonin 5 MG tablet tablet Take 5 mg by mouth At Night As Needed.      • pravastatin (PRAVACHOL) 20 MG tablet Take 20 mg by mouth Every Night.   2/27/2020 at Unknown time   • traMADol (ULTRAM) 50 MG tablet Take 50 mg by mouth Every 6 (Six) Hours As Needed for Moderate Pain .   Taking      Sulfa antibiotics    Scheduled Meds:  acebutolol 200 mg Oral BID   atorvastatin 10 mg Oral Nightly   furosemide 40 mg Intravenous Daily   lisinopril 20 mg Oral Daily   sodium chloride 10 mL Intravenous Q12H     Continuous Infusions:   PRN Meds:.acetaminophen **OR** acetaminophen **OR** acetaminophen  •  aluminum-magnesium hydroxide-simethicone  •  bisacodyl  •  bisacodyl  •  magnesium hydroxide  •  magnesium sulfate **OR** magnesium sulfate in D5W 1g/100mL (PREMIX)  •  melatonin  •  ondansetron **OR** ondansetron  •  potassium chloride  •  potassium chloride  •  [COMPLETED] Insert peripheral IV **AND** sodium chloride  •  sodium chloride  •  traMADol    Objective     Vital Signs   Vitals:    02/29/20 0400 02/29/20 1123 02/29/20 1150 03/01/20 0446   BP: 168/74 168/74 127/69 139/69   BP Location: Right arm   Right arm   Patient Position: Lying   Sitting   Pulse: 69  70 71   Resp: 17  20 21   Temp: 97.6 °F (36.4 °C)  97.5 °F (36.4 °C) 97.6 °F (36.4 °C)   TempSrc: Oral  Oral Oral   SpO2: 99%  100% 94%   Weight: 60.1 kg (132 lb 9.6 oz) 59.9 kg (132 lb)  56.2 kg (124 lb)   Height:  152.4 cm (60\")           Physical Exam:   Pleasant female no apparent distress alert and orient x3,  Thin  Left shoulder shows no bruising swelling masses erythema.  Mild swelling patient is able to actively abduct about 40.  Flex about 40.  Painful with crepitance.  4/5 abduction and external rotation strength.  Sensation.  2+ radial pulse  CT scan shows severe left glenohumeral DJD  Results Review:   I " reviewed the patient's new clinical results.  I reviewed the patient's new imaging results    Lab Results (last 24 hours)     Procedure Component Value Units Date/Time    Potassium [[id number]]  (Normal) Collected:  03/01/20 0455    Specimen:  Blood Updated:  03/01/20 0536     Potassium 3.7 mmol/L           Imaging Results (Last 24 Hours)     ** No results found for the last 24 hours. **            Assessment/Plan     Left rotator cuff arthropathy    Explained to patient and daughter that we should not inject the shoulder if she is going to have shoulder surgery in the next 3 weeks.  If she has the valve replacement instead and shoulder surgery is delayed then we may inject and aspirate.  We will wait until valve surgery plan in place      Lawrence Lester MD  03/01/20  8:14 AM

## 2020-03-01 NOTE — PROGRESS NOTES
Continued Stay Note  JOANNE Jordan     Patient Name: Olga Curtis  MRN: [id number]  Today's Date: 3/1/2020    Admit Date: 2/28/2020    Discharge Plan     Row Name 03/01/20 1819       Plan    Plan  Anticipate routine home with spouse.                  Clara Olvera RN

## 2020-03-01 NOTE — PROGRESS NOTES
"      [facility] Medicine Services Daily Progress Note      Hospitalist Team  LOS 0 days      Patient Care Team:  Tigre Duran MD as PCP - General  Tigre Duran MD as PCP - Family Medicine  Wyatt Kwok MD as Consulting Physician (Cardiology)    Patient Location: [facility]/1      Subjective   Subjective     Chief Complaint / Subjective  Chief Complaint   Patient presents with   • Shortness of Breath         Brief Synopsis of Hospital Course/HPI                Date::      2/29  ch swelling lt shouter and had previous aspiration: pt not aware of details  Echo abnormal    3/1  Does not want lovenox  soa better  Seen by dr urena  Switch to po lasix    ROS  All other systems previewed and neg    Objective   Objective      Vital Signs  Temp:  [97.5 °F (36.4 °C)-97.6 °F (36.4 °C)] 97.6 °F (36.4 °C)  Heart Rate:  [70-71] 71  Resp:  [20-21] 21  BP: (127-168)/(69-74) 139/69  Oxygen Therapy  SpO2: 94 %  Pulse Oximetry Type: Continuous  Device (Oxygen Therapy): room air  Flow (L/min): 3  Flowsheet Rows      First Filed Value   Admission Height  167.6 cm (66\") Documented at 02/28/2020 1036   Admission Weight  59.9 kg (132 lb) Documented at 02/28/2020 1036        Intake & Output (last 3 days)       02/27 0701 - 02/28 0700 02/28 0701 - 02/29 0700 02/29 0701 - 03/01 0700 03/01 0701 - 03/02 0700    P.O.  480 1040     Total Intake(mL/kg)  480 (8) 1040 (18.5)     Net  +480 +1040             Urine Unmeasured Occurrence  2 x 4 x         Lines, Drains & Airways    Active LDAs     Name:   Placement date:   Placement time:   Site:   Days:    Peripheral IV 02/28/20 1114 Left Antecubital   02/28/20    1114    Antecubital   1                  Physical Exam:    Physical Exam   Constitutional: She is oriented to person, place, and time. She appears well-developed and well-nourished. No distress.   HENT:   Head: Normocephalic and atraumatic.   Right Ear: External ear normal.   Left Ear: External ear normal.   Nose: Nose " normal.   Mouth/Throat: Oropharynx is clear and moist. No oropharyngeal exudate.   Eyes: Pupils are equal, round, and reactive to light. Conjunctivae and EOM are normal. Right eye exhibits no discharge. Left eye exhibits no discharge. No scleral icterus.   Neck: Normal range of motion. No JVD present. No tracheal deviation present. No thyromegaly present.   Cardiovascular: Normal rate, regular rhythm and intact distal pulses. Exam reveals no gallop and no friction rub.   Murmur heard.   Systolic murmur is present with a grade of 4/6.  Pulmonary/Chest: Effort normal. No stridor. No respiratory distress. She has no wheezes. She has rales in the right middle field, the right lower field, the left middle field and the left lower field. She exhibits no tenderness.   Abdominal: Soft. Bowel sounds are normal. She exhibits no distension and no mass. There is no tenderness. There is no rebound and no guarding. No hernia.   Musculoskeletal: Normal range of motion. She exhibits no edema, tenderness or deformity.   Swelling lt shoulder   Lymphadenopathy:     She has no cervical adenopathy.   Neurological: She is alert and oriented to person, place, and time. No cranial nerve deficit or sensory deficit. She exhibits normal muscle tone. Coordination normal.   Skin: Skin is warm and dry. No rash noted. She is not diaphoretic. No erythema.   Psychiatric: She has a normal mood and affect. Her behavior is normal.   Nursing note and vitals reviewed.            Procedures:              Results Review:     I reviewed the patient's new clinical results.      Lab Results (last 24 hours)     Procedure Component Value Units Date/Time    Potassium [[id number]]  (Normal) Collected:  03/01/20 0455    Specimen:  Blood Updated:  03/01/20 0536     Potassium 3.7 mmol/L         No results found for: HGBA1C  Results from last 7 days   Lab Units 02/28/20  1130   INR  1.10           No results found for: LIPASE  Lab Results   Component Value Date     CHOL 127 08/05/2017    TRIG 90 08/05/2017    HDL 49 08/05/2017    LDL 62 08/05/2017       No results found for: INTRAOP, PREDX, FINALDX, COMDX    Microbiology Results (last 10 days)     ** No results found for the last 240 hours. **          ECG/EMG Results (most recent)     Procedure Component Value Units Date/Time    ECG 12 Lead [[id number]] Collected:  02/28/20 1038     Updated:  02/28/20 1258    Narrative:       HEART RATE= 70  bpm  RR Interval= 856  ms  MO Interval=   ms  P Horizontal Axis= 194  deg  P Front Axis=   deg  QRSD Interval= 131  ms  QT Interval= 463  ms  QRS Axis= 233  deg  T Wave Axis= 22  deg  - ABNORMAL ECG -  Afib/flutter and ventricular-paced rhythm  When compared with ECG of 07-Aug-2017 6:26:45,  Significant change in rhythm  Electronically Signed By: Ameya Perry (Tre) 28-Feb-2020 12:57:51  Date and Time of Study: 2020-02-28 10:38:31    Adult Transthoracic Echo Complete W/ Cont if Necessary Per Protocol [[id number]] Collected:  02/29/20 0806     Updated:  02/29/20 1257     BSA 1.6 m^2      BH CV ECHO PARVEZ - RVDD 2.0 cm      IVSd 1.2 cm      IVSs 2.0 cm      LVIDd 3.6 cm      LVIDs 1.8 cm      LVPWd 1.1 cm      BH CV ECHO PARVEZ - LVPWS 1.8 cm      IVS/LVPW 1.1     FS 50.3 %      EDV(Teich) 55.9 ml      ESV(Teich) 9.8 ml      EF(Teich) 82.4 %      EDV(cubed) 48.2 ml      ESV(cubed) 5.9 ml      EF(cubed) 87.7 %      % IVS thick 60.7 %      % LVPW thick 63.4 %      LV mass(C)d 138.1 grams      LV mass(C)dI 88.2 grams/m^2      LV mass(C)s 141.3 grams      LV mass(C)sI 90.3 grams/m^2      SV(Teich) 46.0 ml      SI(Teich) 29.4 ml/m^2      SV(cubed) 42.3 ml      SI(cubed) 27.0 ml/m^2      Ao root diam 3.3 cm      Ao root area 8.4 cm^2      asc Aorta Diam 3.7 cm      LVOT diam 1.9 cm      LVOT area 2.9 cm^2      RVOT diam 2.2 cm      RVOT area 3.7 cm^2      EDV(MOD-sp4) 41.1 ml      ESV(MOD-sp4) 15.7 ml      EF(MOD-sp4) 61.9 %      EDV(MOD-sp2) 55.4 ml      ESV(MOD-sp2) 19.0 ml      EF(MOD-sp2) 65.7 %         SV(MOD-sp4) 25.4 ml      SI(MOD-sp4) 16.3 ml/m^2      SV(MOD-sp2) 36.4 ml      SI(MOD-sp2) 23.3 ml/m^2      Ao root area (BSA corrected) 2.1     LV Malin Vol (BSA corrected) 26.3 ml/m^2      LV Sys Vol (BSA corrected) 10.0 ml/m^2      MV E max deon 109.0 cm/sec      MV A max deon 36.8 cm/sec      MV E/A 3.0     MV V2 max 144.0 cm/sec      MV max PG 8.3 mmHg      MV V2 mean 61.5 cm/sec      MV mean PG 2.2 mmHg      MV V2 VTI 20.9 cm      MVA(VTI) 3.9 cm^2      MV dec slope 614.0 cm/sec^2      MV dec time 0.18 sec      Ao pk deon 413.3 cm/sec      Ao max PG 70.1 mmHg      Ao max PG (full) 63.9 mmHg      Ao V2 mean 293.7 cm/sec      Ao mean PG 40.0 mmHg      Ao mean PG (full) 36.5 mmHg      Ao V2 VTI 87.7 cm      CHARIS(I,A) 0.92 cm^2      CHARIS(I,D) 0.92 cm^2      CHARIS(V,A) 0.87 cm^2      CHARIS(V,D) 0.87 cm^2      LV V1 max PG 6.2 mmHg      LV V1 mean PG 3.4 mmHg      LV V1 max 124.5 cm/sec      LV V1 mean 86.9 cm/sec      LV V1 VTI 28.0 cm      MR max deon 471.5 cm/sec      MR max PG 88.9 mmHg      SV(Ao) 735.7 ml      SI(Ao) 470.2 ml/m^2      SV(LVOT) 80.9 ml      SV(RVOT) 60.5 ml      SI(LVOT) 51.7 ml/m^2      PA V2 max 97.3 cm/sec      PA max PG 3.8 mmHg      PA max PG (full) 0.28 mmHg      PA V2 mean 72.2 cm/sec      PA mean PG 2.2 mmHg      PA mean PG (full) 0.57 mmHg      PA V2 VTI 15.7 cm      PVA(I,A) 3.8 cm^2      BH CV ECHO PARVEZ - PVA(I,D) 3.8 cm^2      BH CV ECHO PARVEZ - PVA(V,A) 3.5 cm^2       CV ECHO PARVEZ - PVA(V,D) 3.5 cm^2      PA acc time 0.09 sec      RV V1 max PG 3.5 mmHg      RV V1 mean PG 1.7 mmHg      RV V1 max 93.7 cm/sec      RV V1 mean 59.8 cm/sec      RV V1 VTI 16.6 cm      TR max deon 362.9 cm/sec      RVSP(TR) 56.1 mmHg      RAP systole 3.0 mmHg      PA pr(Accel) 40.1 mmHg      Qp/Qs 0.75      CV ECHO PARVEZ - BZI_BMI 25.8 kilograms/m^2       CV ECHO PARVEZ - BSA([facility]) 1.6 m^2       CV ECHO PARVEZ - BZI_METRIC_WEIGHT 59.9 kg       CV ECHO PARVEZ - BZI_METRIC_HEIGHT 152.4 cm      Target HR (85%)  116 bpm      Max. Pred. HR (100%) 137 bpm      EF(MOD-bp) 65.0 %      LA dimension(2D) 4.2 cm      Echo EF Estimated 65 %     Narrative:       · Left ventricular wall thickness is consistent with mild concentric   hypertrophy.  · Estimated EF = 65%.  · Left ventricular systolic function is normal.  · Left atrial cavity size is mildly dilated.  · There is moderate calcification of the aortic valve mainly affecting the   non, left and right coronary cusp(s).  · Severe aortic valve stenosis is present.  · Moderate tricuspid valve regurgitation is present.  · Mild mitral valve regurgitation is present  · Mild aortic valve regurgitation is present.                  Results for orders placed during the hospital encounter of 02/28/20   Adult Transthoracic Echo Complete W/ Cont if Necessary Per Protocol    Narrative · Left ventricular wall thickness is consistent with mild concentric   hypertrophy.  · Estimated EF = 65%.  · Left ventricular systolic function is normal.  · Left atrial cavity size is mildly dilated.  · There is moderate calcification of the aortic valve mainly affecting the   non, left and right coronary cusp(s).  · Severe aortic valve stenosis is present.  · Moderate tricuspid valve regurgitation is present.  · Mild mitral valve regurgitation is present  · Mild aortic valve regurgitation is present.          Ct Shoulder Left Wo Contrast    Result Date: 2/27/2020  1.Severe glenohumeral joint arthritis with findings of chronic rotator cuff tear. Probable remote fracture the posterior superior glenoid. 2.Moderate arthritis acromioclavicular joint. 3.Osteopenia. 4.Diffuse abnormal appearance of the lungs with diffuse airspace opacity septal thickening and probable pleural effusion. Multifocal pneumonia, pulmonary edema or interstitial lung disease are also possible. 5.Severe cardiomegaly.   Electronically Signed By-Danielle Tirado MD On:2/27/2020 12:58 PM This report was finalized on [id number] by  Danielle Tirado,  MD.    Xr Chest 1 View    Result Date: 2/28/2020  Cardiomegaly with mild interstitial edema and small bilateral pleural effusions. Electronically Signed: Hill Askew MD 2/28/2020 11:44 EST    Ct Chest Pulmonary Embolism    Result Date: 2/28/2020  1.No evidence of pulmonary emboli. 2.Cardiomegaly. Mild reflux of contrast into the inferior vena cava, suggestive of right heart dysfunction. 3.Mildly dilated main pulmonary artery, which can be seen with pulmonary hypertension 4.Small bilateral pleural effusions. 5.Patchy bilateral ground glass opacities, likely due to pulmonary edema or perhaps an infectious/inflammatory process. 6.Increased size of enlarged mediastinal lymph nodes, which could be metastatic, as these are larger than typically seen with reactive lymph nodes. 7.Severe arthritic changes in both glenohumeral joints. Partially included abnormal soft tissue density anterior to the proximal left humerus. Recommend correlation with physical exam.  Electronically Signed By-Kasia Waggoner On:2/28/2020 1:12 PM This report was finalized on [id number] by  Kasia Waggoner, .          Xrays, labs reviewed personally by physician.    Medication Review:   I have reviewed the patient's current medication list      Scheduled Meds    acebutolol 200 mg Oral BID   atorvastatin 10 mg Oral Nightly   furosemide 40 mg Intravenous Daily   lisinopril 20 mg Oral Daily   sodium chloride 10 mL Intravenous Q12H       Meds Infusions       Meds PRN  acetaminophen **OR** acetaminophen **OR** acetaminophen  •  aluminum-magnesium hydroxide-simethicone  •  bisacodyl  •  bisacodyl  •  magnesium hydroxide  •  magnesium sulfate **OR** magnesium sulfate in D5W 1g/100mL (PREMIX)  •  melatonin  •  ondansetron **OR** ondansetron  •  potassium chloride  •  potassium chloride  •  [COMPLETED] Insert peripheral IV **AND** sodium chloride  •  sodium chloride  •  traMADol    I personally reviewed patient's x-ray films all other relevant data        Assessment/Plan       Assessment/Plan     Active Hospital Problems    Diagnosis  POA   • Dyspnea [R06.00]  Yes      Resolved Hospital Problems   No resolved problems to display.       MEDICAL DECISION MAKING COMPLEXITY BY PROBLEM:     Acute diastolic congestive heart failure with preserved ejection fraction likely related to severe aortic stenosis.    New onset Congestive Heart failure  -CT PE protocol showed No PE, cardiomegaly, mild reflux of contrast into the inferior vena cava, suggestive of right heart dysfunction. Mildly dilated main pulmonary artery, which can be seen with pulmonary hypertension. Small bilateral effusions. Patchy bilateral ground glass opacities, likely due to pulmonary edema or perhaps an infectious/inflammatory process. Increased size of enlarged mediastinal lymph nodes, which could be metastatic, as these are larger than typically seen with reactive lymph nodes. Severe arthritis changes in both glenohumeral joints.   -proBNP nearly 8000  -crackles present in left lung base  -IV lasix 40mg daily  -2D echo showing severe aortic stenosis mild mitral regurgitation, moderate to cusp dilatation.    -consult Dr. Kwok for further recommendations     Severe AS  Careful w diuretics  Hold eliquis  May switch to lovenox      Recent new atrial fibrillation per Dr. Kwok's note, Tachy-martin syndrome, SSS s/p PPM  -EKG showed afib/flutter with ventricular-paced rhythm   -cont home acebutolol  Hold eliquis for possible intervention     Nonrheumatic Aortic stenosis  Appreciate cardiology input    Worsening recurrent lt shoulder effusion  previous asp details not available to me  Consult ortho  -scheduled for shoulder replacement 3/11/20       CAD s/p CABG 5v  -on statin, eliquis     Hypertension  -cont home lisinopril     Hyperlipidemia  -Cont home statin     Left shoulder DJD      VTE Prophylaxis -   Mechanical Order History:      Ordered        02/28/20 1701  Place Sequential Compression Device  Once          02/28/20 1701  Maintain Sequential Compression Device  Continuous                 Pharmalogical Order History:     Ordered     Dose Route Frequency Stop    02/28/20 1702  apixaban (ELIQUIS) tablet 2.5 mg      2.5 mg PO Every 12 Hours Scheduled --            Code Status -   Code Status and Medical Interventions:   Ordered at: 02/28/20 1701     Level Of Support Discussed With:    Patient     Code Status:    CPR     Medical Interventions (Level of Support Prior to Arrest):    Full       Discharge Planning          Destination      Coordination has not been started for this encounter.      Durable Medical Equipment      Coordination has not been started for this encounter.      Dialysis/Infusion      Coordination has not been started for this encounter.      Home Medical Care      Coordination has not been started for this encounter.      Therapy      Coordination has not been started for this encounter.      Community Resources      Coordination has not been started for this encounter.            Electronically signed by Connor Lake MD, 03/01/20, 8:32 AM.  [facility] Hospitalist Team

## 2020-03-01 NOTE — PLAN OF CARE
Problem: Cardiac: Heart Failure (Adult)  Goal: Signs and Symptoms of Listed Potential Problems Will be Absent, Minimized or Managed (Cardiac: Heart Failure)  Outcome: Ongoing (interventions implemented as appropriate)

## 2020-03-01 NOTE — PLAN OF CARE
Problem: Cardiac: Heart Failure (Adult)  Goal: Signs and Symptoms of Listed Potential Problems Will be Absent, Minimized or Managed (Cardiac: Heart Failure)  Description  Signs and symptoms of listed potential problems will be absent, minimized or managed by discharge/transition of care (reference Cardiac: Heart Failure (Adult) CPG).  Outcome: Ongoing (interventions implemented as appropriate)     Problem: Breathing Pattern Ineffective (Adult)  Goal: Identify Related Risk Factors and Signs and Symptoms  Description  Related risk factors and signs and symptoms are identified upon initiation of Human Response Clinical Practice Guideline (CPG).  Outcome: Ongoing (interventions implemented as appropriate)  Goal: Effective Oxygenation/Ventilation  Description  Patient will demonstrate the desired outcomes by discharge/transition of care.  Outcome: Ongoing (interventions implemented as appropriate)  Goal: Anxiety/Fear Reduction  Description  Patient will demonstrate the desired outcomes by discharge/transition of care.  Outcome: Ongoing (interventions implemented as appropriate)     Problem: Fluid Volume Excess (Adult)  Goal: Identify Related Risk Factors and Signs and Symptoms  Description  Related risk factors and signs and symptoms are identified upon initiation of Human Response Clinical Practice Guideline (CPG).  Outcome: Ongoing (interventions implemented as appropriate)  Goal: Optimal Fluid Balance  Description  Patient will demonstrate the desired outcomes by discharge/transition of care.  Outcome: Ongoing (interventions implemented as appropriate)     Problem: Pain, Chronic (Adult)  Goal: Identify Related Risk Factors and Signs and Symptoms  Description  Related risk factors and signs and symptoms are identified upon initiation of Human Response Clinical Practice Guideline (CPG).  Outcome: Ongoing (interventions implemented as appropriate)  Goal: Acceptable Pain/Comfort Level and Functional  Ability  Description  Patient will demonstrate the desired outcomes by discharge/transition of care.  Outcome: Ongoing (interventions implemented as appropriate)     Problem: Patient Care Overview  Goal: Plan of Care Review  Outcome: Ongoing (interventions implemented as appropriate)  Goal: Individualization and Mutuality  Outcome: Ongoing (interventions implemented as appropriate)  Goal: Discharge Needs Assessment  Outcome: Ongoing (interventions implemented as appropriate)  Goal: Interprofessional Rounds/Family Conf  Outcome: Ongoing (interventions implemented as appropriate)

## 2020-03-01 NOTE — PROGRESS NOTES
Referring Provider: Hospitalist    Reason for follow-up: Shortness of breath and severe aortic stenosis     Patient Care Team:  Tigre Duran MD as PCP - General  Tigre Duran MD as PCP - Family Medicine  Wyatt Kwok MD as Consulting Physician (Cardiology)    Subjective .  Feeling better today    Objective  Sitting in chair comfortably     Review of Systems   Constitution: Negative for fever and malaise/fatigue.   Cardiovascular: Negative for chest pain, dyspnea on exertion and palpitations.   Respiratory: Negative for cough and shortness of breath.    Skin: Negative for rash.   Gastrointestinal: Negative for abdominal pain, nausea and vomiting.   Neurological: Negative for focal weakness and headaches.   All other systems reviewed and are negative.      Sulfa antibiotics    Scheduled Meds:  acebutolol 200 mg Oral BID   atorvastatin 10 mg Oral Nightly   enoxaparin 1 mg/kg Subcutaneous Q12H   furosemide 40 mg Oral Daily   lisinopril 20 mg Oral Daily   sodium chloride 10 mL Intravenous Q12H     Continuous Infusions:  Pharmacy to Dose enoxaparin (LOVENOX)      PRN Meds:.acetaminophen **OR** acetaminophen **OR** acetaminophen  •  aluminum-magnesium hydroxide-simethicone  •  bisacodyl  •  bisacodyl  •  magnesium hydroxide  •  magnesium sulfate **OR** magnesium sulfate in D5W 1g/100mL (PREMIX)  •  melatonin  •  ondansetron **OR** ondansetron  •  Pharmacy to Dose enoxaparin (LOVENOX)  •  potassium chloride  •  potassium chloride  •  [COMPLETED] Insert peripheral IV **AND** sodium chloride  •  sodium chloride  •  traMADol        VITAL SIGNS  Vitals:    02/29/20 0400 02/29/20 1123 02/29/20 1150 03/01/20 0446   BP: 168/74 168/74 127/69 139/69   BP Location: Right arm   Right arm   Patient Position: Lying   Sitting   Pulse: 69  70 71   Resp: 17  20 21   Temp: 97.6 °F (36.4 °C)  97.5 °F (36.4 °C) 97.6 °F (36.4 °C)   TempSrc: Oral  Oral Oral   SpO2: 99%  100% 94%   Weight: 60.1 kg (132 lb 9.6 oz) 59.9 kg (132 lb)  56.2  "kg (124 lb)   Height:  152.4 cm (60\")         Flowsheet Rows      First Filed Value   Admission Height  167.6 cm (66\") Documented at 02/28/2020 1036   Admission Weight  59.9 kg (132 lb) Documented at 02/28/2020 1036           TELEMETRY: Atrial fibrillation with controlled response    Physical Exam:  Physical Exam   Constitutional: She appears well-developed and well-nourished.   HENT:   Head: Normocephalic and atraumatic.   Eyes: Conjunctivae are normal. No scleral icterus.   Neck: Normal range of motion. Neck supple. No JVD present. Carotid bruit is not present.   Cardiovascular: Normal rate, regular rhythm, S1 normal, S2 normal and intact distal pulses. PMI is not displaced.   Murmur heard.  Pulmonary/Chest: Effort normal and breath sounds normal. She has no wheezes. She has no rales.   Abdominal: Soft. Bowel sounds are normal.   Neurological: She is alert. She has normal strength.   Skin: Skin is warm and dry. No rash noted.        Results Review:   I reviewed the patient's new clinical results.  Lab Results (last 24 hours)     Procedure Component Value Units Date/Time    Potassium [[id number]]  (Normal) Collected:  03/01/20 0455    Specimen:  Blood Updated:  03/01/20 0536     Potassium 3.7 mmol/L           Imaging Results (Last 24 Hours)     ** No results found for the last 24 hours. **          EKG      I personally viewed and interpreted the patient's EKG/Telemetry data:    ECHOCARDIOGRAM:    STRESS MYOVIEW:    CARDIAC CATHETERIZATION:    OTHER:         Assessment/Plan     Active Problems:    Dyspnea  History of coronary artery disease status post coronary artery bypass surgery  Congestive heart failure diastolic dysfunction  Severe aortic stenosis  Hypertension  Hyperlipidemia  History of atrial fibrillation  Status post pacemaker placement for tachybradycardia syndrome    Patient presented with shortness of breath and is ruled out for MI by EKG and enzymes  Patient's pacemaker is working very well  Patient is " on Eliquis at home which will be held for cardiac catheterization  Patient has severe aortic stenosis for which she will need TAVR  Patient is not a candidate for redo surgery because of her previous surgery and her age.    I discussed the patients findings and my recommendations with patient and family    Wyatt Kwok MD  03/01/20  11:07 AM

## 2020-03-02 PROBLEM — I48.21 PERMANENT ATRIAL FIBRILLATION (HCC): Chronic | Status: ACTIVE | Noted: 2020-01-01

## 2020-03-02 PROBLEM — I35.0 NONRHEUMATIC AORTIC VALVE STENOSIS: Status: ACTIVE | Noted: 2020-01-01

## 2020-03-02 PROBLEM — I50.31 DIASTOLIC CHF, ACUTE (HCC): Status: ACTIVE | Noted: 2020-01-01

## 2020-03-02 PROBLEM — I35.0 SEVERE AORTIC STENOSIS: Chronic | Status: ACTIVE | Noted: 2020-01-01

## 2020-03-02 NOTE — CONSULTS
Patient Care Team:  Tigre Duran MD as PCP - General  Tigre Duran MD as PCP - Family Medicine  Wyatt Kwok MD as Consulting Physician (Cardiology)  Referring Provider:  Dr. Kwok  Reason for consultation:  Aortic Stenosis    Chief complaint: SOA    Subjective     History of Present Illness: Patient is an 84 year old  female with known PMH of CAD s/p CABG (2009), SSS s/p PPM, HTN, HLD. She states she was at [facility] to receive a CT scan of her left shoulder for preop workup. She states while walking to the radiology department, she suddenly became severely SOA. She stopped and rested and the symptoms subsided. However the symptoms returned while at home the following day and she represented to [facility]. She denies any associated symptoms, or exacerbating or alleviating factors. A 2d echo showed severe aortic stenosis and moderate tricuspid regurgitation. [facility] has been consulted for surgical consideration.    Review of Systems   Constitutional: Positive for activity change. Negative for fever.   Respiratory: Positive for shortness of breath.    Cardiovascular: Positive for chest pain.   Gastrointestinal: Negative for diarrhea and nausea.   Neurological: Negative for dizziness and syncope.   All other systems reviewed and are negative.       Past Medical History:   Diagnosis Date   • Aortic stenosis 10/28/2014   • Atrial premature complex 12/18/2015   • Chronic coronary artery disease 10/28/2014   • Dyslipidemia 10/28/2014   • Hypertension 10/28/2014   • Nonsustained ventricular tachycardia (CMS/HCC) 12/18/2015   • Shortness of breath 9/12/2016     Past Surgical History:   Procedure Laterality Date   • APPENDECTOMY     • CARDIAC PACEMAKER PLACEMENT  2017   • CARDIAC SURGERY  1999    Bypass surgery   • HERNIA REPAIR       Family History   Problem Relation Age of Onset   • No Known Problems Mother    • No Known Problems Father    • No Known Problems Sister    • No Known Problems Brother    "    Social History     Tobacco Use   • Smoking status: Never Smoker   • Smokeless tobacco: Never Used   Substance Use Topics   • Alcohol use: No     Frequency: Never   • Drug use: No     Medications Prior to Admission   Medication Sig Dispense Refill Last Dose   • acebutolol (SECTRAL) 200 MG capsule Take 200 mg by mouth 2 (Two) Times a Day.   2/28/2020 at Unknown time   • apixaban (ELIQUIS) 2.5 MG tablet tablet Take 1 tablet by mouth Every 12 (Twelve) Hours. 180 tablet 1 2/28/2020 at Unknown time   • lisinopril (PRINIVIL,ZESTRIL) 20 MG tablet TAKE ONE TABLET BY MOUTH DAILY 90 tablet 2 2/28/2020 at Unknown time   • melatonin 5 MG tablet tablet Take 5 mg by mouth At Night As Needed.      • pravastatin (PRAVACHOL) 20 MG tablet Take 20 mg by mouth Every Night.   2/27/2020 at Unknown time   • traMADol (ULTRAM) 50 MG tablet Take 50 mg by mouth Every 6 (Six) Hours As Needed for Moderate Pain .   Taking       acebutolol 200 mg Oral BID   atorvastatin 10 mg Oral Nightly   enoxaparin 1 mg/kg Subcutaneous Q12H   furosemide 40 mg Oral Daily   lisinopril 20 mg Oral Daily   midazolam 1 mg Intravenous Once   sodium chloride 10 mL Intravenous Q12H     Allergies:  Sulfa antibiotics    Objective      Vital Signs  Temp:  [97.5 °F (36.4 °C)-97.8 °F (36.6 °C)] 97.7 °F (36.5 °C)  Heart Rate:  [69-82] 70  Resp:  [13-14] 14  BP: ()/(49-99) 116/57    Flowsheet Rows      First Filed Value   Admission Height  167.6 cm (66\") Documented at 02/28/2020 1036   Admission Weight  59.9 kg (132 lb) Documented at 02/28/2020 1036        152.4 cm (60\")    Physical Exam   Constitutional: She is oriented to person, place, and time. She appears well-developed and well-nourished.   HENT:   Head: Normocephalic and atraumatic.   Eyes: Pupils are equal, round, and reactive to light. EOM are normal.   Neck: Normal range of motion. Neck supple.   Cardiovascular: Normal rate, regular rhythm and intact distal pulses.   Murmur heard.  Pulmonary/Chest: Effort " normal and breath sounds normal.   Abdominal: Soft. Bowel sounds are normal.   Musculoskeletal: Normal range of motion.   Neurological: She is alert and oriented to person, place, and time.   Skin: Skin is warm and dry.   Psychiatric: She has a normal mood and affect. Her behavior is normal. Judgment and thought content normal.       Results Review:   Lab Results (last 24 hours)     Procedure Component Value Units Date/Time    Basic Metabolic Panel [[id number]]  (Abnormal) Collected:  03/02/20 1102    Specimen:  Blood Updated:  03/02/20 1202     Glucose 88 mg/dL      BUN 11 mg/dL      Creatinine 0.84 mg/dL      Sodium 138 mmol/L      Potassium 4.0 mmol/L      Chloride 93 mmol/L      CO2 31.0 mmol/L      Calcium 9.3 mg/dL      eGFR Non African Amer 65 mL/min/1.73      BUN/Creatinine Ratio 13.1     Anion Gap 14.0 mmol/L     Narrative:       GFR Normal >60  Chronic Kidney Disease <60  Kidney Failure <15      CBC & Differential [[id number]] Collected:  03/02/20 1102    Specimen:  Blood Updated:  03/02/20 1137    Narrative:       The following orders were created for panel order CBC & Differential.  Procedure                               Abnormality         Status                     ---------                               -----------         ------                     CBC Auto Differential[[id number]]        Abnormal            Final result                 Please view results for these tests on the individual orders.    CBC Auto Differential [[id number]]  (Abnormal) Collected:  03/02/20 1102    Specimen:  Blood Updated:  03/02/20 1137     WBC 8.40 10*3/mm3      RBC 4.04 10*6/mm3      Hemoglobin 12.9 g/dL      Hematocrit 38.2 %      MCV 94.5 fL      MCH 32.0 pg      MCHC 33.8 g/dL      RDW 14.0 %      RDW-SD 46.4 fl      MPV 8.7 fL      Platelets 339 10*3/mm3      Neutrophil % 66.2 %      Lymphocyte % 14.9 %      Monocyte % 12.4 %      Eosinophil % 3.8 %      Basophil % 2.7 %      Neutrophils, Absolute 5.60 10*3/mm3       Lymphocytes, Absolute 1.20 10*3/mm3      Monocytes, Absolute 1.00 10*3/mm3      Eosinophils, Absolute 0.30 10*3/mm3      Basophils, Absolute 0.20 10*3/mm3      nRBC 0.1 /100 WBC     Potassium [[id number]]  (Normal) Collected:  03/02/20 0613    Specimen:  Blood Updated:  03/02/20 0705     Potassium 3.8 mmol/L               Assessment/Plan       Nonrheumatic aortic valve stenosis    Dyspnea      Assessment & Plan  Severe Aortic Stenosis  CAD s/p CABG (2009)  Left rotator cuff arthropathy  SSS s/p PPM  HTN  HLD      Patient being evaluated for surgical intervention of aortic stenosis. PFTs and carotid duplex ordered. Further recommendations per Dr. Sandoval.    Thank you for allowing us to participate in the care of this patient.      ESME BLANK, APRN  03/02/20  2:54 PM    **all problems new to this examiner  **EKG and CXR independently reviewed and interpreted    Addendum  Patient was seen and examined, note verified by me, I reviewed the studies myself and I agree with the findings.  She has severe aortic stenosis and she had a prior CABG.  I recommend TAVR due to risk and frailty.  I recommend CTA for TAVR to address feasibility.  Bobby Sandoval M.D.

## 2020-03-02 NOTE — PROGRESS NOTES
[facility] Medicine Services Daily Progress Note      Hospitalist Team  LOS 1 days      Patient Care Team:  Tigre Duran MD as PCP - General  Tigre Duran MD as PCP - Family Medicine  Wyatt Kwok MD as Consulting Physician (Cardiology)    Patient Location: 382/1      Subjective   Subjective     Chief Complaint / Subjective  Chief Complaint   Patient presents with   • Shortness of Breath         Brief Synopsis of Hospital Course/HPI       Ms. Curtis is a 84 y.o. with PMH of CAD/CABG x5v, PPM who presented to [facility] ER 2/28/20 with complaints of shortness of breath for the last two weeks. She stated in the last 24 hours she has become more short of breath. She is unable to lie down due to dyspnea. She had difficulty sleeping last night. She denied any cough or congestion or fever. She has not had chest pain. She sees Dr. Kwok and was recently cleared by him for her upcoming shoulder surgery.      In the ER the patient had CT PE protocol that showed No PE, cardiomegaly, mild reflux of contrast into the inferior vena cava, suggestive of right heart dysfunction. Mildly dilated main pulmonary artery, which can be seen with pulmonary hypertension. Small bilateral effusions. Patchy bilateral ground glass opacities, likely due to pulmonary edema or perhaps an infectious/inflammatory process. Increased size of enlarged mediastinal lymph nodes, which could be metastatic, as these are larger than typically seen with reactive lymph nodes. Severe arthritis changes in both glenohumeral joints.   Her proBNP was almost 8000. She was placed on supplemental O2 2L and stated she felt much better. She was given 40mg IV lasix. She was admitted for further treatment.        03/02/20, 3:40 PM  Patient status post heart catheter afternoon.  Awaiting on evaluation by cardiothoracic surgery.    Review of Systems   Constitution: Negative.   HENT: Negative.    Eyes: Negative.    Cardiovascular: Negative.   "Negative for chest pain.   Respiratory: Positive for shortness of breath.    Endocrine: Negative.    Hematologic/Lymphatic: Negative.    Skin: Negative.    Musculoskeletal: Negative.    Gastrointestinal: Negative.    Genitourinary: Negative.    Neurological: Negative.    Psychiatric/Behavioral: Negative.    Allergic/Immunologic: Negative.    All other systems reviewed and are negative.    All other systems previewed and neg    Objective   Objective      Vital Signs  Temp:  [97.5 °F (36.4 °C)-97.8 °F (36.6 °C)] 97.6 °F (36.4 °C)  Heart Rate:  [69-82] 70  Resp:  [13-15] 15  BP: ()/(49-99) 139/67  Oxygen Therapy  SpO2: 97 %  Pulse Oximetry Type: Intermittent  Device (Oxygen Therapy): room air  Flow (L/min): 3  Flowsheet Rows      First Filed Value   Admission Height  167.6 cm (66\") Documented at 02/28/2020 1036   Admission Weight  59.9 kg (132 lb) Documented at 02/28/2020 1036        Intake & Output (last 3 days)       02/28 0701 - 02/29 0700 02/29 0701 - 03/01 0700 03/01 0701 - 03/02 0700 03/02 0701 - 03/03 0700    P.O. 480 1040 900     Total Intake(mL/kg) 480 (8) 1040 (18.5) 900 (16)     Urine (mL/kg/hr)    350 (0.7)    Total Output    350    Net +480 +1040 +900 -350            Urine Unmeasured Occurrence 2 x 4 x 3 x         Lines, Drains & Airways    Active LDAs     Name:   Placement date:   Placement time:   Site:   Days:    Peripheral IV 02/28/20 1114 Left Antecubital   02/28/20    1114    Antecubital   1                  Physical Exam:    Physical Exam   Constitutional: She is oriented to person, place, and time. She appears well-developed and well-nourished. No distress.   HENT:   Head: Normocephalic and atraumatic.   Right Ear: External ear normal.   Left Ear: External ear normal.   Nose: Nose normal.   Mouth/Throat: Oropharynx is clear and moist. No oropharyngeal exudate.   Eyes: Pupils are equal, round, and reactive to light. Conjunctivae and EOM are normal. Right eye exhibits no discharge. Left eye " exhibits no discharge. No scleral icterus.   Neck: Normal range of motion. No JVD present. No tracheal deviation present. No thyromegaly present.   Cardiovascular: Normal rate, regular rhythm and intact distal pulses. Exam reveals no gallop and no friction rub.   Murmur heard.   Systolic murmur is present with a grade of 4/6.  Paced rhythm  Holosystolic aortic murmur 4 x 6   Pulmonary/Chest: Effort normal. No stridor. No respiratory distress. She has no wheezes. She has no rales. She exhibits no tenderness.   Abdominal: Soft. Bowel sounds are normal. She exhibits no distension and no mass. There is no tenderness. There is no rebound and no guarding. No hernia.   Musculoskeletal: Normal range of motion. She exhibits no edema, tenderness or deformity.   Swelling lt shoulder   Lymphadenopathy:     She has no cervical adenopathy.   Neurological: She is alert and oriented to person, place, and time. No cranial nerve deficit or sensory deficit. She exhibits normal muscle tone. Coordination normal.   Skin: Skin is warm and dry. No rash noted. She is not diaphoretic. No erythema.   Psychiatric: She has a normal mood and affect. Her behavior is normal.   Nursing note and vitals reviewed.            Procedures:    Heart cath 03/02/20, 3:42 PM            Results Review:     I reviewed the patient's new clinical results.      Lab Results (last 24 hours)     Procedure Component Value Units Date/Time    Basic Metabolic Panel [[id number]]  (Abnormal) Collected:  03/02/20 1102    Specimen:  Blood Updated:  03/02/20 1202     Glucose 88 mg/dL      BUN 11 mg/dL      Creatinine 0.84 mg/dL      Sodium 138 mmol/L      Potassium 4.0 mmol/L      Chloride 93 mmol/L      CO2 31.0 mmol/L      Calcium 9.3 mg/dL      eGFR Non African Amer 65 mL/min/1.73      BUN/Creatinine Ratio 13.1     Anion Gap 14.0 mmol/L     Narrative:       GFR Normal >60  Chronic Kidney Disease <60  Kidney Failure <15      CBC & Differential [[id number]] Collected:   03/02/20 1102    Specimen:  Blood Updated:  03/02/20 1137    Narrative:       The following orders were created for panel order CBC & Differential.  Procedure                               Abnormality         Status                     ---------                               -----------         ------                     CBC Auto Differential[[id number]]        Abnormal            Final result                 Please view results for these tests on the individual orders.    CBC Auto Differential [[id number]]  (Abnormal) Collected:  03/02/20 1102    Specimen:  Blood Updated:  03/02/20 1137     WBC 8.40 10*3/mm3      RBC 4.04 10*6/mm3      Hemoglobin 12.9 g/dL      Hematocrit 38.2 %      MCV 94.5 fL      MCH 32.0 pg      MCHC 33.8 g/dL      RDW 14.0 %      RDW-SD 46.4 fl      MPV 8.7 fL      Platelets 339 10*3/mm3      Neutrophil % 66.2 %      Lymphocyte % 14.9 %      Monocyte % 12.4 %      Eosinophil % 3.8 %      Basophil % 2.7 %      Neutrophils, Absolute 5.60 10*3/mm3      Lymphocytes, Absolute 1.20 10*3/mm3      Monocytes, Absolute 1.00 10*3/mm3      Eosinophils, Absolute 0.30 10*3/mm3      Basophils, Absolute 0.20 10*3/mm3      nRBC 0.1 /100 WBC     Potassium [[id number]]  (Normal) Collected:  03/02/20 0613    Specimen:  Blood Updated:  03/02/20 0705     Potassium 3.8 mmol/L         No results found for: HGBA1C  Results from last 7 days   Lab Units 02/28/20  1130   INR  1.10           No results found for: LIPASE  Lab Results   Component Value Date    CHOL 127 08/05/2017    TRIG 90 08/05/2017    HDL 49 08/05/2017    LDL 62 08/05/2017       No results found for: INTRAOP, PREDX, FINALDX, COMDX    Microbiology Results (last 10 days)     ** No results found for the last 240 hours. **          ECG/EMG Results (most recent)     Procedure Component Value Units Date/Time    ECG 12 Lead [[id number]] Collected:  02/28/20 1038     Updated:  02/28/20 1258    Narrative:       HEART RATE= 70  bpm  RR Interval= 856  ms  AL Interval=    ms  P Horizontal Axis= 194  deg  P Front Axis=   deg  QRSD Interval= 131  ms  QT Interval= 463  ms  QRS Axis= 233  deg  T Wave Axis= 22  deg  - ABNORMAL ECG -  Afib/flutter and ventricular-paced rhythm  When compared with ECG of 07-Aug-2017 6:26:45,  Significant change in rhythm  Electronically Signed By: Ameya Perry (Tre) 28-Feb-2020 12:57:51  Date and Time of Study: 2020-02-28 10:38:31    Adult Transthoracic Echo Complete W/ Cont if Necessary Per Protocol [[id number]] Collected:  02/29/20 0806     Updated:  02/29/20 1257     BSA 1.6 m^2      BH CV ECHO PARVEZ - RVDD 2.0 cm      IVSd 1.2 cm      IVSs 2.0 cm      LVIDd 3.6 cm      LVIDs 1.8 cm      LVPWd 1.1 cm      BH CV ECHO PARVEZ - LVPWS 1.8 cm      IVS/LVPW 1.1     FS 50.3 %      EDV(Teich) 55.9 ml      ESV(Teich) 9.8 ml      EF(Teich) 82.4 %      EDV(cubed) 48.2 ml      ESV(cubed) 5.9 ml      EF(cubed) 87.7 %      % IVS thick 60.7 %      % LVPW thick 63.4 %      LV mass(C)d 138.1 grams      LV mass(C)dI 88.2 grams/m^2      LV mass(C)s 141.3 grams      LV mass(C)sI 90.3 grams/m^2      SV(Teich) 46.0 ml      SI(Teich) 29.4 ml/m^2      SV(cubed) 42.3 ml      SI(cubed) 27.0 ml/m^2      Ao root diam 3.3 cm      Ao root area 8.4 cm^2      asc Aorta Diam 3.7 cm      LVOT diam 1.9 cm      LVOT area 2.9 cm^2      RVOT diam 2.2 cm      RVOT area 3.7 cm^2      EDV(MOD-sp4) 41.1 ml      ESV(MOD-sp4) 15.7 ml      EF(MOD-sp4) 61.9 %      EDV(MOD-sp2) 55.4 ml      ESV(MOD-sp2) 19.0 ml      EF(MOD-sp2) 65.7 %      SV(MOD-sp4) 25.4 ml      SI(MOD-sp4) 16.3 ml/m^2      SV(MOD-sp2) 36.4 ml      SI(MOD-sp2) 23.3 ml/m^2      Ao root area (BSA corrected) 2.1     LV Malin Vol (BSA corrected) 26.3 ml/m^2      LV Sys Vol (BSA corrected) 10.0 ml/m^2      MV E max deon 109.0 cm/sec      MV A max deon 36.8 cm/sec      MV E/A 3.0     MV V2 max 144.0 cm/sec      MV max PG 8.3 mmHg      MV V2 mean 61.5 cm/sec      MV mean PG 2.2 mmHg      MV V2 VTI 20.9 cm      MVA(VTI) 3.9 cm^2      MV dec  slope 614.0 cm/sec^2      MV dec time 0.18 sec      Ao pk deon 413.3 cm/sec      Ao max PG 70.1 mmHg      Ao max PG (full) 63.9 mmHg      Ao V2 mean 293.7 cm/sec      Ao mean PG 40.0 mmHg      Ao mean PG (full) 36.5 mmHg      Ao V2 VTI 87.7 cm      CHARIS(I,A) 0.92 cm^2      CHARIS(I,D) 0.92 cm^2      CHARIS(V,A) 0.87 cm^2      CHARIS(V,D) 0.87 cm^2      LV V1 max PG 6.2 mmHg      LV V1 mean PG 3.4 mmHg      LV V1 max 124.5 cm/sec      LV V1 mean 86.9 cm/sec      LV V1 VTI 28.0 cm      MR max deon 471.5 cm/sec      MR max PG 88.9 mmHg      SV(Ao) 735.7 ml      SI(Ao) 470.2 ml/m^2      SV(LVOT) 80.9 ml      SV(RVOT) 60.5 ml      SI(LVOT) 51.7 ml/m^2      PA V2 max 97.3 cm/sec      PA max PG 3.8 mmHg      PA max PG (full) 0.28 mmHg      PA V2 mean 72.2 cm/sec      PA mean PG 2.2 mmHg      PA mean PG (full) 0.57 mmHg      PA V2 VTI 15.7 cm      PVA(I,A) 3.8 cm^2       CV ECHO PARVEZ - PVA(I,D) 3.8 cm^2       CV ECHO PARVEZ - PVA(V,A) 3.5 cm^2       CV ECHO PARVEZ - PVA(V,D) 3.5 cm^2      PA acc time 0.09 sec      RV V1 max PG 3.5 mmHg      RV V1 mean PG 1.7 mmHg      RV V1 max 93.7 cm/sec      RV V1 mean 59.8 cm/sec      RV V1 VTI 16.6 cm      TR max deon 362.9 cm/sec      RVSP(TR) 56.1 mmHg      RAP systole 3.0 mmHg      PA pr(Accel) 40.1 mmHg      Qp/Qs 0.75      CV ECHO PARVEZ - BZI_BMI 25.8 kilograms/m^2       CV ECHO PARVEZ - BSA([facility]) 1.6 m^2       CV ECHO PARVEZ - BZI_METRIC_WEIGHT 59.9 kg      BH CV ECHO PARVEZ - BZI_METRIC_HEIGHT 152.4 cm      Target HR (85%) 116 bpm      Max. Pred. HR (100%) 137 bpm      EF(MOD-bp) 65.0 %      LA dimension(2D) 4.2 cm      Echo EF Estimated 65 %     Narrative:       · Left ventricular wall thickness is consistent with mild concentric   hypertrophy.  · Estimated EF = 65%.  · Left ventricular systolic function is normal.  · Left atrial cavity size is mildly dilated.  · There is moderate calcification of the aortic valve mainly affecting the   non, left and right coronary cusp(s).  · Severe  aortic valve stenosis is present.  · Moderate tricuspid valve regurgitation is present.  · Mild mitral valve regurgitation is present  · Mild aortic valve regurgitation is present.                  Results for orders placed during the hospital encounter of 02/28/20   Adult Transthoracic Echo Complete W/ Cont if Necessary Per Protocol    Narrative · Left ventricular wall thickness is consistent with mild concentric   hypertrophy.  · Estimated EF = 65%.  · Left ventricular systolic function is normal.  · Left atrial cavity size is mildly dilated.  · There is moderate calcification of the aortic valve mainly affecting the   non, left and right coronary cusp(s).  · Severe aortic valve stenosis is present.  · Moderate tricuspid valve regurgitation is present.  · Mild mitral valve regurgitation is present  · Mild aortic valve regurgitation is present.          Ct Shoulder Left Wo Contrast    Result Date: 2/27/2020  1.Severe glenohumeral joint arthritis with findings of chronic rotator cuff tear. Probable remote fracture the posterior superior glenoid. 2.Moderate arthritis acromioclavicular joint. 3.Osteopenia. 4.Diffuse abnormal appearance of the lungs with diffuse airspace opacity septal thickening and probable pleural effusion. Multifocal pneumonia, pulmonary edema or interstitial lung disease are also possible. 5.Severe cardiomegaly.   Electronically Signed By-Danielle Tirado MD On:2/27/2020 12:58 PM This report was finalized on [id number] by  Danielle Tirado MD.    Xr Chest 1 View    Result Date: 2/28/2020  Cardiomegaly with mild interstitial edema and small bilateral pleural effusions. Electronically Signed: Hill Askew MD 2/28/2020 11:44 EST    Ct Chest Pulmonary Embolism    Result Date: 2/28/2020  1.No evidence of pulmonary emboli. 2.Cardiomegaly. Mild reflux of contrast into the inferior vena cava, suggestive of right heart dysfunction. 3.Mildly dilated main pulmonary artery, which can be seen with pulmonary  hypertension 4.Small bilateral pleural effusions. 5.Patchy bilateral ground glass opacities, likely due to pulmonary edema or perhaps an infectious/inflammatory process. 6.Increased size of enlarged mediastinal lymph nodes, which could be metastatic, as these are larger than typically seen with reactive lymph nodes. 7.Severe arthritic changes in both glenohumeral joints. Partially included abnormal soft tissue density anterior to the proximal left humerus. Recommend correlation with physical exam.  Electronically Signed By-Kasia Waggoner On:2/28/2020 1:12 PM This report was finalized on [id number] by  Kasia Waggoner, .          Xrays, labs reviewed personally by physician.    Medication Review:   I have reviewed the patient's current medication list      Scheduled Meds    acebutolol 200 mg Oral BID   [START ON 3/3/2020] apixaban 2.5 mg Oral Q12H   atorvastatin 10 mg Oral Nightly   furosemide 40 mg Oral Daily   lisinopril 20 mg Oral Daily   midazolam 1 mg Intravenous Once   sodium chloride 10 mL Intravenous Q12H       Meds Infusions    sodium chloride 1-3 mL/kg/hr   sodium chloride 75 mL/hr       Meds PRN  acetaminophen **OR** acetaminophen **OR** acetaminophen  •  aluminum-magnesium hydroxide-simethicone  •  atropine  •  bisacodyl  •  bisacodyl  •  magnesium hydroxide  •  magnesium sulfate **OR** magnesium sulfate in D5W 1g/100mL (PREMIX)  •  melatonin  •  ondansetron **OR** ondansetron  •  potassium chloride  •  potassium chloride  •  [COMPLETED] Insert peripheral IV **AND** sodium chloride  •  sodium chloride  •  sodium chloride  •  traMADol    I personally reviewed patient's x-ray films all other relevant data       Assessment/Plan       Assessment/Plan     Active Hospital Problems    Diagnosis  POA   • **Nonrheumatic aortic valve stenosis [I35.0]  Yes     Priority: High   • Presence of cardiac pacemaker [Z95.0]  Yes   • Dyslipidemia [E78.5]  Yes   • Essential hypertension [I10]  Yes   • Chronic coronary artery  disease [I25.10]  Yes      Resolved Hospital Problems   No resolved problems to display.       MEDICAL DECISION MAKING COMPLEXITY BY PROBLEM:     Acute diastolic congestive heart failure with preserved ejection fraction likely related to severe aortic stenosis.    New onset Congestive Heart failure  -CT PE protocol showed No PE, cardiomegaly, mild reflux of contrast into the inferior vena cava, suggestive of right heart dysfunction. Mildly dilated main pulmonary artery, which can be seen with pulmonary hypertension. Small bilateral effusions. Patchy bilateral ground glass opacities, likely due to pulmonary edema or perhaps an infectious/inflammatory process. Increased size of enlarged mediastinal lymph nodes, which could be metastatic, as these are larger than typically seen with reactive lymph nodes. Severe arthritis changes in both glenohumeral joints.   -proBNP nearly 8000  -crackles present in left lung base  -IV lasix 40mg daily  -2D echo showing severe aortic stenosis mild mitral regurgitation, moderate to cusp dilatation.    -consult Dr. Kwok for further recommendations     Severe AS  Careful w diuretics  Hold eliquis  May switch to lovenox  Awaiting evaluation by thoracic surgery    Recent new atrial fibrillation per Dr. Kwok's note, Tachy-martin syndrome, SSS s/p PPM  -EKG showed afib/flutter with ventricular-paced rhythm   -cont home acebutolol  Hold eliquis for possible intervention     Nonrheumatic Aortic stenosis  Appreciate cardiology input    Worsening recurrent lt shoulder effusion  previous asp details not available to me  Consult ortho  -scheduled for shoulder replacement 3/11/20       CAD s/p CABG 5v  -on statin, eliquis     Hypertension  -cont home lisinopril     Hyperlipidemia  -Cont home statin     Left shoulder DJD      VTE Prophylaxis -   Mechanical Order History:      Ordered        02/28/20 1701  Place Sequential Compression Device  Once         02/28/20 1701  Maintain Sequential  Compression Device  Continuous                 Pharmalogical Order History:     Ordered     Dose Route Frequency Stop    02/28/20 1702  apixaban (ELIQUIS) tablet 2.5 mg      2.5 mg PO Every 12 Hours Scheduled --            Code Status -   Code Status and Medical Interventions:   Ordered at: 02/28/20 1701     Level Of Support Discussed With:    Patient     Code Status:    CPR     Medical Interventions (Level of Support Prior to Arrest):    Full       Discharge Planning          Destination      Coordination has not been started for this encounter.      Durable Medical Equipment      Coordination has not been started for this encounter.      Dialysis/Infusion      Coordination has not been started for this encounter.      Home Medical Care      Coordination has not been started for this encounter.      Therapy      Coordination has not been started for this encounter.      Community Resources      Coordination has not been started for this encounter.        Care coordination with nursing for current treatment.  Discussed with family regarding evaluation by cardiac surgery.  If it is not planned then she will be discharged to nursing home.  EM more than 40 minutes more than 20% on care coordination and counseling.    Electronically signed by Willie Sifuentes MD, 03/02/20, 3:28 PM.  [facility] Hospitalist Team

## 2020-03-02 NOTE — PROGRESS NOTES
Referring Provider: Hospitalist    Reason for follow-up: Shortness of breath and severe aortic stenosis     Patient Care Team:  Tigre Duran MD as PCP - General  Tigre Duran MD as PCP - Family Medicine  Wyatt Kwok MD as Consulting Physician (Cardiology)    Subjective .  Feeling better today    Objective  Sitting in chair comfortably     Review of Systems   Constitution: Negative for fever and malaise/fatigue.   Cardiovascular: Negative for chest pain, dyspnea on exertion and palpitations.   Respiratory: Negative for cough and shortness of breath.    Skin: Negative for rash.   Gastrointestinal: Negative for abdominal pain, nausea and vomiting.   Neurological: Negative for focal weakness and headaches.   All other systems reviewed and are negative.      Sulfa antibiotics    Scheduled Meds:    acebutolol 200 mg Oral BID   atorvastatin 10 mg Oral Nightly   enoxaparin 1 mg/kg Subcutaneous Q12H   furosemide 40 mg Oral Daily   lisinopril 20 mg Oral Daily   midazolam 1 mg Intravenous Once   sodium chloride 10 mL Intravenous Q12H     Continuous Infusions:    Pharmacy to Dose enoxaparin (LOVENOX)    sodium chloride 1-3 mL/kg/hr   sodium chloride 75 mL/hr     PRN Meds:.acetaminophen **OR** acetaminophen **OR** acetaminophen  •  aluminum-magnesium hydroxide-simethicone  •  atropine  •  bisacodyl  •  bisacodyl  •  magnesium hydroxide  •  magnesium sulfate **OR** magnesium sulfate in D5W 1g/100mL (PREMIX)  •  melatonin  •  ondansetron **OR** ondansetron  •  Pharmacy to Dose enoxaparin (LOVENOX)  •  potassium chloride  •  potassium chloride  •  [COMPLETED] Insert peripheral IV **AND** sodium chloride  •  sodium chloride  •  sodium chloride  •  traMADol        VITAL SIGNS  Vitals:    03/02/20 1330 03/02/20 1345 03/02/20 1400 03/02/20 1415   BP: 123/58 136/71 123/75 116/57   BP Location:       Patient Position:       Pulse: 70 70 70 70   Resp:       Temp:       TempSrc:       SpO2: 100% 100% 100% 100%   Weight:        "  Height:           Flowsheet Rows      First Filed Value   Admission Height  167.6 cm (66\") Documented at 02/28/2020 1036   Admission Weight  59.9 kg (132 lb) Documented at 02/28/2020 1036           TELEMETRY: Atrial fibrillation with controlled response    Physical Exam:  Physical Exam   Constitutional: She appears well-developed and well-nourished.   HENT:   Head: Normocephalic and atraumatic.   Eyes: Conjunctivae are normal. No scleral icterus.   Neck: Normal range of motion. Neck supple. No JVD present. Carotid bruit is not present.   Cardiovascular: Normal rate, regular rhythm, S1 normal, S2 normal and intact distal pulses. PMI is not displaced.   Murmur heard.  Pulmonary/Chest: Effort normal and breath sounds normal. She has no wheezes. She has no rales.   Abdominal: Soft. Bowel sounds are normal.   Neurological: She is alert. She has normal strength.   Skin: Skin is warm and dry. No rash noted.        Results Review:   I reviewed the patient's new clinical results.  Lab Results (last 24 hours)     Procedure Component Value Units Date/Time    Basic Metabolic Panel [[id number]]  (Abnormal) Collected:  03/02/20 1102    Specimen:  Blood Updated:  03/02/20 1202     Glucose 88 mg/dL      BUN 11 mg/dL      Creatinine 0.84 mg/dL      Sodium 138 mmol/L      Potassium 4.0 mmol/L      Chloride 93 mmol/L      CO2 31.0 mmol/L      Calcium 9.3 mg/dL      eGFR Non African Amer 65 mL/min/1.73      BUN/Creatinine Ratio 13.1     Anion Gap 14.0 mmol/L     Narrative:       GFR Normal >60  Chronic Kidney Disease <60  Kidney Failure <15      CBC & Differential [[id number]] Collected:  03/02/20 1102    Specimen:  Blood Updated:  03/02/20 1137    Narrative:       The following orders were created for panel order CBC & Differential.  Procedure                               Abnormality         Status                     ---------                               -----------         ------                     CBC Auto " Differential[[id number]]        Abnormal            Final result                 Please view results for these tests on the individual orders.    CBC Auto Differential [[id number]]  (Abnormal) Collected:  03/02/20 1102    Specimen:  Blood Updated:  03/02/20 1137     WBC 8.40 10*3/mm3      RBC 4.04 10*6/mm3      Hemoglobin 12.9 g/dL      Hematocrit 38.2 %      MCV 94.5 fL      MCH 32.0 pg      MCHC 33.8 g/dL      RDW 14.0 %      RDW-SD 46.4 fl      MPV 8.7 fL      Platelets 339 10*3/mm3      Neutrophil % 66.2 %      Lymphocyte % 14.9 %      Monocyte % 12.4 %      Eosinophil % 3.8 %      Basophil % 2.7 %      Neutrophils, Absolute 5.60 10*3/mm3      Lymphocytes, Absolute 1.20 10*3/mm3      Monocytes, Absolute 1.00 10*3/mm3      Eosinophils, Absolute 0.30 10*3/mm3      Basophils, Absolute 0.20 10*3/mm3      nRBC 0.1 /100 WBC     Potassium [[id number]]  (Normal) Collected:  03/02/20 0613    Specimen:  Blood Updated:  03/02/20 0705     Potassium 3.8 mmol/L           Imaging Results (Last 24 Hours)     ** No results found for the last 24 hours. **          EKG      I personally viewed and interpreted the patient's EKG/Telemetry data:    ECHOCARDIOGRAM:    STRESS MYOVIEW:    CARDIAC CATHETERIZATION:    OTHER:         Assessment/Plan     Principal Problem:    Nonrheumatic aortic valve stenosis  Active Problems:    Dyspnea  History of coronary artery disease status post coronary artery bypass surgery  Congestive heart failure diastolic dysfunction  Severe aortic stenosis  Hypertension  Hyperlipidemia  History of atrial fibrillation  Status post pacemaker placement for tachybradycardia syndrome  Patient presented with shortness of breath and is ruled out for MI by EKG and enzymes  Patient's pacemaker is working very well  Patient is on Eliquis at home which is helpful cardiac catheterization  Patient had a cardiac catheterization which showed patent grafts   patient has severe aortic stenosis for which she will need  TAVR  Patient is not a candidate for redo surgery because of her previous surgery and her age.    I discussed the patients findings and my recommendations with patient and family    Wyatt Kwok MD  03/02/20  3:01 PM

## 2020-03-02 NOTE — PLAN OF CARE
83 yo female, admitting dx of dyspnea.  No s/s of acute respiratory distress, pt placed on 2 liters nasal cannula as precautionary measures to maintain O2 sats above 92 percent.  Pt NPO after midnight for possible cardiac catheterization procedure in the morning.  Pt resting well in bed, will continue to monitor and observe.    Problem: Cardiac: Heart Failure (Adult)  Goal: Signs and Symptoms of Listed Potential Problems Will be Absent, Minimized or Managed (Cardiac: Heart Failure)  Outcome: Ongoing (interventions implemented as appropriate)     Problem: Breathing Pattern Ineffective (Adult)  Goal: Identify Related Risk Factors and Signs and Symptoms  Outcome: Ongoing (interventions implemented as appropriate)  Goal: Effective Oxygenation/Ventilation  Outcome: Ongoing (interventions implemented as appropriate)  Goal: Anxiety/Fear Reduction  Outcome: Ongoing (interventions implemented as appropriate)     Problem: Fluid Volume Excess (Adult)  Goal: Identify Related Risk Factors and Signs and Symptoms  Outcome: Ongoing (interventions implemented as appropriate)  Goal: Optimal Fluid Balance  Outcome: Ongoing (interventions implemented as appropriate)     Problem: Pain, Chronic (Adult)  Goal: Identify Related Risk Factors and Signs and Symptoms  Outcome: Ongoing (interventions implemented as appropriate)  Goal: Acceptable Pain/Comfort Level and Functional Ability  Outcome: Ongoing (interventions implemented as appropriate)     Problem: Patient Care Overview  Goal: Plan of Care Review  Outcome: Ongoing (interventions implemented as appropriate)  Goal: Individualization and Mutuality  Outcome: Ongoing (interventions implemented as appropriate)  Goal: Discharge Needs Assessment  Outcome: Ongoing (interventions implemented as appropriate)  Goal: Interprofessional Rounds/Family Conf  Outcome: Ongoing (interventions implemented as appropriate)

## 2020-03-02 NOTE — CONSULTS
Inpatient notes reviewed. Pt not applicable for Cardiac Rehab due to no MI or intervention done.

## 2020-03-02 NOTE — CONSULTS
[facility]    Tigre Duran MD    CHIEF COMPLAINT: Congestive heart failure due to severe symptomatic aortic stenosis    HISTORY OF PRESENT ILLNESS:    Consult for transcatheter aortic valve replacement evaluation    This is a 84-year-old female patient of Dr. Kwok with history of known coronary artery disease status post coronary artery bypass grafting in the past with most recent cardiac catheterization showing widely patent SVG grafts to diagonal/ramus intermedius, SVG graft to obtuse marginal branch, SVG graft to right coronary artery with right to left collaterals reconstituting the LAD; however she does have a chronic total occlusion of her LAD with preserved LV systolic function nevertheless and left to left collaterals and right to left collaterals.  The patient presented with acute decompensated congestive heart failure.  I personally reviewed the echocardiogram from 2/29/2020 which revealed severe aortic stenosis with an aortic valve area of approximately 0.8 to 0.9 cm², mean peak gradient was 40 mmHg with a peak aortic velocity of 4.1 m/s.  Setting of the above she has preserved LV systolic function with only mild mitral vegetation and no other significant valvular heart disease.     I am being asked to evaluate for feasibility of transcatheter aortic valve replacement.  I spoke with Ananth Kwok and Dr. Sandoval regarding the feasibility for surgical aortic valve replacement.  Given her frailty, age, and back that she would be a redo sternotomy, she is a better candidate for transcatheter aortic valve replacement. Currently her New [place] Heart Association classification on admission was class III-IV.      Past Medical History:   Diagnosis Date   • Aortic stenosis 10/28/2014   • Atrial premature complex 12/18/2015   • Chronic coronary artery disease 10/28/2014   • Dyslipidemia 10/28/2014   • Hypertension 10/28/2014   • Nonsustained ventricular tachycardia (CMS/HCC)  12/18/2015   • Shortness of breath 9/12/2016     Past Surgical History:   Procedure Laterality Date   • APPENDECTOMY     • CARDIAC PACEMAKER PLACEMENT  2017   • CARDIAC SURGERY  1999    Bypass surgery   • HERNIA REPAIR       Family History   Problem Relation Age of Onset   • No Known Problems Mother    • No Known Problems Father    • No Known Problems Sister    • No Known Problems Brother      Social History     Tobacco Use   • Smoking status: Never Smoker   • Smokeless tobacco: Never Used   Substance Use Topics   • Alcohol use: No     Frequency: Never   • Drug use: No     Medications Prior to Admission   Medication Sig Dispense Refill Last Dose   • acebutolol (SECTRAL) 200 MG capsule Take 200 mg by mouth 2 (Two) Times a Day.   2/28/2020 at Unknown time   • apixaban (ELIQUIS) 2.5 MG tablet tablet Take 1 tablet by mouth Every 12 (Twelve) Hours. 180 tablet 1 2/28/2020 at Unknown time   • lisinopril (PRINIVIL,ZESTRIL) 20 MG tablet TAKE ONE TABLET BY MOUTH DAILY 90 tablet 2 2/28/2020 at Unknown time   • melatonin 5 MG tablet tablet Take 5 mg by mouth At Night As Needed.      • pravastatin (PRAVACHOL) 20 MG tablet Take 20 mg by mouth Every Night.   2/27/2020 at Unknown time   • traMADol (ULTRAM) 50 MG tablet Take 50 mg by mouth Every 6 (Six) Hours As Needed for Moderate Pain .   Taking     Allergies:  Sulfa antibiotics      Review of Symptoms:  Constitutional: Patient afebrile no chills or unexpected weight changes  Respiratory: No cough, no wheezing with dyspnea  Cardiovascular: No chest pain, palpitations, with dyspnea, orthopnea and no edema  Gastrointestinal: No nausea, vomiting, constipation or diarrhea.  No melena or dark stools    All other systems reviewed and are negative        Vital Signs  Temp:  [97.5 °F (36.4 °C)-97.8 °F (36.6 °C)] 97.6 °F (36.4 °C)  Heart Rate:  [69-82] 70  Resp:  [13-15] 15  BP: ()/(49-99) 139/67  Oxygen Therapy  SpO2: 97 %  Pulse Oximetry Type: Intermittent  Device (Oxygen Therapy):  "room air  Flow (L/min): 3}  Body mass index is 24.22 kg/m².  Flowsheet Rows      First Filed Value   Admission Height  167.6 cm (66\") Documented at 02/28/2020 1036   Admission Weight  59.9 kg (132 lb) Documented at 02/28/2020 1036           Physical exam  Constitutional: well-nourished, and appears stated age in no acute distress  PERRL: Conjunctiva clear, no pallor, anicteric  HENMT: normocephalic, normal dentition, no cyanosis or pallor  Neck:no bruits, or thrills and bilateral normal carotid upstroke. Normal jugular venous pressure  Cardiovascular: No parasternal heaves an non-displaced focal PMI. Normal rate and rhythm: no rub, gallop, 2 out of 6 late peaking systolic ejection murmur with an audible A2 component of S2 and normal S1; no lower or upper extremity edema.   Lungs: unlabored, no wheezing with no rales or rhonchi on auscultation.  Extremities: Warm, no clubbing, cyanosis. Full and equal peripheral pulses in extremities with no bruits appreciated.   Abdomen: soft, non-tender, non-distended  Musculoskeletal: no joint tenderness or swelling and no erythema  Skin: Warm and dry, non-erythematous   Neuro:alert and normal affect. Oriented to time, place and person.       Results Review:    I reviewed the patient's new clinical results.  Lab Results (most recent)     Procedure Component Value Units Date/Time    Basic Metabolic Panel [[id number]]  (Abnormal) Collected:  03/02/20 1102    Specimen:  Blood Updated:  03/02/20 1202     Glucose 88 mg/dL      BUN 11 mg/dL      Creatinine 0.84 mg/dL      Sodium 138 mmol/L      Potassium 4.0 mmol/L      Chloride 93 mmol/L      CO2 31.0 mmol/L      Calcium 9.3 mg/dL      eGFR Non African Amer 65 mL/min/1.73      BUN/Creatinine Ratio 13.1     Anion Gap 14.0 mmol/L     Narrative:       GFR Normal >60  Chronic Kidney Disease <60  Kidney Failure <15      CBC & Differential [[id number]] Collected:  03/02/20 1102    Specimen:  Blood Updated:  03/02/20 1137    Narrative:       " The following orders were created for panel order CBC & Differential.  Procedure                               Abnormality         Status                     ---------                               -----------         ------                     CBC Auto Differential[[id number]]        Abnormal            Final result                 Please view results for these tests on the individual orders.    CBC Auto Differential [[id number]]  (Abnormal) Collected:  03/02/20 1102    Specimen:  Blood Updated:  03/02/20 1137     WBC 8.40 10*3/mm3      RBC 4.04 10*6/mm3      Hemoglobin 12.9 g/dL      Hematocrit 38.2 %      MCV 94.5 fL      MCH 32.0 pg      MCHC 33.8 g/dL      RDW 14.0 %      RDW-SD 46.4 fl      MPV 8.7 fL      Platelets 339 10*3/mm3      Neutrophil % 66.2 %      Lymphocyte % 14.9 %      Monocyte % 12.4 %      Eosinophil % 3.8 %      Basophil % 2.7 %      Neutrophils, Absolute 5.60 10*3/mm3      Lymphocytes, Absolute 1.20 10*3/mm3      Monocytes, Absolute 1.00 10*3/mm3      Eosinophils, Absolute 0.30 10*3/mm3      Basophils, Absolute 0.20 10*3/mm3      nRBC 0.1 /100 WBC     Potassium [[id number]]  (Normal) Collected:  03/02/20 0613    Specimen:  Blood Updated:  03/02/20 0705     Potassium 3.8 mmol/L     Potassium [[id number]]  (Normal) Collected:  03/01/20 0455    Specimen:  Blood Updated:  03/01/20 0536     Potassium 3.7 mmol/L     Troponin [[id number]]  (Normal) Collected:  02/29/20 0450    Specimen:  Blood Updated:  02/29/20 0546     Troponin T <0.010 ng/mL     Narrative:       Troponin T Reference Range:  <= 0.03 ng/mL-   Negative for AMI  >0.03 ng/mL-     Abnormal for myocardial necrosis.  Clinicians would have to utilize clinical acumen, EKG, Troponin and serial changes to determine if it is an Acute Myocardial Infarction or myocardial injury due to an underlying chronic condition.       Results may be falsely decreased if patient taking Biotin.      Basic Metabolic Panel [[id number]]  (Abnormal) Collected:   02/29/20 0450    Specimen:  Blood Updated:  02/29/20 0542     Glucose 85 mg/dL      BUN 12 mg/dL      Creatinine 0.86 mg/dL      Sodium 135 mmol/L      Potassium 3.6 mmol/L      Chloride 97 mmol/L      CO2 27.0 mmol/L      Calcium 9.4 mg/dL      eGFR Non African Amer 63 mL/min/1.73      BUN/Creatinine Ratio 14.0     Anion Gap 11.0 mmol/L     Narrative:       GFR Normal >60  Chronic Kidney Disease <60  Kidney Failure <15      Magnesium [[id number]]  (Normal) Collected:  02/29/20 0450    Specimen:  Blood Updated:  02/29/20 0542     Magnesium 1.7 mg/dL     CBC Auto Differential [[id number]]  (Abnormal) Collected:  02/29/20 0450    Specimen:  Blood Updated:  02/29/20 0525     WBC 9.50 10*3/mm3      RBC 3.78 10*6/mm3      Hemoglobin 12.1 g/dL      Hematocrit 35.5 %      MCV 94.0 fL      MCH 31.9 pg      MCHC 33.9 g/dL      RDW 13.7 %      RDW-SD 45.1 fl      MPV 9.1 fL      Platelets 279 10*3/mm3      Neutrophil % 60.5 %      Lymphocyte % 17.4 %      Monocyte % 16.1 %      Eosinophil % 3.0 %      Basophil % 3.0 %      Neutrophils, Absolute 5.70 10*3/mm3      Lymphocytes, Absolute 1.70 10*3/mm3      Monocytes, Absolute 1.50 10*3/mm3      Eosinophils, Absolute 0.30 10*3/mm3      Basophils, Absolute 0.30 10*3/mm3      nRBC 0.0 /100 WBC     Troponin [[id number]]  (Normal) Collected:  02/28/20 2214    Specimen:  Blood Updated:  02/28/20 2305     Troponin T <0.010 ng/mL     Narrative:       Troponin T Reference Range:  <= 0.03 ng/mL-   Negative for AMI  >0.03 ng/mL-     Abnormal for myocardial necrosis.  Clinicians would have to utilize clinical acumen, EKG, Troponin and serial changes to determine if it is an Acute Myocardial Infarction or myocardial injury due to an underlying chronic condition.       Results may be falsely decreased if patient taking Biotin.      Extra Tubes [[id number]] Collected:  02/28/20 1130    Specimen:  Blood, Venous Line Updated:  02/28/20 1230    Narrative:       The following orders were created for  panel order Extra Tubes.  Procedure                               Abnormality         Status                     ---------                               -----------         ------                     Gold Top - SST[[id number]]                                   Final result                 Please view results for these tests on the individual orders.    Gold Top - SST [[id number]] Collected:  02/28/20 1130    Specimen:  Blood Updated:  02/28/20 1230     Extra Tube Hold for add-ons.     Comment: Auto resulted.       BNP [[id number]]  (Abnormal) Collected:  02/28/20 1130    Specimen:  Blood Updated:  02/28/20 1210     proBNP 7,990.0 pg/mL     Narrative:       Among patients with dyspnea, NT-proBNP is highly sensitive for the detection of acute congestive heart failure. In addition NT-proBNP of <300 pg/ml effectively rules out acute congestive heart failure with 99% negative predictive value.    Results may be falsely decreased if patient taking Biotin.      D-dimer, Quantitative [[id number]]  (Abnormal) Collected:  02/28/20 1130    Specimen:  Blood Updated:  02/28/20 1151     D-Dimer, Quantitative 2.96 MCGFEU/mL     Narrative:       Reference Range  --------------------------------------------------------------------     < 0.50   Negative Predictive Value  0.50-0.59   Indeterminate    >= 0.60   Probable VTE             A very low percentage of patients with DVT may yield D-Dimer results   below the cut-off of 0.50 MCGFEU/mL.  This is known to be more   prevalent in patients with distal DVT.             Results of this test should always be interpreted in conjunction with   the patient's medical history, clinical presentation and other   findings.  Clinical diagnosis should not be based on the result of   INNOVANCE D-Dimer alone.    aPTT [[id number]]  (Normal) Collected:  02/28/20 1130    Specimen:  Blood Updated:  02/28/20 1147     PTT 25.5 seconds     Protime-INR [[id number]]  (Normal) Collected:  02/28/20 1130    Specimen:   Blood Updated:  02/28/20 1147     Protime 11.4 Seconds      INR 1.10    CBC & Differential [[id number]] Collected:  02/28/20 1130    Specimen:  Blood Updated:  02/28/20 1139    Narrative:       The following orders were created for panel order CBC & Differential.  Procedure                               Abnormality         Status                     ---------                               -----------         ------                     CBC Auto Differential[[id number]]        Abnormal            Final result                 Please view results for these tests on the individual orders.          Imaging Results (Most Recent)     Procedure Component Value Units Date/Time    CT Chest Pulmonary Embolism [[id number]] Collected:  02/28/20 1300     Updated:  02/28/20 1314    Narrative:          DATE OF EXAM:  2/28/2020 12:45 PM     PROCEDURE:  CT CHEST PULMONARY EMBOLISM-     INDICATIONS:   shortness of breath, elevated d-dimer     COMPARISON:   AP chest x-ray 02/28/2020, CT chest PE protocol 03/21/2017.     TECHNIQUE:  Routine transaxial slices were obtained through chest after  administration of intravenous 75 ml of Isovue 370. Reconstructed coronal  and sagittal images were also obtained. Automated exposure control and  iterative reconstruction methods were used.      FINDINGS:  Technologist note states the patient was unable to raise her arms above  her head.      No pulmonary arterial filling defects are seen to suggest pulmonary  emboli. The main pulmonary artery is mildly dilated up to 3.1 cm in  diameter. There is mild reflux of contrast into the inferior vena cava.  Mild dilatation of the ascending aorta at 3.7 cm in diameter appears  stable. Heart size is enlarged. No pericardial effusion is seen. There  are small bilateral pleural effusions. Left pleural calcifications are  redemonstrated, possibly from previous surgery. Patchy groundglass  opacities are scattered throughout both lungs. Enlarged mediastinal  lymph  nodes of mildly increased in size compared to 2017 CT. Prevascular  lymph node measures 2.1 x 1.5 cm on axial image 62, previously 1.3 x 1.0  cm. Precarinal lymph node measures 2.0 x 1.4 cm on axial image 66,  previously 1.7 x 1.1 cm. Partially included solid organs of the upper  abdomen appear unremarkable for the phase of contrast enhancement. There  are severe degenerative changes of both glenohumeral joints. Abnormal  soft tissue density anterior to the proximal left humerus is partially  included (marked with an arrow on axial image 52). L1 compression  fracture is stable. No acute osseous abnormality is identified.       Impression:       1.No evidence of pulmonary emboli.  2.Cardiomegaly. Mild reflux of contrast into the inferior vena cava,  suggestive of right heart dysfunction.  3.Mildly dilated main pulmonary artery, which can be seen with pulmonary  hypertension  4.Small bilateral pleural effusions.  5.Patchy bilateral ground glass opacities, likely due to pulmonary edema  or perhaps an infectious/inflammatory process.  6.Increased size of enlarged mediastinal lymph nodes, which could be  metastatic, as these are larger than typically seen with reactive lymph  nodes.  7.Severe arthritic changes in both glenohumeral joints. Partially  included abnormal soft tissue density anterior to the proximal left  humerus. Recommend correlation with physical exam.     Electronically Signed By-Kasia Waggoner On:2/28/2020 1:12 PM  This report was finalized on [id number] by  Kasia Waggoner, .    XR Chest 1 View [[id number]] Collected:  02/28/20 1140     Updated:  02/28/20 1146    Narrative:       XR CHEST 1 VW    Date of Exam: 2/28/2020 11:28 EST    Indication: Shortness of breath    Comparison Exams: Chest radiograph dated 10/9/2019    Technique: AP erect portable view of the chest was obtained.    FINDINGS:  There is a left chest wall 3-lead pacemaker with leads projecting over the right atrium, right ventricle, and  coronary sinus. The lead positions are unchanged from prior examination. There are postsurgical changes of prior CABG. There is cardiomegaly   with mild interstitial edema changes and small bilateral pleural effusions. The pleural effusions are new from the prior examination. Median sternotomy wires are present and intact. There is multilevel degenerative changes of the thoracic spine. There is   bilateral glenohumeral arthritis, both advanced, right worse than left.      Impression:       Cardiomegaly with mild interstitial edema and small bilateral pleural effusions.    Electronically Signed: Hill Askew MD 2/28/2020 11:44 EST        reviewed    ECG/EMG Results (most recent)     Procedure Component Value Units Date/Time    ECG 12 Lead [[id number]] Collected:  02/28/20 1038     Updated:  02/28/20 1258    Narrative:       HEART RATE= 70  bpm  RR Interval= 856  ms  KY Interval=   ms  P Horizontal Axis= 194  deg  P Front Axis=   deg  QRSD Interval= 131  ms  QT Interval= 463  ms  QRS Axis= 233  deg  T Wave Axis= 22  deg  - ABNORMAL ECG -  Afib/flutter and ventricular-paced rhythm  When compared with ECG of 07-Aug-2017 6:26:45,  Significant change in rhythm  Electronically Signed By: Ameya Perry ([facility]) 28-Feb-2020 12:57:51  Date and Time of Study: 2020-02-28 10:38:31    Adult Transthoracic Echo Complete W/ Cont if Necessary Per Protocol [[id number]] Collected:  02/29/20 0806     Updated:  02/29/20 1257     BSA 1.6 m^2       CV ECHO PARVEZ - RVDD 2.0 cm      IVSd 1.2 cm      IVSs 2.0 cm      LVIDd 3.6 cm      LVIDs 1.8 cm      LVPWd 1.1 cm       CV ECHO PARVEZ - LVPWS 1.8 cm      IVS/LVPW 1.1     FS 50.3 %      EDV(Teich) 55.9 ml      ESV(Teich) 9.8 ml      EF(Teich) 82.4 %      EDV(cubed) 48.2 ml      ESV(cubed) 5.9 ml      EF(cubed) 87.7 %      % IVS thick 60.7 %      % LVPW thick 63.4 %      LV mass(C)d 138.1 grams      LV mass(C)dI 88.2 grams/m^2      LV mass(C)s 141.3 grams      LV mass(C)sI 90.3 grams/m^2       SV(Teich) 46.0 ml      SI(Teich) 29.4 ml/m^2      SV(cubed) 42.3 ml      SI(cubed) 27.0 ml/m^2      Ao root diam 3.3 cm      Ao root area 8.4 cm^2      asc Aorta Diam 3.7 cm      LVOT diam 1.9 cm      LVOT area 2.9 cm^2      RVOT diam 2.2 cm      RVOT area 3.7 cm^2      EDV(MOD-sp4) 41.1 ml      ESV(MOD-sp4) 15.7 ml      EF(MOD-sp4) 61.9 %      EDV(MOD-sp2) 55.4 ml      ESV(MOD-sp2) 19.0 ml      EF(MOD-sp2) 65.7 %      SV(MOD-sp4) 25.4 ml      SI(MOD-sp4) 16.3 ml/m^2      SV(MOD-sp2) 36.4 ml      SI(MOD-sp2) 23.3 ml/m^2      Ao root area (BSA corrected) 2.1     LV Malin Vol (BSA corrected) 26.3 ml/m^2      LV Sys Vol (BSA corrected) 10.0 ml/m^2      MV E max deon 109.0 cm/sec      MV A max deon 36.8 cm/sec      MV E/A 3.0     MV V2 max 144.0 cm/sec      MV max PG 8.3 mmHg      MV V2 mean 61.5 cm/sec      MV mean PG 2.2 mmHg      MV V2 VTI 20.9 cm      MVA(VTI) 3.9 cm^2      MV dec slope 614.0 cm/sec^2      MV dec time 0.18 sec      Ao pk deon 413.3 cm/sec      Ao max PG 70.1 mmHg      Ao max PG (full) 63.9 mmHg      Ao V2 mean 293.7 cm/sec      Ao mean PG 40.0 mmHg      Ao mean PG (full) 36.5 mmHg      Ao V2 VTI 87.7 cm      CHARIS(I,A) 0.92 cm^2      CHARIS(I,D) 0.92 cm^2      CHARIS(V,A) 0.87 cm^2      CHARIS(V,D) 0.87 cm^2      LV V1 max PG 6.2 mmHg      LV V1 mean PG 3.4 mmHg      LV V1 max 124.5 cm/sec      LV V1 mean 86.9 cm/sec      LV V1 VTI 28.0 cm      MR max deon 471.5 cm/sec      MR max PG 88.9 mmHg      SV(Ao) 735.7 ml      SI(Ao) 470.2 ml/m^2      SV(LVOT) 80.9 ml      SV(RVOT) 60.5 ml      SI(LVOT) 51.7 ml/m^2      PA V2 max 97.3 cm/sec      PA max PG 3.8 mmHg      PA max PG (full) 0.28 mmHg      PA V2 mean 72.2 cm/sec      PA mean PG 2.2 mmHg      PA mean PG (full) 0.57 mmHg      PA V2 VTI 15.7 cm      PVA(I,A) 3.8 cm^2      BH CV ECHO PARVEZ - PVA(I,D) 3.8 cm^2      BH CV ECHO PARVEZ - PVA(V,A) 3.5 cm^2      BH CV ECHO PARVEZ - PVA(V,D) 3.5 cm^2      PA acc time 0.09 sec      RV V1 max PG 3.5 mmHg      RV V1 mean PG 1.7  mmHg      RV V1 max 93.7 cm/sec      RV V1 mean 59.8 cm/sec      RV V1 VTI 16.6 cm      TR max deon 362.9 cm/sec      RVSP(TR) 56.1 mmHg      RAP systole 3.0 mmHg      PA pr(Accel) 40.1 mmHg      Qp/Qs 0.75      CV ECHO PARVEZ - BZI_BMI 25.8 kilograms/m^2       CV ECHO PARVEZ - BSA(HAYCOCK) 1.6 m^2       CV ECHO PARVEZ - BZI_METRIC_WEIGHT 59.9 kg       CV ECHO PARVEZ - BZI_METRIC_HEIGHT 152.4 cm      Target HR (85%) 116 bpm      Max. Pred. HR (100%) 137 bpm      EF(MOD-bp) 65.0 %      LA dimension(2D) 4.2 cm      Echo EF Estimated 65 %     Narrative:       · Left ventricular wall thickness is consistent with mild concentric   hypertrophy.  · Estimated EF = 65%.  · Left ventricular systolic function is normal.  · Left atrial cavity size is mildly dilated.  · There is moderate calcification of the aortic valve mainly affecting the   non, left and right coronary cusp(s).  · Severe aortic valve stenosis is present.  · Moderate tricuspid valve regurgitation is present.  · Mild mitral valve regurgitation is present  · Mild aortic valve regurgitation is present.           reviewed    Assessment/Plan     Severe symptomatic aortic stenosis:  84-year-old female patient with acute on chronic decompensated diastolic dysfunction and congestive heart failure who presents with new diagnosis of severe symptomatic aortic stenosis.  Her current neuro cart association classification is 3.  Given her frailty, need for redo sternotomy, frailty and age, I believe that transcatheter aortic valve replacement is the most feasible option and the safest option for her.    We will begin to work her up starting with a TAVR CT scanning to determine the procedural feasibility of doing transcatheter aortic valve replacement    I discussed the patients findings and my recommendations with patient and family.     Greater than 30 minutes total of face-to-face/floor  time was spent with the patient and family and nursing coordinating plan and patient  management.  Of which counseling of patient with regard specifically to the risks and benefits of transcatheter versus surgical aortic valve replacement comprised 50% of this total time.        Vasiliy Bruce MD  03/02/20  4:45 PM

## 2020-03-02 NOTE — PLAN OF CARE
Problem: Patient Care Overview  Goal: Plan of Care Review  Outcome: Ongoing (interventions implemented as appropriate)  Flowsheets (Taken 3/2/2020 9200)  Progress: no change  Plan of Care Reviewed With: patient  Outcome Summary: No complaints. Remains on Room air. Mult. cardiac problems. Currently in cath lab for cardiac cath. Plans to return home

## 2020-03-03 NOTE — PLAN OF CARE
Problem: Patient Care Overview  Goal: Plan of Care Review  Outcome: Ongoing (interventions implemented as appropriate)  Flowsheets (Taken 3/3/2020 1350)  Progress: no change  Plan of Care Reviewed With: patient  Outcome Summary: No complaints. On room air. No complaints. To have TAVR out patient. To return home. Awaiting injection from Dr. Small for d/c maybe later today.

## 2020-03-03 NOTE — SIGNIFICANT NOTE
03/03/20 1822   Family Notification   Reason for Communication d/c home   Family Member's Name Conchis Olmedo    Family Member's Relationship to Patient Daughter   Notification Route At bedside

## 2020-03-03 NOTE — DISCHARGE SUMMARY
Date of Admission: 2/28/2020    Date of Discharge:  3/3/2020    Length of stay:  LOS: 2 days       Presenting Problem/History of Present Illness   Present on Admission:  • Severe aortic stenosis  • Dyslipidemia  • Presence of cardiac pacemaker  • Essential hypertension  • Chronic coronary artery disease  • Permanent atrial fibrillation  • Diastolic CHF, acute (CMS/[facility])        Hospital Course    Chief Complaint   Patient presents with   • Shortness of Breath       Olga Curtis 84 y.o. female.      Ms. Curtis is a 84 y.o. with PMH of CAD/CABG x5v, PPM who presented to [facility] ER 2/28/20 with complaints of shortness of breath for the last two weeks. She stated in the last 24 hours she has become more short of breath. She is unable to lie down due to dyspnea. She had difficulty sleeping last night. She denied any cough or congestion or fever. She has not had chest pain. She sees Dr. Kwok and was recently cleared by him for her upcoming shoulder surgery.      In the ER the patient had CT PE protocol that showed No PE, cardiomegaly, mild reflux of contrast into the inferior vena cava, suggestive of right heart dysfunction. Mildly dilated main pulmonary artery, which can be seen with pulmonary hypertension. Small bilateral effusions. Patchy bilateral ground glass opacities, likely due to pulmonary edema or perhaps an infectious/inflammatory process. Increased size of enlarged mediastinal lymph nodes, which could be metastatic, as these are larger than typically seen with reactive lymph nodes. Severe arthritis changes in both glenohumeral joints.   Her proBNP was almost 8000. She was placed on supplemental O2 2L and stated she felt much better. She was given 40mg IV lasix. She was admitted for further treatment.          03/02/20, 3:40 PM  Patient status post heart catheter afternoon.  Awaiting on evaluation by cardiothoracic surgery.   Patient has been evaluated by Dr. Bruce and cardiothoracic surgery.  Work-up initiated  and instructed as below. 03/03/20, 6:32 PM        ROS  Constitution: Negative.   HENT: Negative.    Eyes: Negative.    Cardiovascular: Negative.  Negative for chest pain.   Respiratory: Positive for shortness of breath.    Endocrine: Negative.    Hematologic/Lymphatic: Negative.    Skin: Negative.    Musculoskeletal: Negative.    Gastrointestinal: Negative.    Genitourinary: Negative.    Neurological: Negative.    Psychiatric/Behavioral: Negative.    Allergic/Immunologic: Negative.    All other systems reviewed and are negative.        Past Medical History:     Past Medical History:   Diagnosis Date   • Aortic stenosis 10/28/2014   • Atrial premature complex 12/18/2015   • Chronic coronary artery disease 10/28/2014   • Dyslipidemia 10/28/2014   • Hypertension 10/28/2014   • Nonsustained ventricular tachycardia (CMS/HCC) 12/18/2015   • Shortness of breath 9/12/2016       Past Surgical History:     Past Surgical History:   Procedure Laterality Date   • APPENDECTOMY     • CARDIAC CATHETERIZATION N/A 3/2/2020    Procedure: Left Heart Cath and coronary angiogram;  Surgeon: Wyatt Kwok MD;  Location: [facility] CATH INVASIVE LOCATION;  Service: Cardiovascular;  Laterality: N/A;   • CARDIAC CATHETERIZATION N/A 3/2/2020    Procedure: Saphenous Vein Graft;  Surgeon: Wyatt Kwok MD;  Location: [facility] CATH INVASIVE LOCATION;  Service: Cardiovascular;  Laterality: N/A;   • CARDIAC PACEMAKER PLACEMENT  2017   • CARDIAC SURGERY  1999    Bypass surgery   • HERNIA REPAIR         Social History:   Social History     Socioeconomic History   • Marital status:      Spouse name: Not on file   • Number of children: Not on file   • Years of education: Not on file   • Highest education level: Not on file   Tobacco Use   • Smoking status: Never Smoker   • Smokeless tobacco: Never Used   Substance and Sexual Activity   • Alcohol use: No     Frequency: Never   • Drug use: No   • Sexual activity: Defer       Vital Signs  Temp:  [97.5  °F (36.4 °C)-98 °F (36.7 °C)] 97.6 °F (36.4 °C)  Heart Rate:  [69-70] 70  Resp:  [16] 16  BP: (107-181)/(38-71) 114/38    Physical Exam:  Physical Exam  Physical Exam   Constitutional: She is oriented to person, place, and time. She appears well-developed and well-nourished. No distress.   HENT:   Head: Normocephalic and atraumatic.   Right Ear: External ear normal.   Left Ear: External ear normal.   Nose: Nose normal.   Mouth/Throat: Oropharynx is clear and moist. No oropharyngeal exudate.   Eyes: Pupils are equal, round, and reactive to light. Conjunctivae and EOM are normal. Right eye exhibits no discharge. Left eye exhibits no discharge. No scleral icterus.   Neck: Normal range of motion. No JVD present. No tracheal deviation present. No thyromegaly present.   Cardiovascular: Normal rate, regular rhythm and intact distal pulses. Exam reveals no gallop and no friction rub.   Murmur heard.   Systolic murmur is present with a grade of 4/6.  Paced rhythm  Holosystolic aortic murmur 4 x 6   Pulmonary/Chest: Effort normal. No stridor. No respiratory distress. She has no wheezes. She has no rales. She exhibits no tenderness.   Abdominal: Soft. Bowel sounds are normal. She exhibits no distension and no mass. There is no tenderness. There is no rebound and no guarding. No hernia.   Musculoskeletal: Normal range of motion. She exhibits no edema, tenderness or deformity.   Swelling lt shoulder   Lymphadenopathy:     She has no cervical adenopathy.   Neurological: She is alert and oriented to person, place, and time. No cranial nerve deficit or sensory deficit. She exhibits normal muscle tone. Coordination normal.   Skin: Skin is warm and dry. No rash noted. She is not diaphoretic. No erythema.   Psychiatric: She has a normal mood and affect. Her behavior is normal.   Nursing note and vitals reviewed.   No change in exam from March 2          Discharge Diagnosis:     Active Hospital Problems    Diagnosis  POA   • **Severe  aortic stenosis [I35.0]  Yes     Priority: High   • Diastolic CHF, acute (CMS/HCC) [I50.31]  Yes     Priority: Medium   • Permanent atrial fibrillation [I48.21]  Yes   • Presence of cardiac pacemaker [Z95.0]  Yes   • Dyslipidemia [E78.5]  Yes   • Essential hypertension [I10]  Yes   • Chronic coronary artery disease [I25.10]  Yes      Resolved Hospital Problems   No resolved problems to display.       Estimated Creatinine Clearance: 42.1 mL/min (by C-G formula based on SCr of 0.77 mg/dL).    Discharge Disposition    Destination      Coordination has not been started for this encounter.      Durable Medical Equipment      Coordination has not been started for this encounter.      Dialysis/Infusion      Coordination has not been started for this encounter.      Home Medical Care      Coordination has not been started for this encounter.      Therapy      Coordination has not been started for this encounter.      Community Resources      Coordination has not been started for this encounter.        Per cardiothoracic surgery:  Pt is being evaluated for TAVR--  Cardiac cath 3/2/2020 with patent grafts  Transthoracic echo 2/29/2020 which revealed severe aortic stenosis with an aortic valve area of approximately 0.8 to 0.9 cm², mean peak gradient was 40 mmHg with a peak aortic velocity of 4.1 m/s.       Dr. Bruce has seen her as well     TAVR coordinator, Clara Vang RN has been contacted about pt  She will need TAVR CT scan which can only be completed at [facility]        PT Recommendation and Plan          Home or Self Care           Discharge Medications      New Medications      Instructions Start Date   furosemide 40 MG tablet  Commonly known as:  LASIX   40 mg, Oral, Daily         Continue These Medications      Instructions Start Date   acebutolol 200 MG capsule  Commonly known as:  SECTRAL   200 mg, Oral, 2 Times Daily      apixaban 2.5 MG tablet tablet  Commonly known as:  ELIQUIS   2.5 mg, Oral, Every 12  Hours Scheduled      lisinopril 20 MG tablet  Commonly known as:  PRINIVIL,ZESTRIL   TAKE ONE TABLET BY MOUTH DAILY      melatonin 5 MG tablet tablet   5 mg, Oral, Nightly PRN      pravastatin 20 MG tablet  Commonly known as:  PRAVACHOL   20 mg, Oral, Nightly      traMADol 50 MG tablet  Commonly known as:  ULTRAM   50 mg, Oral, Every 6 Hours PRN           Discharge medications personally reviewed by me and med rec done by me personally.  03/03/20, 6:30 PM        Consults:   Consults     Date and Time Order Name Status Description    3/2/2020 1453 Inpatient Cardiothoracic Surgery Consult Completed     2/29/2020 1527 Inpatient Orthopedic Surgery Consult Completed     2/28/2020 1700 Inpatient Cardiology Consult Completed     2/28/2020 1334 Hospitalist (on-call MD unless specified) Completed           Procedures Performed:  Procedure(s):  Left Heart Cath and coronary angiogram  Saphenous Vein Graft       Pertinent Test Results:   Results from last 7 days   Lab Units 03/02/20  1102 02/29/20  0450 02/28/20  1130   WBC 10*3/mm3 8.40 9.50 9.00   HEMOGLOBIN g/dL 12.9 12.1 12.4   HEMATOCRIT % 38.2 35.5 37.1   MCV fL 94.5 94.0 95.6   MCH pg 32.0 31.9 31.9   PLATELETS 10*3/mm3 339 279 309     Results from last 7 days   Lab Units 03/03/20  1538 03/03/20  0449 03/02/20  1102  02/29/20  0450 02/28/20  1130   SODIUM mmol/L  --   --  138  --  135* 134*   POTASSIUM mmol/L 5.0 3.4* 4.0   < > 3.6 4.5   CHLORIDE mmol/L  --   --  93*  --  97* 94*   CO2 mmol/L  --   --  31.0*  --  27.0 27.0   BUN mg/dL  --   --  11  --  12 10   CREATININE mg/dL  --  0.77 0.84  --  0.86 0.85   CALCIUM mg/dL  --   --  9.3  --  9.4 9.8   GLUCOSE mg/dL  --   --  88  --  85 102*    < > = values in this interval not displayed.     Results from last 7 days   Lab Units 02/29/20  0450   MAGNESIUM mg/dL 1.7     Lab Results   Component Value Date    CALCIUM 9.3 03/02/2020    PHOS 4.7 08/06/2017     No results found for: HGBA1C  Lab Results   Component Value Date     CHOL 127 08/05/2017    TRIG 90 08/05/2017    HDL 49 08/05/2017    LDL 62 08/05/2017     No results found for: LIPASE        No results found for: INTRAOP, PREDX, FINALDX, COMDX    Microbiology Results (last 10 days)     ** No results found for the last 240 hours. **          ECG/EMG Results (most recent)     Procedure Component Value Units Date/Time    ECG 12 Lead [[id number]] Collected:  02/28/20 1038     Updated:  02/28/20 1258    Narrative:       HEART RATE= 70  bpm  RR Interval= 856  ms  MT Interval=   ms  P Horizontal Axis= 194  deg  P Front Axis=   deg  QRSD Interval= 131  ms  QT Interval= 463  ms  QRS Axis= 233  deg  T Wave Axis= 22  deg  - ABNORMAL ECG -  Afib/flutter and ventricular-paced rhythm  When compared with ECG of 07-Aug-2017 6:26:45,  Significant change in rhythm  Electronically Signed By: Ameya Perry ([facility]) 28-Feb-2020 12:57:51  Date and Time of Study: 2020-02-28 10:38:31    Adult Transthoracic Echo Complete W/ Cont if Necessary Per Protocol [[id number]] Collected:  02/29/20 0806     Updated:  02/29/20 1257     BSA 1.6 m^2      RVIDd 2.0 cm      IVSd 1.2 cm      IVSs 2.0 cm      LVIDd 3.6 cm      LVIDs 1.8 cm      LVPWd 1.1 cm      BH CV ECHO PARVEZ - LVPWS 1.8 cm      IVS/LVPW 1.1     FS 50.3 %      EDV(Teich) 55.9 ml      ESV(Teich) 9.8 ml      EF(Teich) 82.4 %      EDV(cubed) 48.2 ml      ESV(cubed) 5.9 ml      EF(cubed) 87.7 %      % IVS thick 60.7 %      % LVPW thick 63.4 %      LV mass(C)d 138.1 grams      LV mass(C)dI 88.2 grams/m^2      LV mass(C)s 141.3 grams      LV mass(C)sI 90.3 grams/m^2      SV(Teich) 46.0 ml      SI(Teich) 29.4 ml/m^2      SV(cubed) 42.3 ml      SI(cubed) 27.0 ml/m^2      Ao root diam 3.3 cm      Ao root area 8.4 cm^2      asc Aorta Diam 3.7 cm      LVOT diam 1.9 cm      LVOT area 2.9 cm^2      RVOT diam 2.2 cm      RVOT area 3.7 cm^2      EDV(MOD-sp4) 41.1 ml      ESV(MOD-sp4) 15.7 ml      EF(MOD-sp4) 61.9 %      EDV(MOD-sp2) 55.4 ml      ESV(MOD-sp2) 19.0 ml       EF(MOD-sp2) 65.7 %      SV(MOD-sp4) 25.4 ml      SI(MOD-sp4) 16.3 ml/m^2      SV(MOD-sp2) 36.4 ml      SI(MOD-sp2) 23.3 ml/m^2      Ao root area (BSA corrected) 2.1     LV Malin Vol (BSA corrected) 26.3 ml/m^2      LV Sys Vol (BSA corrected) 10.0 ml/m^2      MV E max deon 109.0 cm/sec      MV A max deon 36.8 cm/sec      MV E/A 3.0     MV V2 max 144.0 cm/sec      MV max PG 8.3 mmHg      MV V2 mean 61.5 cm/sec      MV mean PG 2.2 mmHg      MV V2 VTI 20.9 cm      MVA(VTI) 3.9 cm^2      MV dec slope 614.0 cm/sec^2      MV dec time 0.18 sec      Ao pk deon 413.3 cm/sec      Ao max PG 70.1 mmHg      Ao max PG (full) 63.9 mmHg      Ao V2 mean 293.7 cm/sec      Ao mean PG 40.0 mmHg      Ao mean PG (full) 36.5 mmHg      Ao V2 VTI 87.7 cm      CHARIS(I,A) 0.92 cm^2      CHARIS(I,D) 0.92 cm^2      CHARIS(V,A) 0.87 cm^2      CHARIS(V,D) 0.87 cm^2      LV V1 max PG 6.2 mmHg      LV V1 mean PG 3.4 mmHg      LV V1 max 124.5 cm/sec      LV V1 mean 86.9 cm/sec      LV V1 VTI 28.0 cm      MR max deon 471.5 cm/sec      MR max PG 88.9 mmHg      SV(Ao) 735.7 ml      SI(Ao) 470.2 ml/m^2      SV(LVOT) 80.9 ml      SV(RVOT) 60.5 ml      SI(LVOT) 51.7 ml/m^2      PA V2 max 97.3 cm/sec      PA max PG 3.8 mmHg      PA max PG (full) 0.28 mmHg      PA V2 mean 72.2 cm/sec      PA mean PG 2.2 mmHg      PA mean PG (full) 0.57 mmHg      PA V2 VTI 15.7 cm      PVA(I,A) 3.8 cm^2      BH CV ECHO PARVEZ - PVA(I,D) 3.8 cm^2       CV ECHO PARVEZ - PVA(V,A) 3.5 cm^2       CV ECHO PARVEZ - PVA(V,D) 3.5 cm^2      PA acc time 0.09 sec      RV V1 max PG 3.5 mmHg      RV V1 mean PG 1.7 mmHg      RV V1 max 93.7 cm/sec      RV V1 mean 59.8 cm/sec      RV V1 VTI 16.6 cm      TR max deon 362.9 cm/sec      RVSP(TR) 56.1 mmHg      RAP systole 3.0 mmHg      PA pr(Accel) 40.1 mmHg      Qp/Qs 0.75      CV ECHO PARVEZ - BZI_BMI 25.8 kilograms/m^2       CV ECHO PARVEZ - BSA(BuckheadCOCK) 1.6 m^2       CV ECHO PARVEZ - BZI_METRIC_WEIGHT 59.9 kg       CV ECHO PARVEZ - BZI_METRIC_HEIGHT 152.4 cm       Target HR (85%) 116 bpm      Max. Pred. HR (100%) 137 bpm      EF(MOD-bp) 65.0 %      LA dimension(2D) 4.2 cm      Echo EF Estimated 65 %     Narrative:       · Left ventricular wall thickness is consistent with mild concentric   hypertrophy.  · Estimated EF = 65%.  · Left ventricular systolic function is normal.  · Left atrial cavity size is mildly dilated.  · There is moderate calcification of the aortic valve mainly affecting the   non, left and right coronary cusp(s).  · Severe aortic valve stenosis is present.  · Moderate tricuspid valve regurgitation is present.  · Mild mitral valve regurgitation is present  · Mild aortic valve regurgitation is present.             Results for orders placed during the hospital encounter of 02/28/20   Duplex Carotid Ultrasound CAR    Narrative · Proximal right internal carotid artery moderate stenosis.  · Proximal left internal carotid artery mild stenosis.          Results for orders placed during the hospital encounter of 02/28/20   Adult Transthoracic Echo Complete W/ Cont if Necessary Per Protocol    Narrative · Left ventricular wall thickness is consistent with mild concentric   hypertrophy.  · Estimated EF = 65%.  · Left ventricular systolic function is normal.  · Left atrial cavity size is mildly dilated.  · There is moderate calcification of the aortic valve mainly affecting the   non, left and right coronary cusp(s).  · Severe aortic valve stenosis is present.  · Moderate tricuspid valve regurgitation is present.  · Mild mitral valve regurgitation is present  · Mild aortic valve regurgitation is present.          Ct Shoulder Left Wo Contrast    Result Date: 2/27/2020  1.Severe glenohumeral joint arthritis with findings of chronic rotator cuff tear. Probable remote fracture the posterior superior glenoid. 2.Moderate arthritis acromioclavicular joint. 3.Osteopenia. 4.Diffuse abnormal appearance of the lungs with diffuse airspace opacity septal thickening and probable  pleural effusion. Multifocal pneumonia, pulmonary edema or interstitial lung disease are also possible. 5.Severe cardiomegaly.   Electronically Signed By-Danielle Tirado MD On:2/27/2020 12:58 PM This report was finalized on [id number] by  Danielle Tirado MD.    Xr Chest 1 View    Result Date: 2/28/2020  Cardiomegaly with mild interstitial edema and small bilateral pleural effusions. Electronically Signed: Hill Askew MD 2/28/2020 11:44 EST    Ct Chest Pulmonary Embolism    Result Date: 2/28/2020  1.No evidence of pulmonary emboli. 2.Cardiomegaly. Mild reflux of contrast into the inferior vena cava, suggestive of right heart dysfunction. 3.Mildly dilated main pulmonary artery, which can be seen with pulmonary hypertension 4.Small bilateral pleural effusions. 5.Patchy bilateral ground glass opacities, likely due to pulmonary edema or perhaps an infectious/inflammatory process. 6.Increased size of enlarged mediastinal lymph nodes, which could be metastatic, as these are larger than typically seen with reactive lymph nodes. 7.Severe arthritic changes in both glenohumeral joints. Partially included abnormal soft tissue density anterior to the proximal left humerus. Recommend correlation with physical exam.  Electronically Signed By-Kasia Waggoner On:2/28/2020 1:12 PM This report was finalized on [id number] by  Kasia Waggoner, .      Xrays, labs reviewed personally by me.  03/03/20  6:30 PM      Condition on Discharge:    Stable    Discharge Diet:   Dietary Orders (From admission, onward)     Start     Ordered    03/02/20 1746  Diet Regular  Diet Effective Now     Question:  Diet / Texture / Consistency  Answer:  Regular    03/02/20 1746                Activity at Discharge:   Activity Instructions     Activity as Tolerated            Follow-up Appointments  Future Appointments   Date Time Provider Department Center   4/20/2020  6:20 AM [facility] [place] [facility]   8/13/2020  1:00 PM  [facility] NA   8/13/2020  1:30 PM Wyatt Kwok MD [facility] NA     Additional Instructions for the Follow-ups that You Need to Schedule     Discharge Follow-up with PCP   As directed       Currently Documented PCP:    Tigre Duran MD    PCP Phone Number:    [phone]     Follow Up Details:  If no PCP, call MD finder at [phone]         Discharge Follow-up with Specified Provider: Cardiology follow-up as instructed   As directed      To:  Cardiology follow-up as instructed         Discharge Follow-up with Specified Provider: Cardiothoracic surgery follow-up as instructed   As directed      To:  Cardiothoracic surgery follow-up as instructed         Discharge Follow-up with Specified Provider: Dr. Lester as needed   As directed      To:  Dr. Lester as needed               Test Results Pending at Discharge       Risk for Readmission (LACE) Score: 10 (3/3/2020  6:00 AM)          Willie Sifuentes MD  03/03/20  6:30 PM    Time: Greater than 30 minutes in discharge activity for care coordination with nursing for current care and discharge planning and care coordination with  for discharge planning and with cardiology and cardiothoracic surgery for the same purpose.

## 2020-03-03 NOTE — PROGRESS NOTES
Referring Provider: Hospitalist    Reason for follow-up: Shortness of breath and severe aortic stenosis     Patient Care Team:  Tigre Duran MD as PCP - General  Tigre Duran MD as PCP - Family Medicine  Wyatt Kwok MD as Consulting Physician (Cardiology)    Subjective .  Feeling better today    Objective  Sitting in chair comfortably     Review of Systems   Constitution: Negative for fever and malaise/fatigue.   Cardiovascular: Negative for chest pain, dyspnea on exertion and palpitations.   Respiratory: Negative for cough and shortness of breath.    Skin: Negative for rash.   Gastrointestinal: Negative for abdominal pain, nausea and vomiting.   Neurological: Negative for focal weakness and headaches.   All other systems reviewed and are negative.      Sulfa antibiotics    Scheduled Meds:    acebutolol 200 mg Oral BID   apixaban 2.5 mg Oral Q12H   atorvastatin 10 mg Oral Nightly   furosemide 40 mg Oral Daily   lisinopril 20 mg Oral Daily   midazolam 1 mg Intravenous Once   sodium chloride 10 mL Intravenous Q12H     Continuous Infusions:    sodium chloride 1-3 mL/kg/hr    sodium chloride 75 mL/hr Last Rate: Stopped (03/03/20 0726)     PRN Meds:.acetaminophen **OR** acetaminophen **OR** acetaminophen  •  aluminum-magnesium hydroxide-simethicone  •  atropine  •  bisacodyl  •  bisacodyl  •  magnesium hydroxide  •  magnesium sulfate **OR** magnesium sulfate in D5W 1g/100mL (PREMIX)  •  melatonin  •  ondansetron **OR** ondansetron  •  potassium chloride  •  potassium chloride  •  [COMPLETED] Insert peripheral IV **AND** sodium chloride  •  sodium chloride  •  sodium chloride  •  traMADol        VITAL SIGNS  Vitals:    03/02/20 1525 03/02/20 1934 03/03/20 0526 03/03/20 0726   BP: 139/67 107/71 162/57 (!) 181/68   BP Location: Right arm      Patient Position: Lying Lying Lying    Pulse: 70 69 69 69   Resp: 15 16 16 16   Temp: 97.6 °F (36.4 °C) 97.5 °F (36.4 °C) 98 °F (36.7 °C) 97.5 °F (36.4 °C)   TempSrc: Oral  "Oral Oral Oral   SpO2: 97% 97% 92% 99%   Weight:   56.8 kg (125 lb 3.5 oz)    Height:           Flowsheet Rows      First Filed Value   Admission Height  167.6 cm (66\") Documented at 02/28/2020 1036   Admission Weight  59.9 kg (132 lb) Documented at 02/28/2020 1036           TELEMETRY: Atrial fibrillation with controlled response    Physical Exam:  Physical Exam   Constitutional: She appears well-developed and well-nourished.   HENT:   Head: Normocephalic and atraumatic.   Eyes: Conjunctivae are normal. No scleral icterus.   Neck: Normal range of motion. Neck supple. No JVD present. Carotid bruit is not present.   Cardiovascular: Normal rate, regular rhythm, S1 normal, S2 normal and intact distal pulses. PMI is not displaced.   Murmur heard.  Pulmonary/Chest: Effort normal and breath sounds normal. She has no wheezes. She has no rales.   Abdominal: Soft. Bowel sounds are normal.   Neurological: She is alert. She has normal strength.   Skin: Skin is warm and dry. No rash noted.        Results Review:   I reviewed the patient's new clinical results.  Lab Results (last 24 hours)     Procedure Component Value Units Date/Time    Potassium [[id number]]  (Abnormal) Collected:  03/03/20 0449    Specimen:  Blood Updated:  03/03/20 0616     Potassium 3.4 mmol/L     Creatinine, Serum [[id number]]  (Normal) Collected:  03/03/20 0449    Specimen:  Blood Updated:  03/03/20 0616     Creatinine 0.77 mg/dL      eGFR Non African Amer 72 mL/min/1.73     Narrative:       GFR Normal >60  Chronic Kidney Disease <60  Kidney Failure <15            Imaging Results (Last 24 Hours)     ** No results found for the last 24 hours. **          EKG      I personally viewed and interpreted the patient's EKG/Telemetry data:    ECHOCARDIOGRAM:    STRESS MYOVIEW:    CARDIAC CATHETERIZATION:    OTHER:         Assessment/Plan     Principal Problem:    Severe aortic stenosis  Active Problems:    Chronic coronary artery disease    Dyslipidemia    Essential " hypertension    Presence of cardiac pacemaker    Permanent atrial fibrillation    Diastolic CHF, acute (CMS/HCC)  History of coronary artery disease status post coronary artery bypass surgery  Congestive heart failure diastolic dysfunction  Severe aortic stenosis  Hypertension  Hyperlipidemia  History of atrial fibrillation  Status post pacemaker placement for tachybradycardia syndrome  Patient presented with shortness of breath and is ruled out for MI by EKG and enzymes  Patient's pacemaker is working very well  Patient is on Eliquis at home which is helpful cardiac catheterization  Patient had a cardiac catheterization which showed patent grafts   patient has severe aortic stenosis for which she will need TAVR  Patient is not a candidate for redo surgery because of her previous surgery and her age.  Patient is seen by cardiac surgeons and also TAVR physicians and will be followed as an outpatient for the procedure.    I discussed the patients findings and my recommendations with patient and family    Wyatt Kwok MD  03/03/20  12:13 PM

## 2020-03-03 NOTE — SIGNIFICANT NOTE
03/03/20 1822   Discharge of Care   Discharge Mode wheel chair   Discharge Destination home   Discharged Accompanied by family member/friend   Discharge Teaching Done  Yes   Learning Method Explanation;Written Materials

## 2020-03-03 NOTE — OP NOTE
Patient Name:  Olga Curtis  YOB: 1935    Date of Surgery:  3/2/2020  Procedure: Left subacromial corticosteroid injection  Indications: Left shoulder pain.  Unable to have reverse shoulder replacement due to valve surgery.    Description of Procedure: Patient consented.  Had left shoulder prepped sterilely with chlorhexidine.  Had a 25-gauge inch and half needle entered into the lateral subacromial space.  This was then injected with 40 mg Depo-Medrol and and 1 cc 1% plain lidocaine without complications.  Patient may follow-up with her shoulder surgeon in the future for possible replacement after recovering from valve surgery    Specimen:          0    Findings: Normal skin    Complications: None      Lawrence Lester MD     Date: 3/3/2020  Time: 5:37 PM

## 2020-03-03 NOTE — PLAN OF CARE
83 yo male, admitting dx of severe aortic stenosis.  No s/s, reports, or complaints of chest pain, difficulty breathing this shift.  Pt ambulates to bathroom on her own.  Pt resting well in bed, will continue to monitor and observe.   Problem: Cardiac: Heart Failure (Adult)  Goal: Signs and Symptoms of Listed Potential Problems Will be Absent, Minimized or Managed (Cardiac: Heart Failure)  Outcome: Ongoing (interventions implemented as appropriate)     Problem: Breathing Pattern Ineffective (Adult)  Goal: Identify Related Risk Factors and Signs and Symptoms  Outcome: Ongoing (interventions implemented as appropriate)  Goal: Effective Oxygenation/Ventilation  Outcome: Ongoing (interventions implemented as appropriate)  Goal: Anxiety/Fear Reduction  Outcome: Ongoing (interventions implemented as appropriate)     Problem: Fluid Volume Excess (Adult)  Goal: Identify Related Risk Factors and Signs and Symptoms  Outcome: Ongoing (interventions implemented as appropriate)  Goal: Optimal Fluid Balance  Outcome: Ongoing (interventions implemented as appropriate)     Problem: Pain, Chronic (Adult)  Goal: Identify Related Risk Factors and Signs and Symptoms  Outcome: Ongoing (interventions implemented as appropriate)  Goal: Acceptable Pain/Comfort Level and Functional Ability  Outcome: Ongoing (interventions implemented as appropriate)     Problem: Patient Care Overview  Goal: Plan of Care Review  Outcome: Ongoing (interventions implemented as appropriate)  Goal: Individualization and Mutuality  Outcome: Ongoing (interventions implemented as appropriate)  Goal: Discharge Needs Assessment  Outcome: Ongoing (interventions implemented as appropriate)  Goal: Interprofessional Rounds/Family Conf  Outcome: Ongoing (interventions implemented as appropriate)

## 2020-03-03 NOTE — PROGRESS NOTES
CC:  SOA    F/U Aortic stenosis, s/p CABG (2009)    Pt is being evaluated for TAVR--  Cardiac cath 3/2/2020 with patent grafts  Transthoracic echo 2/29/2020 which revealed severe aortic stenosis with an aortic valve area of approximately 0.8 to 0.9 cm², mean peak gradient was 40 mmHg with a peak aortic velocity of 4.1 m/s.      Dr. Bruce has seen her as well    TAVR coordinator, Clara Vang RN has been contacted about pt  She will need TAVR CT scan which can only be completed at [facility]

## 2020-03-04 NOTE — PROGRESS NOTES
Discharge Planning Assessment   [facility]     Patient Name: Olga Curtis  MRN: [id number]  Today's Date: 3/4/2020    Admit Date: 2/28/2020          Plan    Final Discharge Disposition Code  01 - home or self-care    Final Note  return home       Carol naegele rn  Case management  Office number [phone]  Cell phone [phone]

## 2020-03-05 NOTE — OUTREACH NOTE
"Medical Week 1 Survey      Responses   [facility] patient discharged from?  [facility]   Does the patient have one of the following disease processes/diagnoses(primary or secondary)?  Other   Is there a successful TCM telephone encounter documented?  No   Week 1 attempt successful?  Yes   Call start time  1559   Call end time  1602   Meds reviewed with patient/caregiver?  Yes   Is the patient having any side effects they believe may be caused by any medication additions or changes?  No   Does the patient have all medications ordered at discharge?  Yes   Is the patient taking all medications as directed (includes completed medication regime)?  Yes   Does the patient have a primary care provider?   Yes   Does the patient have an appointment with their PCP within 7 days of discharge?  No   Comments regarding PCP  PATIENT STATES SOMEONE FROM DR. WRIGHT'S OFFICE CALLED HER AND SPOKE WITH HER TODAY, BUT SHE DOES NOT HAVE AN APPOINTMENT AT THIS TIME. STATES, \"I AM TRYING TO GET MY SHOULDER REPAIR DONE AND I HAVE AN APPOINTMENT FOR VALVE REPLACEMENT.\"   Nursing Interventions  Educated patient on importance of making appointment, Advised patient to make appointment   Has the patient kept scheduled appointments due by today?  N/A   Has home health visited the patient within 72 hours of discharge?  N/A   Did the patient receive a copy of their discharge instructions?  Yes   Nursing interventions  Reviewed instructions with patient   What is the patient's perception of their health status since discharge?  Same   Is the patient/caregiver able to teach back signs and symptoms related to disease process for when to call PCP?  Yes   Is the patient/caregiver able to teach back signs and symptoms related to disease process for when to call 911?  Yes   Is the patient/caregiver able to teach back the hierarchy of who to call/visit for symptoms/problems? PCP, Specialist, Home health nurse, Urgent Care, ED, 911  Yes   Week 1 call " completed?  Yes          Cesilia Arellano LPN

## 2020-03-05 NOTE — OUTREACH NOTE
Prep Survey      Responses   [facility]   Is LACE score < 7 ?  No   Eligibility  Readm Mgmt   Discharge diagnosis   severe aortic stenosis,    Cardiac cath    Does the patient have one of the following disease processes/diagnoses(primary or secondary)?  Other   Does the patient have Home health ordered?  No   Is there a DME ordered?  No   Prep survey completed?  Yes          Suzan Gloria RN

## 2020-03-11 NOTE — OUTREACH NOTE
"Medical Week 2 Survey      Responses   [facility] patient discharged from?  [facility]   Does the patient have one of the following disease processes/diagnoses(primary or secondary)?  Other   Week 2 attempt successful?  Yes   Call start time  1106   Discharge diagnosis   severe aortic stenosis,    Cardiac cath    Call end time  1112   Meds reviewed with patient/caregiver?  Yes   Is the patient having any side effects they believe may be caused by any medication additions or changes?  No   Does the patient have all medications ordered at discharge?  Yes   Is the patient taking all medications as directed (includes completed medication regime)?  Yes   Does the patient have a primary care provider?   Yes   Does the patient have an appointment with their PCP within 7 days of discharge?  No   Comments regarding PCP  She has spoken with Dr. Ronquillo office   Nursing Interventions  Educated patient on importance of making appointment, Advised patient to make appointment   Has the patient kept scheduled appointments due by today?  N/A   Has home health visited the patient within 72 hours of discharge?  N/A   Did the patient receive a copy of their discharge instructions?  Yes   Nursing interventions  Reviewed instructions with patient   What is the patient's perception of their health status since discharge?  Same   Is the patient/caregiver able to teach back signs and symptoms related to disease process for when to call PCP?  Yes   Is the patient/caregiver able to teach back signs and symptoms related to disease process for when to call 911?  Yes   Is the patient/caregiver able to teach back the hierarchy of who to call/visit for symptoms/problems? PCP, Specialist, Home health nurse, Urgent Care, ED, 911  Yes   Additional teach back comments  Patient states she is to have a \"heart scan\".  She is unsure who she is to follow up with.  Called Dr. Sandoval's office and they stated she it having a CTA for a TAVR and then a " cardiologist will see her upstairs and I could speak with Clara Vang [phone] regarding this.  Left message regarding if she needed an appt with a cardiologist.   Week 2 Call Completed?  Yes   Wrap up additional comments  Waiting on return call regarding needing a follow up          Sarah Valenzuela LPN

## 2020-03-19 NOTE — OUTREACH NOTE
Medical Week 3 Survey      Responses   [facility] patient discharged from?  [facility]   Does the patient have one of the following disease processes/diagnoses(primary or secondary)?  Other   Week 3 attempt successful?  Yes   Call start time  1418   Call end time  1422   Meds reviewed with patient/caregiver?  Yes   Is the patient having any side effects they believe may be caused by any medication additions or changes?  No   Does the patient have all medications ordered at discharge?  Yes   Is the patient taking all medications as directed (includes completed medication regime)?  Yes   Does the patient have a primary care provider?   Yes   Has the patient kept scheduled appointments due by today?  Yes   Has home health visited the patient within 72 hours of discharge?  N/A   Did the patient receive a copy of their discharge instructions?  Yes   Nursing interventions  Reviewed instructions with patient   What is the patient's perception of their health status since discharge?  Same   Is the patient/caregiver able to teach back signs and symptoms related to disease process for when to call PCP?  Yes   Is the patient/caregiver able to teach back signs and symptoms related to disease process for when to call 911?  Yes   Is the patient/caregiver able to teach back the hierarchy of who to call/visit for symptoms/problems? PCP, Specialist, Home health nurse, Urgent Care, ED, 911  Yes   Week 3 Call Completed?  Yes   Graduated  Yes   Did the patient feel the follow up calls were helpful during their recovery period?  Yes   Was the number of calls appropriate?  Yes          Cesilia Arellano LPN

## 2020-03-23 NOTE — NURSING NOTE
The multidisciplinary structural heart team met to discuss patient and upcoming TAVR procedure. All elective procedures on hold due to pandemic. Called and spoke with patient to provide update.  Instructed patient to contact MD with any medical concerns and that she could call the structural heart team with any questions/concerns regarding procedure. Patient verbalized understanding.

## 2020-04-14 NOTE — TELEPHONE ENCOUNTER
The multidisciplinary Structural Heart Team met, via Teleconference,  to discuss patient and upcoming TAVR procedure. All elective procedures are on hold due to the COVID-19 pandemic. Called and spoke with patient to provide update.  She reports doing well.  Instructed patient to contact MD with any medical concerns and that they could call the structural heart team with any questions/concerns regarding procedure. Patient verbalized understanding.

## 2020-04-30 NOTE — TELEPHONE ENCOUNTER
Called pt to talk about device report - pt stated that she has had bad SOA today and felt really tired.   Advised she could set up an appointment to see dr. garcia sooner than august.   Pt denied - says thinks from shoulder.   Advised to call back if she feels she needs to see dr. garcia   understood

## 2020-05-01 NOTE — NURSING NOTE
Called to follow-up with patient RE: TAVR process and telephone encounter from Dr. Kwok's office 4/30/20. Patient states her shoulder is bothering her and that she does not have any concerns regarding shortness of breath or fatigue. Patient educated on importance of calling MD with any health status changes. Patient verbalizes understanding

## 2020-05-19 PROBLEM — I50.32 CHRONIC DIASTOLIC CONGESTIVE HEART FAILURE (HCC): Status: ACTIVE | Noted: 2020-01-01

## 2020-05-19 PROBLEM — I35.0 NONRHEUMATIC AORTIC VALVE STENOSIS: Status: ACTIVE | Noted: 2020-01-01

## 2020-05-19 NOTE — NURSING NOTE
Called patient on 5/13 and today. Updated on TAVR process and anticipated TAVR date of 5/26. Questions answered. Will continue to update

## 2020-05-21 NOTE — ANESTHESIA PREPROCEDURE EVALUATION
Anesthesia Evaluation     Patient summary reviewed and Nursing notes reviewed   no history of anesthetic complications:               Airway   Mallampati: II  TM distance: >3 FB  Neck ROM: full  no difficulty expected  Dental - normal exam   (+) poor dentition    Pulmonary     breath sounds clear to auscultation  (+) shortness of breath,   (-) sleep apnea, decreased breath sounds, wheezes  Cardiovascular - normal exam  Exercise tolerance: good (4-7 METS)    Rhythm: regular  Rate: normal    (+) pacemaker pacemaker, hypertension, valvular problems/murmurs AS, CAD, CABG, dysrhythmias, CHF , hyperlipidemia,   (-) past MI, angina, orthopnea, PND, HAGAN, PVD      Neuro/Psych- negative ROS  (-) seizures, neuromuscular disease, TIA, CVA, dizziness/light headedness, weakness, numbness  GI/Hepatic/Renal/Endo - negative ROS   (-) liver disease, diabetes    Musculoskeletal     (+) arthralgias, chronic pain, joint swelling,       ROS comment: Severely limited ROM in entire right arm and left shoulder  Abdominal  - normal exam   Substance History - negative use  (-) alcohol use, drug use     OB/GYN negative ob/gyn ROS         Other   arthritis,                      Anesthesia Plan    ASA 4     MAC   (A-line/CVC    Special care with arm placement    D/W pt. MAC and possible awareness intra op.  Pt understands MAC and GA are not the same and the possibility of GA being required for failed MAC)  intravenous induction     Anesthetic plan, all risks, benefits, and alternatives have been provided, discussed and informed consent has been obtained with: patient.  Use of blood products discussed with patient .

## 2020-05-21 NOTE — DISCHARGE INSTRUCTIONS
Take the following medications the morning of surgery:    ARRIVE AT 0600.    MED INSTRUCTIONS PER MARCO A.        General Instructions:  • Do not eat solid food after midnight the night before surgery.  • You may drink clear liquids day of surgery but must stop at least one hour before your hospital arrival time.  • It is beneficial for you to have a clear drink that contains carbohydrates the day of surgery.  We suggest a 12 to 20 ounce bottle of Gatorade or Powerade for non-diabetic patients or a 12 to 20 ounce bottle of G2 or Powerade Zero for diabetic patients. (Pediatric patients, are not advised to drink a 12 to 20 ounce carbohydrate drink)    Clear liquids are liquids you can see through.  Nothing red in color.     Plain water                               Sports drinks  Sodas                                   Gelatin (Jell-O)  Fruit juices without pulp such as white grape juice and apple juice  Popsicles that contain no fruit or yogurt  Tea or coffee (no cream or milk added)  Gatorade / Powerade  G2 / Powerade Zero    • Infants may have breast milk up to four hours before surgery.  • Infants drinking formula may drink formula up to six hours before surgery.   • Patients who avoid smoking, chewing tobacco and alcohol for 4 weeks prior to surgery have a reduced risk of post-operative complications.  Quit smoking as many days before surgery as you can.  • Do not smoke, use chewing tobacco or drink alcohol the day of surgery.   • If applicable bring your C-PAP/ BI-PAP machine.  • Bring any papers given to you in the doctor’s office.  • Wear clean comfortable clothes.  • Do not wear contact lenses, false eyelashes or make-up.  Bring a case for your glasses.   • Bring crutches or walker if applicable.  • Remove all piercings.  Leave jewelry and any other valuables at home.  • Hair extensions with metal clips must be removed prior to surgery.  • The Pre-Admission Testing nurse will instruct you to bring medications  if unable to obtain an accurate list in Pre-Admission Testing.        If you were given a blood bank ID arm band remember to bring it with you the day of surgery.    Preventing a Surgical Site Infection:  • For 2 to 3 days before surgery, avoid shaving with a razor because the razor can irritate skin and make it easier to develop an infection.    • Any areas of open skin can increase the risk of a post-operative wound infection by allowing bacteria to enter and travel throughout the body.  Notify your surgeon if you have any skin wounds / rashes even if it is not near the expected surgical site.  The area will need assessed to determine if surgery should be delayed until it is healed.  • The night prior to surgery shower using a fresh bar of anti-bacterial soap (such as Dial) and clean washcloth.  Sleep in a clean bed with clean clothing.  Do not allow pets to sleep with you.  • Shower on the morning of surgery using a fresh bar of anti-bacterial soap (such as Dial) and clean washcloth.  Dry with a clean towel and dress in clean clothing.  • Ask your surgeon if you will be receiving antibiotics prior to surgery.  • Make sure you, your family, and all healthcare providers clean their hands with soap and water or an alcohol based hand  before caring for you or your wound.    Day of surgery:  Your arrival time is approximately two hours before your scheduled surgery time.  Upon arrival, a Pre-op nurse and Anesthesiologist will review your health history, obtain vital signs, and answer questions you may have.  The only belongings needed at this time will be a list of your home medications and if applicable your C-PAP/BI-PAP machine.  If you are staying overnight your family can leave the rest of your belongings in the car and bring them to your room later.  A Pre-op nurse will start an IV and you may receive medication in preparation for surgery, including something to help you relax.  Your family will be able  to see you in the Pre-op area.  Two visitors at a time will be allowed in the Pre-op room.  While you are in surgery your family should notify the waiting room  if they leave the waiting room area and provide a contact phone number.    Please be aware that surgery does come with discomfort.  We want to make every effort to control your discomfort so please discuss any uncontrolled symptoms with your nurse.   Your doctor will most likely have prescribed pain medications.      If you are going home after surgery you will receive individualized written care instructions before being discharged.  A responsible adult must drive you to and from the hospital on the day of your surgery and stay with you for 24 hours.    If you are staying overnight following surgery, you will be transported to your hospital room following the recovery period.  [facility] has all private rooms.    If you have any questions please call Pre-Admission Testing at [phone].  Deductibles and co-payments are collected on the day of service. Please be prepared to pay the required co-pay, deductible or deposit on the day of service as defined by your plan.    Self-Quarantine Prior to Surgery    • Stay at home. Do not leave your home after you have been given the Covid test for your upcoming surgery.   • Wash your hands often with soap and water for at least 20 seconds especially after you have been in a public place, or after blowing your nose, coughing, or sneezing.  • If soap and water are not readily available, use a hand  that contains at least 60% alcohol. Cover all surfaces of your hands and rub them together until they feel dry.  • Avoid touching your eyes, nose, and mouth with unwashed hands.  • Take everyday precautions to keep space between yourself and others (stay 6 feet away, which is about two arm lengths).  Remember that some people without symptoms may be able to spread virus.  • You should  stay in a specific room away from others if possible.  Stay at least 6 feet away from others in the home if you cannot have a dedicated room to yourself. Do not have visitors.   • Wear a mask around others in your home if you are unable to stay in a separate room or 6 feet apart. If you are unable to wear a mask, have your family member wear a mask if they must be within 6 feet of you.   • Do not snuggle with your pet. While the CDC says there is no evidence that pets can spread COVID-19 or be infected from humans, it is probably best to avoid “petting, snuggling, being kissed or licked and sharing food (during self-quarantine)”, according to the CDC.   • Do not share anything - Have your own utensils, drinking glass, dishes, towels and bedding.   • Do not share a bathroom with others, if possible.   • Clean and disinfect frequently touched services daily. This includes tables, doorknobs, light switches, countertops, handles, desks, phones, keyboards, toilets, faucets, and sinks.  • Do not travel prior to surgery.    Call your surgeon immediately if you experience any of the following symptoms:  • Sore Throat      • Shortness of Breath or difficulty breathing  • Cough  • Chills  • Body soreness or muscle pain  • Headache  • Fever  • New loss of taste or smell  • Do not arrive for your surgery ill.  Your procedure will need to be rescheduled to another time.  You will need to call your physician before the day of surgery to avoid any unnecessary exposure to hospital staff as well as other patients.      BACTROBAN NASAL OINTMENT  There are many germs normally in your nose. Bactroban is an ointment that will help reduce these germs. Please follow these instructions for Bactroban use:      ____The day before surgery in the morning  Date________    ____The day before surgery in the evening              Date________    ____The day of surgery in the morning    Date________    **Squirt ½ package of Bactroban Ointment onto a  cotton applicator and apply to inside of 1st nostril.  Squirt the remaining Bactroban and apply to the inside of the other nostril.    PERIDEX- ORAL:  Use only if your surgeon has ordered  Use the night before and morning of surgery - Swish, gargle, and spit - do not swallow.

## 2020-05-26 NOTE — ANESTHESIA PROCEDURE NOTES
Central Line      Patient reassessed immediately prior to procedure    Patient location during procedure: pre-op  Start time: 5/26/2020 11:23 AM  Stop Time:5/26/2020 11:42 AM  Indications: vascular access, MD/Surgeon request and central pressure monitoring  Staff  Anesthesiologist: Dick Molina MD  Preanesthetic Checklist  Completed: patient identified, surgical consent, pre-op evaluation, timeout performed, IV checked, risks and benefits discussed and monitors and equipment checked  Central Line Prep  Sterile Tech:cap, gloves, gown, mask and sterile barriers  Prep: chloraprep  Patient monitoring: blood pressure monitoring, continuous pulse oximetry and EKG  Central Line Procedure  Laterality:right  Location:internal jugular  Catheter Type:Cordis  Catheter Size:6 Fr  Guidance:landmark technique  Assessment  Post procedure:biopatch applied, line sutured and occlusive dressing applied  Assessement:blood return through all ports, free fluid flow, Kris Test and chest x-ray ordered  Complications:no  Patient Tolerance:patient tolerated the procedure well with no apparent complications  Additional Notes  PACING EMILIANO

## 2020-05-26 NOTE — ANESTHESIA POSTPROCEDURE EVALUATION
Patient: Olga Curtis    Procedure Summary     Date:  05/26/20 Room / Location:  [facility]    Anesthesia Start:  1235 Anesthesia Stop:  1444    Procedures:       TTE TRANSFEMORAL TRANSCATHETER AORTIC VALVE REPLACEMENT PERCUTANEOUS APPROACH (N/A Chest)      Transfemoral Transcatheter Aortic Valve Replacement w/Intra Op TTE and Possible Open Rescue Surgery (N/A ) Diagnosis:       Nonrheumatic aortic valve stenosis      Chronic diastolic congestive heart failure (CMS/HCC)      (Nonrheumatic aortic valve stenosis [I35.0])      (Chronic diastolic congestive heart failure (CMS/HCC) [I50.32])    Surgeon:  J aCrlos Leon MD; Vasiliy Bruce MD Provider:  Lawrenec Eid MD    Anesthesia Type:  MAC ASA Status:  4          Anesthesia Type: MAC    Vitals  Vitals Value Taken Time   /62 5/26/2020  2:38 PM   Temp     Pulse 70 5/26/2020  2:54 PM   Resp     SpO2 100 % 5/26/2020  2:54 PM   Vitals shown include unvalidated device data.        Post Anesthesia Care and Evaluation    Patient location during evaluation: PACU  Patient participation: complete - patient participated  Level of consciousness: awake and alert  Pain score: 0  Pain management: adequate  Airway patency: patent  Anesthetic complications: No anesthetic complications    Cardiovascular status: acceptable  Respiratory status: acceptable  Hydration status: acceptable    Comments: BP (!) 99/38 (BP Location: Left arm, Patient Position: Lying)   Pulse 70   Temp 36.5 °C (97.7 °F) (Oral)   Resp 18   Wt 52.2 kg (115 lb)   SpO2 100%   BMI 18.56 kg/m²

## 2020-05-26 NOTE — ANESTHESIA PROCEDURE NOTES
Arterial Line      Patient reassessed immediately prior to procedure    Patient location during procedure: pre-op  Start time: 5/26/2020 11:10 AM  Stop Time:5/26/2020 11:14 AM       Line placed for hemodynamic monitoring.  Performed By   Anesthesiologist: Dick Molina MD  Preanesthetic Checklist  Completed: patient identified, surgical consent, pre-op evaluation, timeout performed, IV checked, risks and benefits discussed and monitors and equipment checked  Arterial Line Prep   Sterile Tech: gloves and cap  Prep: ChloraPrep  Patient monitoring: blood pressure monitoring, continuous pulse oximetry and EKG  Arterial Line Procedure   Laterality:left  Location:  radial artery  Catheter size: 20 G   Guidance: landmark technique  Number of attempts: 1  Successful placement: yes  Post Assessment   Dressing Type: occlusive dressing applied and wrist guard applied.   Complications no  Circ/Move/Sens Assessment: unchanged and normal.   Patient Tolerance: patient tolerated the procedure well with no apparent complications

## 2020-05-28 NOTE — OUTREACH NOTE
Prep Survey      Responses   [facility] facility patient discharged from?  [place]   Is LACE score < 7 ?  No   Eligibility  Readm Mgmt   Discharge diagnosis  TTE TRANSFEMORAL TRANSCATHETER AORTIC VALVE REPLACEMENT PERCUTANEOUS APPROACH   COVID-19 Test Status  Negative   Does the patient have one of the following disease processes/diagnoses(primary or secondary)?  Cardiothoracic surgery   Does the patient have Home health ordered?  No   Is there a DME ordered?  No   Prep survey completed?  Yes          Rossana Munson RN

## 2020-06-02 PROBLEM — Z95.2 S/P TAVR (TRANSCATHETER AORTIC VALVE REPLACEMENT): Status: ACTIVE | Noted: 2020-01-01

## 2020-06-02 NOTE — PROGRESS NOTES
Date of Office Visit: 2020  Encounter Provider: HERMINIO Savage  Place of Service: [facility] CARDIOLOGY  Patient Name: Olga Curtis  :1935    Chief Complaint   Patient presents with   • TAVR     1 [facility] HOSP FOLLOW UP   • Atrial Fibrillation   :     HPI: Olga Curtis is a 85 y.o. female, new to me, who presents today for follow-up.  Old records have been obtained and reviewed by me.  She is a patient normally followed by Dr. Kwok with a cardiac history significant for coronary artery disease (status post CABG), aortic stenosis, atrial fibrillation (anticoagulated on Eliquis), tachybradycardia syndrome status post permanent pacemaker, hypertension, and hyperlipidemia.  In February of this year, she was admitted with complaints of increasing shortness of breath.  Echocardiogram revealed severe aortic stenosis with normal LV function.  She had a cardiac catheterization revealing 4/4 patent bypass grafts.  Due to her age and frailty and high risk of redo sternotomy, she was referred for TAVR.  Due to the COVID-19 pandemic, this was ultimately postponed.   On 2020, she underwent successful placement of a 23 mm Rachel 3 transcatheter aortic heart valve.  Echocardiogram the following day revealed hyperdynamic LV function with an EF of 85%, a well-functioning TAVR valve with peak and mean gradients within defined limits, mild mitral regurgitation, and moderate tricuspid regurgitation with a markedly elevated RVSP of 60 mmHg.  On 2020, she was felt stable for discharge.  She was discharged on Plavix with instructions to restart her Eliquis on 2020.  I am seeing her today for 1 week follow-up.   Since being discharged, she has been doing well from a cardiac standpoint.  She states that her shortness of breath has significantly improved.  She denies any PND or orthopnea.  Her weight has been stable.  She denies any chest pain, palpitations, edema, dizziness,  or syncope.  She denies any bleeding difficulties or melena.  Her appetite has improved.  Her only complaint is that she is not sleeping well.  Evidently she is needing a left shoulder replacement.  The shoulder causes her a lot of pain when she is sleeping.      Past Medical History:   Diagnosis Date   • Aortic stenosis 10/28/2014   • Atrial premature complex 12/18/2015   • Chronic coronary artery disease 10/28/2014   • Dyslipidemia 10/28/2014   • Hyperlipidemia    • Hypertension 10/28/2014   • Nonsustained ventricular tachycardia (CMS/HCC) 12/18/2015   • Shortness of breath 9/12/2016   • Shoulder pain, left        Past Surgical History:   Procedure Laterality Date   • AORTIC VALVE REPAIR/REPLACEMENT N/A 5/26/2020    Procedure: TTE TRANSFEMORAL TRANSCATHETER AORTIC VALVE REPLACEMENT PERCUTANEOUS APPROACH;  Surgeon: J Carlos Leon MD;  Location: [facility] OR 18/19;  Service: Cardiothoracic;  Laterality: N/A;   • AORTIC VALVE REPAIR/REPLACEMENT N/A 5/26/2020    Procedure: Transfemoral Transcatheter Aortic Valve Replacement w/Intra Op TTE and Possible Open Rescue Surgery;  Surgeon: Vasiliy Bruce MD;  Location: [facility] OR 18/19;  Service: Cardiovascular;  Laterality: N/A;   • APPENDECTOMY     • CARDIAC CATHETERIZATION N/A 3/2/2020    Procedure: Left Heart Cath and coronary angiogram;  Surgeon: Wyatt Kwok MD;  Location: [facility] CATH INVASIVE LOCATION;  Service: Cardiovascular;  Laterality: N/A;   • CARDIAC CATHETERIZATION N/A 3/2/2020    Procedure: Saphenous Vein Graft;  Surgeon: Wyatt Kwok MD;  Location: [facility] CATH INVASIVE LOCATION;  Service: Cardiovascular;  Laterality: N/A;   • CARDIAC PACEMAKER PLACEMENT  2017   • CARDIAC SURGERY  1999    Bypass surgery   • HERNIA REPAIR         Social History     Socioeconomic History   • Marital status:      Spouse name: Not on file   • Number of children: Not on file   • Years of education: Not on file   • Highest education level: Not on  file   Tobacco Use   • Smoking status: Never Smoker   • Smokeless tobacco: Never Used   • Tobacco comment: CAFFEINE USE: 1 CUP COFFEE DAILY   Substance and Sexual Activity   • Alcohol use: No     Frequency: Never   • Drug use: No   • Sexual activity: Defer       Family History   Problem Relation Age of Onset   • No Known Problems Mother    • No Known Problems Father    • No Known Problems Sister    • No Known Problems Brother    • Malig Hyperthermia Neg Hx        Review of Systems   Constitution: Positive for malaise/fatigue. Negative for chills and fever.   Cardiovascular: Positive for dyspnea on exertion. Negative for chest pain, leg swelling, near-syncope, orthopnea, palpitations, paroxysmal nocturnal dyspnea and syncope.   Respiratory: Negative for cough and shortness of breath.    Musculoskeletal: Negative for joint pain, joint swelling and myalgias.        Left shoulder pain   Gastrointestinal: Negative for abdominal pain, diarrhea, melena, nausea and vomiting.   Genitourinary: Negative for frequency and hematuria.   Neurological: Negative for light-headedness, numbness, paresthesias and seizures.   Allergic/Immunologic: Negative.    All other systems reviewed and are negative.      Allergies   Allergen Reactions   • Sulfa Antibiotics Hives         Current Outpatient Medications:   •  acebutolol (SECTRAL) 200 MG capsule, TAKE ONE CAPSULE BY MOUTH TWICE A DAY, Disp: 180 capsule, Rfl: 2  •  clopidogrel (PLAVIX) 75 MG tablet, Take 1 tablet by mouth Daily for 90 days., Disp: 30 tablet, Rfl: 2  •  ELIQUIS 2.5 MG tablet tablet, TAKE ONE TABLET BY MOUTH EVERY 12 HOURS (Patient taking differently: Take 2.5 mg by mouth 2 (Two) Times a Day.), Disp: 180 tablet, Rfl: 3  •  furosemide (LASIX) 20 MG tablet, Take 1 tablet by mouth Daily for 30 days., Disp: 30 tablet, Rfl: 0  •  lisinopril (PRINIVIL,ZESTRIL) 20 MG tablet, TAKE ONE TABLET BY MOUTH DAILY, Disp: 90 tablet, Rfl: 2  •  melatonin 5 MG tablet tablet, Take 5 mg by  "mouth At Night As Needed., Disp: , Rfl:   •  pravastatin (PRAVACHOL) 20 MG tablet, Take 20 mg by mouth Every Night., Disp: , Rfl:   •  traMADol (ULTRAM) 50 MG tablet, Take 50 mg by mouth Every 6 (Six) Hours As Needed for Moderate Pain ., Disp: , Rfl:       Objective:     Vitals:    06/03/20 1127 06/03/20 1146   BP: 128/68 128/68   BP Location: Right arm Left arm   Patient Position: Sitting Sitting   Pulse: 70    Resp: 16    SpO2: 96%    Weight: 52.4 kg (115 lb 9.6 oz)    Height: 167.6 cm (66\")      Body mass index is 18.66 kg/m².    PHYSICAL EXAM:    Physical Exam   Constitutional: She is oriented to person, place, and time. She appears well-developed and well-nourished. No distress.   HENT:   Head: Normocephalic and atraumatic.   Eyes: Pupils are equal, round, and reactive to light.   Neck: No JVD present. No thyromegaly present.   Cardiovascular: Normal rate, regular rhythm and intact distal pulses.   Murmur heard.   Harsh midsystolic murmur is present with a grade of 1/6 at the upper right sternal border radiating to the neck.  Pulmonary/Chest: Effort normal. No respiratory distress. She has rales in the right lower field and the left lower field.   Abdominal: Soft. Bowel sounds are normal. She exhibits no distension. There is no splenomegaly or hepatomegaly. There is no tenderness.   Musculoskeletal: Normal range of motion. She exhibits no edema.   Neurological: She is alert and oriented to person, place, and time.   Skin: Skin is warm and dry. She is not diaphoretic. No erythema.   Bilateral groin sites clean, dry, and intact with no erythema or edema.   Psychiatric: She has a normal mood and affect. Her behavior is normal. Judgment normal.         ECG 12 Lead  Date/Time: 6/3/2020 11:54 AM  Performed by: Meliza Adams APRN  Authorized by: Meliza Adams APRN   Comparison: compared with previous ECG from 5/27/2020  Similar to previous ECG  Rhythm: atrial flutter and paced  Rate: normal  BPM: " 70    Clinical impression: abnormal EKG  Comments: Indication: Atrial fibrillation              Assessment:       Diagnosis Plan   1. Severe aortic stenosis     2. S/P TAVR (transcatheter aortic valve replacement)     3. Permanent atrial fibrillation  ECG 12 Lead     Orders Placed This Encounter   Procedures   • ECG 12 Lead     This order was created via procedure documentation          Plan:       1.  Aortic stenosis status post TAVR.  Postoperative echocardiogram revealed a well-functioning TAVR valve.  She denies any symptoms of heart failure.  She appears euvolemic.  She will have a repeat echocardiogram in 1 month.      2.  Atrial fibrillation.  She is 100% paced.  Her rate is well controlled.  She is anticoagulated on Eliquis.      Overall I think she is stable and doing well from a cardiac standpoint.  She denies any symptoms of angina or heart failure.  I am not going to make any changes, and she will follow-up with Dr. Ricci on 7/2/2020 or sooner if needed.      As always, it has been a pleasure to participate in your patient's care.      Sincerely,         HERMINIO Hobbs

## 2020-06-02 NOTE — H&P
Name: Olga Curtis ADMIT: 2020   : 1935  PCP: Tigre Duran MD    MRN: [id number] LOS: 1 days   AGE/SEX: 85 y.o. female  ROOM: 220/     CC: Aortic Stenosis    Subjective     History of Presenting Illness  Patient is a 85 y.o. female presents with a history of aortic stenosis, CAD s/p CABG in , hyperlipidemia, HTN, and SSS s/p PPM. In March she was undergoing a CT scan for shoulder surgery work up. While there she developed sudden severe shortness of breath. She rested and the symptoms resolved. She went home and then presented back to the ER at [facility] because the shortness of air had returned. She was admitted for further work up. She underwent a 2D echo which showed severe aortic stenosis, and moderate tricuspid regurgitation. Dr. Sandoval evaluated her at that time and recommended TAVR due to the patient risk and frailty. She underwent a TAVR CTA and it was thought to have good femoral access. She is here for scheduled transfemoral transcatheter aortic valve replacement.     Past Medical History:   Diagnosis Date   • Aortic stenosis 10/28/2014   • Atrial premature complex 2015   • Chronic coronary artery disease 10/28/2014   • Dyslipidemia 10/28/2014   • Hyperlipidemia    • Hypertension 10/28/2014   • Nonsustained ventricular tachycardia (CMS/HCC) 2015   • Shortness of breath 2016   • Shoulder pain, left      Past Surgical History:   Procedure Laterality Date   • AORTIC VALVE REPAIR/REPLACEMENT N/A 2020    Procedure: TTE TRANSFEMORAL TRANSCATHETER AORTIC VALVE REPLACEMENT PERCUTANEOUS APPROACH;  Surgeon: J Carlos Leon MD;  Location: [facility] OR ;  Service: Cardiothoracic;  Laterality: N/A;   • AORTIC VALVE REPAIR/REPLACEMENT N/A 2020    Procedure: Transfemoral Transcatheter Aortic Valve Replacement w/Intra Op TTE and Possible Open Rescue Surgery;  Surgeon: Vasiliy Bruce MD;  Location: [facility] OR ;  Service:  Cardiovascular;  Laterality: N/A;   • APPENDECTOMY     • CARDIAC CATHETERIZATION N/A 3/2/2020    Procedure: Left Heart Cath and coronary angiogram;  Surgeon: Wyatt Kwok MD;  Location: [facility] CATH INVASIVE LOCATION;  Service: Cardiovascular;  Laterality: N/A;   • CARDIAC CATHETERIZATION N/A 3/2/2020    Procedure: Saphenous Vein Graft;  Surgeon: Wyatt Kwok MD;  Location:  [facility] CATH INVASIVE LOCATION;  Service: Cardiovascular;  Laterality: N/A;   • CARDIAC PACEMAKER PLACEMENT  2017   • CARDIAC SURGERY  1999    Bypass surgery   • HERNIA REPAIR       Family History   Problem Relation Age of Onset   • No Known Problems Mother    • No Known Problems Father    • No Known Problems Sister    • No Known Problems Brother    • Malig Hyperthermia Neg Hx      Social History     Tobacco Use   • Smoking status: Never Smoker   • Smokeless tobacco: Never Used   Substance Use Topics   • Alcohol use: No     Frequency: Never   • Drug use: No     No medications prior to admission.     Allergies:  Sulfa antibiotics    Review of Systems   Constitutional: Positive for activity change and fatigue.   HENT: Negative.    Eyes: Negative.    Respiratory: Positive for shortness of breath.    Cardiovascular: Positive for chest pain and leg swelling. Negative for palpitations.   Endocrine: Negative.    Genitourinary: Negative.    Musculoskeletal: Negative.    Skin: Negative.    Allergic/Immunologic: Negative.    Neurological: Negative for dizziness, seizures, syncope and weakness.   Hematological: Negative.    Psychiatric/Behavioral: Negative.         Objective      Physical Exam   Constitutional: She is oriented to person, place, and time. She appears well-developed and well-nourished.   HENT:   Head: Normocephalic and atraumatic.   Eyes: Pupils are equal, round, and reactive to light.   Neck: Normal range of motion. Neck supple. No JVD present. No thyromegaly present.   Cardiovascular: Normal rate, regular rhythm and intact distal pulses.    Murmur heard.  Paced on tele monitor   Pulmonary/Chest: Effort normal and breath sounds normal.   Abdominal: Soft. Bowel sounds are normal.   Musculoskeletal: Normal range of motion. She exhibits no edema.   Neurological: She is alert and oriented to person, place, and time.   Skin: Skin is warm and dry. Capillary refill takes less than 2 seconds. No rash noted.   Psychiatric: She has a normal mood and affect. Her behavior is normal. Judgment and thought content normal.       Results Review:  I reviewed the patient's new clinical results.      Assessment & Plan  - severe aortic valve stenosis   - chronic diastolic congestive heart failure  - CAD s/p CABG in 1999  - hyperlipidemia- statin therapy  - hypertension- ACE  - chronic atrial fibrillation  - hx SSS s/p PPM  - osteoarthritis    Plan for TAVR on 5/26/20    I discussed the patients findings and my recommendations with patient and family.    HERMINIO Swan  06/02/20  11:20     Patient examined, all findings confirmed. Chart reviewed. All images independently reviewed and interpreted by me. Plan for TF TAVR this admission.

## 2020-06-02 NOTE — OUTREACH NOTE
CT Surgery Week 1 Survey      Responses   [facility] patient discharged from?  [place]   Does the patient have one of the following disease processes/diagnoses(primary or secondary)?  Cardiothoracic surgery   Is there a successful TCM telephone encounter documented?  Yes   Call end time  0856   Is the patient having any side effects they believe may be caused by any medication additions or changes?  No   Does the patient have all medications related to this admission filled (includes all antibiotics, pain medications, cardiac medications, etc.)  Yes   Is the patient taking all medications as directed (includes completed medication regime)?  Yes   Does the patient have a primary care provider?   Yes   Does the patient have an appointment scheduled with their C/T surgeon?  Yes   Comments regarding PCP  She has an appt on Wednesday.   Has the patient kept scheduled appointments due by today?  N/A   Has home health visited the patient within 72 hours of discharge?  N/A   Psychosocial issues?  No   Did the patient receive a copy of their discharge instructions?  Yes   Nursing interventions  Reviewed instructions with patient   What is the patient's perception of their health status since discharge?  Improving   Nursing interventions  Nurse provided patient education   Is the patient /caregiver able to teach back basic post-op care?  Continue use of incentive spirometry at least 1 week post discharge, Practice 'cough and deep breath', No tub bath, swimming, or hot tub until instructed by MD   Is the patient/caregiver able to teach back signs and symptoms of incisional infection?  Increased redness, swelling or pain at the incisonal site, Incisional warmth, Fever, Increased drainage or bleeding, Pus or odor from incision   Is the patient/caregiver able to teach back steps to recovery at home?  Set small, achievable goals for return to baseline health   Additional teach back comments  She states she is doing great,  quick call.    Week 1 call completed?  Yes            Kadie Garcia RN

## 2020-06-04 NOTE — TELEPHONE ENCOUNTER
Spoke to patient about cardiac rehab secondary to referral received from Dr Ricci.  Pt confirms she will be continuing with her cardiologist, Dr. Kwok, and has an appt with him on 6/25.  She will discuss attending cardiac rehab at [facility] then with him.

## 2020-06-05 NOTE — TELEPHONE ENCOUNTER
Patient called and stated it will probably be about 6 months tell she can have her shoulder replacement surgery due to just having heart surgery and being on a blood thinner. She said that she would call our office and let us know when she has clearence from her heart DrYara To have her surgery.

## 2020-06-08 NOTE — OUTREACH NOTE
CT Surgery Week 2 Survey      Responses   [facility] patient discharged from?  [place]   Does the patient have one of the following disease processes/diagnoses(primary or secondary)?  Cardiothoracic surgery   Week 2 attempt successful?  Yes   Call start time  1250   Call end time  1252   Discharge diagnosis  TTE TRANSFEMORAL TRANSCATHETER AORTIC VALVE REPLACEMENT PERCUTANEOUS APPROACH   Meds reviewed with patient/caregiver?  Yes   Is the patient taking all medications as directed (includes completed medication regime)?  Yes   Has the patient kept scheduled appointments due by today?  N/A   What is the patient's perception of their health status since discharge?  Improving   Week 2 call completed?  Yes          Naida Maldonado RN

## 2020-06-16 NOTE — OUTREACH NOTE
CT Surgery Week 3 Survey      Responses   [facility] patient discharged from?  [place]   Does the patient have one of the following disease processes/diagnoses(primary or secondary)?  Cardiothoracic surgery   Week 3 attempt successful?  Yes   Call start time  1133   Call end time  1138   Meds reviewed with patient/caregiver?  Yes   Is the patient taking all medications as directed (includes completed medication regime)?  Yes   Has the patient kept scheduled appointments due by today?  Yes   What is the patient's perception of their health status since discharge?  Improving   Week 3 call completed?  Yes   Wrap up additional comments  Doing well.          Sammi Elizabeth, RN

## 2020-06-25 NOTE — PROGRESS NOTES
Subjective:     Encounter Date:06/25/2020      Patient ID: Olga Curtis is a 85 y.o. female.    Chief Complaint:  History of Present Illness 85-year-old white female with history of severe aortic stenosis status post TAVR history of coronary disease hypertension hyperlipidemia and history of sick sinus syndrome status post pacemaker placement presents to my office for follow-up.  Patient recently had TAVR and is doing well without any symptoms of chest pain or shortness of breath.  No complains of any PND orthopnea.  No palpitation dizziness syncope or swelling of the feet.  She has been taking her medicines regularly.  She does not smoke.    The following portions of the patient's history were reviewed and updated as appropriate: allergies, current medications, past family history, past medical history, past social history, past surgical history and problem list.  Past Medical History:   Diagnosis Date   • Aortic stenosis 10/28/2014   • Atrial premature complex 12/18/2015   • Chronic coronary artery disease 10/28/2014   • Dyslipidemia 10/28/2014   • Hyperlipidemia    • Hypertension 10/28/2014   • Nonsustained ventricular tachycardia (CMS/HCC) 12/18/2015   • Shortness of breath 9/12/2016   • Shoulder pain, left      Past Surgical History:   Procedure Laterality Date   • AORTIC VALVE REPAIR/REPLACEMENT N/A 5/26/2020    Procedure: TTE TRANSFEMORAL TRANSCATHETER AORTIC VALVE REPLACEMENT PERCUTANEOUS APPROACH;  Surgeon: J Carlos Leon MD;  Location: [facility] 18/19;  Service: Cardiothoracic;  Laterality: N/A;   • AORTIC VALVE REPAIR/REPLACEMENT N/A 5/26/2020    Procedure: Transfemoral Transcatheter Aortic Valve Replacement w/Intra Op TTE and Possible Open Rescue Surgery;  Surgeon: Vasiliy Bruce MD;  Location: [facility] OR 18/19;  Service: Cardiovascular;  Laterality: N/A;   • APPENDECTOMY     • CARDIAC CATHETERIZATION N/A 3/2/2020    Procedure: Left Heart Cath and coronary angiogram;   "Surgeon: Wyatt Kwok MD;  Location: [facility] CATH INVASIVE LOCATION;  Service: Cardiovascular;  Laterality: N/A;   • CARDIAC CATHETERIZATION N/A 3/2/2020    Procedure: Saphenous Vein Graft;  Surgeon: Wyatt Kwok MD;  Location: [facility] CATH INVASIVE LOCATION;  Service: Cardiovascular;  Laterality: N/A;   • CARDIAC PACEMAKER PLACEMENT  2017   • CARDIAC SURGERY  1999    Bypass surgery   • HERNIA REPAIR       /80 (BP Location: Left arm, Patient Position: Sitting)   Pulse 53   Ht 167.6 cm (66\")   Wt 52.2 kg (115 lb)   SpO2 98%   BMI 18.56 kg/m²   Family History   Problem Relation Age of Onset   • No Known Problems Mother    • No Known Problems Father    • No Known Problems Sister    • No Known Problems Brother    • Malig Hyperthermia Neg Hx    Current outpatient and discharge medications have been reconciled for the patient.  Reviewed by: Wyatt Kwok MD       Current Outpatient Medications:   •  acebutolol (SECTRAL) 200 MG capsule, TAKE ONE CAPSULE BY MOUTH TWICE A DAY, Disp: 180 capsule, Rfl: 2  •  clopidogrel (PLAVIX) 75 MG tablet, Take 1 tablet by mouth Daily for 90 days., Disp: 30 tablet, Rfl: 2  •  ELIQUIS 2.5 MG tablet tablet, TAKE ONE TABLET BY MOUTH EVERY 12 HOURS (Patient taking differently: Take 2.5 mg by mouth 2 (Two) Times a Day.), Disp: 180 tablet, Rfl: 3  •  lisinopril (PRINIVIL,ZESTRIL) 20 MG tablet, TAKE ONE TABLET BY MOUTH DAILY, Disp: 90 tablet, Rfl: 2  •  melatonin 5 MG tablet tablet, Take 5 mg by mouth At Night As Needed., Disp: , Rfl:   •  pravastatin (PRAVACHOL) 20 MG tablet, Take 20 mg by mouth Every Night., Disp: , Rfl:   •  traMADol (ULTRAM) 50 MG tablet, Take 50 mg by mouth Every 6 (Six) Hours As Needed for Moderate Pain ., Disp: , Rfl:   Allergies   Allergen Reactions   • Sulfa Antibiotics Hives     Social History     Socioeconomic History   • Marital status:      Spouse name: Not on file   • Number of children: Not on file   • Years of education: Not on file   • Highest " education level: Not on file   Tobacco Use   • Smoking status: Never Smoker   • Smokeless tobacco: Never Used   • Tobacco comment: CAFFEINE USE: 1 CUP COFFEE DAILY   Substance and Sexual Activity   • Alcohol use: No     Frequency: Never   • Drug use: No   • Sexual activity: Defer     Review of Systems   Constitution: Positive for malaise/fatigue. Negative for fever.   Cardiovascular: Negative for chest pain, dyspnea on exertion and palpitations.   Respiratory: Negative for cough and shortness of breath.    Hematologic/Lymphatic: Bruises/bleeds easily.   Skin: Negative for rash.   Gastrointestinal: Negative for abdominal pain, nausea and vomiting.   Neurological: Negative for focal weakness and headaches.   All other systems reviewed and are negative.             Objective:     Physical Exam   Constitutional: She appears well-developed and well-nourished.   HENT:   Head: Normocephalic and atraumatic.   Eyes: Conjunctivae are normal. No scleral icterus.   Neck: Normal range of motion. Neck supple. No JVD present. Carotid bruit is not present.   Cardiovascular: Normal rate, regular rhythm, S1 normal, S2 normal, normal heart sounds and intact distal pulses. PMI is not displaced.   Pulmonary/Chest: Effort normal and breath sounds normal. She has no wheezes. She has no rales.   Abdominal: Soft. Bowel sounds are normal.   Neurological: She is alert. She has normal strength.   Skin: Skin is warm and dry. No rash noted.     Procedures    Lab Review:       Assessment:          Diagnosis Plan   1. Nonrheumatic aortic valve stenosis     2. S/P TAVR (transcatheter aortic valve replacement)     3. Chronic coronary artery disease     4. Essential hypertension     5. Presence of cardiac pacemaker     6. Sick sinus syndrome (CMS/HCC)     7. Permanent atrial fibrillation (CMS/HCC)            Plan:     Patient has history of severe aortic stenosis and is status post TAVR and is currently stable  Patient also has history of coronary  disease and is currently stable without any angina  Patient's blood pressure and heart rate stable  Patient lipids are followed by primary care doctor  Patient has history of atrial fibrillation is currently on Eliquis  Patient also has sick sinus syndrome and status post pacemaker placement.  Patient's pacemaker is working very well.

## 2020-06-25 NOTE — OUTREACH NOTE
CT Surgery Week 4 Survey      Responses   [facility] patient discharged from?  [place]   Does the patient have one of the following disease processes/diagnoses(primary or secondary)?  Cardiothoracic surgery   Week 4 attempt successful?  Yes   Call start time  1424   Call end time  1427   Meds reviewed with patient/caregiver?  Yes   Is the patient having any side effects they believe may be caused by any medication additions or changes?  No   Is the patient taking all medications as directed (includes completed medication regime)?  Yes   Has the patient kept scheduled appointments due by today?  Yes   Is the patient still receiving Home Health Services?  N/A   What is the patient's perception of their health status since discharge?  Improving   Nursing interventions  Nurse provided patient education   Is the patient/caregiver able to teach back normal signs of recovery?  Depression or irritability, Nausea and lack of appetite, Constipation, Pain or discomfort at incisional site   Nursing interventions  Reassured on normal signs of recovery   Is the patient/caregiver able to teach back steps to recovery at home?  Set small, achievable goals for return to baseline health, Rest and rebuild strength, gradually increase activity, Make a list of questions for surgeon's appointment   Is the patient/caregiver able to teach back the hierarchy of who to call/visit for symptoms/problems? PCP, Specialist, Home health nurse, Urgent Care, ED, 911  Yes   Week 4 Call Completed?  Yes          Cesilia Arellano LPN

## 2020-07-02 NOTE — PROGRESS NOTES
[facility] Follow Up Office Note     Encounter Date:20  Patient:Olga Curtis  :1935  MRN:[id number]      Chief Complaint:   Chief Complaint   Patient presents with   • TAVR F/U     History of Presenting Illness:      Ms. Curtis is a is a 85 y.o. woman with past medical history notable for severe symptomatic nonrheumatic aortic stenoses, coronary artery disease status post CABG, chronic diastolic congestive heart failure stage III, hypertension, mixed hyperlipidemia, chronic atrial fibrillation on anticoagulation, complete heart block status post permanent pacemaker, and clinical frailty who presents for follow-up after recent transcatheter aortic valve replacement therapy.  She recently underwent transcatheter aortic valve replacement on 2020.  Her hospital course was uncomplicated and she was able to be discharged home after her procedure without any issues.    Since going home patient is doing excellent.  Before she was short of breath even walking across the room but now is completely independent.  Her only limitation is actually her shoulder pain which is how her aortic stenosis was found in the beginning as she was planning on having shoulder repair.  She is anxious to get this done.  From a cardiac perspective she is doing great.  She was able to be weaned off of Lasix and has maintained stable weight.  She does have an effusion noted on her echocardiogram but in general is breathing much better and denies any significant limitations in her daily activities.  She denies any chest discomfort.  She denies any orthopnea.  She denies any lower extremity swelling.    She has been maintained on anticoagulation for her atrial fibrillation and denies any bleeding issues.  She denies any TIA or strokelike symptoms.      Review of Systems:  Review of Systems   Constitution: Negative.   HENT: Negative.    Eyes: Negative.    Cardiovascular: Negative.    Respiratory: Negative.    Endocrine:  Negative.    Hematologic/Lymphatic: Negative.    Skin: Negative.    Musculoskeletal: Positive for joint pain.   Gastrointestinal: Negative.    Genitourinary: Negative.    Neurological: Negative.    Psychiatric/Behavioral: Negative.    Allergic/Immunologic: Negative.        Current Outpatient Medications on File Prior to Visit   Medication Sig Dispense Refill   • acebutolol (SECTRAL) 200 MG capsule TAKE ONE CAPSULE BY MOUTH TWICE A  capsule 2   • clopidogrel (PLAVIX) 75 MG tablet Take 1 tablet by mouth Daily for 90 days. 30 tablet 2   • ELIQUIS 2.5 MG tablet tablet TAKE ONE TABLET BY MOUTH EVERY 12 HOURS (Patient taking differently: Take 2.5 mg by mouth 2 (Two) Times a Day.) 180 tablet 3   • lisinopril (PRINIVIL,ZESTRIL) 20 MG tablet TAKE ONE TABLET BY MOUTH DAILY 90 tablet 2   • melatonin 5 MG tablet tablet Take 5 mg by mouth At Night As Needed.     • pravastatin (PRAVACHOL) 20 MG tablet Take 20 mg by mouth Every Night.     • traMADol (ULTRAM) 50 MG tablet Take 50 mg by mouth Every 6 (Six) Hours As Needed for Moderate Pain .       No current facility-administered medications on file prior to visit.        Allergies   Allergen Reactions   • Sulfa Antibiotics Hives       Past Medical History:   Diagnosis Date   • Aortic stenosis 10/28/2014   • Atrial premature complex 12/18/2015   • Chronic coronary artery disease 10/28/2014   • Dyslipidemia 10/28/2014   • Hyperlipidemia    • Hypertension 10/28/2014   • Nonsustained ventricular tachycardia (CMS/HCC) 12/18/2015   • Shortness of breath 9/12/2016   • Shoulder pain, left        Past Surgical History:   Procedure Laterality Date   • AORTIC VALVE REPAIR/REPLACEMENT N/A 5/26/2020    Procedure: TTE TRANSFEMORAL TRANSCATHETER AORTIC VALVE REPLACEMENT PERCUTANEOUS APPROACH;  Surgeon: J Carlos Leon MD;  Location: [facility] 18/19;  Service: Cardiothoracic;  Laterality: N/A;   • AORTIC VALVE REPAIR/REPLACEMENT N/A 5/26/2020    Procedure: Transfemoral  "Transcatheter Aortic Valve Replacement w/Intra Op TTE and Possible Open Rescue Surgery;  Surgeon: Vasiliy Bruce MD;  Location: [facility] OR 18/19;  Service: Cardiovascular;  Laterality: N/A;   • APPENDECTOMY     • CARDIAC CATHETERIZATION N/A 3/2/2020    Procedure: Left Heart Cath and coronary angiogram;  Surgeon: Wyatt Kwko MD;  Location:  [facility] CATH INVASIVE LOCATION;  Service: Cardiovascular;  Laterality: N/A;   • CARDIAC CATHETERIZATION N/A 3/2/2020    Procedure: Saphenous Vein Graft;  Surgeon: Wyatt Kwok MD;  Location: [facility] CATH INVASIVE LOCATION;  Service: Cardiovascular;  Laterality: N/A;   • CARDIAC PACEMAKER PLACEMENT  2017   • CARDIAC SURGERY  1999    Bypass surgery   • HERNIA REPAIR         Social History     Socioeconomic History   • Marital status:      Spouse name: Not on file   • Number of children: Not on file   • Years of education: Not on file   • Highest education level: Not on file   Tobacco Use   • Smoking status: Never Smoker   • Smokeless tobacco: Never Used   • Tobacco comment: CAFFEINE USE: 1 CUP COFFEE DAILY   Substance and Sexual Activity   • Alcohol use: No     Frequency: Never   • Drug use: No   • Sexual activity: Defer       Family History   Problem Relation Age of Onset   • No Known Problems Mother    • No Known Problems Father    • No Known Problems Sister    • No Known Problems Brother    • Malig Hyperthermia Neg Hx        The following portions of the patient's history were reviewed and updated as appropriate: allergies, current medications, past family history, past medical history, past social history, past surgical history and problem list.       Objective:       Vitals:    07/02/20 1144   BP: 120/62   BP Location: Left arm   Patient Position: Sitting   Pulse: 70   Weight: 51.7 kg (114 lb)   Height: 167.6 cm (66\")         Physical Exam:  Constitutional: Well appearing, frail, no acute distress   HENT: Oropharynx clear and membrane moist  Eyes: " Normal conjunctiva, no sclera icterus.  Neck: Supple, no carotid bruit bilaterally.  Cardiovascular: Regular rate and rhythm, late peaking systolic murmur murmur, no lower extremity edema.  Pulmonary: Normal respiratory effort, no lung sounds, no wheezing.  Abdominal: Soft, nontender, no hepatosplenomegaly, liver is non-pulsatile.  Neurological: Alert and orient x 3.   Skin: Warm, dry, no ecchymosis, no rash.  Psych: Appropriate mood and affect. Normal judgment and insight.       Lab Results   Component Value Date    GLUCOSE 69 05/27/2020    BUN 20 05/27/2020    CREATININE 0.94 05/27/2020    EGFRIFNONA 57 (L) 05/27/2020    BCR 21.3 05/27/2020    K 4.4 05/27/2020    CO2 24.0 05/27/2020    CALCIUM 9.2 05/27/2020    ALBUMIN 4.00 05/21/2020    LABIL2 1.2 08/04/2017    AST 17 05/21/2020    ALT 7 05/21/2020       Lab Results   Component Value Date    WBC 9.23 05/27/2020    HGB 11.3 (L) 05/27/2020    HCT 32.1 (L) 05/27/2020    MCV 91.5 05/27/2020     05/27/2020       Lab Results   Component Value Date    TROPONINT <0.010 02/29/2020       Lab Results   Component Value Date    PROBNP 2,866.0 (H) 05/21/2020       Lab Results   Component Value Date    CHOL 127 08/05/2017     Lab Results   Component Value Date    TRIG 90 08/05/2017     Lab Results   Component Value Date    HDL 49 08/05/2017     Lab Results   Component Value Date    LDL 62 08/05/2017       Lab Results   Component Value Date    TSH 2.26 03/22/2017           ECG 12 Lead  Date/Time: 7/2/2020 12:11 PM  Performed by: Kevin Ricci MD  Authorized by: Kevin Ricci MD   Comparison: compared with previous ECG from 6/3/2020  Similar to previous ECG  Rhythm: atrial fibrillation  Pacing: ventricular pacing  Clinical impression: abnormal EKG          Echocardiogram 7/2/2020 with images reviewed by myself and final report pending:  · Normal left ventricular systolic function with ejection fraction greater than 70%  · Moderate concentric hypertrophy of the  left ventricle  · Normal appearing 23 mm sapient 3 aortic valve bioprostheses with a mean gradient of 13 mmHg across the aortic valve and calculated aortic valve area of 1.3 cm2 with dimensionless index of 0.4.  Trace paravalvular leak.    Echocardiogram 5/27/2020 with images reviewed by myself:  · Left ventricular systolic function is hyperdynamic (EF > 70%). Calculated EF = 85%. Estimated EF was in agreement with the calculated EF. Estimated EF = 85%.  · All left ventricular wall segments contract normally. The left ventricular cavity is small. Left ventricular wall thickness is consistent with severe concentric hypertrophy. The septum is sigmoid without systolic anterior motion of the anterior mitral valve leaflet.. Left ventricular diastolic function was indeterminate. Elevated left atrial pressure.  · Right ventricular cavity is moderately dilated. Electronic lead present in the ventricle.  · Left atrial cavity size is severely dilated. Right atrial cavity size is severely dilated. An electronic lead is present in the right atrium.  · There is a 23 mm TAVR valve present. The aortic valve peak and mean gradients are within defined limits. Perivalvular regurgitation is present. The aortic valve leaflets are not well seen by 2D imaging. There is a mild to moderate degree of paravalvular regurgitation noted from 9:00 to 11 o'clock position. There is also thickening of the aortic valve in the region of the original right coronary cusp, cannot rule out possible aortic wall hematoma. This is best noted on the short axis views  · Mild mitral valve regurgitation is present.  · Moderate tricuspid valve regurgitation is present. Estimated right ventricular systolic pressure from tricuspid regurgitation is markedly elevated (>55 mmHg). Calculated right ventricular systolic pressure from tricuspid regurgitation is 60 mmHg.  · Thickening of the aortic root as described above.     TAVR 5/26/2020:  · Successful placement of 23  mm Rachel 3 valve with trace PVL       Assessment:          Diagnosis Plan   1. Nonrheumatic aortic valve stenosis     2. S/P TAVR (transcatheter aortic valve replacement)     3. Permanent atrial fibrillation (CMS/[facility])     4. Chronic diastolic congestive heart failure (CMS/HCC)     5. Presence of cardiac pacemaker            Plan:       Ms. Curtis is a is a 85 y.o. woman with past medical history notable for severe symptomatic nonrheumatic aortic stenoses, coronary artery disease status post CABG, chronic diastolic congestive heart failure stage III, hypertension, mixed hyperlipidemia, chronic atrial fibrillation on anticoagulation, complete heart block status post permanent pacemaker, and clinical frailty who presents for one-month follow-up after her recent TAVR.  Overall she is doing great with significant improvement in her overall clinical and functional status.  She has NYHA Class II symptoms and her main limitations seem to be related to her shoulder rather than her heart.  No changes are needed at this time and will plan on seeing back in one year with echo.  She will continue to follow with her primary cardiologist Dr. Kwok for her cardiac care.     Aortic stenosis, non-reumatic:  · S/P 23 mm Rachel 3 aortic valve via L TF approach  · On Elquis  · 30-day follow-up echo demonstrates normal valve gradients with trace paravalvular leak.       Chronic diastolic heart failure:  · Continue to monitor volume status and continue diuretics as needed     Pulmonary hypertension:  · Improving post TAVR     Permanent atrial fibrillation:  · Continue eliquis      Complete heart block:  · Lead threshold were checked post TAVR with normal function     Follow-up:  1 year with echo          Kevin Ricci MD  [facility]  07/02/20  12:12

## 2020-07-27 PROBLEM — M19.012 PRIMARY LOCALIZED OSTEOARTHROSIS OF LEFT SHOULDER REGION: Status: ACTIVE | Noted: 2020-01-01

## 2020-07-27 NOTE — PROGRESS NOTES
"     Patient ID: Olga Curtis is a 85 y.o. female.  Left shoulder pain  Gena is an 81 forward female with longstanding left shoulder pain.  She was scheduled to have shoulder arthroplasty in the spring but the pandemic it as well as she had some cardiac issues.  She is currently doing well from a cardiac standpoint.  She continues on blood thinners and anticipates being able to come off of them in August.  Her shoulder hurts significantly and is an 8/10.  Swelling has improved though in the shoulder    Review of Systems:    Left shoulder pain  Denies chest pain    Objective:    /80   Pulse 70   Ht 167.6 cm (66\")   Wt 51.7 kg (114 lb)   BMI 18.40 kg/m²     Physical Examination:     She is a pleasant female in no distress. She is alert and oriented x3 and appears her stated age.  Left shoulder demonstrates no scars with supraspinatus infraspinatus atrophy and a trace effusion.  No redness no warmth.  Passive elevation 80 degrees abduction 20 degrees external rotation 10 degrees.  Deltoid function is intact.  Belly press and liftoff are 3/5.Sensory and motor exam are intact all distributions. Radial pulse is palpable and capillary refill is less than two seconds to all digits    Imaging:       Assessment:    Left shoulder degenerative disease    Plan:  Further treatment was again discussed and she would like to proceed with reverse total shoulder arthroplasty  Discussed the risks including bleeding, scar, infection, stiffness, nerve artery vein tendon damage, instability, fracture dvt, loss of life or limb. Issues of scapular notching and component revision were discussed. Questions were answered and addressed          Disclaimer: Please note that areas of this note were completed with computer voice recognition software.  Quite often unanticipated grammatical, syntax, homophones, and other interpretive errors are inadvertently transcribed by the computer software. Please excuse any errors that have escaped " final proofreading.

## 2020-07-29 NOTE — TELEPHONE ENCOUNTER
Dr. Barcenas  Phone [phone]  Fax [phone]  Left reverse total shoulder on 9/1/2020  Ok to hold blood thinners?  LOV 6/25/2020

## 2020-08-03 NOTE — TELEPHONE ENCOUNTER
Patient can have surgery in October and can stop Plavix 1 week before and does not need to restart.  She can only be on aspirin 81 mg once a day from then.

## 2020-08-03 NOTE — TELEPHONE ENCOUNTER
Patient wanting to know why Dr. Kwok said she could schedule surgery 90 days , then changed it to 6 months after surgery?

## 2020-09-01 NOTE — TELEPHONE ENCOUNTER
Spoke with patient she called and Anna advised her she was in the donut hole. Pt understands she will pay more at this time.

## 2020-10-05 NOTE — H&P
Patient Care Team:  Tigre Duran MD as PCP - General  Tigre Duran MD as PCP - Family Medicine  Tigre Duran MD as PCP - Claims Attributed  Wyatt Kwok MD as Consulting Physician (Cardiology)    Chief complaint left shoulder pain  Olga is an 81 forward female here with longstanding left shoulder pain presented for shoulder arthroplasty      Review of Systems   Cardiovascular: Negative for chest pain.   Musculoskeletal: Positive for arthralgias.        Past Medical History:   Diagnosis Date   • Aortic stenosis 10/28/2014   • Atrial premature complex 12/18/2015   • Chronic coronary artery disease 10/28/2014   • Dyslipidemia 10/28/2014   • Hyperlipidemia    • Hypertension 10/28/2014   • Nausea 09/2020   • Nonsustained ventricular tachycardia (CMS/HCC) 12/18/2015   • Partial paralysis of right hand (CMS/HCC)     states right arm only.  Cannot have sticks in arm, hand only   • Shoulder pain, left      Past Surgical History:   Procedure Laterality Date   • AORTIC VALVE REPAIR/REPLACEMENT N/A 5/26/2020    Procedure: TTE TRANSFEMORAL TRANSCATHETER AORTIC VALVE REPLACEMENT PERCUTANEOUS APPROACH;  Surgeon: J Carlos Leon MD;  Location: [facility] OR 18/19;  Service: Cardiothoracic;  Laterality: N/A;   • AORTIC VALVE REPAIR/REPLACEMENT N/A 5/26/2020    Procedure: Transfemoral Transcatheter Aortic Valve Replacement w/Intra Op TTE and Possible Open Rescue Surgery;  Surgeon: Vasiliy Bruce MD;  Location: [facility] OR 18/19;  Service: Cardiovascular;  Laterality: N/A;   • APPENDECTOMY     • CARDIAC CATHETERIZATION N/A 3/2/2020    Procedure: Left Heart Cath and coronary angiogram;  Surgeon: Wyatt wKok MD;  Location: [facility] CATH INVASIVE LOCATION;  Service: Cardiovascular;  Laterality: N/A;   • CARDIAC CATHETERIZATION N/A 3/2/2020    Procedure: Saphenous Vein Graft;  Surgeon: Wyatt Kwok MD;  Location: [facility] CATH INVASIVE LOCATION;  Service: Cardiovascular;  Laterality: N/A;   •  CARDIAC PACEMAKER PLACEMENT  2017   • CARDIAC SURGERY  1999    Bypass surgery   • EYE SURGERY Bilateral     cat ext   • HERNIA REPAIR       Family History   Problem Relation Age of Onset   • No Known Problems Mother    • No Known Problems Father    • No Known Problems Sister    • No Known Problems Brother    • Malig Hyperthermia Neg Hx      Social History     Tobacco Use   • Smoking status: Never Smoker   • Smokeless tobacco: Never Used   • Tobacco comment: CAFFEINE USE: 1 CUP COFFEE DAILY   Substance Use Topics   • Alcohol use: No     Frequency: Never   • Drug use: No     No medications prior to admission.     Allergies:  Sulfa antibiotics    Objective      Vital Signs       She is a pleasant female in no distress. She is alert and oriented x3 and appears her stated age.  Left shoulder demonstrates no scars with supraspinatus infraspinatus atrophy and a trace effusion.  No redness no warmth.  Passive elevation 80 degrees abduction 20 degrees external rotation 10 degrees.  Deltoid function is intact.  Belly press and liftoff are 3/5.Sensory and motor exam are intact all distributions. Radial pulse is palpable and capillary refill is less than two seconds to all digits     Imaging:         Assessment:    Left shoulder degenerative disease     Plan:  Further treatment was again discussed and she would like to proceed with reverse total shoulder arthroplasty  Discussed the risks including bleeding, scar, infection, stiffness, nerve artery vein tendon damage, instability, fracture dvt, loss of life or limb. Issues of scapular notching and component revision were discussed. Questions were answered and addressed

## 2020-10-06 PROBLEM — M19.012 DJD OF LEFT SHOULDER: Status: ACTIVE | Noted: 2020-01-01

## 2020-10-06 NOTE — ANESTHESIA PROCEDURE NOTES
Airway  Urgency: elective    Date/Time: 10/6/2020 11:03 AM  Airway not difficult    General Information and Staff    Patient location during procedure: OR    Indications and Patient Condition  Indications for airway management: airway protection    Preoxygenated: yes  MILS maintained throughout  Mask difficulty assessment: 2 - vent by mask + OA or adjuvant +/- NMBA    Final Airway Details  Final airway type: endotracheal airway      Successful airway: ETT  Cuffed: yes   Successful intubation technique: direct laryngoscopy  Facilitating devices/methods: intubating stylet  Endotracheal tube insertion site: oral  Blade: Shonda  Blade size: 3  ETT size (mm): 7.0  Cormack-Lehane Classification: grade IIa - partial view of glottis  Placement verified by: chest auscultation and capnometry   Measured from: lips  ETT/EBT  to lips (cm): 20  Number of attempts at approach: 1  Assessment: lips, teeth, and gum same as pre-op and atraumatic intubation

## 2020-10-06 NOTE — ANESTHESIA POSTPROCEDURE EVALUATION
Patient: Olga Curtis    Procedure Summary     Date: 10/06/20 Room / Location: [facility] OR 08 / [facility] MAIN OR    Anesthesia Start: 1050 Anesthesia Stop: 1251    Procedure: TOTAL SHOULDER REVERSE ARTHROPLASTY (Left Shoulder) Diagnosis:       Primary localized osteoarthrosis of left shoulder region      (Primary localized osteoarthrosis of left shoulder region [M19.012])    Surgeon: Oli Barcenas MD Provider: Bryce Talavera MD    Anesthesia Type: general with block ASA Status: 3          Anesthesia Type: general with block    Vitals  Vitals Value Taken Time   /56 10/06/20 1330   Temp 97.1 °F (36.2 °C) 10/06/20 1247   Pulse 60 10/06/20 1331   Resp 11 10/06/20 1317   SpO2 100 % 10/06/20 1331   Vitals shown include unvalidated device data.        Post Anesthesia Care and Evaluation    Patient location during evaluation: PACU  Patient participation: complete - patient participated  Level of consciousness: awake  Pain scale: See nurse's notes for pain score.  Pain management: adequate  Airway patency: patent  Anesthetic complications: No anesthetic complications  PONV Status: none  Cardiovascular status: acceptable  Respiratory status: acceptable  Hydration status: acceptable    Comments: Patient seen and examined postoperatively; vital signs stable; SpO2 greater than or equal to 90%; cardiopulmonary status stable; nausea/vomiting adequately controlled; pain adequately controlled; no apparent anesthesia complications; patient discharged from anesthesia care when discharge criteria were met

## 2020-10-06 NOTE — THERAPY EVALUATION
Patient Name: Olga Curtis  : 1935    MRN: [id number]                              Today's Date: 10/6/2020       Admit Date: 10/6/2020    Visit Dx:     ICD-10-CM ICD-9-CM   1. Primary localized osteoarthrosis of left shoulder region  M19.012 715.11     Patient Active Problem List   Diagnosis   • Aortic stenosis   • Atrial premature complex   • Chronic coronary artery disease   • Dyslipidemia   • Essential hypertension   • Nonsustained ventricular tachycardia (CMS/HCC)   • Presence of cardiac pacemaker   • Shortness of breath   • Sick sinus syndrome (CMS/HCC)   • Allergic reaction   • Tachy-martin syndrome (CMS/HCC)   • DJD of shoulder   • Osteoarthritis of left glenohumeral joint   • Dyspnea   • Severe aortic stenosis   • Permanent atrial fibrillation (CMS/HCC)   • Diastolic CHF, acute (CMS/HCC)   • Nonrheumatic aortic valve stenosis   • Chronic diastolic congestive heart failure (CMS/HCC)   • S/P TAVR (transcatheter aortic valve replacement)   • Primary localized osteoarthrosis of left shoulder region   • DJD of left shoulder     Past Medical History:   Diagnosis Date   • Aortic stenosis 10/28/2014   • Atrial premature complex 2015   • Chronic coronary artery disease 10/28/2014   • Dyslipidemia 10/28/2014   • Hyperlipidemia    • Hypertension 10/28/2014   • Nausea 2020   • Nonsustained ventricular tachycardia (CMS/HCC) 2015   • Partial paralysis of right hand (CMS/HCC)     states right arm only.  Cannot have sticks in arm, hand only   • Shoulder pain, left      Past Surgical History:   Procedure Laterality Date   • AORTIC VALVE REPAIR/REPLACEMENT N/A 2020    Procedure: TTE TRANSFEMORAL TRANSCATHETER AORTIC VALVE REPLACEMENT PERCUTANEOUS APPROACH;  Surgeon: J Carlos Leon MD;  Location: [facility] ;  Service: Cardiothoracic;  Laterality: N/A;   • AORTIC VALVE REPAIR/REPLACEMENT N/A 2020    Procedure: Transfemoral Transcatheter Aortic Valve Replacement w/Intra Op TTE and  Possible Open Rescue Surgery;  Surgeon: Vasiliy Bruce MD;  Location:  [facility] 18[facility];  Service: Cardiovascular;  Laterality: N/A;   • APPENDECTOMY     • CARDIAC CATHETERIZATION N/A 3/2/2020    Procedure: Left Heart Cath and coronary angiogram;  Surgeon: Wyatt Kwok MD;  Location:  [facility];  Service: Cardiovascular;  Laterality: N/A;   • CARDIAC CATHETERIZATION N/A 3/2/2020    Procedure: Saphenous Vein Graft;  Surgeon: Wyatt Kwok MD;  Location:  [facility];  Service: Cardiovascular;  Laterality: N/A;   • CARDIAC PACEMAKER PLACEMENT  2017   • CARDIAC SURGERY  1999    Bypass surgery   • EYE SURGERY Bilateral     cat ext   • HERNIA REPAIR       General Information     Row Name 10/06/20 1729          OT Time and Intention    Document Type  evaluation  -MP     Mode of Treatment  individual therapy  -MP     Row Name 10/06/20 1729          General Information    Patient Profile Reviewed  yes  -MP     Existing Precautions/Restrictions  left;shoulder;non-weight bearing  -MP     Row Name 10/06/20 1729          Living Environment    Lives With  spouse  -MP     Row Name 10/06/20 1729          Stairs Within Home, Primary    Number of Stairs, Within Home, Primary  none  -MP     Row Name 10/06/20 1729          Cognition    Orientation Status (Cognition)  oriented x 3  -MP       User Key  (r) = Recorded By, (t) = Taken By, (c) = Cosigned By    Initials Name Provider Type    Endy Flores OT Occupational Therapist        Mobility/ADL's     Row Name 10/06/20 1729          Activities of Daily Living    BADL Assessment/Intervention  upper body dressing  -MP     Row Name 10/06/20 1729          Upper Body Dressing Assessment/Training    [place] Level (Upper Body Dressing)  upper body dressing skills;doff;don;maximum assist (25% patient effort)  -MP       User Key  (r) = Recorded By, (t) = Taken By, (c) = Cosigned By    Initials Name Provider Type    MP  Endy Garcia OT Occupational Therapist        Obj/Interventions     Row Name 10/06/20 1730          Range of Motion Comprehensive    Comment, General Range of Motion  R shoulder AROM impacted 50-75%, LUE limited secondary to nerve block impacting AROM  -MP     Row Name 10/06/20 1730          Strength Comprehensive (MMT)    Comment, General Manual Muscle Testing (MMT) Assessment  R shoulder 2+/5, LUE not tested secondary to shoulder precautions  -MP     Row Name 10/06/20 1730          Shoulder (Therapeutic Exercise)    Shoulder (Therapeutic Exercise)  AAROM (active assistive range of motion);PROM (passive range of motion)  -MP     Shoulder PROM (Therapeutic Exercise)  -- Shoulder flexion, abduction, adduction completed per total shoulder protocol 10 reps x 2 sets  -MP     Row Name 10/06/20 1730          Therapeutic Exercise    Therapeutic Exercise  shoulder  -MP       User Key  (r) = Recorded By, (t) = Taken By, (c) = Cosigned By    Initials Name Provider Type     Endy Garcia OT Occupational Therapist        Goals/Plan     Row Name 10/06/20 1735          Bed Mobility Goal 1 (OT)    Activity/Assistive Device (Bed Mobility Goal 1, OT)  bed mobility activities, all  -MP     [place] Level/Cues Needed (Bed Mobility Goal 1, OT)  modified independence  -MP     Time Frame (Bed Mobility Goal 1, OT)  long term goal (LTG);2 weeks  -MP     Row Name 10/06/20 1735          Transfer Goal 1 (OT)    Activity/Assistive Device (Transfer Goal 1, OT)  transfers, all  -MP     [place] Level/Cues Needed (Transfer Goal 1, OT)  independent  -MP     Time Frame (Transfer Goal 1, OT)  long term goal (LTG);2 weeks  -MP     Row Name 10/06/20 1735          Dressing Goal 1 (OT)    Activity/Device (Dressing Goal 1, OT)  upper body dressing  -MP     [place]/Cues Needed (Dressing Goal 1, OT)  minimum assist (75% or more patient effort)  -MP     Time Frame (Dressing Goal 1, OT)  long term goal (LTG);2 weeks  -MP        User Key  (r) = Recorded By, (t) = Taken By, (c) = Cosigned By    Initials Name Provider Type    Endy Flores, RUDDY Occupational Therapist        Clinical Impression     Row Name 10/06/20 1731          Pain Assessment    Additional Documentation  Pain Scale: FACES Pre/Post-Treatment (Group)  -MP     Row Name 10/06/20 1731          Pain Scale: FACES Pre/Post-Treatment    Pain: FACES Scale, Pretreatment  0-->no hurt  -MP     Posttreatment Pain Rating  0-->no hurt  -MP     Row Name 10/06/20 1731          Plan of Care Review    Plan of Care Reviewed With  patient  -MP     Progress  no change  -MP     Outcome Summary  Pt. is 86 y/o POD #0 L reverse TSA. 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 Pt. lethargic this date, seen in PACU. Pt. reports supportive spouse at home that will provide 24 hour care/support at d/c. pt. requires max A for all UB ADLs this date and completes 2 sets x 10 reps TSA protocol. Will follow up w/ pt. tomorrow AM to educate family on ADL assist, precautions, and HEP. Recommend d/c home w/ HH therapy to follow through w/ TSA protocol.  -MP     Row Name 10/06/20 1731          Therapy Assessment/Plan (OT)    Rehab Potential (OT)  good, to achieve stated therapy goals  -MP     Criteria for Skilled Therapeutic Interventions Met (OT)  yes  -MP     Therapy Frequency (OT)  5 times/wk  -MP     Row Name 10/06/20 1731          Therapy Plan Review/Discharge Plan (OT)    Anticipated Discharge Disposition (OT)  home with home health  -MP     Row Name 10/06/20 1731          Vital Signs    Pre Patient Position  Supine  -MP     Intra Patient Position  Supine  -MP     Post Patient Position  Supine  -MP     Row Name 10/06/20 1731          Positioning and Restraints    Pre-Treatment Position  in bed  -MP     Post Treatment Position  bed  -MP     In Bed  exit alarm on  -MP       User Key  (r) = Recorded By, (t) = Taken By, (c) = Cosigned By    Initials Name Provider Type    YONY  Endy Garcia OT Occupational Therapist        Outcome Measures    No documentation.       Occupational Therapy Education                 Title: PT OT SLP Therapies (In Progress)     Topic: Occupational Therapy (In Progress)     Point: ADL training (Not Started)     Description:   Instruct learner(s) on proper safety adaptation and remediation techniques during self care or transfers.   Instruct in proper use of assistive devices.              Learner Progress:  Not documented in this visit.          Point: Home exercise program (Done)     Description:   Instruct learner(s) on appropriate technique for monitoring, assisting and/or progressing therapeutic exercises/activities.              Learning Progress Summary           Patient Acceptance, E,TB, VU,NR by  at 10/6/2020 1736                   Point: Precautions (Done)     Description:   Instruct learner(s) on prescribed precautions during self-care and functional transfers.              Learning Progress Summary           Patient Acceptance, E,TB, VU,NR by  at 10/6/2020 1736                   Point: Body mechanics (Not Started)     Description:   Instruct learner(s) on proper positioning and spine alignment during self-care, functional mobility activities and/or exercises.              Learner Progress:  Not documented in this visit.                      User Key     Initials Effective Dates Name Provider Type Discipline     03/01/19 -  Endy Garcia OT Occupational Therapist OT              OT Recommendation and Plan  Retired Outcome Summary/Treatment Plan (OT)  Anticipated Discharge Disposition (OT): home with home health  Therapy Frequency (OT): 5 times/wk  Plan of Care Review  Plan of Care Reviewed With: patient  Progress: no change  Outcome Summary: Pt. is 86 y/o POD #0 L reverse TSA. 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 Pt. lethargic this date, seen in PACU. Pt. reports supportive spouse at home that will provide  24 hour care/support at d/c. pt. requires max A for all UB ADLs this date and completes 2 sets x 10 reps TSA protocol. Will follow up w/ pt. tomorrow AM to educate family on ADL assist, precautions, and HEP. Recommend d/c home w/ HH therapy to follow through w/ TSA protocol.  Plan of Care Reviewed With: patient  Outcome Summary: Pt. is 84 y/o POD #0 L reverse TSA. 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 Pt. lethargic this date, seen in PACU. Pt. reports supportive spouse at home that will provide 24 hour care/support at d/c. pt. requires max A for all UB ADLs this date and completes 2 sets x 10 reps TSA protocol. Will follow up w/ pt. tomorrow AM to educate family on ADL assist, precautions, and HEP. Recommend d/c home w/ HH therapy to follow through w/ TSA protocol.     Time Calculation:   Time Calculation- OT     Row Name 10/06/20 1736             Time Calculation- OT    OT Start Time  1545  -MP      OT Stop Time  1603  -MP      OT Time Calculation (min)  18 min  -MP      Total Timed Code Minutes- OT  8 minute(s)  -MP      OT Received On  10/06/20  -      OT - Next Appointment  10/07/20  -      OT Goal Re-Cert Due Date  10/20/20  -        User Key  (r) = Recorded By, (t) = Taken By, (c) = Cosigned By    Initials Name Provider Type     Endy Garcia OT Occupational Therapist        Therapy Charges for Today     Code Description Service Date Service Provider Modifiers Qty    10734973634 HC OT EVAL LOW COMPLEXITY 2 10/6/2020 Endy Garcia OT GO 1    [id number] HC OT THER PROC EA 15 MIN 10/6/2020 Endy Garcia OT GO 1               Endy Garcia OT  10/6/2020

## 2020-10-06 NOTE — ANESTHESIA PROCEDURE NOTES
Peripheral Block      Patient reassessed immediately prior to procedure    Start time: 10/6/2020 10:12 AM  Reason for block: at surgeon's request and post-op pain management  Performed by  Anesthesiologist: Bryce Talavera MD  Preanesthetic Checklist  Completed: patient identified, site marked, surgical consent, pre-op evaluation, timeout performed, IV checked, risks and benefits discussed and monitors and equipment checked  Prep:  Pt Position: supine  Sterile barriers:cap, gloves and sterile barriers  Prep: ChloraPrep  Patient monitoring: blood pressure monitoring, continuous pulse oximetry and EKG  Procedure  Sedation:yes  Performed under: local infiltration  Guidance:ultrasound guided  ULTRASOUND INTERPRETATION.  Using ultrasound guidance a 20 G gauge needle was placed in close proximity to the brachial plexus nerve, at which point, under ultrasound guidance anesthetic was injected in the area of the nerve and spread of the anesthesia was seen on ultrasound in close proximity thereto.  There were no abnormalities seen on ultrasound; a digital image was taken; and the patient tolerated the procedure with no complications. Images:still images obtained, printed/placed on chart    Laterality:left  Block Type:interscalene  Injection Technique:single-shot  Needle Type:echogenic  Needle Gauge:20 G  Resistance on Injection: none  Sedation medications used: midazolam (VERSED) injection, 2 mg  Medications Used: dexamethasone (DECADRON) injection, 4 mg  bupivacaine liposome (EXPAREL) injection 1.3%, 7 mL  bupivacaine PF (MARCAINE) injection 0.5%, 7 mL      Post Assessment  Injection Assessment: negative aspiration for heme, no paresthesia on injection and incremental injection  Patient Tolerance:comfortable throughout block  Complications:no

## 2020-10-06 NOTE — PLAN OF CARE
Problem: Adult Inpatient Plan of Care  Goal: Plan of Care Review  Recent Flowsheet Documentation  Taken 10/6/2020 1731 by Endy Garcia OT  Progress: no change  Plan of Care Reviewed With: patient  Outcome Summary: Pt. is 84 y/o POD #0 L reverse TSA. 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 Pt. lethargic this date, seen in PACU. Pt. reports supportive spouse at home that will provide 24 hour care/support at d/c. pt. requires max A for all UB ADLs this date and completes 2 sets x 10 reps TSA protocol. Will follow up w/ pt. tomorrow AM to educate family on ADL assist, precautions, and HEP. Recommend d/c home w/ HH therapy to follow through w/ TSA protocol.     PPE: gloves, mask, face shield

## 2020-10-06 NOTE — OP NOTE
TOTAL SHOULDER REVERSE ARTHROPLASTY  Procedure Report    Patient Name:  Olga Curtis  YOB: 1935    Date of Surgery:  10/6/2020     Indications: This is a 85 y.o. female with left shoulder pain.  Imaging demonstrated degenerative joint disease.  Treatment options were discussed.  They desired to proceed with reverse shoulder arthroplasty after discussing the risks including bleeding, scarring, infection, stiffness, nerve damage, tendon damage, artery damage, continued  pain, DVT, loss of life or limb, fracture, dislocation, and a need for further surgery.      Pre-op Diagnosis:   Primary localized osteoarthrosis of left shoulder region [M19.012]       Post-op Diagnosis:    Post-Op Diagnosis Codes:     * Primary localized osteoarthrosis of left shoulder region [M19.012]    Procedure/CPT® Codes: 39140    Procedure(s):  TOTAL SHOULDER REVERSE ARTHROPLASTY    Staff: Babar Soto certified first assist    was responsible for performing the following activities: Retraction, Suction, Irrigation, Suturing, Closing and Placing Dressing and their skilled assistance was necessary for the success of this case.       Anesthesia: General with Block    Estimated Blood Loss: 100ml    Implants:    Implant Name Type Inv. Item Serial No.  Lot No. LRB No. Used Action   SUT CONTRL TISS STRATAFIX SPIRAL MNCRYL UD 3/0 PLS 30CM - [id number] Implant SUT CONTRL TISS STRATAFIX SPIRAL MNCRYL UD 3/0 PLS 30CM  [employer]  DIV OF [employer] J [id number] Left 1 Implanted   SUT NONABS MAXBRAID/PE NMBR2 HC5 38IN TERRANCE [id number] - [id number] Implant SUT NONABS MAXBRAID/PE NMBR2 HC5 38IN TERRANCE [id number]  [employer] [id number] Left 3 Implanted   GLENSPHR TRABECULARMETAL REV 36MM - [id number] Implant GLENSPHR TRABECULARMETAL REV 36MM  [employer] [id number] Left 1 Implanted   BASEPLT GREGORIO TRABECULARMETAL REV 15MM - [id number] Implant BASEPLT GREGORIO TRABECULARMETAL REV 15MM  [employer] [id number] Left 1 Implanted   STEM HUM/SHLDR  TRABECULARMETAL REV [id number] - [id number] Implant STEM HUM/SHLDR TRABECULARMETAL REV [id number]  [employer] [id number] Left 1 Implanted   SCRW CANC INV/REV 4.5X30MM - [id number] Implant SCRW CANC INV/REV 4.5X30MM  [employer] [id number] Left 1 Implanted   SCRW CANC INV/REV 4.5X30MM - [id number] Implant SCRW CANC INV/REV 4.5X30MM  [employer] [id number] Left 1 Implanted   LINER HUM/SHLDR TRABECULARMETAL REV POLY 60DEG 36MM PLS3 - [id number] Implant LINER HUM/SHLDR TRABECULARMETAL REV POLY 60DEG 36MM PLS3  [employer] [id number] Left 1 Implanted       IVF: see anesthesia      Complications: none    Specimens:none    Description of Procedure: The patient's operative site was marked in the  preoperative holding area.  They received regional anesthesia and were brought to  the operating room and placed supine on a well-padded operating room table.  General anesthesia was administered.  Antibiotics were dosed.  A timeout was  taken, confirming the correct operative site and procedure.  They were placed in  the beach chair position.  The left shoulder was prepped and draped in the  standard surgical fashion.  SCDs were placed. A deltopectoral incision was marked and injected with local anesthetic.  The skin was opened.  Flaps were raised.  The interval was opened and the cephalic vein was protected.  Adhesions were released from the deltoid.  The circumflex vessels were identified and ligated with suture and cautery.  The rotator interval was opened and a retractor was placed.  The subscapularis was absent and capsule was  Tenotomized and the capsule was released down to the 6 o'clock position on the  humeral head protecting the axillary nerve.  A Fukuda retractor was placed and an inferior and anterior capsular release was performed palpating and protecting the axillary  nerve with my finger at all times.  The shoulder was dislocated.  The biceps  was absent.  Reaming was started  behind the biceps groove up to a size 18.   The cut was made in 20  degrees of retroversion and the final two reamers were used to prepare the  proximal humerus.  A trial was placed.  The glenoid was exposed after placing retractors.  The soft tissue was removed circumferentially.  The center point was marked and the glenoid was prepared in standard fashion.  The base plate was placed with good press-fit.  Two screws were placed with good purchase.  The locking caps were  placed.  The glenosphere was placed with good purchase.  The shoulder was  dislocated and the trial was removed from the canal and irrigated.  The true  stem was placed with good press-fit and trialing demonstrated 3 thickness to offer the best restoration of joint stability, muscle tension and range of motion.  The true polyethylene was placed with similar range of motion and stability.  A 3-minute Betadine wash performed after topical TXA.  The wound was closed in layers with absorbable suture and skin glue.  A sterile dressing and sling were applied.  They were awakened and taken to the recovery room.  There were no complications.  I was present for all portions.  All counts were correct.   Good capillary refill was noted to the hand.      Oli Barcenas MD     Date: 10/6/2020  Time: 12:32 EDT

## 2020-10-06 NOTE — ANESTHESIA PREPROCEDURE EVALUATION
Anesthesia Evaluation     Patient summary reviewed and Nursing notes reviewed   NPO Solid Status: > 8 hours  NPO Liquid Status: > 8 hours           Airway   Mallampati: II  TM distance: >3 FB  Neck ROM: full  No difficulty expected  Dental - normal exam   (+) upper dentures    Pulmonary - normal exam   Cardiovascular - normal exam    ECG reviewed    (+) pacemaker pacemaker, hypertension, valvular problems/murmurs AS, CAD, CABG >6 Months, dysrhythmias PAC, Bradycardia, hyperlipidemia,     ROS comment: S/p TAVR. S/p CABG 1999.    Neuro/Psych  GI/Hepatic/Renal/Endo      Musculoskeletal     Abdominal  - normal exam    Bowel sounds: normal.   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                  Anesthesia Plan    ASA 3     general with block     intravenous induction     Anesthetic plan, all risks, benefits, and alternatives have been provided, discussed and informed consent has been obtained with: patient.

## 2020-10-07 PROBLEM — Z95.0 PRESENCE OF CARDIAC PACEMAKER: Chronic | Status: ACTIVE | Noted: 2017-08-14

## 2020-10-07 PROBLEM — I50.31 DIASTOLIC CHF, ACUTE (HCC): Status: RESOLVED | Noted: 2020-01-01 | Resolved: 2020-01-01

## 2020-10-07 PROBLEM — I50.32 CHRONIC DIASTOLIC CONGESTIVE HEART FAILURE (HCC): Chronic | Status: ACTIVE | Noted: 2020-01-01

## 2020-10-07 PROBLEM — I48.0 PAROXYSMAL ATRIAL FIBRILLATION (HCC): Chronic | Status: ACTIVE | Noted: 2020-01-01

## 2020-10-07 PROBLEM — I35.0 SEVERE AORTIC STENOSIS: Status: RESOLVED | Noted: 2020-01-01 | Resolved: 2020-01-01

## 2020-10-07 NOTE — PLAN OF CARE
Problem: Adult Inpatient Plan of Care  Goal: Plan of Care Review  Recent Flowsheet Documentation  Taken 10/7/2020 2544 by Dane Velazquez PT  Plan of Care Reviewed With:   patient   daughter  Outcome Summary: Pt is an 84 YO F admitted s/p L rTSA, lives with spouse, and daughter will be staying with her for a while. Pt typically very independent ambulates without AD, no recent falls and drives. 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 Pt requires MIN A to come to standing and CGA to ambulate around room, no lob but noted lateral sway. Recommendation is pt return home with daughter and  and follow up with HHPT following d/c. PPE worn includes gloves and mask with shield.

## 2020-10-07 NOTE — PROGRESS NOTES
Case Management Discharge Note      Final Note: Home with [facility] (order in EPIC and accepted and selected).    Provided Post Acute Provider List?: Yes  Post Acute Provider List: Home Health  Provided Post Acute Provider Quality & Resource List?: Yes  Post Acute Provider Quality and Resource List: Home Health  Delivered To: Support Person  Method of Delivery: In person    Selected Continued Care - Discharged on 10/7/2020 Admission date: 10/6/2020 - Discharge disposition: Home or Self Care        Home Medical Care     Service Provider Selected Services Address Phone Fax    [facility] [address] [place] [address], [place] [address] [phone] --                       Final Discharge Disposition Code: 06 - home with home health care

## 2020-10-07 NOTE — PLAN OF CARE
Problem: Adult Inpatient Plan of Care  Goal: Plan of Care Review  Recent Flowsheet Documentation  Taken 10/7/2020 7074 by Endy Garcia OT  Progress: improving  Plan of Care Reviewed With:   patient   daughter  Outcome Summary: Pt. demonstrates progress w/ LUE HEP this date 10 reps x 2 sets AROM and PROM shoulder including pendulum exercises w/ max A VC to correct pt. tech. Pt. and daughter educated on donning.doffing  shirt, sling, and polar pack w/ daughter participating in pt. care this date. Pt. proggress limited secondary to decreased dynamic standing balance w/ increased risk for falls. Recommend HH therapy at d/c to address aforementioned deficits.     PPE: gloves, mask, face shield

## 2020-10-07 NOTE — THERAPY EVALUATION
Patient Name: Olga Curtis  : 1935    MRN: [id number]                              Today's Date: 10/7/2020       Admit Date: 10/6/2020    Visit Dx:     ICD-10-CM ICD-9-CM   1. Primary localized osteoarthrosis of left shoulder region  M19.012 715.11     Patient Active Problem List   Diagnosis   • Atrial premature complex   • Chronic coronary artery disease   • Dyslipidemia   • Essential hypertension   • Nonsustained ventricular tachycardia (CMS/HCC)   • Presence of cardiac pacemaker   • Shortness of breath   • Sick sinus syndrome (CMS/HCC)   • Allergic reaction   • Tachy-martin syndrome (CMS/HCC)   • Dyspnea   • Paroxysmal atrial fibrillation (CMS/HCC)   • Nonrheumatic aortic valve stenosis   • Chronic diastolic congestive heart failure (CMS/HCC)   • S/P TAVR (transcatheter aortic valve replacement)   • Primary localized osteoarthrosis of left shoulder region   • DJD of left shoulder     Past Medical History:   Diagnosis Date   • Aortic stenosis 10/28/2014   • Atrial premature complex 2015   • Chronic coronary artery disease 10/28/2014   • Dyslipidemia 10/28/2014   • Hyperlipidemia    • Hypertension 10/28/2014   • Nausea 2020   • Nonsustained ventricular tachycardia (CMS/HCC) 2015   • Partial paralysis of right hand (CMS/HCC)     states right arm only.  Cannot have sticks in arm, hand only   • Shoulder pain, left      Past Surgical History:   Procedure Laterality Date   • AORTIC VALVE REPAIR/REPLACEMENT N/A 2020    Procedure: TTE TRANSFEMORAL TRANSCATHETER AORTIC VALVE REPLACEMENT PERCUTANEOUS APPROACH;  Surgeon: J Carlos Leon MD;  Location: [facility] OR ;  Service: Cardiothoracic;  Laterality: N/A;   • AORTIC VALVE REPAIR/REPLACEMENT N/A 2020    Procedure: Transfemoral Transcatheter Aortic Valve Replacement w/Intra Op TTE and Possible Open Rescue Surgery;  Surgeon: Vasiliy Bruce MD;  Location: [facility] OR ;  Service: Cardiovascular;  Laterality:  N/A;   • APPENDECTOMY     • CARDIAC CATHETERIZATION N/A 3/2/2020    Procedure: Left Heart Cath and coronary angiogram;  Surgeon: Wyatt Kwok MD;  Location: [facility] CATH INVASIVE LOCATION;  Service: Cardiovascular;  Laterality: N/A;   • CARDIAC CATHETERIZATION N/A 3/2/2020    Procedure: Saphenous Vein Graft;  Surgeon: Wyatt Kwok MD;  Location: [facility] CATH INVASIVE LOCATION;  Service: Cardiovascular;  Laterality: N/A;   • CARDIAC PACEMAKER PLACEMENT  2017   • CARDIAC SURGERY  1999    Bypass surgery   • EYE SURGERY Bilateral     cat ext   • HERNIA REPAIR     • TOTAL SHOULDER REVISION  10/06/2020    left     General Information     Row Name 10/07/20 1545          Physical Therapy Time and Intention    Document Type  evaluation  -     Mode of Treatment  individual therapy  -[facility] Name 10/07/20 1545          General Information    Patient Profile Reviewed  yes  -     Existing Precautions/Restrictions  left;shoulder;non-weight bearing  -     Row Name 10/07/20 1545          Living Environment    Lives With  spouse  -[facility] Name 10/07/20 1545          Stairs Within Home, Primary    Number of Stairs, Within Home, Primary  none  -     Row Name 10/07/20 1545          Cognition    Orientation Status (Cognition)  oriented x 4;verbal cues/prompts needed for orientation  -     Row Name 10/07/20 1545          Safety Issues, Functional Mobility    Impairments Affecting Function (Mobility)  range of motion (ROM);muscle tone abnormal;pain;balance  -       User Key  (r) = Recorded By, (t) = Taken By, (c) = Cosigned By    Initials Name Provider Type    Dane Torres PT Physical Therapist        Mobility     Row Name 10/07/20 1547          Bed Mobility    Comment (Bed Mobility)  sitting in chair when PT entered  -     Row Name 10/07/20 1547          Sit-Stand Transfer    Sit-Stand [place] (Transfers)  minimum assist (75% patient effort)  -     Row Name 10/07/20 1547          Gait/Stairs (Locomotion)     [place] Level (Gait)  contact guard  -EL     Distance in Feet (Gait)  30  -EL     Deviations/Abnormal Patterns (Gait)  gait speed decreased  -EL     Comment (Gait/Stairs)  noted lateral sway  -EL     Row Name 10/07/20 1547          Mobility    Extremity Weight-bearing Status  left upper extremity  -EL     Left Upper Extremity (Weight-bearing Status)  non weight-bearing (NWB)  -EL       User Key  (r) = Recorded By, (t) = Taken By, (c) = Cosigned By    Initials Name Provider Type    Dane Torres PT Physical Therapist        Obj/Interventions     Row Name 10/07/20 1547          Range of Motion Comprehensive    General Range of Motion  bilateral lower extremity ROM WFL  -EL     Row Name 10/07/20 1547          Strength Comprehensive (MMT)    Comment, General Manual Muscle Testing (MMT) Assessment  BLE 4-/5 gross  -EL     Row Name 10/07/20 1547          Motor Skills    Therapeutic Exercise  other (see comments) Pt just completed ther ex with OT, reports good understanidng  -EL       User Key  (r) = Recorded By, (t) = Taken By, (c) = Cosigned By    Initials Name Provider Type    Dane Torres, PT Physical Therapist        Goals/Plan     Row Name 10/07/20 1552          Transfer Goal 1 (PT)    Activity/Assistive Device (Transfer Goal 1, PT)  transfers, all  -EL     [place] Level/Cues Needed (Transfer Goal 1, PT)  modified independence  -EL     Time Frame (Transfer Goal 1, PT)  long term goal (LTG);2 weeks  -EL     Row Name 10/07/20 1552          Gait Training Goal 1 (PT)    Activity/Assistive Device (Gait Training Goal 1, PT)  gait (walking locomotion)  -EL     [place] Level (Gait Training Goal 1, PT)  modified independence  -EL     Distance (Gait Training Goal 1, PT)  150  -EL     Time Frame (Gait Training Goal 1, PT)  long term goal (LTG);2 weeks  -EL       User Key  (r) = Recorded By, (t) = Taken By, (c) = Cosigned By    Initials Name Provider Type    Dane Torres, [place] Physical Therapist        Clinical  Impression     Row Name 10/07/20 1549          Pain Scale: FACES Pre/Post-Treatment    Pain: FACES Scale, Pretreatment  0-->no hurt  -EL     Posttreatment Pain Rating  0-->no hurt  -EL     Row Name 10/07/20 1549          Plan of Care Review    Plan of Care Reviewed With  patient;daughter  -EL     Outcome Summary  Pt is an 84 YO F admitted s/p L rTSA, lives with spouse, and daughter will be staying with her for a while. Pt typically very independent ambulates without AD, no recent falls and drives. 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 Pt requires MIN A to come to standing and CGA to ambulate around room, no lob but noted lateral sway. Recommendation is pt return home with daughter and  and follow up with HHPT following d/c. PPE worn includes gloves and mask with shield.  -EL     Row Name 10/07/20 1549          Therapy Assessment/Plan (PT)    Rehab Potential (PT)  good, to achieve stated therapy goals  -EL     Criteria for Skilled Interventions Met (PT)  yes  -     Row Name 10/07/20 1549          Vital Signs    O2 Delivery Pre Treatment  room air  -EL     O2 Delivery Intra Treatment  room air  -EL     O2 Delivery Post Treatment  room air  -EL     Pre Patient Position  Sitting  -EL     Intra Patient Position  Standing  -EL     Post Patient Position  Sitting  -EL     Row Name 10/07/20 1549          Positioning and Restraints    Pre-Treatment Position  sitting in chair/recliner  -EL     Post Treatment Position  chair  -EL     In Chair  notified nsg;sitting;call light within reach;encouraged to call for assist;with family/caregiver  -EL       User Key  (r) = Recorded By, (t) = Taken By, (c) = Cosigned By    Initials Name Provider Type    Dane Torres, PT Physical Therapist        Outcome Measures    No documentation.       Physical Therapy Education                 Title: PT OT SLP Therapies (Done)     Topic: Physical Therapy (Done)     Point: Mobility training (Done)      Learning Progress Summary           Patient Acceptance, E,TB, VU by SAI at 10/7/2020 1553                   Point: Home exercise program (Done)     Learning Progress Summary           Patient Acceptance, E,TB, VU by  at 10/7/2020 1553                               User Key     Initials Effective Dates Name Provider Type Discipline     06/23/20 -  Dane Velazquez PT Physical Therapist PT              PT Recommendation and Plan  Planned Therapy Interventions (PT): balance training, neuromuscular re-education, home exercise program, gait training, ROM (range of motion), transfer training  Plan of Care Reviewed With: patient, daughter  Outcome Summary: Pt is an 84 YO F admitted s/p L rTSA, lives with spouse, and daughter will be staying with her for a while. Pt typically very independent ambulates without AD, no recent falls and drives. 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 Pt requires MIN A to come to standing and CGA to ambulate around room, no lob but noted lateral sway. Recommendation is pt return home with daughter and  and follow up with HHPT following d/c. PPE worn includes gloves and mask with shield.     Time Calculation:   PT Charges     Row Name 10/07/20 1554             Time Calculation    Start Time  1137  -EL      Stop Time  1152  -EL      Time Calculation (min)  15 min  -EL      PT Received On  10/07/20  -EL      PT - Next Appointment  10/08/20  -      PT Goal Re-Cert Due Date  10/21/20  -        User Key  (r) = Recorded By, (t) = Taken By, (c) = Cosigned By    Initials Name Provider Type     Dane Velazquez PT Physical Therapist        Therapy Charges for Today     Code Description Service Date Service Provider Modifiers Qty    [id number] HC PT EVAL MOD COMPLEXITY 3 10/7/2020 Dane Velazquez, PT GP 1               Dane Velazquez PT  10/7/2020

## 2020-10-07 NOTE — PROGRESS NOTES
Discharge Planning Assessment  [facility]     Patient Name: Olga Curtis  MRN: [id number]  Today's Date: 10/7/2020    Admit Date: 10/6/2020    Discharge Needs Assessment     Row Name 10/07/20 1002       Living Environment    Lives With  spouse    Current Living Arrangements  home/apartment/condo    Primary Care Provided by  self    Provides Primary Care For  no one    Family Caregiver if Needed  none    Quality of Family Relationships  helpful    Able to Return to Prior Arrangements  yes       Resource/Environmental Concerns    Resource/Environmental Concerns  none    Transportation Concerns  car, none       Transition Planning    Patient/Family Anticipates Transition to  home with family    Patient/Family Anticipated Services at Transition  home health care    Transportation Anticipated  family or friend will provide       Discharge Needs Assessment    Current Outpatient/Agency/Support Group  homecare agency    Equipment Currently Used at Home  other (see comments) Patient has a bedside commode at home if she needs it.    Concerns to be Addressed  discharge planning    Equipment Needed After Discharge  none    Outpatient/Agency/Support Group Needs  homecare agency    Provided Post Acute Provider List?  Yes    Post Acute Provider List  Home Health    Provided Post Acute Provider Quality & Resource List?  Yes    Post Acute Provider Quality and Resource List  Home Health    Delivered To  Support Person    Method of Delivery  In person    Discharge Coordination/Progress  Home with Home Health. Daughter still trying to decide on a choice of home health.        Discharge Plan     Row Name 10/07/20 1006       Plan    Plan  Home with Home Health. Daughter/family deciding on which home health. (Had wanted [facility], not able to take any patient's at this time according to [place]).    Patient/Family in Agreement with Plan  yes    Plan Comments  Spoke with patient and daughter from a distance of greater than 6 feet, with  appropriate PPE in place. Patient verified her PCP, Pharmacy and current DME at home. Patient has been fiercly independent and lives at home with her . Anticipate discharge today.        Continued Care and Services - Admitted Since 10/6/2020    Coordination has not been started for this encounter.       Expected Discharge Date and Time     Expected Discharge Date Expected Discharge Time    Oct 7, 2020         Demographic Summary     Row Name 10/07/20 1001       General Information    Admission Type  inpatient    Arrived From  home    Required Notices Provided  Important Message from Medicare    Referral Source  admission list;physician    Reason for Consult  discharge planning    Preferred Language  English     Used During This Interaction  no       Contact Information    Permission Granted to Share Info With            Patient Forms     Row Name 10/07/20 1010       Patient Forms    Important Message from Medicare (IMM)  Delivered IM given 10/6            Anna [place] [address]   [phone]  office  [phone]  fax  [email or web address]  [email or web address]

## 2020-10-07 NOTE — CONSULTS
[facility] Medicine Services      Patient Name: Olga Curtis  : 1935  MRN: [id number]  Primary Care Physician: Tigre Duran MD  Date of admission: 10/6/2020    Patient Care Team:  Tigre Duran MD as PCP - General  Tigre Duran MD as PCP - Family Medicine  Tigre Duran MD as PCP - Claims Attributed  Wyatt Kwok MD as Consulting Physician (Cardiology)          Subjective   History Present Illness     Chief Complaint:  Left shoulder pain    Ms. Curtis is a 85 y.o. female with a history of osteoarthritis who presented to [facility] on 10/06/2020 and underwent a left total shoulder reverse arthroplasty by Dr. Barcenas. The patient was admitted to the surgical inpatient unit postoperatively and the Hospitalist team was consulted for medical management.    In addition to above, the patient has a history of paroxysmal a-fib, sick sinus syndrome status post permanent pacemaker placement, severe aortic stenosis status post TAVR, chronic diastolic CHF, CAD, HTN, and HLD.        Review of Systems   Musculoskeletal: Positive for arthritis.   All other systems reviewed and are negative.      Personal History     Past Medical History:   Past Medical History:   Diagnosis Date   • Aortic stenosis 10/28/2014   • Atrial premature complex 2015   • Chronic coronary artery disease 10/28/2014   • Dyslipidemia 10/28/2014   • Hyperlipidemia    • Hypertension 10/28/2014   • Nausea 2020   • Nonsustained ventricular tachycardia (CMS/HCC) 2015   • Partial paralysis of right hand (CMS/HCC)     states right arm only.  Cannot have sticks in arm, hand only   • Shoulder pain, left        Surgical History:      Past Surgical History:   Procedure Laterality Date   • AORTIC VALVE REPAIR/REPLACEMENT N/A 2020    Procedure: TTE TRANSFEMORAL TRANSCATHETER AORTIC VALVE REPLACEMENT PERCUTANEOUS APPROACH;  Surgeon: J Carlos Leon MD;  Location: [facility] ;  Service:  Cardiothoracic;  Laterality: N/A;   • AORTIC VALVE REPAIR/REPLACEMENT N/A 5/26/2020    Procedure: Transfemoral Transcatheter Aortic Valve Replacement w/Intra Op TTE and Possible Open Rescue Surgery;  Surgeon: Vasiliy Bruce MD;  Location: [facility] HYBRID OR 18/19;  Service: Cardiovascular;  Laterality: N/A;   • APPENDECTOMY     • CARDIAC CATHETERIZATION N/A 3/2/2020    Procedure: Left Heart Cath and coronary angiogram;  Surgeon: Wyatt Kwok MD;  Location:  [facility] INVASIVE [facility]ATION;  Service: Cardiovascular;  Laterality: N/A;   • CARDIAC CATHETERIZATION N/A 3/2/2020    Procedure: Saphenous Vein Graft;  Surgeon: Wyatt Kwok MD;  Location:  [facility] INVASIVE [facility]ATION;  Service: Cardiovascular;  Laterality: N/A;   • CARDIAC PACEMAKER PLACEMENT  2017   • CARDIAC SURGERY  1999    Bypass surgery   • EYE SURGERY Bilateral     cat ext   • HERNIA REPAIR     • TOTAL SHOULDER REVISION  10/06/2020    left       Family History: family history includes No Known Problems in her brother, father, mother, and sister. Otherwise pertinent FHx was reviewed and unremarkable.     Social History:  reports that she has never smoked. She has never used smokeless tobacco. She reports that she does not drink alcohol or use drugs.      Medications:  Prior to Admission medications    Medication Sig Start Date End Date Taking? Authorizing Provider   acebutolol (SECTRAL) 200 MG capsule TAKE ONE CAPSULE BY MOUTH TWICE A DAY  Patient taking differently: Take 200 mg by mouth 2 (Two) Times a Day. Take dos 5/6/20  Yes Wyatt Kwok MD   ELIQUIS 2.5 MG tablet tablet TAKE ONE TABLET BY MOUTH EVERY 12 HOURS  Patient taking differently: Take 2.5 mg by mouth 2 (Two) Times a Day. To stop 3 days prior to surgery (DR. Kwok) 4/13/20  Yes Wyatt Kwok MD   pravastatin (PRAVACHOL) 20 MG tablet Take 20 mg by mouth Every Night.   Yes Provider, MD Yeyo   lisinopril (PRINIVIL,ZESTRIL) 20 MG tablet TAKE ONE TABLET BY MOUTH DAILY  10/5/20   Wyatt Kwok MD   melatonin 5 MG tablet tablet Take 5 mg by mouth At Night As Needed.    Provider, MD Yeyo   traMADol (ULTRAM) 50 MG tablet Take 50 mg by mouth Every 6 (Six) Hours As Needed for Moderate Pain .    Provider, MD Yeyo       Allergies:    Allergies   Allergen Reactions   • Sulfa Antibiotics Hives       Objective   Objective     Vital Signs  Temp:  [96.9 °F (36.1 °C)-98 °F (36.7 °C)] 97.6 °F (36.4 °C)  Heart Rate:  [60-74] 63  Resp:  [10-22] 16  BP: (116-176)/(45-85) 129/45  SpO2:  [91 %-100 %] 98 %  on  Flow (L/min):  [2-6] 2;   Device (Oxygen Therapy): room air  Body mass index is 18.27 kg/m².    Physical Exam  Vitals signs reviewed.   Constitutional:       Appearance: Normal appearance. She is normal weight.   HENT:      Head: Normocephalic and atraumatic.      Nose: Nose normal.      Mouth/Throat:      Mouth: Mucous membranes are moist.   Eyes:      Extraocular Movements: Extraocular movements intact.      Conjunctiva/sclera: Conjunctivae normal.      Pupils: Pupils are equal, round, and reactive to light.   Cardiovascular:      Rate and Rhythm: Normal rate and regular rhythm.      Pulses: Normal pulses.   Pulmonary:      Effort: Pulmonary effort is normal.      Breath sounds: Normal breath sounds.   Abdominal:      General: Bowel sounds are normal. There is no distension.      Palpations: Abdomen is soft.      Tenderness: There is no abdominal tenderness.   Musculoskeletal:      Comments: LUE limited ROM s/p total shoulder, in sling   Skin:     General: Skin is warm and dry.      Capillary Refill: Capillary refill takes less than 2 seconds.      Comments: Left shoulder surgical incision covered with dry dressing   Neurological:      General: No focal deficit present.      Mental Status: She is alert and oriented to person, place, and time.   Psychiatric:         Mood and Affect: Mood normal.         Behavior: Behavior normal.         Results Review:  I have personally  reviewed most recent cardiac tracings and lab results and agree with findings.    Results from last 7 days   Lab Units 09/30/20  0935   WBC 10*3/mm3 7.87   HEMOGLOBIN g/dL 12.7   HEMATOCRIT % 37.2   PLATELETS 10*3/mm3 241   INR  1.00     Results from last 7 days   Lab Units 09/30/20  0935   SODIUM mmol/L 137   POTASSIUM mmol/L 4.9   CHLORIDE mmol/L 98   CO2 mmol/L 29.2*   BUN mg/dL 10   CREATININE mg/dL 0.80   GLUCOSE mg/dL 80   CALCIUM mg/dL 10.3     Estimated Creatinine Clearance: 42.4 mL/min (by C-G formula based on SCr of 0.8 mg/dL).  Brief Urine Lab Results  (Last result in the past 365 days)      Color   Clarity   Blood   Leuk Est   Nitrite   Protein   CREAT   Urine HCG        05/21/20 0909 Yellow Cloudy Trace Large (3+) Negative Negative               Microbiology Results (last 10 days)     Procedure Component Value - Date/Time    COVID PRE-OP / PRE-PROCEDURE SCREENING ORDER (NO ISOLATION) - Swab, Nasopharynx [[id number]] Collected: 10/03/20 0857    Lab Status: Final result Specimen: Swab from Nasopharynx Updated: 10/04/20 1957    Narrative:      The following orders were created for panel order COVID PRE-OP / PRE-PROCEDURE SCREENING ORDER (NO ISOLATION) - Swab, Nasopharynx.  Procedure                               Abnormality         Status                     ---------                               -----------         ------                     COVID-19,[facility], NP ...[[id number]]                      Final result                 Please view results for these tests on the individual orders.    COVID-19,[facility], NP SWAB IN LEXAR VIRAL TRANSPORT MEDIA 24-30 HR TAT - Swab, Nasopharynx [[id number]] Collected: 10/03/20 0857    Lab Status: Final result Specimen: Swab from Nasopharynx Updated: 10/04/20 1957     SARS-CoV-2 KIRSTEN Not Detected    MRSA Screen, PCR (Inpatient) - Swab, Nares [[id number]]  (Normal) Collected: 09/30/20 0935    Lab Status: Final result Specimen: Swab from Nares Updated: 09/30/20 1122      MRSA PCR No MRSA Detected          ECG/EMG Results (most recent)     None          Results for orders placed during the hospital encounter of 02/28/20   Duplex Carotid Ultrasound CAR    Narrative · Proximal right internal carotid artery moderate stenosis.  · Proximal left internal carotid artery mild stenosis.          Results for orders placed during the hospital encounter of 07/02/20   Adult Transthoracic Echo Complete W/ Cont if Necessary Per Protocol    Narrative · Left ventricular systolic function is normal. Calculated EF = 60.4%.   Estimated EF was in agreement with the calculated EF. Normal left   ventricular cavity size noted. All left ventricular wall segments contract   normally. Left ventricular wall thickness is consistent with   mild-to-moderate concentric hypertrophy. Left ventricular diastolic   dysfunction is noted (grade II w/high LAP) consistent with   pseudonormalization.  · Left atrial cavity size is moderately dilated.  · There is a prosthetic aortic valve. Aortic valve maximum pressure   gradient is 23.6 mmHg. There is a 23 mm TAVR valve present. The aortic   valve peak and mean gradients are within defined limits. The prosthetic   valve is grossly normal. There is a mild anterior paravalvular leak.  · Moderate mitral valve regurgitation is present.  · Moderate tricuspid valve regurgitation is present. Estimated right   ventricular systolic pressure from tricuspid regurgitation is mildly   elevated (35-45 mmHg). Calculated right ventricular systolic pressure from   tricuspid regurgitation is 38.7 mmHg.  · No dilation of the aortic root is present. Mild dilation of the   ascending aorta is present. 3.7cm.          Xr Chest 2 View    Result Date: 9/30/2020   1. Interstitial thickening and hazy airspace opacities bilaterally with relative sparing of the right lower lobe, along with small bilateral pleural effusions. FINDINGS may reflect changes of atypical distribution of pulmonary edema  superimposed upon emphysema and fibrosis.  Electronically Signed By-Dr. Tonja Chauhan MD On:9/30/2020 10:21 AM This report was finalized on [id number] by Dr. Tonja Chauhan MD.    Xr Shoulder 2+ View Left    Result Date: 10/6/2020  Satisfactory postoperative appearance status post left shoulder replacement.  Electronically Signed By-Dr. Tonja Chauhan MD On:10/6/2020 1:31 PM This report was finalized on [id number] by Dr. Tonja Chauhan MD.      Estimated Creatinine Clearance: 42.4 mL/min (by C-G formula based on SCr of 0.8 mg/dL).      Assessment/Plan   Assessment/Plan       Active Hospital Problems    Diagnosis  POA   • **Primary localized osteoarthrosis of left shoulder region [M19.012]  Yes   • DJD of left shoulder [M19.012]  Yes   • Chronic diastolic congestive heart failure (CMS/HCC) [I50.32]  Yes   • Paroxysmal atrial fibrillation (CMS/HCC) [I48.0]  Yes   • Presence of cardiac pacemaker [Z95.0]  Yes   • Essential hypertension [I10]  Yes   • Dyslipidemia [E78.5]  Yes   • Chronic coronary artery disease [I25.10]  Yes      Resolved Hospital Problems   No resolved problems to display.       Primary localized osteoarthrosis of left shoulder region / DJD of left shoulder  -status post left total shoulder reverse arthroplasty (10/06/20)  -post-op care and PRN analgesia per orthopedic surgery  -PT/OT following  -LUE in sling    Chronic coronary artery disease / Dyslipidemia / Essential hypertension, chronic, controlled   -continue Eliquis, statin, and lisinopril  -monitor BP    Presence of cardiac pacemaker, h/o SSS    Paroxysmal atrial fibrillation, s/p cardioversion  -currently v-paced  -continue BB and Eliquis     Chronic diastolic congestive heart failure, h/o severe aortic stenosis s/p TAVR  -appears compensated  -continue ACE-I and BB  -not on any diuretics at home             VTE Prophylaxis -   Mechanical Order History:      Ordered        10/06/20 0922  Place Sequential Compression Device  Once           10/06/20 0922  Maintain Sequential Compression Device  Continuous                 Pharmalogical Order History:      Ordered     Dose Route Frequency Stop    10/06/20 2332  apixaban (ELIQUIS) tablet 2.5 mg      2.5 mg PO Every 12 Hours Scheduled --                CODE STATUS:    Code Status and Medical Interventions:   Ordered at: 10/06/20 2332     Code Status:    CPR     Medical Interventions (Level of Support Prior to Arrest):    Full       This patient has been examined wearing appropriate Personal Protective Equipment. 10/07/20      I discussed the patient's findings and my recommendations with nursing staff.        Electronically signed by HERMINIO Calderon, 10/07/20, 7:35 AM EDT.  [facility] Hospitalist Team

## 2020-10-07 NOTE — DISCHARGE PLACEMENT REQUEST
"Tena Curtis (85 y.o. Female)     Date of Birth Social Security Number Address Home Phone MRN    1935  [id number] [facility] [place] [address] [phone] [id number]    [employer] Marital Status          Other        Admission Date Admission Type Admitting Provider Attending Provider Department, Room/Bed    10/6/20 Elective Oli Barcenas MD Abeln, Kristopher Todd, MD [facility] SURGICAL INPATIENT, 4101/1    Discharge Date Discharge Disposition Discharge Destination         Home or Self Care              Attending Provider: Oli Barcenas MD    Allergies: Sulfa Antibiotics    Isolation: None   Infection: None   Code Status: CPR    Ht: 167.6 cm (66\")   Wt: 51.3 kg (113 lb 3.2 oz)    Admission Cmt: None   Principal Problem: Primary localized osteoarthrosis of left shoulder region [M19.012] More...                 Active Insurance as of 10/6/2020     Primary Coverage     Payor Plan Insurance Group Employer/Plan Group    MEDICARE MEDICARE A & B      Payor Plan Address Payor Plan Phone Number Payor Plan Fax Number Effective Dates    [address] [phone]  4/1/2001 - None Entered    [facility] [id number]       Subscriber Name Subscriber Birth Date Member ID       TENA CURTIS 1935 [id number]           Secondary Coverage     Payor Plan Insurance Group Employer/Plan Group    [facility] SUPP [id number]     Payor Plan Address Payor Plan Phone Number Payor Plan Fax Number Effective Dates    [address]   12/1/2016 - None Entered    [facility] [id number]       Subscriber Name Subscriber Birth Date Member ID       TENA CURTIS 1935 [id number]                 Emergency Contacts      (Rel.) Home Phone Work Phone Mobile Phone    RHINAADRIANNE ROWELL (Spouse) [phone] -- --    ROGELIO ROSE (Daughter) [phone] -- --              "

## 2020-10-07 NOTE — THERAPY TREATMENT NOTE
Patient Name: Olga Curtis  : 1935    MRN: [id number]                              Today's Date: 10/7/2020       Admit Date: 10/6/2020    Visit Dx:     ICD-10-CM ICD-9-CM   1. Primary localized osteoarthrosis of left shoulder region  M19.012 715.11     Patient Active Problem List   Diagnosis   • Atrial premature complex   • Chronic coronary artery disease   • Dyslipidemia   • Essential hypertension   • Nonsustained ventricular tachycardia (CMS/HCC)   • Presence of cardiac pacemaker   • Shortness of breath   • Sick sinus syndrome (CMS/HCC)   • Allergic reaction   • Tachy-martin syndrome (CMS/HCC)   • Dyspnea   • Paroxysmal atrial fibrillation (CMS/HCC)   • Nonrheumatic aortic valve stenosis   • Chronic diastolic congestive heart failure (CMS/HCC)   • S/P TAVR (transcatheter aortic valve replacement)   • Primary localized osteoarthrosis of left shoulder region   • DJD of left shoulder     Past Medical History:   Diagnosis Date   • Aortic stenosis 10/28/2014   • Atrial premature complex 2015   • Chronic coronary artery disease 10/28/2014   • Dyslipidemia 10/28/2014   • Hyperlipidemia    • Hypertension 10/28/2014   • Nausea 2020   • Nonsustained ventricular tachycardia (CMS/HCC) 2015   • Partial paralysis of right hand (CMS/HCC)     states right arm only.  Cannot have sticks in arm, hand only   • Shoulder pain, left      Past Surgical History:   Procedure Laterality Date   • AORTIC VALVE REPAIR/REPLACEMENT N/A 2020    Procedure: TTE TRANSFEMORAL TRANSCATHETER AORTIC VALVE REPLACEMENT PERCUTANEOUS APPROACH;  Surgeon: J Carlos Leon MD;  Location: [facility] OR ;  Service: Cardiothoracic;  Laterality: N/A;   • AORTIC VALVE REPAIR/REPLACEMENT N/A 2020    Procedure: Transfemoral Transcatheter Aortic Valve Replacement w/Intra Op TTE and Possible Open Rescue Surgery;  Surgeon: Vasiliy Bruce MD;  Location: [facility] OR ;  Service: Cardiovascular;  Laterality:  N/A;   • APPENDECTOMY     • CARDIAC CATHETERIZATION N/A 3/2/2020    Procedure: Left Heart Cath and coronary angiogram;  Surgeon: Wyatt Kwok MD;  Location: [facility] CATH INVASIVE LOCATION;  Service: Cardiovascular;  Laterality: N/A;   • CARDIAC CATHETERIZATION N/A 3/2/2020    Procedure: Saphenous Vein Graft;  Surgeon: Wyatt Kwok MD;  Location:  [facility] CATH INVASIVE LOCATION;  Service: Cardiovascular;  Laterality: N/A;   • CARDIAC PACEMAKER PLACEMENT  2017   • CARDIAC SURGERY  1999    Bypass surgery   • EYE SURGERY Bilateral     cat ext   • HERNIA REPAIR     • TOTAL SHOULDER REVISION  10/06/2020    left     General Information     Row Name 10/07/20 1546          OT Time and Intention    Document Type  therapy note (daily note)  -     Mode of Treatment  individual therapy  -     Row Name 10/07/20 1543          General Information    Patient Profile Reviewed  yes  -     Row Name 10/07/20 1543          Cognition    Orientation Status (Cognition)  oriented x 3  -MP       User Key  (r) = Recorded By, (t) = Taken By, (c) = Cosigned By    Initials Name Provider Type    MP Endy Garcia OT Occupational Therapist        Mobility/ADL's     Row Name 10/07/20 9151          Bed Mobility    Bed Mobility  supine-sit  -MP     Supine-Sit [place] (Bed Mobility)  minimum assist (75% patient effort);1 person assist  -     Row Name 10/07/20 7398          Transfers    Transfers  sit-stand transfer;bed-chair transfer  -MP     Bed-Chair [place] (Transfers)  moderate assist (50% patient effort);minimum assist (75% patient effort);1 person assist  -MP     Sit-Stand [place] (Transfers)  moderate assist (50% patient effort);1 person assist;minimum assist (75% patient effort)  -     Row Name 10/07/20 0908          Functional Mobility    Functional Mobility- Ind. Level  contact guard assist  -     Row Name 10/07/20 6427          Activities of Daily Living    BADL Assessment/Intervention  upper body  dressing;lower body dressing  -     Row Name 10/07/20 1550          Upper Body Dressing Assessment/Training    [place] Level (Upper Body Dressing)  upper body dressing skills;moderate assist (50% patient effort);doff;don;front opening garment sling and polar pack w/ family education  -     Row Name 10/07/20 1550          Lower Body Dressing Assessment/Training    [place] Level (Lower Body Dressing)  don;doff;pants/bottoms;shoes/slippers;socks;maximum assist (25% patient effort)  -       User Key  (r) = Recorded By, (t) = Taken By, (c) = Cosigned By    Initials Name Provider Type    Endy Flores OT Occupational Therapist        Obj/Interventions     Row Name 10/07/20 1552          Shoulder (Therapeutic Exercise)    Shoulder (Therapeutic Exercise)  AROM (active range of motion)  -     Shoulder PROM (Therapeutic Exercise)  flexion;aBduction;external rotation;internal rotation;10 repetitions;supine;left per total shoulder protocol x 2 sets  -     Row Name 10/07/20 1552          Elbow/Forearm (Therapeutic Exercise)    Elbow/Forearm (Therapeutic Exercise)  AROM (active range of motion)  -     Elbow/Forearm AROM (Therapeutic Exercise)  left;extension;flexion;10 repetitions;2 sets;supine  -     Row Name 10/07/20 1552          Balance    Balance Interventions  standing;dynamic;supported;moderate challenge Pt. completes pendulum exerices from standing w/ min A to maintain dynamic standing balance/safety  -     Row Name 10/07/20 1552          Therapeutic Exercise    Therapeutic Exercise  elbow/forearm  -       User Key  (r) = Recorded By, (t) = Taken By, (c) = Cosigned By    Initials Name Provider Type    Endy Flores OT Occupational Therapist        Goals/Plan    No documentation.       Clinical Impression     Row Name 10/07/20 1555          Pain Assessment    Additional Documentation  Pain Scale: FACES Pre/Post-Treatment (Group)  -[facility] Name 10/07/20 9108          Pain  Scale: FACES Pre/Post-Treatment    Pain: FACES Scale, Pretreatment  0-->no hurt  -MP     Posttreatment Pain Rating  0-->no hurt  -MP     Row Name 10/07/20 1554          Plan of Care Review    Plan of Care Reviewed With  patient;daughter  -MP     Progress  improving  -MP     Outcome Summary  Pt. demonstrates progress w/ LUE HEP this date 10 reps x 2 sets AROM and PROM shoulder including pendulum exercises w/ max A VC to correct pt. tech. Pt. and daughter educated on donning.doffing  shirt, sling, and polar pack w/ daughter participating in pt. care this date. Pt. proggress limited secondary to decreased dynamic standing balance w/ increased risk for falls. Recommend HH therapy at d/c to address aforementioned deficits.  -MP     Row Name 10/07/20 1551          Therapy Assessment/Plan (OT)    Rehab Potential (OT)  good, to achieve stated therapy goals  -MP     Criteria for Skilled Therapeutic Interventions Met (OT)  yes  -MP     Row Name 10/07/20 1555          Therapy Plan Review/Discharge Plan (OT)    Anticipated Discharge Disposition (OT)  home with 24/7 care;home with home health  -MP     Row Name 10/07/20 1552          Vital Signs    Pre Patient Position  Supine  -MP     Intra Patient Position  Standing  -MP     Post Patient Position  Sitting  -MP     Row Name 10/07/20 1559          Positioning and Restraints    Pre-Treatment Position  in bed  -MP     Post Treatment Position  chair  -MP     In Chair  call light within reach;encouraged to call for assist;with family/caregiver  -MP       User Key  (r) = Recorded By, (t) = Taken By, (c) = Cosigned By    Initials Name Provider Type    Endy Flores OT Occupational Therapist        Outcome Measures    No documentation.       Occupational Therapy Education                 Title: PT OT SLP Therapies (Done)     Topic: Occupational Therapy (Resolved)     Point: ADL training (Resolved)     Description:   Instruct learner(s) on proper safety adaptation and  remediation techniques during self care or transfers.   Instruct in proper use of assistive devices.              Learning Progress Summary           Patient Acceptance, E,TB, VU by PB at 10/6/2020 2345                   Point: Home exercise program (Resolved)     Description:   Instruct learner(s) on appropriate technique for monitoring, assisting and/or progressing therapeutic exercises/activities.              Learning Progress Summary           Patient Acceptance, E,TB, VU by PB at 10/6/2020 2345    Acceptance, E,TB, VU,NR by MP at 10/6/2020 1736                   Point: Precautions (Resolved)     Description:   Instruct learner(s) on prescribed precautions during self-care and functional transfers.              Learning Progress Summary           Patient Acceptance, E,TB, VU by PB at 10/6/2020 2345    Acceptance, E,TB, VU,NR by MP at 10/6/2020 1736                   Point: Body mechanics (Resolved)     Description:   Instruct learner(s) on proper positioning and spine alignment during self-care, functional mobility activities and/or exercises.              Learning Progress Summary           Patient Acceptance, E,TB, VU by PB at 10/6/2020 2345                               User Key     Initials Effective Dates Name Provider Type Discipline     03/01/19 -  Endy Garcia OT Occupational Therapist OT     03/01/19 -  Alyse Arteaga RN Registered Nurse Nurse              OT Recommendation and Plan  Retired Outcome Summary/Treatment Plan (OT)  Anticipated Discharge Disposition (OT): home with 24/7 care, home with home health  Therapy Frequency (OT): 5 times/wk  Plan of Care Review  Plan of Care Reviewed With: patient, daughter  Progress: improving  Outcome Summary: Pt. demonstrates progress w/ LUE HEP this date 10 reps x 2 sets AROM and PROM shoulder including pendulum exercises w/ max A VC to correct pt. tech. Pt. and daughter educated on donning.doffing  shirt, sling, and polar pack w/ daughter  participating in pt. care this date. Pt. proggress limited secondary to decreased dynamic standing balance w/ increased risk for falls. Recommend HH therapy at d/c to address aforementioned deficits.  Plan of Care Reviewed With: patient, daughter  Outcome Summary: Pt. demonstrates progress w/ LUE HEP this date 10 reps x 2 sets AROM and PROM shoulder including pendulum exercises w/ max A VC to correct pt. tech. Pt. and daughter educated on donning.doffing  shirt, sling, and polar pack w/ daughter participating in pt. care this date. Pt. proggress limited secondary to decreased dynamic standing balance w/ increased risk for falls. Recommend HH therapy at d/c to address aforementioned deficits.     Time Calculation:   Time Calculation- OT     Row Name 10/07/20 1558             Time Calculation- OT    OT Start Time  1020  -MP      OT Stop Time  1115  -MP      OT Time Calculation (min)  55 min  -MP      Total Timed Code Minutes- OT  50 minute(s)  -MP      OT Received On  10/07/20  -      OT - Next Appointment  10/08/20  -        User Key  (r) = Recorded By, (t) = Taken By, (c) = Cosigned By    Initials Name Provider Type    Endy Flores OT Occupational Therapist        Therapy Charges for Today     Code Description Service Date Service Provider Modifiers Qty    74621800748 HC OT EVAL LOW COMPLEXITY 2 10/6/2020 Endy Garcia OT GO 1    31056557288 HC OT THER PROC EA 15 MIN 10/6/2020 Endy Garcia OT GO 1    82894383275 HC OT THER PROC EA 15 MIN 10/7/2020 Endy Garcia OT GO 1    65017821537 HC OT SELF CARE/MGMT/TRAIN EA 15 MIN 10/7/2020 Endy Garcia OT GO 2    25846741015 HC OT THERAPEUTIC ACT EA 15 MIN 10/7/2020 Endy Garcia OT GO 1               Endy Garcia OT  10/7/2020

## 2020-10-07 NOTE — PLAN OF CARE
Goal Outcome Evaluation:  Plan of Care Reviewed With: patient, daughter  Progress: no change  Outcome Summary: Admitted to room 4104

## 2020-10-07 NOTE — DISCHARGE SUMMARY
"  Date of Discharge:  10/7/2020    Discharge Diagnosis: Left shoulder degenerative joint disease    Presenting Problem/History of Present Illness  Active Hospital Problems    Diagnosis  POA   • **Primary localized osteoarthrosis of left shoulder region [M19.012]  Yes   • DJD of left shoulder [M19.012]  Yes   • Chronic diastolic congestive heart failure (CMS/HCC) [I50.32]  Yes   • Paroxysmal atrial fibrillation (CMS/HCC) [I48.0]  Yes   • Presence of cardiac pacemaker [Z95.0]  Yes   • Essential hypertension [I10]  Yes   • Dyslipidemia [E78.5]  Yes   • Chronic coronary artery disease [I25.10]  Yes      Resolved Hospital Problems   No resolved problems to display.        Hospital Course  Patient is a 85 y.o. female presented with left shoulder degenerative joint disease and underwent total shoulder arthroplasty date of admission, postop day 1 was tolerating diet and oral medication.      Procedures Performed: Left reverse total shoulder      Consults:   Consults     Date and Time Order Name Status Description    10/6/2020 0934 Inpatient Consult to Hospitalist Completed             Condition on Discharge:  Improved    Vital Signs  Vitals:    10/06/20 2313 10/07/20 0005 10/07/20 0406 10/07/20 0819   BP: 120/49 122/67 129/45 116/47   BP Location: Right arm Right arm  Right arm   Patient Position: Lying Lying  Lying   Pulse: 60 60 63 66   Resp: 15 16 16 16   Temp: 98 °F (36.7 °C) 97.6 °F (36.4 °C) 97.6 °F (36.4 °C) 98 °F (36.7 °C)   TempSrc: Temporal Oral  Oral   SpO2: 98%   95%   Weight:  51.3 kg (113 lb 3.2 oz)     Height:  167.6 cm (66\")         Physical Exam:  Dry dressing, Sensory and motor exam are intact all distributions. Radial pulse is palpable and capillary refill is less than two seconds to all digits       Discharge Disposition  Home    Discharge Medications     Discharge Medications      New Medications      Instructions Start Date   HYDROcodone-acetaminophen 5-325 MG per tablet  Commonly known as: NORCO   1 " tablet, Oral, Every 6 Hours PRN         Changes to Medications      Instructions Start Date   acebutolol 200 MG capsule  Commonly known as: SECTRAL  What changed: additional instructions   TAKE ONE CAPSULE BY MOUTH TWICE A DAY      Eliquis 2.5 MG tablet tablet  Generic drug: apixaban  What changed:   · how much to take  · when to take this  · additional instructions   TAKE ONE TABLET BY MOUTH EVERY 12 HOURS         Continue These Medications      Instructions Start Date   lisinopril 20 MG tablet  Commonly known as: PRINIVIL,ZESTRIL   TAKE ONE TABLET BY MOUTH DAILY      melatonin 5 MG tablet tablet   5 mg, Oral, Nightly PRN      pravastatin 20 MG tablet  Commonly known as: PRAVACHOL   20 mg, Oral, Nightly      traMADol 50 MG tablet  Commonly known as: ULTRAM   50 mg, Oral, Every 6 Hours PRN               Discharge instructions:  Continue sling.  Keep dressing on.  Okay to shower and perform home exercises.  Continue polar care.    Follow-up Appointments  Future Appointments   Date Time Provider Department Center   10/21/2020  8:45 AM Oli Barcenas MD [facility]O [facility]   1/7/2021 11:30 AM [facility]   1/7/2021 12:10 PM Wyatt Kwok MD [facility] NA   3/31/2021 10:45 AM [facility]/VAS [facility]   3/31/2021 11:30 AM Kevin Ricci MD [facility] None   7/7/2021 11:15 AM Kevin Ricci MD [facility] None              Oli Barcenas MD  10/07/20  09:36 EDT      Disclaimer: Please note that areas of this note were completed with computer voice recognition software.  Quite often unanticipated grammatical, syntax, homophones, and other interpretive errors are inadvertently transcribed by the computer software. Please excuse any errors that have escaped final proofreading.

## 2020-10-07 NOTE — NURSING NOTE
Patients right finger tips turned white and was cool. Polar pack strap was on tightly. Readjusted and color came back. Educated about proper application and making sure its not too tight.

## 2020-10-08 NOTE — OUTREACH NOTE
Prep Survey      Responses   [facility] facility patient discharged from?  [facility]   Is LACE score < 7 ?  No   Eligibility  Not Eligible   What are the reasons patient is not eligible?  Other [Left reverse total shoulder]   Does the patient have one of the following disease processes/diagnoses(primary or secondary)?  Total Joint Replacement   Prep survey completed?  Yes          Marlyn Arita RN

## 2020-10-21 NOTE — PROGRESS NOTES
"     Patient ID: Olga Curtis is a 85 y.o. female.  10/6/20 left reverse total shoulder  Pain mild    Objective:    /85 (BP Location: Right arm, Patient Position: Sitting, Cuff Size: Adult)   Pulse 66   Ht 167.6 cm (66\")   Wt 51.3 kg (113 lb)   BMI 18.24 kg/m²     Physical Examination:  Incision is well approximated without infection.  She has a small hematoma over the upper aspect.  Centrally there is a small skin tear with some serous drainage.Sensory and motor exam are intact all distributions. Radial pulse is palpable and capillary refill is less than two seconds to all digits      Imaging:  X-rays demonstrate reverse total shoulder position    Assessment:  Doing well after reverse total shoulder    Plan:  Continue physical therapy, local wound care and contact me sooner if there is continued drainage or redness.  Otherwise see me in a month  "

## 2020-11-18 NOTE — PROGRESS NOTES
"     Patient ID: Olga Curtis is a 85 y.o. female.    10/6/20 left reverse total shoulder  Pain mild, having a little bit of fatigue at times    Objective:    /79   Pulse 70   Ht 167.6 cm (66\")   Wt 60.3 kg (133 lb)   BMI 21.47 kg/m²     Physical Examination:  Incision is healed.  No sign of infection.  Passive elevation 110 degrees, external rotation 20 degrees      Imaging:      Assessment:  Doing well after reverse total shoulder    Plan:  Discontinue sling, continue physical therapy.  See me in 4 months.  This point appears to be normal postoperative recovery from a fatigue standpoint but if symptoms continue can recommend discussion with her primary care or cardiologist  "

## 2020-12-01 NOTE — PLAN OF CARE
Goal Outcome Evaluation:  Pt resting well currently. Pt is confused but is able to be reoriented easily. External catheter in place. Will continue to monitor.

## 2020-12-01 NOTE — PROGRESS NOTES
Discharge Planning Assessment  [facility]     Patient Name: Olga Curtis  MRN: [id number]  Today's Date: 12/1/2020    Admit Date: 11/30/2020    Discharge Needs Assessment     Row Name 12/01/20 1700       Living Environment    Lives With  spouse    Current Living Arrangements  home/apartment/condo    Primary Care Provided by  self;spouse/significant other    Family Caregiver if Needed  child(javier), adult    Family Caregiver Names  Conchis, daughter or son    Able to Return to Prior Arrangements  yes       Resource/Environmental Concerns    Resource/Environmental Concerns  none    Transportation Concerns  car, none       Transition Planning    Patient/Family Anticipates Transition to  home with family    Patient/Family Anticipated Services at Transition  none    Transportation Anticipated  family or friend will provide       Discharge Needs Assessment    Readmission Within the Last 30 Days  no previous admission in last 30 days    Equipment Currently Used at Home  wheelchair;walker, standard;cane, straight    Concerns to be Addressed  discharge planning    Concerns Comments  Current with [facility]    Patient's Choice of Community Agency(s)  Current [facility]. Pt has one more visit per PT with [facility] then was changing to outpt PT [facility].    Discharge Coordination/Progress  DC Plan: Return home with family - current [facility].        Discharge Plan     Row Name 12/01/20 1710       Plan    Plan  DC Plan: Return home with family - current [facility].    Plan Comments  Pending CT chest.        Continued Care and Services - Admitted Since 11/30/2020     Home Medical Care Coordination complete    Service Provider Request Status Selected Services Address Phone Fax Patient Preferred    [facility]   Selected [facility] Health Services [address] [facility] [place] [address] [phone] -- --            Selected Continued Care - Prior Encounters Includes selections from prior  encounters from 9/1/2020 to 12/1/2020    Discharged on 10/7/2020 Admission date: 10/6/2020 - Discharge disposition: Home-Health Care c    Home Medical Care     Service Provider Selected Services Address Phone Fax Patient Preferred    [facility] [address] [place] [address] [place] [id number] [phone] -- --                      Demographic Summary     Row Name 12/01/20 8569       General Information    Admission Type  observation    Arrived From  emergency department    Required Notices Provided  Observation Status Notice    Referral Source  admission list    Reason for Consult  discharge planning    Preferred Language  English     Used During This Interaction  no        Met with patient in room wearing PPE: mask, goggles.      Maintained distance greater than six feet and spent less than 15 minutes in the room.               Efren Whitfield RN

## 2020-12-01 NOTE — PLAN OF CARE
Goal Outcome Evaluation:  Plan of Care Reviewed With: daughter  Progress: no change  Outcome Summary: Patient had echo and CT today daughter has remained at bedside the whole shift.

## 2020-12-01 NOTE — CONSULTS
Referring Provider: Hospitalist  Reason for Consultation: Shortness of breath    Patient Care Team:  Tigre Duran MD as PCP - General  Tigre Druan MD as PCP - Family Medicine  Wyatt Kwok MD as Consulting Physician (Cardiology)    Chief complaint shortness of breath    Subjective .     History of present illness:  Olga Curtis is a 85 y.o. female with history of severe aortic stenosis status post TAVR history of coronary artery disease hypertension hyperlipidemia history of sick sinus syndrome status post pacemaker placement presented to the hospital complains of increasing shortness of breath.  No complaints of any chest pain.  No PND orthopnea.  No palpitation dizziness syncope or swelling of the feet.  Patient states that she was taking her medicines regularly.  In the ER patient is ruled out for Covid.  She is admitted and is being ruled out for MI.  She had an echocardiogram in the last 6 months which showed normal LV systolic function.  She had severe aortic stenosis but underwent transcatheter aortic valve replacement.    Review of Systems   Constitution: Negative for fever and malaise/fatigue.   HENT: Negative for ear pain and nosebleeds.    Eyes: Negative for blurred vision and double vision.   Cardiovascular: Negative for chest pain, dyspnea on exertion and palpitations.   Respiratory: Positive for shortness of breath. Negative for cough.    Skin: Negative for rash.   Musculoskeletal: Negative for joint pain.   Gastrointestinal: Negative for abdominal pain, nausea and vomiting.   Neurological: Negative for focal weakness and headaches.   Psychiatric/Behavioral: Negative for depression. The patient is not nervous/anxious.    All other systems reviewed and are negative.      History  Past Medical History:   Diagnosis Date   • Aortic stenosis 10/28/2014   • Atrial premature complex 12/18/2015   • Chronic coronary artery disease 10/28/2014   • Dyslipidemia 10/28/2014   • Hyperlipidemia    •  Hypertension 10/28/2014   • Nausea 09/2020   • Nonsustained ventricular tachycardia (CMS/HCC) 12/18/2015   • Partial paralysis of right hand (CMS/HCC)     states right arm only.  Cannot have sticks in arm, hand only   • Shoulder pain, left        Past Surgical History:   Procedure Laterality Date   • AORTIC VALVE REPAIR/REPLACEMENT N/A 5/26/2020    Procedure: TTE TRANSFEMORAL TRANSCATHETER AORTIC VALVE REPLACEMENT PERCUTANEOUS APPROACH;  Surgeon: J Carlos Leon MD;  Location: [facility] OR 18/19;  Service: Cardiothoracic;  Laterality: N/A;   • AORTIC VALVE REPAIR/REPLACEMENT N/A 5/26/2020    Procedure: Transfemoral Transcatheter Aortic Valve Replacement w/Intra Op TTE and Possible Open Rescue Surgery;  Surgeon: Vasiliy Bruce MD;  Location: [facility] OR 18/19;  Service: Cardiovascular;  Laterality: N/A;   • APPENDECTOMY     • CARDIAC CATHETERIZATION N/A 3/2/2020    Procedure: Left Heart Cath and coronary angiogram;  Surgeon: Wyatt Kwok MD;  Location: [facility] CATH INVASIVE LOCATION;  Service: Cardiovascular;  Laterality: N/A;   • CARDIAC CATHETERIZATION N/A 3/2/2020    Procedure: Saphenous Vein Graft;  Surgeon: Wyatt Kwok MD;  Location: [facility] CATH INVASIVE LOCATION;  Service: Cardiovascular;  Laterality: N/A;   • CARDIAC PACEMAKER PLACEMENT  2017   • CARDIAC SURGERY  1999    Bypass surgery   • EYE SURGERY Bilateral     cat ext   • HERNIA REPAIR     • TOTAL SHOULDER ARTHROPLASTY W/ DISTAL CLAVICLE EXCISION Left 10/6/2020    Procedure: TOTAL SHOULDER REVERSE ARTHROPLASTY;  Surgeon: Oli Barcenas MD;  Location: [facility] OR;  Service: Orthopedics;  Laterality: Left;   • TOTAL SHOULDER REVISION  10/06/2020    left       Family History   Problem Relation Age of Onset   • No Known Problems Mother    • No Known Problems Father    • No Known Problems Sister    • No Known Problems Brother    • Malig Hyperthermia Neg Hx        Social History     Tobacco Use   • Smoking status: Never  "Smoker   • Smokeless tobacco: Never Used   • Tobacco comment: CAFFEINE USE: 1 CUP COFFEE DAILY   Substance Use Topics   • Alcohol use: No     Frequency: Never   • Drug use: No        Medications Prior to Admission   Medication Sig Dispense Refill Last Dose   • acebutolol (SECTRAL) 200 MG capsule TAKE ONE CAPSULE BY MOUTH TWICE A  capsule 2 11/30/2020 at Unknown time   • ELIQUIS 2.5 MG tablet tablet TAKE ONE TABLET BY MOUTH EVERY 12 HOURS 180 tablet 3 11/30/2020 at Unknown time   • lisinopril (PRINIVIL,ZESTRIL) 20 MG tablet TAKE ONE TABLET BY MOUTH DAILY 90 tablet 1 11/30/2020 at Unknown time   • pravastatin (PRAVACHOL) 20 MG tablet TAKE ONE TABLET BY MOUTH DAILY 90 tablet 2 11/29/2020 at Unknown time   • melatonin 5 MG tablet tablet Take 5 mg by mouth At Night As Needed (sleep).   Unknown at Unknown time         Sulfa antibiotics    Scheduled Meds:acebutolol, 200 mg, Oral, BID  apixaban, 2.5 mg, Oral, Q12H  atorvastatin, 20 mg, Oral, Nightly  [START ON 12/1/2020] furosemide, 40 mg, Intravenous, Q12H  [START ON 12/1/2020] lisinopril, 20 mg, Oral, Daily  sodium chloride, 10 mL, Intravenous, Q12H      Continuous Infusions:   PRN Meds:.melatonin  •  nitroglycerin  •  [COMPLETED] Insert peripheral IV **AND** sodium chloride  •  sodium chloride    Objective     VITAL SIGNS  Vitals:    11/30/20 1318 11/30/20 1405 11/30/20 1513 11/30/20 1951   BP:  155/63 156/73 147/73   BP Location:  Left arm Right arm Left arm   Patient Position:  Lying Sitting Lying   Pulse:  69 70 69   Resp:  14 16 15   Temp:    98.1 °F (36.7 °C)   TempSrc:    Oral   SpO2: 99% 99% 98% 97%   Weight:    52.1 kg (114 lb 13.8 oz)   Height:    162.6 cm (64.02\")       Flowsheet Rows      First Filed Value   Admission Height  162.6 cm (64\") Documented at 11/30/2020 1241   Admission Weight  49.9 kg (110 lb) Documented at 11/30/2020 1241           TELEMETRY: Sinus rhythm with nonspecific ST segment abnormality    Physical Exam:  Constitutional:       " Appearance: Well-developed.   Eyes:      General: No scleral icterus.     Conjunctiva/sclera: Conjunctivae normal.      Pupils: Pupils are equal, round, and reactive to light.   HENT:      Head: Normocephalic and atraumatic.   Neck:      Musculoskeletal: Normal range of motion and neck supple.      Vascular: No carotid bruit or JVD.   Pulmonary:      Effort: Pulmonary effort is normal.      Breath sounds: Normal breath sounds. No wheezing. No rales.   Cardiovascular:      Normal rate. Regular rhythm.      Murmurs: There is a systolic murmur.   Pulses:     Intact distal pulses.   Abdominal:      General: Bowel sounds are normal.      Palpations: Abdomen is soft.   Musculoskeletal: Normal range of motion.   Skin:     General: Skin is warm and dry.      Findings: No rash.   Neurological:      Mental Status: Alert.      Comments: No focal deficits          Results Review:   I reviewed the patient's new clinical results.  Lab Results (last 24 hours)     Procedure Component Value Units Date/Time    D-dimer, Quantitative [[id number]]  (Abnormal) Collected: 11/30/20 1802    Specimen: Blood Updated: 11/30/20 1859     D-Dimer, Quantitative 1.57 mg/L (FEU)     Narrative:      Reference Range  --------------------------------------------------------------------     < 0.50   Negative Predictive Value  0.50-0.59   Indeterminate    >= 0.60   Probable VTE             A very low percentage of patients with DVT may yield D-Dimer results   below the cut-off of 0.50 mg/L FEU.  This is known to be more   prevalent in patients with distal DVT.             Results of this test should always be interpreted in conjunction with   the patient's medical history, clinical presentation and other   findings.  Clinical diagnosis should not be based on the result of   IN[place]OVANCE D-Dimer alone.    COVID-19,[facility] IN-HOUSE,Nasal Swab No Transport Media 3-4 HR TAT - Swab, Nasal Cavity [[id number]]  (Normal) Collected: 11/30/20 1328    Specimen: Swab  from Nasal Cavity Updated: 11/30/20 1403     COVID19 Not Detected    Narrative:      Fact sheet for providers: https://www.fda.gov/media/886712/download     Fact sheet for patients: https://www.fda.gov/media/590808/download    Comprehensive Metabolic Panel [[id number]]  (Abnormal) Collected: 11/30/20 1319    Specimen: Blood Updated: 11/30/20 1357     Glucose 87 mg/dL      BUN 11 mg/dL      Creatinine 0.66 mg/dL      Sodium 129 mmol/L      Potassium 4.7 mmol/L      Chloride 90 mmol/L      CO2 31.0 mmol/L      Calcium 9.5 mg/dL      Total Protein 7.2 g/dL      Albumin 3.60 g/dL      ALT (SGPT) 7 U/L      AST (SGOT) 19 U/L      Alkaline Phosphatase 88 U/L      Total Bilirubin 0.6 mg/dL      eGFR Non African Amer 85 mL/min/1.73      Globulin 3.6 gm/dL      A/G Ratio 1.0 g/dL      BUN/Creatinine Ratio 16.7     Anion Gap 8.0 mmol/L     Narrative:      GFR Normal >60  Chronic Kidney Disease <60  Kidney Failure <15      Troponin [[id number]]  (Normal) Collected: 11/30/20 1319    Specimen: Blood Updated: 11/30/20 1357     Troponin T <0.010 ng/mL     Narrative:      Troponin T Reference Range:  <= 0.03 ng/mL-   Negative for AMI  >0.03 ng/mL-     Abnormal for myocardial necrosis.  Clinicians would have to utilize clinical acumen, EKG, Troponin and serial changes to determine if it is an Acute Myocardial Infarction or myocardial injury due to an underlying chronic condition.       Results may be falsely decreased if patient taking Biotin.      BNP [[id number]]  (Abnormal) Collected: 11/30/20 1319    Specimen: Blood Updated: 11/30/20 1355     proBNP 1,872.0 pg/mL     Narrative:      Among patients with dyspnea, NT-proBNP is highly sensitive for the detection of acute congestive heart failure. In addition NT-proBNP of <300 pg/ml effectively rules out acute congestive heart failure with 99% negative predictive value.    Results may be falsely decreased if patient taking Biotin.      aPTT [[id number]]  (Normal) Collected: 11/30/20 1319     Specimen: Blood Updated: 11/30/20 1340     PTT 26.9 seconds     Protime-INR [[id number]]  (Normal) Collected: 11/30/20 1319    Specimen: Blood from Arm, Left Updated: 11/30/20 1339     Protime 11.2 Seconds      INR 1.02    CBC & Differential [[id number]]  (Abnormal) Collected: 11/30/20 1319    Specimen: Blood Updated: 11/30/20 1330    Narrative:      The following orders were created for panel order CBC & Differential.  Procedure                               Abnormality         Status                     ---------                               -----------         ------                     CBC Auto Differential[[id number]]        Abnormal            Final result                 Please view results for these tests on the individual orders.    CBC Auto Differential [[id number]]  (Abnormal) Collected: 11/30/20 1319    Specimen: Blood Updated: 11/30/20 1330     WBC 8.70 10*3/mm3      RBC 3.75 10*6/mm3      Hemoglobin 11.7 g/dL      Hematocrit 35.4 %      MCV 94.4 fL      MCH 31.3 pg      MCHC 33.1 g/dL      RDW 14.3 %      RDW-SD 47.3 fl      MPV 8.0 fL      Platelets 297 10*3/mm3      Neutrophil % 71.1 %      Lymphocyte % 14.5 %      Monocyte % 12.3 %      Eosinophil % 0.7 %      Basophil % 1.4 %      Neutrophils, Absolute 6.20 10*3/mm3      Lymphocytes, Absolute 1.30 10*3/mm3      Monocytes, Absolute 1.10 10*3/mm3      Eosinophils, Absolute 0.10 10*3/mm3      Basophils, Absolute 0.10 10*3/mm3      nRBC 0.1 /100 WBC           Imaging Results (Last 24 Hours)     Procedure Component Value Units Date/Time    XR Shoulder 2+ View Left [[id number]] Collected: 11/30/20 1410     Updated: 11/30/20 1414    Narrative:      DATE OF EXAM:  11/30/2020 1:30 PM     PROCEDURE:  XR SHOULDER 2+ VW LEFT-     INDICATIONS:  Fall trauma     COMPARISON:  Left shoulder radiographs 10/06/2020.     TECHNIQUE:   A minimum of two radiologic views of the left shoulder were obtained.        FINDINGS:  Left reverse total shoulder arthroplasty changes  are redemonstrated. No  periprosthetic fracture or evidence of prosthesis loosening. Chronic  appearing corticated calcification at the inferior margin proximal  humerus, similar to prior. Chronic appearing corticated calcification at  the superior lateral margin the proximal humerus. Degenerative spurring  of the AC joint. Degenerative spurring at the undersurface of the  scapular acromion. Advanced osteopenia. Pacemaker/defibrillator device  in place. TAVR  changes are noted.        Impression:      No acute left shoulder findings. The left shoulder prosthesis appears  intact. No definite acute periprosthetic fracture is seen. No  dislocation.     Electronically Signed By-Tonja Chauhan MD On:11/30/2020 2:12 PM  This report was finalized on [id number] by  Tonja Chauhan MD.    XR Chest 1 View [[id number]] Collected: 11/30/20 1408     Updated: 11/30/20 1412    Narrative:      DATE OF EXAM:  11/30/2020 1:30 PM     PROCEDURE:  XR CHEST 1 VW-     INDICATIONS:  Short of breath       COMPARISON:  PA and lateral chest 09/30/2020.     TECHNIQUE:   Single radiographic view of the chest was obtained.     FINDINGS:  Heart size is enlarged but stable with signs of TAPVR and CABG.  Pacemaker/fibrillator appears stable. Chronic appearing interstitial  fibrotic changes in both lungs, greatest in the left lower lobe.. No  definite acute airspace disease changes are identified. No pleural  effusion or pneumothorax. Severe degenerative change of the right  shoulder. Left shoulder replacement.       Impression:      Chronic interstitial fibrotic changes in both lungs. Stable  cardiomegaly. No acute chest findings.     Electronically Signed By-Tonja Chauhan MD On:11/30/2020 2:10 PM  This report was finalized on [id number] by  Tonja Chauhan MD.          EKG      I personally viewed and interpreted the patient's EKG/Telemetry data:    ECHOCARDIOGRAM:      STRESS MYOVIEW:    CARDIAC CATHETERIZATION:    OTHER:                  Assessment/Plan     Active Problems:    Dyspnea  History of coronary disease  Hypertension  Hyperlipidemia  History of severe aortic stenosis status post TAVR  Sick sinus syndrome  Status post pacemaker placement    Patient presented with increasing shortness of breath and was admitted and being ruled out for MI by enzymes  Patient had an echocardiogram last 6 months which showed normal function but we will repeat an echocardiogram for LV function valvular abnormalities  Patient will also have a D-dimer and be ruled out for pulmonary embolism  Patient's blood pressure heart rate stable  Patient had severe aortic stenosis status post TAVR  Patient also had sick sinus syndrome in the past and her pacemaker has been working very well but we will recheck her pacemaker again  Patient's lipid levels are followed by the primary care doctor  Patient has history of coronary disease and is stable without any angina.    I discussed the patients findings and my recommendations with patient and nurse    Wyatt Kwok MD  11/30/20  20:35 EST

## 2020-12-01 NOTE — PROGRESS NOTES
[facility] Medicine Services Daily Progress Note      Hospitalist Team  LOS 1 days      Patient Care Team:  Tigre Duran MD as PCP - General  Tigre Duran MD as PCP - Family Medicine  Wyatt Kwok MD as Consulting Physician (Cardiology)    Patient Location: 222/1      Subjective   Subjective   No complaints or events overnight.  Chief Complaint / Subjective  Chief Complaint   Patient presents with   • Shortness of Breath         Brief Synopsis of Hospital Course/HPI  85-year-old pleasant female with multiple comorbidities significant for coronary disease, status post CABG (1999), severe aortic stenosis, status post TAVR (5/20) history of SSS, status post PPM, hypertension, PAF, DJD, hospitalist recent left shoulder surgery , nonsustained V. tach, and hyperlipidemia.  Presented to [facility] ED this afternoon complaining of few days history of progressive weakness with dyspnea on exertion and shortness of breath.  She stated that symptoms progressively worsened in the past few days and with difficulty ambulating short distant with dyspnea.  Denies chest pain, no fever, chills no cough, nausea, vomiting, and no reported PND orthopnea.  She denies weight gain and lower extremity swelling.     EKG showed atrial sensed ventricular paced rhythm, and laboratory revealed elevated BNP of 1872,  cardiac biomarkers within normal limit, and Covid-19 screen nonreactive.  Chest x-ray showed chronic interstitial fibrotic changes in both lung with stable cardiomegaly, no acute cardiopulmonary findings.  Most recent 2D echo on 7/2/2020, showed preserved LV function, with estimated EF of 60%.  In ED, she was treated with Lasix with improvement.           Objective   Objective    Seen and examined in follow up evaluation.  Reports feeling better and has no complaints  Vital Signs  Temp:  [97.6 °F (36.4 °C)-98.1 °F (36.7 °C)] 97.6 °F (36.4 °C)  Heart Rate:  [68-70] 69  Resp:  [14-16] 16  BP:  "(147171)/(63-78) 171/73  Oxygen Therapy  SpO2: 96 %  Pulse Oximetry Type: Continuous  Device (Oxygen Therapy): room air  Flowsheet Rows      First Filed Value   Admission Height  162.6 cm (64\") Documented at 11/30/2020 1241   Admission Weight  49.9 kg (110 lb) Documented at 11/30/2020 1241        Intake & Output (last 3 days)       11/28 0701 - 11/29 0700 11/29 0701 - 11/30 0700 11/30 0701 - 12/01 0700    P.O.   480    Total Intake(mL/kg)   480 (9.2)    Urine (mL/kg/hr)   1200    Total Output   1200    Net   -720               Lines, Drains & Airways    Active LDAs     Name:   Placement date:   Placement time:   Site:   Days:    Peripheral IV 11/30/20 1318 Left   11/30/20    1318    --   less than 1    External Urinary Catheter   11/30/20    1905    --   less than 1                  Physical Exam:  Constitutional:       General: She is no distress      Appearance: Normal appearance. She is normal weight. She is not ill-appearing.   HENT:      Head: Normocephalic and atraumatic.      Nose: Nose normal. No congestion or rhinorrhea.      Mouth/Throat:      Mouth: Mucous membranes are moist.   Eyes:      Extraocular Movements: Extraocular movements intact.      Pupils: Pupils are equal, round, and reactive to light.   Neck:      Musculoskeletal: Normal range of motion and neck supple. No neck rigidity.   Cardiovascular:      Rate and Rhythm: Normal rate and regular rhythm.      Pulses: Normal pulses.      Heart sounds: No murmur. No friction rub.   Pulmonary:      Effort: Pulmonary effort is normal. No respiratory distress.      Breath sounds: Normal breath sounds. No wheezing or rales.   Abdominal:      Palpations: Abdomen is soft.   Lymphadenopathy:      Cervical: No cervical adenopathy.   Skin:     General: Skin is warm.   Neurological:      General: No focal deficit present.      Mental Status: She is alert. Mental status is at baseline.   Psychiatric:         Mood and Affect: Mood normal.         Behavior: " Behavior normal.           Procedures:        Results Review:     I reviewed the patient's new clinical results.      Lab Results (last 24 hours)     Procedure Component Value Units Date/Time    TSH [[id number]] Collected: 12/01/20 0603    Specimen: Blood Updated: 12/01/20 0625    D-dimer, Quantitative [[id number]]  (Abnormal) Collected: 11/30/20 1802    Specimen: Blood Updated: 11/30/20 1859     D-Dimer, Quantitative 1.57 mg/L (FEU)     Narrative:      Reference Range  --------------------------------------------------------------------     < 0.50   Negative Predictive Value  0.50-0.59   Indeterminate    >= 0.60   Probable VTE             A very low percentage of patients with DVT may yield D-Dimer results   below the cut-off of 0.50 mg/L FEU.  This is known to be more   prevalent in patients with distal DVT.             Results of this test should always be interpreted in conjunction with   the patient's medical history, clinical presentation and other   findings.  Clinical diagnosis should not be based on the result of   INNOVANCE D-Dimer alone.    COVID-19,[facility] IN-HOUSE,Nasal Swab No Transport Media 3-4 HR TAT - Swab, Nasal Cavity [[id number]]  (Normal) Collected: 11/30/20 1328    Specimen: Swab from Nasal Cavity Updated: 11/30/20 1403     COVID19 Not Detected    Narrative:      Fact sheet for providers: https://www.fda.gov/media/143369/download     Fact sheet for patients: https://www.fda.gov/media/670210/download    Comprehensive Metabolic Panel [[id number]]  (Abnormal) Collected: 11/30/20 1319    Specimen: Blood Updated: 11/30/20 1357     Glucose 87 mg/dL      BUN 11 mg/dL      Creatinine 0.66 mg/dL      Sodium 129 mmol/L      Potassium 4.7 mmol/L      Chloride 90 mmol/L      CO2 31.0 mmol/L      Calcium 9.5 mg/dL      Total Protein 7.2 g/dL      Albumin 3.60 g/dL      ALT (SGPT) 7 U/L      AST (SGOT) 19 U/L      Alkaline Phosphatase 88 U/L      Total Bilirubin 0.6 mg/dL      eGFR Non African Amer 85  mL/min/1.73      Globulin 3.6 gm/dL      A/G Ratio 1.0 g/dL      BUN/Creatinine Ratio 16.7     Anion Gap 8.0 mmol/L     Narrative:      GFR Normal >60  Chronic Kidney Disease <60  Kidney Failure <15      Troponin [[id number]]  (Normal) Collected: 11/30/20 1319    Specimen: Blood Updated: 11/30/20 1357     Troponin T <0.010 ng/mL     Narrative:      Troponin T Reference Range:  <= 0.03 ng/mL-   Negative for AMI  >0.03 ng/mL-     Abnormal for myocardial necrosis.  Clinicians would have to utilize clinical acumen, EKG, Troponin and serial changes to determine if it is an Acute Myocardial Infarction or myocardial injury due to an underlying chronic condition.       Results may be falsely decreased if patient taking Biotin.      BNP [[id number]]  (Abnormal) Collected: 11/30/20 1319    Specimen: Blood Updated: 11/30/20 1355     proBNP 1,872.0 pg/mL     Narrative:      Among patients with dyspnea, NT-proBNP is highly sensitive for the detection of acute congestive heart failure. In addition NT-proBNP of <300 pg/ml effectively rules out acute congestive heart failure with 99% negative predictive value.    Results may be falsely decreased if patient taking Biotin.      aPTT [[id number]]  (Normal) Collected: 11/30/20 1319    Specimen: Blood Updated: 11/30/20 1340     PTT 26.9 seconds     Protime-INR [[id number]]  (Normal) Collected: 11/30/20 1319    Specimen: Blood from Arm, Left Updated: 11/30/20 1339     Protime 11.2 Seconds      INR 1.02    CBC & Differential [[id number]]  (Abnormal) Collected: 11/30/20 1319    Specimen: Blood Updated: 11/30/20 1330    Narrative:      The following orders were created for panel order CBC & Differential.  Procedure                               Abnormality         Status                     ---------                               -----------         ------                     CBC Auto Differential[[id number]]        Abnormal            Final result                 Please view results for these  tests on the individual orders.    CBC Auto Differential [[id number]]  (Abnormal) Collected: 11/30/20 1319    Specimen: Blood Updated: 11/30/20 1330     WBC 8.70 10*3/mm3      RBC 3.75 10*6/mm3      Hemoglobin 11.7 g/dL      Hematocrit 35.4 %      MCV 94.4 fL      MCH 31.3 pg      MCHC 33.1 g/dL      RDW 14.3 %      RDW-SD 47.3 fl      MPV 8.0 fL      Platelets 297 10*3/mm3      Neutrophil % 71.1 %      Lymphocyte % 14.5 %      Monocyte % 12.3 %      Eosinophil % 0.7 %      Basophil % 1.4 %      Neutrophils, Absolute 6.20 10*3/mm3      Lymphocytes, Absolute 1.30 10*3/mm3      Monocytes, Absolute 1.10 10*3/mm3      Eosinophils, Absolute 0.10 10*3/mm3      Basophils, Absolute 0.10 10*3/mm3      nRBC 0.1 /100 WBC         No results found for: HGBA1C  Results from last 7 days   Lab Units 11/30/20  1319   INR  1.02           No results found for: LIPASE  Lab Results   Component Value Date    CHOL 127 08/05/2017    TRIG 90 08/05/2017    HDL 49 08/05/2017    LDL 62 08/05/2017       No results found for: INTRAOP, PREDX, FINALDX, COMDX    Microbiology Results (last 10 days)     Procedure Component Value - Date/Time    COVID-19,[facility] [employer] IN-HOUSE,Nasal Swab No Transport Media 3-4 HR TAT - Swab, Nasal Cavity [[id number]]  (Normal) Collected: 11/30/20 1328    Lab Status: Final result Specimen: Swab from Nasal Cavity Updated: 11/30/20 1403     COVID19 Not Detected    Narrative:      Fact sheet for providers: https://www.fda.gov/media/448320/download     Fact sheet for patients: https://www.fda.gov/media/880372/download          ECG/EMG Results (most recent)     Procedure Component Value Units Date/Time    ECG 12 Lead [[id number]] Collected: 11/30/20 1322     Updated: 11/30/20 1324     QT Interval 472 ms     Narrative:      HEART RATE= 63  bpm  RR Interval= 958  ms  HI Interval= 121  ms  P Horizontal Axis= 40  deg  P Front Axis= 13  deg  QRSD Interval= 140  ms  QT Interval= 472  ms  QRS Axis= 223  deg  T Wave Axis= 17  deg  -  ABNORMAL ECG -  Atrial-sensed ventricular-paced rhythm  When compared with ECG of 30-Sep-2020 9:46:53,  No significant change  Electronically Signed By:   Date and Time of Study: 2020-11-30 13:22:06          Results for orders placed during the hospital encounter of 02/28/20   Duplex Carotid Ultrasound CAR    Narrative · Proximal right internal carotid artery moderate stenosis.  · Proximal left internal carotid artery mild stenosis.          Results for orders placed during the hospital encounter of 07/02/20   Adult Transthoracic Echo Complete W/ Cont if Necessary Per Protocol    Narrative · Left ventricular systolic function is normal. Calculated EF = 60.4%.   Estimated EF was in agreement with the calculated EF. Normal left   ventricular cavity size noted. All left ventricular wall segments contract   normally. Left ventricular wall thickness is consistent with   mild-to-moderate concentric hypertrophy. Left ventricular diastolic   dysfunction is noted (grade II w/high LAP) consistent with   pseudonormalization.  · Left atrial cavity size is moderately dilated.  · There is a prosthetic aortic valve. Aortic valve maximum pressure   gradient is 23.6 mmHg. There is a 23 mm TAVR valve present. The aortic   valve peak and mean gradients are within defined limits. The prosthetic   valve is grossly normal. There is a mild anterior paravalvular leak.  · Moderate mitral valve regurgitation is present.  · Moderate tricuspid valve regurgitation is present. Estimated right   ventricular systolic pressure from tricuspid regurgitation is mildly   elevated (35-45 mmHg). Calculated right ventricular systolic pressure from   tricuspid regurgitation is 38.7 mmHg.  · No dilation of the aortic root is present. Mild dilation of the   ascending aorta is present. 3.7cm.          Xr Shoulder 2+ View Left    Result Date: 11/30/2020  No acute left shoulder findings. The left shoulder prosthesis appears intact. No definite acute  periprosthetic fracture is seen. No dislocation.  Electronically Signed By-Tonja Chauhan MD On:11/30/2020 2:12 PM This report was finalized on [id number] by  Tonja Chauhan MD.    Xr Chest 1 View    Result Date: 11/30/2020  Chronic interstitial fibrotic changes in both lungs. Stable cardiomegaly. No acute chest findings.  Electronically Signed By-Tonja Chauhan MD On:11/30/2020 2:10 PM This report was finalized on [id number] by  Tonja Chauhan MD.          Xrays, labs reviewed personally by physician.    Medication Review:   I have reviewed the patient's current medication list      Scheduled Meds  acebutolol, 200 mg, Oral, BID  apixaban, 2.5 mg, Oral, Q12H  atorvastatin, 20 mg, Oral, Nightly  furosemide, 40 mg, Intravenous, Q12H  lisinopril, 20 mg, Oral, Daily  sodium chloride, 10 mL, Intravenous, Q12H        Meds Infusions       Meds PRN  melatonin  •  nitroglycerin  •  [COMPLETED] Insert peripheral IV **AND** sodium chloride  •  sodium chloride        Assessment/Plan   Assessment/Plan     Active Hospital Problems    Diagnosis  POA   • Dyspnea [R06.00]  Yes      Resolved Hospital Problems   No resolved problems to display.     1.  Dypsea/acute HFpEF        -feels better with diurese--> Lasix, cont lisinopril          -cardiac biomarkers within normal limits, and chest x-ray show chronic fibrotic changes with stable cardiomegaly.  No acute cardiopulmonary findings         -slightly elevated D-dimer and will follow with CTA to rule out PE          -2D echo which result pending  and 2D echo pending             2.  Coronary artery disease, status post CABG (1999)         -Lisinopril/Lipitor     3.  PAF        -on Sectral and Eliquis     4.  SSS-S/p PPM     5.  Aortic stenosis        -S/p TAVR (5/20)     6.  Essential hypertension     7.  Hyperlipidemia        -Lipitor     8.  DJD, recent left shoulder surgery     9.  History of nonsustained V. tach             MEDICAL DECISION MAKING COMPLEXITY BY PROBLEM:      VTE Prophylaxis -   Mechanical Order History:     None      Pharmalogical Order History:      Ordered     Dose Route Frequency Stop    11/30/20 1723  apixaban (ELIQUIS) tablet 2.5 mg      2.5 mg PO Every 12 Hours Scheduled --                  Code Status -   Code Status and Medical Interventions:   Ordered at: 11/30/20 1723     Level Of Support Discussed With:    Patient     Code Status:    CPR     Medical Interventions (Level of Support Prior to Arrest):    Full         Discharge Planning      Electronically signed by Dov Seth MD, 12/01/20, 06:46 EST.  [facility] Hospitalist Team

## 2020-12-01 NOTE — DISCHARGE PLACEMENT REQUEST
"Tena Curtis (85 y.o. Female)     Date of Birth Social Security Number Address Home Phone MRN    1935  [id number] [place] [facility] [phone] [id number]    Spiritism Marital Status          Other        Admission Date Admission Type Admitting Provider Attending Provider Department, Room/Bed    20 Emergency Dov Seth MD Ojo-Oniyun, Saheed G, MD [facility] 2B MEDICAL INPATIENT,     Discharge Date Discharge Disposition Discharge Destination                       Attending Provider: Dov Seth MD    Allergies: Sulfa Antibiotics    Isolation: None   Infection: None   Code Status: CPR    Ht: 162.6 cm (64\")   Wt: 51.7 kg (114 lb)    Admission Cmt: None   Principal Problem: None                Active Insurance as of 2020     Primary Coverage     Payor Plan Insurance Group Employer/Plan Group    MEDICARE MEDICARE A & B      Payor Plan Address Payor Plan Phone Number Payor Plan Fax Number Effective Dates    [address] [phone]  2001 - None Entered    [facility] [id number]       Subscriber Name Subscriber Birth Date Member ID       TENA CURTIS 1935 [id number]           Secondary Coverage     Payor Plan Insurance Group Employer/Plan Group    [facility] SUPP [id number]     Payor Plan Address Payor Plan Phone Number Payor Plan Fax Number Effective Dates    [address]   2016 - None Entered    [facility] [id number]       Subscriber Name Subscriber Birth Date Member ID       TENA CURTIS 1935 [id number]                 Emergency Contacts      (Rel.) Home Phone Work Phone Mobile Phone    ADRIANNE CURTIS (Spouse) [phone] -- --    ROGELIO ROSE (Daughter) [phone] -- --               History & Physical      Dov Seth MD at 20 1723                [facility] Medicine Services      Patient Name: Tena Curtis  : 1935  MRN: [id number]  Primary Care " Physician: Tigre Duran MD  Date of admission: 11/30/2020    Patient Care Team:  Tigre Duran MD as PCP - General  Tigre Duran MD as PCP - Family Medicine  Wyatt Kwok MD as Consulting Physician (Cardiology)          Subjective   History Present Illness   Shortness of breath with generalized weakness  Chief Complaint:   Chief Complaint   Patient presents with   • Shortness of Breath          History of Present Illness   85-year-old pleasant female with multiple comorbidities significant for coronary disease, status post CABG (1999), severe aortic stenosis, status post TAVR (5/20) history of SSS, status post PPM, hypertension, PAF, DJD, hospitalist recent left shoulder surgery , nonsustained V. tach, and hyperlipidemia.  Presented to [facility] ED this afternoon complaining of few days history of progressive weakness with dyspnea on exertion and shortness of breath.  She stated that symptoms progressively worsened in the past few days and with difficulty ambulating short distant with dyspnea.  Denies chest pain, no fever, chills no cough, nausea, vomiting, and no reported PND orthopnea.  She denies weight gain and lower extremity swelling.    EKG showed atrial sensed ventricular paced rhythm, and laboratory revealed elevated BNP of 1872,  cardiac biomarkers within normal limit, and Covid-19 screen nonreactive.  Chest x-ray showed chronic interstitial fibrotic changes in both lung with stable cardiomegaly, no acute cardiopulmonary findings.  Most recent 2D echo on 7/2/2020, showed preserved LV function, with estimated EF of 60%.  In ED, she was treated with Lasix with improvement.      Review of Systems   Constitution: Negative.   HENT: Negative.    Eyes: Negative.    Cardiovascular: Positive for dyspnea on exertion.   Respiratory: Positive for shortness of breath.    Endocrine: Negative.    Skin: Negative.    Musculoskeletal: Negative.    Gastrointestinal: Negative.    Genitourinary: Negative.       Neurological: Negative.    Psychiatric/Behavioral: Negative.            Personal History     Past Medical History:   Past Medical History:   Diagnosis Date   • Aortic stenosis 10/28/2014   • Atrial premature complex 12/18/2015   • Chronic coronary artery disease 10/28/2014   • Dyslipidemia 10/28/2014   • Hyperlipidemia    • Hypertension 10/28/2014   • Nausea 09/2020   • Nonsustained ventricular tachycardia (CMS/HCC) 12/18/2015   • Partial paralysis of right hand (CMS/HCC)     states right arm only.  Cannot have sticks in arm, hand only   • Shoulder pain, left        Surgical History:      Past Surgical History:   Procedure Laterality Date   • AORTIC VALVE REPAIR/REPLACEMENT N/A 5/26/2020    Procedure: TTE TRANSFEMORAL TRANSCATHETER AORTIC VALVE REPLACEMENT PERCUTANEOUS APPROACH;  Surgeon: J Carlos Leon MD;  Location: [facility] 18/19;  Service: Cardiothoracic;  Laterality: N/A;   • AORTIC VALVE REPAIR/REPLACEMENT N/A 5/26/2020    Procedure: Transfemoral Transcatheter Aortic Valve Replacement w/Intra Op TTE and Possible Open Rescue Surgery;  Surgeon: Vasiliy Bruce MD;  Location: [facility] OR 18/19;  Service: Cardiovascular;  Laterality: N/A;   • APPENDECTOMY     • CARDIAC CATHETERIZATION N/A 3/2/2020    Procedure: Left Heart Cath and coronary angiogram;  Surgeon: Wyatt Kwok MD;  Location: [facility] CATH INVASIVE LOCATION;  Service: Cardiovascular;  Laterality: N/A;   • CARDIAC CATHETERIZATION N/A 3/2/2020    Procedure: Saphenous Vein Graft;  Surgeon: Wyatt Kwok MD;  Location: [facility] CATH INVASIVE LOCATION;  Service: Cardiovascular;  Laterality: N/A;   • CARDIAC PACEMAKER PLACEMENT  2017   • CARDIAC SURGERY  1999    Bypass surgery   • EYE SURGERY Bilateral     cat ext   • HERNIA REPAIR     • TOTAL SHOULDER ARTHROPLASTY W/ DISTAL CLAVICLE EXCISION Left 10/6/2020    Procedure: TOTAL SHOULDER REVERSE ARTHROPLASTY;  Surgeon: Oli Barcenas MD;  Location: [facility] OR;   Service: Orthopedics;  Laterality: Left;   • TOTAL SHOULDER REVISION  10/06/2020    left           Family History: family history includes No Known Problems in her brother, father, mother, and sister. Otherwise pertinent FHx was reviewed and unremarkable.     Social History:  reports that she has never smoked. She has never used smokeless tobacco. She reports that she does not drink alcohol or use drugs.      Medications:  Prior to Admission medications    Medication Sig Start Date End Date Taking? Authorizing Provider   acebutolol (SECTRAL) 200 MG capsule TAKE ONE CAPSULE BY MOUTH TWICE A DAY 5/6/20  Yes Wyatt Kwok MD   ELIQUIS 2.5 MG tablet tablet TAKE ONE TABLET BY MOUTH EVERY 12 HOURS 4/13/20  Yes Wyatt Kwok MD   lisinopril (PRINIVIL,ZESTRIL) 20 MG tablet TAKE ONE TABLET BY MOUTH DAILY 10/5/20  Yes Wyatt Kwok MD   pravastatin (PRAVACHOL) 20 MG tablet TAKE ONE TABLET BY MOUTH DAILY 11/23/20  Yes Wyatt Kwok MD   melatonin 5 MG tablet tablet Take 5 mg by mouth At Night As Needed (sleep).    Provider, MD Yeyo       Allergies:    Allergies   Allergen Reactions   • Sulfa Antibiotics Hives       Objective   Objective     Vital Signs  Temp:  [98 °F (36.7 °C)] 98 °F (36.7 °C)  Heart Rate:  [68-70] 70  Resp:  [14-16] 16  BP: (155-158)/(63-78) 156/73  SpO2:  [98 %-99 %] 98 %  on   ;   Device (Oxygen Therapy): room air  Body mass index is 18.88 kg/m².    Physical Exam  Constitutional:       General: She is in acute distress.      Appearance: Normal appearance. She is normal weight. She is not ill-appearing.   HENT:      Head: Normocephalic and atraumatic.      Nose: Nose normal. No congestion or rhinorrhea.      Mouth/Throat:      Mouth: Mucous membranes are moist.   Eyes:      Extraocular Movements: Extraocular movements intact.      Pupils: Pupils are equal, round, and reactive to light.   Neck:      Musculoskeletal: Normal range of motion and neck supple. No neck rigidity.   Cardiovascular:       Rate and Rhythm: Normal rate and regular rhythm.      Pulses: Normal pulses.      Heart sounds: No murmur. No friction rub.   Pulmonary:      Effort: Pulmonary effort is normal. No respiratory distress.      Breath sounds: Normal breath sounds. No wheezing or rales.   Abdominal:      Palpations: Abdomen is soft.   Lymphadenopathy:      Cervical: No cervical adenopathy.   Skin:     General: Skin is warm.   Neurological:      General: No focal deficit present.      Mental Status: She is alert. Mental status is at baseline.   Psychiatric:         Mood and Affect: Mood normal.         Behavior: Behavior normal.         Results Review:  I have personally reviewed most recent  and agree with findings    Results from last 7 days   Lab Units 11/30/20  1319   WBC 10*3/mm3 8.70   HEMOGLOBIN g/dL 11.7*   HEMATOCRIT % 35.4   PLATELETS 10*3/mm3 297   INR  1.02     Results from last 7 days   Lab Units 11/30/20  1319   SODIUM mmol/L 129*   POTASSIUM mmol/L 4.7   CHLORIDE mmol/L 90*   CO2 mmol/L 31.0*   BUN mg/dL 11   CREATININE mg/dL 0.66   GLUCOSE mg/dL 87   CALCIUM mg/dL 9.5   ALT (SGPT) U/L 7   AST (SGOT) U/L 19   TROPONIN T ng/mL <0.010   PROBNP pg/mL 1,872.0*     Estimated Creatinine Clearance: 41.2 mL/min (by C-G formula based on SCr of 0.66 mg/dL).  Brief Urine Lab Results  (Last result in the past 365 days)      Color   Clarity   Blood   Leuk Est   Nitrite   Protein   CREAT   Urine HCG        05/21/20 0909 Yellow Cloudy Trace Large (3+) Negative Negative               Microbiology Results (last 10 days)     Procedure Component Value - Date/Time    COVID-19,[facility] IN-HOUSE,Nasal Swab No Transport Media 3-4 HR TAT - Swab, Nasal Cavity [[id number]]  (Normal) Collected: 11/30/20 1328    Lab Status: Final result Specimen: Swab from Nasal Cavity Updated: 11/30/20 1403     COVID19 Not Detected    Narrative:      Fact sheet for providers: https://www.fda.gov/media/231681/download     Fact sheet for patients:  https://www.fda.gov/media/262891/download          ECG/EMG Results (most recent)     Procedure Component Value Units Date/Time    ECG 12 Lead [[id number]] Collected: 11/30/20 1322     Updated: 11/30/20 1324     QT Interval 472 ms     Narrative:      HEART RATE= 63  bpm  RR Interval= 958  ms  ID Interval= 121  ms  P Horizontal Axis= 40  deg  P Front Axis= 13  deg  QRSD Interval= 140  ms  QT Interval= 472  ms  QRS Axis= 223  deg  T Wave Axis= 17  deg  - ABNORMAL ECG -  Atrial-sensed ventricular-paced rhythm  When compared with ECG of 30-Sep-2020 9:46:53,  No significant change  Electronically Signed By:   Date and Time of Study: 2020-11-30 13:22:06          Results for orders placed during the hospital encounter of 02/28/20   Duplex Carotid Ultrasound CAR    Narrative · Proximal right internal carotid artery moderate stenosis.  · Proximal left internal carotid artery mild stenosis.          Results for orders placed during the hospital encounter of 07/02/20   Adult Transthoracic Echo Complete W/ Cont if Necessary Per Protocol    Narrative · Left ventricular systolic function is normal. Calculated EF = 60.4%.   Estimated EF was in agreement with the calculated EF. Normal left   ventricular cavity size noted. All left ventricular wall segments contract   normally. Left ventricular wall thickness is consistent with   mild-to-moderate concentric hypertrophy. Left ventricular diastolic   dysfunction is noted (grade II w/high LAP) consistent with   pseudonormalization.  · Left atrial cavity size is moderately dilated.  · There is a prosthetic aortic valve. Aortic valve maximum pressure   gradient is 23.6 mmHg. There is a 23 mm TAVR valve present. The aortic   valve peak and mean gradients are within defined limits. The prosthetic   valve is grossly normal. There is a mild anterior paravalvular leak.  · Moderate mitral valve regurgitation is present.  · Moderate tricuspid valve regurgitation is present. Estimated right      ventricular systolic pressure from tricuspid regurgitation is mildly   elevated (35-45 mmHg). Calculated right ventricular systolic pressure from   tricuspid regurgitation is 38.7 mmHg.  · No dilation of the aortic root is present. Mild dilation of the   ascending aorta is present. 3.7cm.          Xr Shoulder 2+ View Left    Result Date: 11/30/2020  No acute left shoulder findings. The left shoulder prosthesis appears intact. No definite acute periprosthetic fracture is seen. No dislocation.  Electronically Signed By-Tonja Chauhan MD On:11/30/2020 2:12 PM This report was finalized on [id number] by  Tonja Chauhan MD.    Xr Chest 1 View    Result Date: 11/30/2020  Chronic interstitial fibrotic changes in both lungs. Stable cardiomegaly. No acute chest findings.  Electronically Signed By-Tonja Chauhan MD On:11/30/2020 2:10 PM This report was finalized on [id number] by  Tonja Chauhan MD.        Estimated Creatinine Clearance: 41.2 mL/min (by C-G formula based on SCr of 0.66 mg/dL).    Assessment/Plan   Assessment/Plan       Active Hospital Problems    Diagnosis  POA   • Dyspnea [R06.00]  Yes      Resolved Hospital Problems   No resolved problems to display.       1.  Dypsea/acute HFpEF        -feels better after  diurese--> Lasix, cont lisinopril          -cardiac biomarkers within normal limits, and chest x-ray show chronic fibrotic changes with stable cardiomegaly.  No acute cardiopulmonary findings         - follow up on D-dimer and 2D echo pending         -evaluated by cardiologist with recommendation noted      2.  Coronary artery disease, status post CABG (1999)         -Lisinopril/Lipitor     3.  PAF        -on Sectral and Eliquis     4.  SSS-S/p PPM     5.  Aortic stenosis        -S/p TAVR (5/20)     6.  Essential hypertension     7.  Hyperlipidemia        -Lipitor     8.  DJD, recent left shoulder surgery     9.  History of nonsustained V. tach                  VTE Prophylaxis -   Mechanical Order  History:     None      Pharmalogical Order History:      Ordered     Dose Route Frequency Stop    11/30/20 1723  apixaban (ELIQUIS) tablet 2.5 mg      2.5 mg PO Every 12 Hours --                CODE STATUS:    Code Status and Medical Interventions:   Ordered at: 11/30/20 1723     Level Of Support Discussed With:    Patient     Code Status:    CPR     Medical Interventions (Level of Support Prior to Arrest):    Full       I discussed the patient's findings and my recommendations with patient and she expressed understanding.        Electronically signed by Dov Seth MD, 11/30/20, 5:24 PM EST.  [facility] Hospitalist Team          Electronically signed by Dov Seth MD at 12/01/20 0656         Current Facility-Administered Medications   Medication Dose Route Frequency Provider Last Rate Last Admin   • acebutolol (SECTRAL) capsule 200 mg  200 mg Oral BID Dov Seth MD   200 mg at 12/01/20 0903   • apixaban (ELIQUIS) tablet 2.5 mg  2.5 mg Oral Q12H Dov Seth MD   2.5 mg at 12/01/20 0908   • atorvastatin (LIPITOR) tablet 20 mg  20 mg Oral Nightly Dov Seth MD   20 mg at 11/30/20 2029   • furosemide (LASIX) injection 40 mg  40 mg Intravenous Q12H Dov Seth MD   40 mg at 12/01/20 1641   • lisinopril (PRINIVIL,ZESTRIL) tablet 20 mg  20 mg Oral Daily Dov Seth MD   20 mg at 12/01/20 0908   • melatonin tablet 5 mg  5 mg Oral Nightly PRN Dov Seth MD       • nitroglycerin (NITROSTAT) SL tablet 0.4 mg  0.4 mg Sublingual Q5 Min PRN Dvo Seth MD       • sodium chloride 0.9 % flush 10 mL  10 mL Intravenous PRN Dov Seth MD       • sodium chloride 0.9 % flush 10 mL  10 mL Intravenous Q12H Dov Seth MD   10 mL at 12/01/20 0909   • sodium chloride 0.9 % flush 10 mL  10 mL Intravenous PRN Juan José-Dov Cunningham MD         Referral Orders (last 24 hours) (24h ago, onward)    None

## 2020-12-02 NOTE — PROGRESS NOTES
Malnutrition Severity Assessment    Patient Name:  Olga Curtis  YOB: 1935  MRN: [id number]  Admit Date:  11/30/2020    Patient meets criteria for : Severe Malnutrition    Malnutrition (acute, severe) r/t decreased oral intake due to poor appetite in the setting of chronic severe pain from 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     RD to order Boost Plus TID to provide 1080 kcal and 42g protein and liberalize diet to Regular until PO intake reaches 75% consistently.    Malnutrition Severity Assessment  Malnutrition Type: Acute Disease or Injury - Related Malnutrition     Malnutrition Type (last 8 hours)      Malnutrition Severity Assessment     Row Name 12/02/20 155       Malnutrition Severity Assessment    Malnutrition Type  Acute Disease or Injury - Related Malnutrition    Row Name 12/02/20 [facility]       Insufficient Energy Intake     Insufficient Energy Intake Findings  Severe    Insufficient Energy Intake   <75% of est. energy requirement for >7d)    Row Name 12/02/20 [facility]       Muscle Loss    Loss of Muscle Mass Findings  Moderate    [place]  Moderate - slight depression    Clavicle Bone Region  Severe - protruding prominent bone    Acromion Bone Region  Moderate - acromion may slightly protrude    Scapular Bone Region  Moderate - mild depression, bones may show slightly    Row Name 12/02/20 [facility]       Fat Loss    Subcutaneous Fat Loss Findings  Moderate    Orbital Region   Moderate -  somewhat hollowness, slightly dark circles    Upper Arm Region  Moderate - some fat tissue, not ample    Row Name 12/02/20 1551       Criteria Met (Must meet criteria for severity in at least 2 of these categories: M Wasting, Fat Loss, Fluid, Secondary Signs, Wt. Status,  Intake)    Patient meets criteria for   Severe Malnutrition          Electronically signed by:  Kadie Marshall RD  12/02/20 15:56 EST

## 2020-12-02 NOTE — PROGRESS NOTES
[facility] Medicine Services Daily Progress Note      Hospitalist Team  LOS 1 days      Patient Care Team:  Tigre Duran MD as PCP - General  Tigre Duran MD as PCP - Family Medicine  Wyatt Kwok MD as Consulting Physician (Cardiology)    Patient Location: 222/1      Subjective   Subjective     Chief Complaint / Subjective  Chief Complaint   Patient presents with   • Shortness of Breath         Brief Synopsis of Hospital Course/HPI      85-year-old pleasant female with multiple comorbidities significant for coronary disease, status post CABG (1999), severe aortic stenosis, status post TAVR (5/20) history of SSS, status post PPM, hypertension, PAF, DJD, hospitalist recent left shoulder surgery , nonsustained V. tach, and hyperlipidemia.  Presented to [facility] ED with  Complains of few days history of progressive weakness with dyspnea on exertion and shortness of breath.  She stated that symptoms progressively worsened in the past few days and with difficulty ambulating short distant with dyspnea.  Denies chest pain, no fever, chills no cough, nausea, vomiting, and no reported PND orthopnea.  She denies weight gain and lower extremity swelling.     EKG showed atrial sensed ventricular paced rhythm, and laboratory revealed elevated BNP of 1872,  cardiac biomarkers within normal limit, and Covid-19 screen nonreactive.  Chest x-ray showed chronic interstitial fibrotic changes in both lung with stable cardiomegaly, no acute cardiopulmonary findings.  Most recent 2D echo on 7/2/2020, showed preserved LV function, with estimated EF of 60%.  In ED, she was treated with Lasix with improvement.         Date:: 12/2/2020  Patient seen at bedside today, her daughter is present, she is very lethargic and weak.  Is also a little somnolent.  No fever overnight.BP  On  Low side , the  Daughter reports that she was  C/o back pain.She also  States that her home back pains normally coincides with   "UTI              Therapy Charges for Today     Code Description Service Date Service Provider Modifiers Berta    41581300644 acetaminophen 325 MG tablet 12/2/2020 Clarisse Velez MD  2    12375320223 sodium chloride 0.9 % solution 12/2/2020 Clarisse Velez MD  1    [id number] ceftriaxone per 250 mg 12/2/2020 Clarisse Velez MD  4    [id number] sodium chloride 0.9 % solution 100 mL Flex Cont 12/2/2020 Clarisse Velez MD  1       daughter         Review of Systems   Constitution: Negative.   HENT: Negative.    Cardiovascular: Negative.    Respiratory: Negative.    Skin: Negative.    Gastrointestinal: Negative.    Neurological: Negative.          Objective   Objective      Vital Signs  Temp:  [97.4 °F (36.3 °C)-97.5 °F (36.4 °C)] 97.5 °F (36.4 °C)  Heart Rate:  [63-66] 63  Resp:  [16] 16  BP: ()/(43-72) 95/55  Oxygen Therapy  SpO2: 96 %  Pulse Oximetry Type: Intermittent  Device (Oxygen Therapy): room air  Flowsheet Rows      First Filed Value   Admission Height  162.6 cm (64\") Documented at 11/30/2020 1241   Admission Weight  49.9 kg (110 lb) Documented at 11/30/2020 1241        Intake & Output (last 3 days)       11/29 0701 - 11/30 0700 11/30 0701 - 12/01 0700 12/01 0701 - 12/02 0700 12/02 0701 - 12/03 0700    P.O.  480      IV Piggyback    250    Total Intake(mL/kg)  480 (9.2)  250 (5.1)    Urine (mL/kg/hr)  1200 700 (0.6)     Total Output  1200 700     Net  -720 -700 +250                Lines, Drains & Airways    Active LDAs     Name:   Placement date:   Placement time:   Site:   Days:    Peripheral IV 12/02/20 0245 Anterior;Left Forearm   12/02/20    0245    Forearm   less than 1    External Urinary Catheter   11/30/20    1905    --   1                  Physical Exam:    Physical Exam  Vitals signs reviewed.   Constitutional:       Appearance: She is well-developed.      Comments:  lethargic   HENT:      Head: Normocephalic and atraumatic.   Eyes:      Conjunctiva/sclera: Conjunctivae normal. "      Pupils: Pupils are equal, round, and reactive to light.   Neck:      Musculoskeletal: Normal range of motion and neck supple.   Cardiovascular:      Rate and Rhythm: Normal rate and regular rhythm.      Heart sounds: Normal heart sounds.   Pulmonary:      Effort: Pulmonary effort is normal.      Breath sounds: Normal breath sounds.   Abdominal:      General: Bowel sounds are normal.      Palpations: Abdomen is soft.   Musculoskeletal: Normal range of motion.   Skin:     General: Skin is warm and dry.      Capillary Refill: Capillary refill takes 2 to 3 seconds.   Neurological:      Comments: Somnolent and lethargic   Psychiatric:         Behavior: Behavior normal.         Thought Content: Thought content normal.               Procedures:              Results Review:     I reviewed the patient's new clinical results.      Lab Results (last 24 hours)     Procedure Component Value Units Date/Time    POC Glucose Once [[id number]]  (Abnormal) Collected: 12/01/20 1956    Specimen: Blood Updated: 12/01/20 2014     Glucose 110 mg/dL      Comment: Serial Number: [id number]:  [id number]           No results found for: HGBA1C  Results from last 7 days   Lab Units 11/30/20  1319   INR  1.02           No results found for: LIPASE  Lab Results   Component Value Date    CHOL 127 08/05/2017    TRIG 90 08/05/2017    HDL 49 08/05/2017    LDL 62 08/05/2017       No results found for: INTRAOP, PREDX, FINALDX, COMDX    Microbiology Results (last 10 days)     Procedure Component Value - Date/Time    COVID-19,[facility] [employer] IN-HOUSE,Nasal Swab No Transport Media 3-4 HR TAT - Swab, Nasal Cavity [[id number]]  (Normal) Collected: 11/30/20 1328    Lab Status: Final result Specimen: Swab from Nasal Cavity Updated: 11/30/20 1403     COVID19 Not Detected    Narrative:      Fact sheet for providers: https://www.fda.gov/media/247599/download     Fact sheet for patients: https://www.fda.gov/media/985774/download          ECG/EMG Results (most  recent)     Procedure Component Value Units Date/Time    ECG 12 Lead [[id number]] Collected: 11/30/20 1322     Updated: 12/01/20 1610     QT Interval 472 ms     Narrative:      HEART RATE= 63  bpm  RR Interval= 958  ms  CA Interval= 121  ms  P Horizontal Axis= 40  deg  P Front Axis= 13  deg  QRSD Interval= 140  ms  QT Interval= 472  ms  QRS Axis= 223  deg  T Wave Axis= 17  deg  - ABNORMAL ECG -  Atrial-sensed ventricular-paced rhythm  When compared with ECG of 30-Sep-2020 9:46:53,  No significant change  Electronically Signed By: Lawrence Mckeon (MAGO) 01-Dec-2020 16:07:22  Date and Time of Study: 2020-11-30 13:22:06    Adult Transthoracic Echo Complete W/ Cont if Necessary Per Protocol [[id number]] Collected: 12/01/20 1600     Updated: 12/01/20 2038     BSA 1.5 m^2      RVIDd 1.8 cm      IVSd 1.3 cm      LVIDd 3.1 cm      LVIDs 1.9 cm      LVPWd 1.3 cm      IVS/LVPW 0.99     FS 39.8 %      EDV(Teich) 38.4 ml      ESV(Teich) 10.8 ml      EF(Teich) 71.8 %      EDV(cubed) 30.3 ml      ESV(cubed) 6.6 ml      EF(cubed) 78.1 %      LV mass(C)d 134.8 grams      LV mass(C)dI 87.5 grams/m^2      SV(Teich) 27.6 ml      SI(Teich) 17.9 ml/m^2      SV(cubed) 23.6 ml      SI(cubed) 15.4 ml/m^2      Ao root diam 2.1 cm      Ao root area 3.6 cm^2      asc Aorta Diam 2.0 cm      LVOT diam 1.8 cm      LVOT area 2.6 cm^2      EDV(MOD-sp4) 40.2 ml      ESV(MOD-sp4) 16.8 ml      EF(MOD-sp4) 58.2 %      SV(MOD-sp4) 23.4 ml      SI(MOD-sp4) 15.2 ml/m^2      Ao root area (BSA corrected) 1.4     LV Malin Vol (BSA corrected) 26.1 ml/m^2      LV Sys Vol (BSA corrected) 10.9 ml/m^2      Aortic R-R 1.0 sec      Aortic HR 59.5 BPM      MV E max deon 66.0 cm/sec      MV A max deon 46.7 cm/sec      MV E/A 1.4     MV V2 max 82.5 cm/sec      MV max PG 2.7 mmHg      MV V2 mean 41.0 cm/sec      MV mean PG 0.81 mmHg      MV V2 VTI 22.8 cm      MVA(VTI) 3.3 cm^2      MV dec slope 301.3 cm/sec^2      MV dec time 0.22 sec      Ao pk deon 185.9 cm/sec      Ao max  PG 13.8 mmHg      Ao max PG (full) 7.6 mmHg      Ao V2 mean 134.9 cm/sec      Ao mean PG 8.4 mmHg      Ao mean PG (full) 4.6 mmHg      Ao V2 VTI 37.0 cm      CHARIS(I,A) 2.0 cm^2      CHARIS(I,D) 2.0 cm^2      CHARIS(V,A) 1.7 cm^2      CHARIS(V,D) 1.7 cm^2      LV V1 max PG 6.2 mmHg      LV V1 mean PG 3.8 mmHg      LV V1 max 124.8 cm/sec      LV V1 mean 90.7 cm/sec      LV V1 VTI 29.0 cm      CO(Ao) 8.0 l/min      CI(Ao) 5.2 l/min/m^2      SV(Ao) 133.9 ml      SI(Ao) 86.9 ml/m^2      CO(LVOT) 4.5 l/min      CI(LVOT) 2.9 l/min/m^2      SV(LVOT) 75.5 ml      SI(LVOT) 49.0 ml/m^2      PA V2 max 108.7 cm/sec      PA max PG 4.7 mmHg      PA max PG (full) 2.0 mmHg      PA V2 mean 65.0 cm/sec      PA mean PG 2.1 mmHg      PA mean PG (full) 1.1 mmHg      PA V2 VTI 17.8 cm      PA acc time 0.08 sec      RV V1 max PG 2.8 mmHg      RV V1 mean PG 1.0 mmHg      RV V1 max 83.3 cm/sec      RV V1 mean 45.5 cm/sec      RV V1 VTI 15.2 cm      TR max deon 307.5 cm/sec      RVSP(TR) 40.8 mmHg      RAP systole 3.0 mmHg      PA pr(Accel) 41.3 mmHg       CV ECHO PARVEZ - BZI_BMI 19.6 kilograms/m^2       CV ECHO PARVEZ - BSA(HAYCOCK) 1.5 m^2       CV ECHO PARVEZ - BZI_METRIC_WEIGHT 51.7 kg       CV ECHO PARVEZ - BZI_METRIC_HEIGHT 162.6 cm      EF(MOD-bp) 58.0 %      LA dimension(2D) 3.2 cm     Narrative:      · Left ventricular ejection fraction appears to be 56 - 60%.  · Left ventricular wall thickness is consistent with moderate concentric   hypertrophy.  · There is a TAVR valve present.  · Moderate tricuspid valve regurgitation is present.  · Left ventricular diastolic function is consistent with (grade I)   impaired relaxation.  · No pericardial effusion noted             Results for orders placed during the hospital encounter of 02/28/20   Duplex Carotid Ultrasound CAR    Narrative · Proximal right internal carotid artery moderate stenosis.  · Proximal left internal carotid artery mild stenosis.          Results for orders placed during the  hospital encounter of 11/30/20   Adult Transthoracic Echo Complete W/ Cont if Necessary Per Protocol    Narrative · Left ventricular ejection fraction appears to be 56 - 60%.  · Left ventricular wall thickness is consistent with moderate concentric   hypertrophy.  · There is a TAVR valve present.  · Moderate tricuspid valve regurgitation is present.  · Left ventricular diastolic function is consistent with (grade I)   impaired relaxation.  · No pericardial effusion noted          Xr Shoulder 2+ View Left    Result Date: 11/30/2020  No acute left shoulder findings. The left shoulder prosthesis appears intact. No definite acute periprosthetic fracture is seen. No dislocation.  Electronically Signed By-Tonja Chauhan MD On:11/30/2020 2:12 PM This report was finalized on [id number] by  Tonja Chauhan MD.    Xr Chest 1 View    Result Date: 11/30/2020  Chronic interstitial fibrotic changes in both lungs. Stable cardiomegaly. No acute chest findings.  Electronically Signed By-Tonja Chauhan MD On:11/30/2020 2:10 PM This report was finalized on [id number] by  Tonja Chauhan MD.    Ct Chest Pulmonary Embolism    Result Date: 12/1/2020   1. No pulmonary embolism. 2. FINDINGS suggestive of mild interstitial and alveolar pulmonary edema superimposed upon emphysema. 3. Stable cardiac megaly with aortic valve replacement and CABG. 4. Calcified pleural plaque. Correlate for history of asbestos exposure. 5. Chronic T4 and L1 vertebral body compression fractures.    Electronically Signed By-Tonja Chauhan MD On:12/1/2020 12:49 PM This report was finalized on [id number] by  Tonja Chauhan MD.          Xrays, labs reviewed personally by physician.    Medication Review:   I have reviewed the patient's current medication list      Scheduled Meds  acebutolol, 200 mg, Oral, BID  apixaban, 2.5 mg, Oral, Q12H  atorvastatin, 20 mg, Oral, Nightly  cefTRIAXone, 1 g, Intravenous, Q24H  furosemide, 40 mg, Intravenous,  Q12H  lisinopril, 20 mg, Oral, Daily  sodium chloride, 250 mL, Intravenous, Once  sodium chloride, 10 mL, Intravenous, Q12H        Meds Infusions       Meds PRN  acetaminophen  •  melatonin  •  nitroglycerin  •  [COMPLETED] Insert peripheral IV **AND** sodium chloride  •  sodium chloride        Assessment/Plan   Assessment/Plan     Active Hospital Problems    Diagnosis  POA   • Dyspnea [R06.00]  Yes      Resolved Hospital Problems   No resolved problems to display.       MEDICAL DECISION MAKING COMPLEXITY BY PROBLEM:       Hypotension-Unclear etiology  CXR neg for consolidation  Covid  -19  Previously neg  Obtain UA  Hold Antihypertensive meds for now  Bolus NS 250cc  Start ceftriaxone for possible UTI  Give one  Dose of  Vancomycin  Cont with gentle hydration  Obtain  procalcitonin  And  CRP        Elevated D-dimer   PE ruled out  By CT    Dypsea/acute HFpEF  FU echo  Cardiology CX appreciatedcardiac biomarkers within normal limits, and chest x-ray show chronic fibrotic changes with stable cardiomegaly.  No acute cardiopulmonary findings    Coronary artery disease, status post CABG (1999)  Cont home meds     PAF  Cont  Eliquis     SSS-S/p PPM  Cardiology cx appreciated       Aortic stenosis    S/p TAVR (5/20)     Essential hypertension  BP low  Hold meds        Hyperlipidemia        -Lipitor     DJD, recent left shoulder surgery       History of nonsustained V. Tach    Physical deconditioning- multifactorial  Cont with PT/OT eval      VTE Prophylaxis -   Mechanical Order History:     None      Pharmalogical Order History:      Ordered     Dose Route Frequency Stop    11/30/20 1723  apixaban (ELIQUIS) tablet 2.5 mg      2.5 mg PO Every 12 Hours Scheduled --                  Code Status -   Code Status and Medical Interventions:   Ordered at: 11/30/20 1723     Level Of Support Discussed With:    Patient     Code Status:    CPR     Medical Interventions (Level of Support Prior to Arrest):    Full       This patient  has been examined wearing appropriate Personal Protective Equipment       Discharge Planning  rehab        Electronically signed by Clarisse Velez MD, 12/02/20, 13:52 EST.  [facility] Hospitalist Team

## 2020-12-02 NOTE — PLAN OF CARE
Goal Outcome Evaluation:  Plan of Care Reviewed With: patient, daughter  Progress: no change  Outcome Summary: Pt is 84 YO F admitted with c/o dypsnea, recent fall at home, and history of TSA in October.  She lives with  and he assists with some ADLs such as lower body dressing due to decreased functional reach.  She appears to be mobilizing near baseline (s/p shoulder surgery) though demos decreased activity tolerence.  O2 stable after mobility.  She demonstrates only mild balance impairments with no LOB throughout evaluation.  Anticipate she is safe to return home with assist from family and HH PT/OT.  PPE: mask, shield, gloves.

## 2020-12-02 NOTE — PROGRESS NOTES
Referring Provider: Hospitalist    Reason for follow-up: Shortness of breath     Patient Care Team:  Tigre Duran MD as PCP - General  Tigre Duran MD as PCP - Family Medicine  Wyatt Kwok MD as Consulting Physician (Cardiology)    Subjective . Feels better with less shortness of breath today. No chest pain    Objective  Lying in bed comfortably     Review of Systems   Constitution: Negative for fever and malaise/fatigue.   HENT: Negative for ear pain and nosebleeds.    Eyes: Negative for blurred vision and double vision.   Cardiovascular: Negative for chest pain, dyspnea on exertion and palpitations.   Respiratory: Positive for shortness of breath. Negative for cough.    Skin: Negative for rash.   Musculoskeletal: Negative for joint pain.   Gastrointestinal: Negative for abdominal pain, nausea and vomiting.   Neurological: Negative for focal weakness and headaches.   Psychiatric/Behavioral: Negative for depression. The patient is not nervous/anxious.    All other systems reviewed and are negative.      Sulfa antibiotics    Scheduled Meds:acebutolol, 200 mg, Oral, BID  apixaban, 2.5 mg, Oral, Q12H  atorvastatin, 20 mg, Oral, Nightly  cefTRIAXone, 1 g, Intravenous, Q24H  furosemide, 40 mg, Intravenous, Q12H  lisinopril, 20 mg, Oral, Daily  sodium chloride, 250 mL, Intravenous, Once  sodium chloride, 10 mL, Intravenous, Q12H      Continuous Infusions:   PRN Meds:.acetaminophen  •  melatonin  •  nitroglycerin  •  [COMPLETED] Insert peripheral IV **AND** sodium chloride  •  sodium chloride        VITAL SIGNS  Vitals:    12/02/20 0346 12/02/20 1255 12/02/20 1340 12/02/20 1355   BP: 132/72 (!) 85/43 95/55 109/60   BP Location: Left arm Right arm Right arm    Patient Position: Lying Lying Lying    Pulse: 66 63  61   Resp: 16 16     Temp: 97.4 °F (36.3 °C) 97.5 °F (36.4 °C)     TempSrc: Oral Oral     SpO2: 97% 96%     Weight: 49.3 kg (108 lb 11 oz)      Height:           Flowsheet Rows      First Filed Value    "  Admission Height  162.6 cm (64\") Documented at 11/30/2020 1241   Admission Weight  49.9 kg (110 lb) Documented at 11/30/2020 1241           TELEMETRY: Sinus rhythm  Physical Exam:  Constitutional:       Appearance: Well-developed.   Eyes:      General: No scleral icterus.     Conjunctiva/sclera: Conjunctivae normal.      Pupils: Pupils are equal, round, and reactive to light.   HENT:      Head: Normocephalic and atraumatic.   Neck:      Musculoskeletal: Normal range of motion and neck supple.      Vascular: No carotid bruit or JVD.   Pulmonary:      Effort: Pulmonary effort is normal.      Breath sounds: Normal breath sounds. No wheezing. No rales.   Cardiovascular:      Normal rate. Regular rhythm.      Murmurs: There is a systolic murmur.   Pulses:     Intact distal pulses.   Abdominal:      General: Bowel sounds are normal.      Palpations: Abdomen is soft.   Musculoskeletal: Normal range of motion.   Skin:     General: Skin is warm and dry.      Findings: No rash.   Neurological:      Mental Status: Alert.      Comments: No focal deficits          Results Review:   I reviewed the patient's new clinical results.  Lab Results (last 24 hours)     Procedure Component Value Units Date/Time    Urinalysis With Culture If Indicated - Urine, Clean Catch [[id number]] Collected: 12/02/20 1426    Specimen: Urine, Clean Catch Updated: 12/02/20 1439    POC Glucose Once [[id number]]  (Abnormal) Collected: 12/01/20 1956    Specimen: Blood Updated: 12/01/20 2014     Glucose 110 mg/dL      Comment: Serial Number: [id number]:  [id number]             Imaging Results (Last 24 Hours)     ** No results found for the last 24 hours. **          EKG      I personally viewed and interpreted the patient's EKG/Telemetry data:    ECHOCARDIOGRAM:    STRESS MYOVIEW:    CARDIAC CATHETERIZATION:    OTHER:         Assessment/Plan     Active Problems:    Dyspnea  History of coronary disease  History of severe aortic stenosis status post " TAVR  Hypertension  Hyperlipidemia  Sick sinus syndrome  Status post pacemaker placement  History of atrial fibrillation    Patient presents with shortness of breath and is ruled out for MI by enzymes  Patient had an echocardiogram which showed normal function with normally functioning TAVR e abnormalities  Patient had echocardiogram 6 months ago which showed normal LV function and a normally functioning TAVR valve  Patient had elevated D-dimer and had a CT scan which showed no pulmonary bolus him  Continue current medical therapy and follow with echocardiogram results  Blood pressure and heart rate are stable  Patient has history of paroxysmal atrial fibrillation is currently on Eliquis  Patient also had tachybradycardia syndrome and status post pacemaker placement her pacemaker is working very well  Patient has history of hyperlipidemia and is on a statin    I discussed the patients findings and my recommendations with patient and nurse    Wyatt Kwok MD  12/02/20  14:41 EST

## 2020-12-02 NOTE — PLAN OF CARE
Goal Outcome Evaluation:  Plan of Care Reviewed With: patient  Progress: no change  Outcome Summary: Pt resting well in bed with some confusion. Pt currently recieving IV lasix. Will continue to monitor.

## 2020-12-02 NOTE — CONSULTS
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    RD to order Boost Plus TID to provide 1080 kcal and 42g protein and liberalize diet to Regular until PO intake reaches 75% consistently.    PPE Documentation        PPE Worn By Provider Mask, glasses and gloves   PPE Worn By Patient  None     CLINICAL NUTRITION ASSESSMENT       Reason for Assessment Low BMI      H&P:  85 year old female presented to ER with chief complaint of shortness of breath.    Med hx: aortic stenosis, chronic CAD, Hyperlipidemia, dyslipidemia, HTN, partial paralysis of right hand     Current Problems:   Dyspnea  CAD  HTN  Hyperlipidemia        Nutrition/Diet History         Narrative     Went to patient room today to discuss reported poor appetite and weight loss. Pt daughter in the room provided answers to most questions but patient was aware and following the conversation, responding a few times.  RD notes patient has lost weight in the last 6 months and daughter confirms.  Pt daughter states that as dyspnea is improving pt appetite is increasing, and pt has eaten better in the hospital than she has at home for the past week.  RD presented high kcal education to pt and pt daughter, to help with weight gain and suggested using supp. At home. Pt daughter says they do drink Boost at home sometimes but will start giving her more.  RD to send high kcal education to home address per pt daughter request.     Functional Status Independent for all ADL's except ambulation,  "transferring.  Can communicate and understand.    Food Allergies NKFA     Factors Affecting   Nutritional Intake Reported poor appetite     Anthropometrics        Current Height, Weight 64\", 49.3kg (108lbs)        Admit Height, Weight 64\"., 49.8kg (110lbs)           Ideal Body Weight (IBW) 120lbs   % Ideal Body Weight 90%       Usual Body Weight 115lbs   % Usual Body Weight 93%   Wt Change Observation 6% WL in 6 months, gradual weight loss   Weight Hx  12/2/20: 108lbs  11/30: 110lbs  10/21/20: 113lbs  9/28/20: 113lbs  7/27/20: 114lbs  6/3/20: 115lbs     BMI kg/m2 18.66     Labs/Medications         Pertinent Labs 12/1/20: Slight hyperglycemia 110         Pertinent Medications Sectral, Eliquis, Lipitor, Rocephin, Lasix, Zestril     Physical Findings        Overall Physical   Appearance, MSA Looks visibly malnourished   -  Edema  No edema reported     Gastrointestinal BM 11/29     Tubes No feeding tubes     Oral/Mouth Cavity No difficulties chewing or swallowing but does have dentures with no reported problems.     Skin Left shoulder incision.       Estimated/Assessed Needs       Energy Requirements    Height for Calculation  -   Weight for Calculation -   Method for Estimation  -   EST Needs (kcal/day) -       Protein Requirements    Weight for Calculation -   EST Protein Needs (g/kg) -   EST Daily Needs (g/day) -       Fluid Requirements     Estimated Needs (mL/day) -       Fluid Deficit    Current Na Level (mEq/L) -   Desired Na Level (mEq/L) -   Estimated Fluid Deficit (L)  -     Current Nutrition Orders & Evaluation of Received Nutrient/Fluid Intake       Oral Nutrition     Current PO Diet Cardiac, Consistent Carb   Supplement None   PO Evaluation     % PO Intake Meeting less than 75% of needs since admission    -  Enteral Nutrition    Enteral Route    TF Modular -   TF Delivery Method -   Current Ordered TF  -   Current Ordered H2O flush  -    TF Observation  -   EN Evaluation     TF Changes -    TF Residual -    " TF Tolerance -    Average EN Delivered -       Parenteral Nutrition     TPN Route -   Current Ordered TPN VOL  -   Dextrose (g/kcals)  -   Amino Acid (g/kcals) -   Lipids (mL/Concentration/FREQ)  -   MVI Frequency  -   Trace Element Frequency  -   TPN Observation  -    TPN Evaluation    Total # Days on TPN -   -  Clinical Course    Nutrition Course Details Cardiac, Consistent Carbohydrate     Nutritional Risk Screening        NRS-2002 Score          Nutrition Diagnosis         Nutrition Dx Problem 1 Malnutrition (acute, severe) r/t decreased oral intake due to poor appetite in the setting of chronic severe pain from 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      Nutrition Dx Problem 2        Intervention Goal         Intervention Goal(s) Patent will consistently eat 75% of meals during stay and accept ONS     Nutrition Intervention        RD/Tech Action Liberalize diet to Regular until PO remains above 75% consistently then reassess.  Boost Plus BID  NFPE       Nutrition Prescription          Diet Prescription Regular diet, until PO increases to above 75% consistently then reassess.   Supplement Prescription Boost Plus TID   -  Enteral Prescription        TPN Prescription      Monitor/Evaluation        Monitor PO intake, BM, Labs     Electronically signed by:  Kadie Marshall RD  12/02/20 14:47 EST

## 2020-12-02 NOTE — PLAN OF CARE
Goal Outcome Evaluation:  Plan of Care Reviewed With: patient, daughter  Progress: no change  Outcome Summary: Pt is 86 YO F admitted with c/o dypsnea, recent fall at home, and history of TSA in october. Pt reports living wiht spouse, prior to surgery pt was very independent. Since surgery pt has still be able to ambulate in home without AD, with one recent fall which pt attributes to dizziness. Pt this date performs bed mobility with MIN A, comes to standing and ambulates with CGA. Following ambulating 50 feet pt with minor compliants of SOA. O2 sat assessed and was 94%. Pt with no LOB during ambulation and no complaints of dizziness. Recommendation at this time is return home with spouse assistance and HHPT following d/c. PPE worn includes gloves and mask with goggles.

## 2020-12-02 NOTE — THERAPY EVALUATION
Patient Name: Olga Curtis  : 1935    MRN: [id number]                              Today's Date: 2020       Admit Date: 2020    Visit Dx:     ICD-10-CM ICD-9-CM   1. Dyspnea, unspecified type  R06.00 786.09   2. Acute congestive heart failure, unspecified heart failure type (CMS/HCC)  I50.9 428.0     Patient Active Problem List   Diagnosis   • Atrial premature complex   • Chronic coronary artery disease   • Dyslipidemia   • Essential hypertension   • Nonsustained ventricular tachycardia (CMS/HCC)   • Presence of cardiac pacemaker   • Shortness of breath   • Sick sinus syndrome (CMS/HCC)   • Allergic reaction   • Tachy-martin syndrome (CMS/HCC)   • Dyspnea   • Paroxysmal atrial fibrillation (CMS/HCC)   • Nonrheumatic aortic valve stenosis   • Chronic diastolic congestive heart failure (CMS/HCC)   • S/P TAVR (transcatheter aortic valve replacement)   • Primary localized osteoarthrosis of left shoulder region   • DJD of left shoulder     Past Medical History:   Diagnosis Date   • Aortic stenosis 10/28/2014   • Atrial premature complex 2015   • Chronic coronary artery disease 10/28/2014   • Dyslipidemia 10/28/2014   • Hyperlipidemia    • Hypertension 10/28/2014   • Nausea 2020   • Nonsustained ventricular tachycardia (CMS/HCC) 2015   • Partial paralysis of right hand (CMS/HCC)     states right arm only.  Cannot have sticks in arm, hand only   • Shoulder pain, left      Past Surgical History:   Procedure Laterality Date   • AORTIC VALVE REPAIR/REPLACEMENT N/A 2020    Procedure: TTE TRANSFEMORAL TRANSCATHETER AORTIC VALVE REPLACEMENT PERCUTANEOUS APPROACH;  Surgeon: J Carlos Leon MD;  Location: [facility] OR ;  Service: Cardiothoracic;  Laterality: N/A;   • AORTIC VALVE REPAIR/REPLACEMENT N/A 2020    Procedure: Transfemoral Transcatheter Aortic Valve Replacement w/Intra Op TTE and Possible Open Rescue Surgery;  Surgeon: Vasiliy Bruce MD;  Location:   [facility] 18/19;  Service: Cardiovascular;  Laterality: N/A;   • APPENDECTOMY     • CARDIAC CATHETERIZATION N/A 3/2/2020    Procedure: Left Heart Cath and coronary angiogram;  Surgeon: Wyatt Kwok MD;  Location: [facility] CATH INVASIVE LOCATION;  Service: Cardiovascular;  Laterality: N/A;   • CARDIAC CATHETERIZATION N/A 3/2/2020    Procedure: Saphenous Vein Graft;  Surgeon: Wyatt Kwok MD;  Location: [facility] CATH INVASIVE LOCATION;  Service: Cardiovascular;  Laterality: N/A;   • CARDIAC PACEMAKER PLACEMENT  2017   • CARDIAC SURGERY  1999    Bypass surgery   • EYE SURGERY Bilateral     cat ext   • HERNIA REPAIR     • TOTAL SHOULDER ARTHROPLASTY W/ DISTAL CLAVICLE EXCISION Left 10/6/2020    Procedure: TOTAL SHOULDER REVERSE ARTHROPLASTY;  Surgeon: Oli Barcenas MD;  Location: [facility] MAIN OR;  Service: Orthopedics;  Laterality: Left;   • TOTAL SHOULDER REVISION  10/06/2020    left     General Information     Row Name 12/02/20 1648          Physical Therapy Time and Intention    Document Type  evaluation  -EL     Mode of Treatment  physical therapy;co-treatment;occupational therapy  -EL     Row Name 12/02/20 1648          General Information    Patient Profile Reviewed  yes  -EL     Prior Level of Function  independent:;all household mobility;ADL's;driving Prior to shoulder surgery in Oct  -EL     Row Name 12/02/20 1648          Living Environment    Lives With  spouse  -EL     Row Name 12/02/20 1648          Cognition    Orientation Status (Cognition)  oriented x 4  -EL     Row Name 12/02/20 1648          Safety Issues, Functional Mobility    Impairments Affecting Function (Mobility)  balance;shortness of breath;endurance/activity tolerance;strength  -EL       User Key  (r) = Recorded By, (t) = Taken By, (c) = Cosigned By    Initials Name Provider Type    Dane Torres PT Physical Therapist        Mobility     Row Name 12/02/20 1649          Bed Mobility    Bed Mobility  supine-sit  -EL     Supine-Sit  [place] (Bed Mobility)  minimum assist (75% patient effort)  -EL     Assistive Device (Bed Mobility)  bed rails  -EL     Row Name 12/02/20 1649          Bed-Chair Transfer    Bed-Chair [place] (Transfers)  standby assist  -     Row Name 12/02/20 1649          Sit-Stand Transfer    Sit-Stand [place] (Transfers)  contact guard  -     Row Name 12/02/20 1649          Gait/Stairs (Locomotion)    [place] Level (Gait)  contact guard  -     Distance in Feet (Gait)  50 feet  -EL     Deviations/Abnormal Patterns (Gait)  gait speed decreased  -EL     Bilateral Gait Deviations  forward flexed posture;heel strike decreased  -EL       User Key  (r) = Recorded By, (t) = Taken By, (c) = Cosigned By    Initials Name Provider Type    Dane Torres PT Physical Therapist        Obj/Interventions     Row Name 12/02/20 1649          Range of Motion Comprehensive    General Range of Motion  bilateral lower extremity ROM WFL  -     Row Name 12/02/20 1649          Strength Comprehensive (MMT)    General Manual Muscle Testing (MMT) Assessment  lower extremity strength deficits identified  -     Comment, General Manual Muscle Testing (MMT) Assessment  BLE 4-/5  -EL     Row Name 12/02/20 1649          Balance    Balance Assessment  sitting static balance;standing static balance;standing dynamic balance  -EL     Static Sitting Balance  WFL  -EL     Static Standing Balance  WFL  -EL     Dynamic Standing Balance  mild impairment  -EL     Row Name 12/02/20 1649          Sensory Assessment (Somatosensory)    Sensory Assessment (Somatosensory)  sensation intact  -EL       User Key  (r) = Recorded By, (t) = Taken By, (c) = Cosigned By    Initials Name Provider Type    Dane Torres PT Physical Therapist        Goals/Plan     Row Name 12/02/20 1654          Bed Mobility Goal 1 (PT)    Activity/Assistive Device (Bed Mobility Goal 1, PT)  bed mobility activities, all  -EL     [place] Level/Cues Needed (Bed Mobility  Goal 1, PT)  modified independence  -EL     Time Frame (Bed Mobility Goal 1, PT)  long term goal (LTG);2 weeks  -EL     Row Name 12/02/20 1654          Transfer Goal 1 (PT)    Activity/Assistive Device (Transfer Goal 1, PT)  transfers, all  -EL     [place] Level/Cues Needed (Transfer Goal 1, PT)  modified independence  -EL     Time Frame (Transfer Goal 1, PT)  long term goal (LTG);2 weeks  -EL     Row Name 12/02/20 1654          Gait Training Goal 1 (PT)    Activity/Assistive Device (Gait Training Goal 1, PT)  gait (walking locomotion)  -EL     [place] Level (Gait Training Goal 1, PT)  supervision required  -EL     Distance (Gait Training Goal 1, PT)  100  -EL     Time Frame (Gait Training Goal 1, PT)  long term goal (LTG);2 weeks  -EL       User Key  (r) = Recorded By, (t) = Taken By, (c) = Cosigned By    Initials Name Provider Type    Dane Torres, PT Physical Therapist        Clinical Impression     Row Name 12/02/20 2880          Pain    Additional Documentation  Pain Scale: FACES Pre/Post-Treatment (Group)  -EL     Row Name 12/02/20 4602          Pain Scale: FACES Pre/Post-Treatment    Pain: FACES Scale, Pretreatment  0-->no hurt  -EL     Posttreatment Pain Rating  0-->no hurt  -EL     Row Name 12/02/20 8336          Plan of Care Review    Plan of Care Reviewed With  patient;daughter  -EL     Outcome Summary  Pt is 84 YO F admitted with c/o dypsnea, recent fall at home, and history of TSA in october. Pt reports living wiht spouse, prior to surgery pt was very independent. Since surgery pt has still be able to ambulate in home without AD, with one recent fall which pt attributes to dizziness. Pt this date performs bed mobility with MIN A, comes to standing and ambulates with CGA. Following ambulating 50 feet pt with minor compliants of SOA. O2 sat assessed and was 94%. Pt with no LOB during ambulation and no complaints of dizziness. Recommendation at this time is return home with spouse assistance and  HHPT following d/c. PPE worn includes gloves and mask with goggles.  -EL     Row Name 12/02/20 1650          Therapy Assessment/Plan (PT)    Rehab Potential (PT)  good, to achieve stated therapy goals  -EL     Criteria for Skilled Interventions Met (PT)  yes  -EL     Predicted Duration of Therapy Intervention (PT)  Until d\c  -EL     Row Name 12/02/20 1650          Vital Signs    O2 Delivery Pre Treatment  room air  -EL     O2 Delivery Intra Treatment  room air  -EL     Post SpO2 (%)  94  -EL     O2 Delivery Post Treatment  room air  -EL     Pre Patient Position  Supine  -EL     Intra Patient Position  Standing  -EL     Post Patient Position  Sitting  -EL     Row Name 12/02/20 1650          Positioning and Restraints    Pre-Treatment Position  in bed  -EL     Post Treatment Position  chair  -EL     In Chair  notified nsg;sitting;call light within reach;encouraged to call for assist  -EL       User Key  (r) = Recorded By, (t) = Taken By, (c) = Cosigned By    Initials Name Provider Type    Dane Torres PT Physical Therapist        Outcome Measures     Row Name 12/02/20 1654          How much help from another person do you currently need...    Turning from your back to your side while in flat bed without using bedrails?  3  -EL     Moving from lying on back to sitting on the side of a flat bed without bedrails?  3  -EL     Moving to and from a bed to a chair (including a wheelchair)?  4  -EL     Standing up from a chair using your arms (e.g., wheelchair, bedside chair)?  3  -EL     Climbing 3-5 steps with a railing?  3  -EL     To walk in hospital room?  3  -EL     AM-PAC 6 Clicks Score (PT)  19  -EL     Row Name 12/02/20 1654          Functional Assessment    Outcome Measure Options  AM-PAC 6 Clicks Basic Mobility (PT)  -EL       User Key  (r) = Recorded By, (t) = Taken By, (c) = Cosigned By    Initials Name Provider Type    Dane Torres PT Physical Therapist        Physical Therapy Education                  Title: PT OT SLP Therapies (Not Started)     Topic: Physical Therapy (In Progress)     Point: Mobility training (Done)     Learning Progress Summary           Patient Acceptance, E,TB, VU by  at 12/2/2020 1655                   Point: Home exercise program (Not Started)     Learner Progress:  Not documented in this visit.          Point: Precautions (Done)     Learning Progress Summary           Patient Acceptance, E,TB, VU by  at 12/2/2020 1655                               User Key     Initials Effective Dates Name Provider Type Discipline     06/23/20 -  Dane Velazquez, PT Physical Therapist PT              PT Recommendation and Plan  Planned Therapy Interventions (PT): balance training, bed mobility training, neuromuscular re-education, gait training, transfer training, patient/family education, strengthening  Plan of Care Reviewed With: patient, daughter  Outcome Summary: Pt is 84 YO F admitted with c/o dypsnea, recent fall at home, and history of TSA in october. Pt reports living wiht spouse, prior to surgery pt was very independent. Since surgery pt has still be able to ambulate in home without AD, with one recent fall which pt attributes to dizziness. Pt this date performs bed mobility with MIN A, comes to standing and ambulates with CGA. Following ambulating 50 feet pt with minor compliants of SOA. O2 sat assessed and was 94%. Pt with no LOB during ambulation and no complaints of dizziness. Recommendation at this time is return home with spouse assistance and HHPT following d/c. PPE worn includes gloves and mask with goggles.     Time Calculation:   PT Charges     Row Name 12/02/20 1656             Time Calculation    Start Time  1516  -EL      Stop Time  1540  -EL      Time Calculation (min)  24 min  -EL      PT Received On  12/02/20  -      PT - Next Appointment  12/04/20  -      PT Goal Re-Cert Due Date  12/16/20  -        User Key  (r) = Recorded By, (t) = Taken By, (c) = Cosigned By    Initials  Name Provider Type    Dane Torres, PT Physical Therapist        Therapy Charges for Today     Code Description Service Date Service Provider Modifiers Qty    90137651906 HC PT EVAL MOD COMPLEXITY 4 12/2/2020 Dane Velazquez, PT GP 1          PT G-Codes  Outcome Measure Options: AM-PAC 6 Clicks Basic Mobility (PT)  AM-PAC 6 Clicks Score (PT): 19    Dane Velazquez PT  12/2/2020

## 2020-12-02 NOTE — THERAPY EVALUATION
Patient Name: Olga Curtis  : 1935    MRN: [id number]                              Today's Date: 2020       Admit Date: 2020    Visit Dx:     ICD-10-CM ICD-9-CM   1. Dyspnea, unspecified type  R06.00 786.09   2. Acute congestive heart failure, unspecified heart failure type (CMS/HCC)  I50.9 428.0     Patient Active Problem List   Diagnosis   • Atrial premature complex   • Chronic coronary artery disease   • Dyslipidemia   • Essential hypertension   • Nonsustained ventricular tachycardia (CMS/HCC)   • Presence of cardiac pacemaker   • Shortness of breath   • Sick sinus syndrome (CMS/HCC)   • Allergic reaction   • Tachy-martin syndrome (CMS/HCC)   • Dyspnea   • Paroxysmal atrial fibrillation (CMS/HCC)   • Nonrheumatic aortic valve stenosis   • Chronic diastolic congestive heart failure (CMS/HCC)   • S/P TAVR (transcatheter aortic valve replacement)   • Primary localized osteoarthrosis of left shoulder region   • DJD of left shoulder     Past Medical History:   Diagnosis Date   • Aortic stenosis 10/28/2014   • Atrial premature complex 2015   • Chronic coronary artery disease 10/28/2014   • Dyslipidemia 10/28/2014   • Hyperlipidemia    • Hypertension 10/28/2014   • Nausea 2020   • Nonsustained ventricular tachycardia (CMS/HCC) 2015   • Partial paralysis of right hand (CMS/HCC)     states right arm only.  Cannot have sticks in arm, hand only   • Shoulder pain, left      Past Surgical History:   Procedure Laterality Date   • AORTIC VALVE REPAIR/REPLACEMENT N/A 2020    Procedure: TTE TRANSFEMORAL TRANSCATHETER AORTIC VALVE REPLACEMENT PERCUTANEOUS APPROACH;  Surgeon: J Carlos Leon MD;  Location: [facility] OR ;  Service: Cardiothoracic;  Laterality: N/A;   • AORTIC VALVE REPAIR/REPLACEMENT N/A 2020    Procedure: Transfemoral Transcatheter Aortic Valve Replacement w/Intra Op TTE and Possible Open Rescue Surgery;  Surgeon: Vasiliy Bruce MD;  Location:   [facility] 18/19;  Service: Cardiovascular;  Laterality: N/A;   • APPENDECTOMY     • CARDIAC CATHETERIZATION N/A 3/2/2020    Procedure: Left Heart Cath and coronary angiogram;  Surgeon: Wyatt Kwok MD;  Location: [facility] CATH INVASIVE LOCATION;  Service: Cardiovascular;  Laterality: N/A;   • CARDIAC CATHETERIZATION N/A 3/2/2020    Procedure: Saphenous Vein Graft;  Surgeon: Wyatt Kwok MD;  Location: [facility] CATH INVASIVE LOCATION;  Service: Cardiovascular;  Laterality: N/A;   • CARDIAC PACEMAKER PLACEMENT  2017   • CARDIAC SURGERY  1999    Bypass surgery   • EYE SURGERY Bilateral     cat ext   • HERNIA REPAIR     • TOTAL SHOULDER ARTHROPLASTY W/ DISTAL CLAVICLE EXCISION Left 10/6/2020    Procedure: TOTAL SHOULDER REVERSE ARTHROPLASTY;  Surgeon: Oli Barcenas MD;  Location: [facility] MAIN OR;  Service: Orthopedics;  Laterality: Left;   • TOTAL SHOULDER REVISION  10/06/2020    left     General Information     Row Name 12/02/20 1704          OT Time and Intention    Document Type  evaluation  -SR     Mode of Treatment  physical therapy;co-treatment;occupational therapy  -SR     Row Name 12/02/20 1704          General Information    Patient Profile Reviewed  yes  -SR     Row Name 12/02/20 1704          Occupational Profile    Reason for Services/Referral (Occupational Profile)  Pt is 84 YO F admitted with c/o dypsnea, recent fall at home, and history of TSA in october.  -SR     Successful Occupations (Occupational Profile)  She was completely independent prior to TSA though has required some assist for lower body ADLs since surgery.  She walks without AD.  -SR     Row Name 12/02/20 1704          Living Environment    Lives With  spouse  -SR     Row Name 12/02/20 1704          Cognition    Orientation Status (Cognition)  oriented x 4  -SR     Row Name 12/02/20 1704          Safety Issues, Functional Mobility    Impairments Affecting Function (Mobility)  balance;shortness of breath;endurance/activity  tolerance;strength  -SR       User Key  (r) = Recorded By, (t) = Taken By, (c) = Cosigned By    Initials Name Provider Type    SR Daya Hill, OT Occupational Therapist        Mobility/ADL's     Row Name 12/02/20 1705          Bed Mobility    Supine-Sit [place] (Bed Mobility)  minimum assist (75% patient effort)  -SR     Assistive Device (Bed Mobility)  bed rails  -SR     Row Name 12/02/20 1705          Transfers    Sit-Stand [place] (Transfers)  contact guard  -SR     Row Name 12/02/20 1705          Activities of Daily Living    BADL Assessment/Intervention  toileting;lower body dressing  -SR     Row Name 12/02/20 1705          Toileting Assessment/Training    Comment (Toileting)  Pt simulated toilet hygiene and clothing management with supervision.  -     Row Name 12/02/20 1705          Lower Body Dressing Assessment/Training    [place] Level (Lower Body Dressing)  don;socks;dependent (less than 25% patient effort)  -SR     Comment (Lower Body Dressing)  Reports  assists with this at home  -SR       User Key  (r) = Recorded By, (t) = Taken By, (c) = Cosigned By    Initials Name Provider Type    SR Daya Hill, OT Occupational Therapist        Obj/Interventions     Row Name 12/02/20 1706          Range of Motion Comprehensive    Comment, General Range of Motion  R shoulder and elbow slightly limited.  Reports it has been since birth.  L shoulder flexion to about 90 degrees.  Good distal UE ROM.  -     Row Name 12/02/20 1706          Strength Comprehensive (MMT)    Comment, General Manual Muscle Testing (MMT) Assessment  RUE 4-/5.  Distal LUE 4-/5.  Did not test shoulder due to recent surgery.  -SR     Row Name 12/02/20 1706          Balance    Static Sitting Balance  WFL  -SR     Static Standing Balance  WFL  -SR     Dynamic Standing Balance  mild impairment  -SR     Row Name 12/02/20 1706          Therapeutic Exercise    Therapeutic Exercise  -- Pt demonstrated  exercises that she has been doing at home for shoulder.  Able to complete seated exercises independently.  Supervision provided for pendulums.  -SR       User Key  (r) = Recorded By, (t) = Taken By, (c) = Cosigned By    Initials Name Provider Type    SR Daya Hill OT Occupational Therapist        Goals/Plan     Row Name 12/02/20 1711          Bathing Goal 1 (OT)    Activity/Device (Bathing Goal 1, OT)  bathing skills, all  -SR     [place] Level/Cues Needed (Bathing Goal 1, OT)  supervision required  -SR     Time Frame (Bathing Goal 1, OT)  2 weeks  -SR     Row Name 12/02/20 1711          Dressing Goal 1 (OT)    Activity/Device (Dressing Goal 1, OT)  lower body dressing  -SR     [place]/Cues Needed (Dressing Goal 1, OT)  moderate assist (50-74% patient effort)  -SR     Time Frame (Dressing Goal 1, OT)  2 weeks  -SR     Row Name 12/02/20 1711          Therapy Assessment/Plan (OT)    Planned Therapy Interventions (OT)  activity tolerance training;functional balance retraining;occupation/activity based interventions;ROM/therapeutic exercise;transfer/mobility retraining  -SR       User Key  (r) = Recorded By, (t) = Taken By, (c) = Cosigned By    Initials Name Provider Type    Daya Helms OT Occupational Therapist        Clinical Impression     Row Name 12/02/20 1705          Pain Scale: FACES Pre/Post-Treatment    Pain: FACES Scale, Pretreatment  0-->no hurt  -SR     Posttreatment Pain Rating  0-->no hurt  -SR     Row Name 12/02/20 7312          Plan of Care Review    Outcome Summary  Pt is 86 YO F admitted with c/o dypsnea, recent fall at home, and history of TSA in October.  She lives with  and he assists with some ADLs such as lower body dressing due to decreased functional reach.  She appears to be mobilizing near baseline (s/p shoulder surgery) though demos decreased activity tolerence.  O2 stable after mobility.  She demonstrates only mild balance impairments with no LOB  throughout evaluation.  Anticipate she is safe to return home with assist from family and HH PT/OT.  PPE: mask, shield, gloves.  -SR     Row Name 12/02/20 1708          Therapy Assessment/Plan (OT)    Rehab Potential (OT)  good, to achieve stated therapy goals  -SR     Criteria for Skilled Therapeutic Interventions Met (OT)  yes  -SR     Therapy Frequency (OT)  5 times/wk  -SR     Predicted Duration of Therapy Intervention (OT)  Until discharge  -SR     Row Name 12/02/20 1708          Therapy Plan Review/Discharge Plan (OT)    Anticipated Discharge Disposition (OT)  home with assist;inpatient rehabilitation facility  -SR     Row Name 12/02/20 1708          Positioning and Restraints    Pre-Treatment Position  in bed  -SR     Post Treatment Position  chair  -SR     In Chair  call light within reach;encouraged to call for assist;exit alarm on  -SR       User Key  (r) = Recorded By, (t) = Taken By, (c) = Cosigned By    Initials Name Provider Type    SR Daya Hill OT Occupational Therapist        Outcome Measures     Row Name 12/02/20 1712          How much help from another is currently needed...    Putting on and taking off regular lower body clothing?  2  -SR     Bathing (including washing, rinsing, and drying)  2  -SR     Toileting (which includes using toilet bed pan or urinal)  3  -SR     Putting on and taking off regular upper body clothing  3  -SR     Taking care of personal grooming (such as brushing teeth)  3  -SR     Eating meals  4  -SR     AM-PAC 6 Clicks Score (OT)  17  -SR     Row Name 12/02/20 1712          Functional Assessment    Outcome Measure Options  AM-PAC 6 Clicks Daily Activity (OT)  -SR       User Key  (r) = Recorded By, (t) = Taken By, (c) = Cosigned By    Initials Name Provider Type    Daya Helms OT Occupational Therapist        Occupational Therapy Education                 Title: PT OT SLP Therapies (Not Started)     Topic: Occupational Therapy (In Progress)      Point: ADL training (In Progress)     Description:   Instruct learner(s) on proper safety adaptation and remediation techniques during self care or transfers.   Instruct in proper use of assistive devices.              Learning Progress Summary           Patient Acceptance, E,TB, NR by SR at 12/2/2020 1713                   Point: Home exercise program (Not Started)     Description:   Instruct learner(s) on appropriate technique for monitoring, assisting and/or progressing therapeutic exercises/activities.              Learner Progress:  Not documented in this visit.          Point: Precautions (Not Started)     Description:   Instruct learner(s) on prescribed precautions during self-care and functional transfers.              Learner Progress:  Not documented in this visit.          Point: Body mechanics (In Progress)     Description:   Instruct learner(s) on proper positioning and spine alignment during self-care, functional mobility activities and/or exercises.              Learning Progress Summary           Patient Acceptance, E,TB, NR by SR at 12/2/2020 1713                               User Key     Initials Effective Dates Name Provider Type Discipline     03/01/19 -  Daya Hill OT Occupational Therapist OT              OT Recommendation and Plan  Planned Therapy Interventions (OT): activity tolerance training, functional balance retraining, occupation/activity based interventions, ROM/therapeutic exercise, transfer/mobility retraining  Therapy Frequency (OT): 5 times/wk  Plan of Care Review  Outcome Summary: Pt is 84 YO F admitted with c/o dypsnea, recent fall at home, and history of TSA in October.  She lives with  and he assists with some ADLs such as lower body dressing due to decreased functional reach.  She appears to be mobilizing near baseline (s/p shoulder surgery) though demos decreased activity tolerence.  O2 stable after mobility.  She demonstrates only mild balance  impairments with no LOB throughout evaluation.  Anticipate she is safe to return home with assist from family and HH PT/OT.  PPE: mask, shield, gloves.     Time Calculation:   Time Calculation- OT     Row Name 12/02/20 1713             Time Calculation- OT    OT Start Time  1520  -SR      OT Stop Time  1545  -SR      OT Time Calculation (min)  25 min  -SR      Total Timed Code Minutes- OT  0 minute(s)  -SR      OT Non-Billable Time (min)  25 min  -SR      OT Received On  12/02/20  -SR      OT - Next Appointment  12/03/20  -SR      OT Goal Re-Cert Due Date  12/16/20  -SR        User Key  (r) = Recorded By, (t) = Taken By, (c) = Cosigned By    Initials Name Provider Type    SR Daya Hill OT Occupational Therapist        Therapy Charges for Today     Code Description Service Date Service Provider Modifiers Qty    [id number] HC OT EVAL LOW COMPLEXITY 4 12/2/2020 Daya Hill OT GO 1               Daya Hill OT  12/2/2020

## 2020-12-02 NOTE — PROGRESS NOTES
"Referring Provider: Hospitalist    Reason for follow-up: Shortness of breath     Patient Care Team:  Tigre Duran MD as PCP - General  Tigre Duran MD as PCP - Family Medicine  Wyatt Kwok MD as Consulting Physician (Cardiology)    Subjective . Feels better with less shortness of breath today. No chest pain    Objective  Lying in bed comfortably     Review of Systems   Constitution: Negative for fever and malaise/fatigue.   HENT: Negative for ear pain and nosebleeds.    Eyes: Negative for blurred vision and double vision.   Cardiovascular: Negative for chest pain, dyspnea on exertion and palpitations.   Respiratory: Positive for shortness of breath. Negative for cough.    Skin: Negative for rash.   Musculoskeletal: Negative for joint pain.   Gastrointestinal: Negative for abdominal pain, nausea and vomiting.   Neurological: Negative for focal weakness and headaches.   Psychiatric/Behavioral: Negative for depression. The patient is not nervous/anxious.    All other systems reviewed and are negative.      Sulfa antibiotics    Scheduled Meds:acebutolol, 200 mg, Oral, BID  apixaban, 2.5 mg, Oral, Q12H  atorvastatin, 20 mg, Oral, Nightly  furosemide, 40 mg, Intravenous, Q12H  lisinopril, 20 mg, Oral, Daily  sodium chloride, 10 mL, Intravenous, Q12H      Continuous Infusions:   PRN Meds:.melatonin  •  nitroglycerin  •  [COMPLETED] Insert peripheral IV **AND** sodium chloride  •  sodium chloride        VITAL SIGNS  Vitals:    12/01/20 0908 12/01/20 1220 12/01/20 1625 12/01/20 1950   BP: 110/64 102/61 102/61 108/65   BP Location:    Left arm   Patient Position:    Lying   Pulse:  65  66   Resp:  16  16   Temp:  98.2 °F (36.8 °C)  97.5 °F (36.4 °C)   TempSrc:    Axillary   SpO2:  95%  96%   Weight:   51.7 kg (114 lb)    Height:   162.6 cm (64\")        Flowsheet Rows      First Filed Value   Admission Height  162.6 cm (64\") Documented at 11/30/2020 1241   Admission Weight  49.9 kg (110 lb) Documented at 11/30/2020 " 1241           TELEMETRY: Sinus rhythm  Physical Exam:  Constitutional:       Appearance: Well-developed.   Eyes:      General: No scleral icterus.     Conjunctiva/sclera: Conjunctivae normal.      Pupils: Pupils are equal, round, and reactive to light.   HENT:      Head: Normocephalic and atraumatic.   Neck:      Musculoskeletal: Normal range of motion and neck supple.      Vascular: No carotid bruit or JVD.   Pulmonary:      Effort: Pulmonary effort is normal.      Breath sounds: Normal breath sounds. No wheezing. No rales.   Cardiovascular:      Normal rate. Regular rhythm.      Murmurs: There is a systolic murmur.   Pulses:     Intact distal pulses.   Abdominal:      General: Bowel sounds are normal.      Palpations: Abdomen is soft.   Musculoskeletal: Normal range of motion.   Skin:     General: Skin is warm and dry.      Findings: No rash.   Neurological:      Mental Status: Alert.      Comments: No focal deficits          Results Review:   I reviewed the patient's new clinical results.  Lab Results (last 24 hours)     Procedure Component Value Units Date/Time    D-dimer, Quantitative [[id number]]  (Abnormal) Collected: 12/01/20 0746    Specimen: Blood Updated: 12/01/20 0835     D-Dimer, Quantitative 1.59 mg/L (FEU)     Narrative:      Reference Range  --------------------------------------------------------------------     < 0.50   Negative Predictive Value  0.50-0.59   Indeterminate    >= 0.60   Probable VTE             A very low percentage of patients with DVT may yield D-Dimer results   below the cut-off of 0.50 mg/L FEU.  This is known to be more   prevalent in patients with distal DVT.             Results of this test should always be interpreted in conjunction with   the patient's medical history, clinical presentation and other   findings.  Clinical diagnosis should not be based on the result of   [place] D-Dimer alone.    TSH [[id number]]  (Normal) Collected: 12/01/20 0603    Specimen: Blood Updated:  12/01/20 0700     TSH 2.050 uIU/mL           Imaging Results (Last 24 Hours)     Procedure Component Value Units Date/Time    CT Chest Pulmonary Embolism [[id number]] Collected: 12/01/20 1246     Updated: 12/01/20 1251    Narrative:      CT CHEST PULMONARY EMBOLISM-     Date of Exam: 12/1/2020 12:43 PM     Indication: PE suspected, high prob; R06.00-Dyspnea, unspecified;  I50.9-Heart failure, unspecified.     Comparison: CT chest 02/20/2020, AP portable chest 11/30/2020.     Technique: Serial and axial CT images of the chest were obtained  following the uneventful intravenous administration of 100 cc Isovue-370  contrast. Reconstructions in the coronal and sagittal planes were also  performed.  Automated exposure control and iterative reconstruction  methods were used.     FINDINGS:     No pulmonary embolism. Stable cardiomegaly with CABG. No thoracic aortic  aneurysm or aortic dissection. Aortic valve replacement. Pacemaker  device in place. Interstitial thickening with groundglass opacity within  both lungs favored to reflect changes of mild pulmonary edema. FINDINGS  are superimposed upon emphysema. Calcified pleural plaque is seen within  the posterior left lower hemithorax.     Included portions the upper abdominal organs within normal limits. Mild  generalized body wall edema. Chronic appearing compression fracture of  the L1 vertebral body. Chronic appearing compression fracture of  superior endplate of the T4 vertebral body, which is a new finding since  the prior examination.     Severe degenerative change of the right shoulder. Left shoulder  replacement.          Impression:         1. No pulmonary embolism.  2. FINDINGS suggestive of mild interstitial and alveolar pulmonary edema  superimposed upon emphysema.  3. Stable cardiac megaly with aortic valve replacement and CABG.  4. Calcified pleural plaque. Correlate for history of asbestos exposure.  5. Chronic T4 and L1 vertebral body compression  fractures.           Electronically Signed By-Tnoja Chauhan MD On:12/1/2020 12:49 PM  This report was finalized on [id number] by  Tonja Chauhan MD.          EKG      I personally viewed and interpreted the patient's EKG/Telemetry data:    ECHOCARDIOGRAM:    STRESS MYOVIEW:    CARDIAC CATHETERIZATION:    OTHER:         Assessment/Plan     Active Problems:    Dyspnea  History of coronary disease  History of severe aortic stenosis status post TAVR  Hypertension  Hyperlipidemia  Sick sinus syndrome  Status post pacemaker placement  History of atrial fibrillation    Patient presents with shortness of breath and is ruled out for MI by enzymes  Patient is having an echocardiogram performed today for LV function valve abnormalities  Patient had echocardiogram 6 months ago which showed normal B function and a normally functioning TAVR valve  Patient had elevated D-dimer and had a CT scan which showed no pulmonary bolus him  Continue current medical therapy and follow with echocardiogram results  Blood pressure and heart rate are stable  Patient has history of paroxysmal atrial fibrillation is currently on Eliquis  Patient also had tachybradycardia syndrome and status post pacemaker placement her pacemaker is working very well  Patient has history of hyperlipidemia and is on a statin    I discussed the patients findings and my recommendations with patient and nurse    Wyatt Kwok MD  12/01/20  19:59 EST

## 2020-12-03 NOTE — PLAN OF CARE
Goal Outcome Evaluation:  Plan of Care Reviewed With: patient, daughter  Progress: no change  Outcome Summary: Pt particpiated well in therapy this date.  She demonstrated increased activity tolerence with no SOA noted throughout session.  She requires assist for toileting and lower body dressing due to decreased BUE mobility.  She is near baseline at this time.  She will reuquire IP rehab at discharge.  PPE: mask, shield, gloves.

## 2020-12-03 NOTE — THERAPY TREATMENT NOTE
Patient Name: Olga Curtis  : 1935    MRN: [id number]                              Today's Date: 12/3/2020       Admit Date: 2020    Visit Dx:     ICD-10-CM ICD-9-CM   1. Dyspnea, unspecified type  R06.00 786.09   2. Acute congestive heart failure, unspecified heart failure type (CMS/HCC)  I50.9 428.0     Patient Active Problem List   Diagnosis   • Atrial premature complex   • Chronic coronary artery disease   • Dyslipidemia   • Essential hypertension   • Nonsustained ventricular tachycardia (CMS/HCC)   • Presence of cardiac pacemaker   • Shortness of breath   • Sick sinus syndrome (CMS/HCC)   • Allergic reaction   • Tachy-martin syndrome (CMS/HCC)   • Dyspnea   • Paroxysmal atrial fibrillation (CMS/HCC)   • Nonrheumatic aortic valve stenosis   • Chronic diastolic congestive heart failure (CMS/HCC)   • S/P TAVR (transcatheter aortic valve replacement)   • Primary localized osteoarthrosis of left shoulder region   • DJD of left shoulder     Past Medical History:   Diagnosis Date   • Aortic stenosis 10/28/2014   • Atrial premature complex 2015   • Chronic coronary artery disease 10/28/2014   • Dyslipidemia 10/28/2014   • Hyperlipidemia    • Hypertension 10/28/2014   • Nausea 2020   • Nonsustained ventricular tachycardia (CMS/HCC) 2015   • Partial paralysis of right hand (CMS/HCC)     states right arm only.  Cannot have sticks in arm, hand only   • Shoulder pain, left      Past Surgical History:   Procedure Laterality Date   • AORTIC VALVE REPAIR/REPLACEMENT N/A 2020    Procedure: TTE TRANSFEMORAL TRANSCATHETER AORTIC VALVE REPLACEMENT PERCUTANEOUS APPROACH;  Surgeon: J Carlos Leon MD;  Location: [facility] OR ;  Service: Cardiothoracic;  Laterality: N/A;   • AORTIC VALVE REPAIR/REPLACEMENT N/A 2020    Procedure: Transfemoral Transcatheter Aortic Valve Replacement w/Intra Op TTE and Possible Open Rescue Surgery;  Surgeon: Vasiliy Bruce MD;  Location:   [facility] 18/19;  Service: Cardiovascular;  Laterality: N/A;   • APPENDECTOMY     • CARDIAC CATHETERIZATION N/A 3/2/2020    Procedure: Left Heart Cath and coronary angiogram;  Surgeon: Wyatt Kwok MD;  Location: [facility] CATH INVASIVE LOCATION;  Service: Cardiovascular;  Laterality: N/A;   • CARDIAC CATHETERIZATION N/A 3/2/2020    Procedure: Saphenous Vein Graft;  Surgeon: Wyatt Kwok MD;  Location: [facility] CATH INVASIVE LOCATION;  Service: Cardiovascular;  Laterality: N/A;   • CARDIAC PACEMAKER PLACEMENT  2017   • CARDIAC SURGERY  1999    Bypass surgery   • EYE SURGERY Bilateral     cat ext   • HERNIA REPAIR     • TOTAL SHOULDER ARTHROPLASTY W/ DISTAL CLAVICLE EXCISION Left 10/6/2020    Procedure: TOTAL SHOULDER REVERSE ARTHROPLASTY;  Surgeon: Oli Barcenas MD;  Location: [facility] MAIN OR;  Service: Orthopedics;  Laterality: Left;   • TOTAL SHOULDER REVISION  10/06/2020    left     General Information     Row Name 12/03/20 1310          OT Time and Intention    Document Type  therapy note (daily note)  -SR     Mode of Treatment  occupational therapy  -SR       User Key  (r) = Recorded By, (t) = Taken By, (c) = Cosigned By    Initials Name Provider Type    SR Daya Hill OT Occupational Therapist        Mobility/ADL's     Row Name 12/03/20 1311          Bed Mobility    Bed Mobility  supine-sit  -SR     Supine-Sit [place] (Bed Mobility)  supervision  -SR     Assistive Device (Bed Mobility)  bed rails  -SR     Row Name 12/03/20 1311          Transfers    Transfers  toilet transfer  -SR     Sit-Stand [place] (Transfers)  contact guard  -SR     [place] Level (Toilet Transfer)  minimum assist (75% patient effort)  -SR     Assistive Device (Toilet Transfer)  commode low toilet  -SR     Row Name 12/03/20 1311          Functional Mobility    Functional Mobility- Ind. Level  supervision required  -SR     Row Name 12/03/20 1311          Toileting Assessment/Training    [place]  Level (Toileting)  minimum assist (75% patient effort)  -SR     Comment (Toileting)  Assist to pull up brief in the back  -SR     Row Name 12/03/20 1311          Lower Body Dressing Assessment/Training    [place] Level (Lower Body Dressing)  don;socks;dependent (less than 25% patient effort)  -SR       User Key  (r) = Recorded By, (t) = Taken By, (c) = Cosigned By    Initials Name Provider Type    SR Daya Hill, OT Occupational Therapist        Obj/Interventions    No documentation.       Goals/Plan    No documentation.       Clinical Impression     Row Name 12/03/20 1325          Pain Scale: FACES Pre/Post-Treatment    Pain: FACES Scale, Pretreatment  0-->no hurt  -SR     Posttreatment Pain Rating  0-->no hurt  -SR     Row Name 12/03/20 1325          Plan of Care Review    Outcome Summary  Pt particpiated well in therapy this date.  She demonstrated increased activity tolerence with no SOA noted throughout session.  She requires assist for toileting and lower body dressing due to decreased BUE mobility.  She is near baseline at this time.  She will reuquire IP rehab at discharge.  PPE: mask, shield, gloves.  -SR     Row Name 12/03/20 1325          Therapy Assessment/Plan (OT)    Therapy Frequency (OT)  3 times/wk  -SR     Row Name 12/03/20 1325          Therapy Plan Review/Discharge Plan (OT)    Anticipated Discharge Disposition (OT)  home with assist;home with home health  -SR       User Key  (r) = Recorded By, (t) = Taken By, (c) = Cosigned By    Initials Name Provider Type    Daya Helms, OT Occupational Therapist        Outcome Measures    No documentation.       Occupational Therapy Education                 Title: PT OT SLP Therapies (Not Started)     Topic: Occupational Therapy (In Progress)     Point: ADL training (In Progress)     Description:   Instruct learner(s) on proper safety adaptation and remediation techniques during self care or transfers.   Instruct in proper use  of assistive devices.              Learning Progress Summary           Patient Acceptance, E,TB, NR by SR at 12/3/2020 1336    Acceptance, E,TB, NR by SR at 12/2/2020 1713                   Point: Home exercise program (Not Started)     Description:   Instruct learner(s) on appropriate technique for monitoring, assisting and/or progressing therapeutic exercises/activities.              Learner Progress:  Not documented in this visit.          Point: Precautions (Not Started)     Description:   Instruct learner(s) on prescribed precautions during self-care and functional transfers.              Learner Progress:  Not documented in this visit.          Point: Body mechanics (In Progress)     Description:   Instruct learner(s) on proper positioning and spine alignment during self-care, functional mobility activities and/or exercises.              Learning Progress Summary           Patient Acceptance, E,TB, NR by SR at 12/3/2020 1336    Acceptance, E,TB, NR by SR at 12/2/2020 1713                               User Key     Initials Effective Dates Name Provider Type Discipline     03/01/19 -  Daya Hill OT Occupational Therapist OT              OT Recommendation and Plan  Planned Therapy Interventions (OT): activity tolerance training, functional balance retraining, occupation/activity based interventions, ROM/therapeutic exercise, transfer/mobility retraining  Therapy Frequency (OT): 3 times/wk  Plan of Care Review  Outcome Summary: Pt particpiated well in therapy this date.  She demonstrated increased activity tolerence with no SOA noted throughout session.  She requires assist for toileting and lower body dressing due to decreased BUE mobility.  She is near baseline at this time.  She will reuquire IP rehab at discharge.  PPE: mask, shield, gloves.     Time Calculation:   Time Calculation- OT     Row Name 12/03/20 1336             Time Calculation- OT    OT Start Time  1046  -SR      OT Stop Time   1103  -SR      OT Time Calculation (min)  17 min  -SR      Total Timed Code Minutes- OT  17 minute(s)  -SR      OT Received On  12/03/20  -SR      OT - Next Appointment  12/07/20  -SR        User Key  (r) = Recorded By, (t) = Taken By, (c) = Cosigned By    Initials Name Provider Type    SR Daya Hill, OT Occupational Therapist        Therapy Charges for Today     Code Description Service Date Service Provider Modifiers Qty    [id number]  OT EVAL LOW COMPLEXITY 4 12/2/2020 Daya Hill, OT GO 1    [id number]  OT SELF CARE/MGMT/TRAIN EA 15 MIN 12/3/2020 Daya Hill, OT GO 1               Daya Hill OT  12/3/2020

## 2020-12-03 NOTE — PLAN OF CARE
Goal Outcome Evaluation:  Plan of Care Reviewed With: patient, daughter  Progress: no change  Outcome Summary: Patient resting in bed.  Pt confused at times.  Daughter at bedside and helps in assisting pt.  Pt is continent of bladder and utilizes the bedpain.  Pt with no issues overnight.  Will continue to monitor.

## 2020-12-03 NOTE — PROGRESS NOTES
Continued Stay Note  JOANNE Jordan     Patient Name: Olga Curtis  MRN: [id number]  Today's Date: 12/3/2020    Admit Date: 11/30/2020    Discharge Plan     Row Name 12/03/20 1535       Plan    Plan  DC Plan: [facility]    Plan Comments  DC 12/4                     Efren Whitfield RN

## 2020-12-03 NOTE — PROGRESS NOTES
[facility] Medicine Services Daily Progress Note      Hospitalist Team  LOS 1 days      Patient Care Team:  Tigre Duran MD as PCP - General  Tigre Duran MD as PCP - Family Medicine  Wyatt Kwok MD as Consulting Physician (Cardiology)    Patient Location: 222/1      Subjective   Subjective     Chief Complaint / Subjective  Chief Complaint   Patient presents with   • Shortness of Breath         Brief Synopsis of Hospital Course/HPI    85-year-old pleasant female with multiple comorbidities significant for coronary disease, status post CABG (1999), severe aortic stenosis, status post TAVR (5/20) history of SSS, status post PPM, hypertension, PAF, DJD, hospitalist recent left shoulder surgery , nonsustained V. tach, and hyperlipidemia.  Presented to [facility] ED with  Complains of few days history of progressive weakness with dyspnea on exertion and shortness of breath.  She stated that symptoms progressively worsened in the past few days and with difficulty ambulating short distant with dyspnea.  Denies chest pain, no fever, chills no cough, nausea, vomiting, and no reported PND orthopnea.  She denies weight gain and lower extremity swelling.     EKG showed atrial sensed ventricular paced rhythm, and laboratory revealed elevated BNP of 1872,  cardiac biomarkers within normal limit, and Covid-19 screen nonreactive.  Chest x-ray showed chronic interstitial fibrotic changes in both lung with stable cardiomegaly, no acute cardiopulmonary findings.  Most recent 2D echo on 7/2/2020, showed preserved LV function, with estimated EF of 60%.  In ED, she was treated with Lasix with improvement.       Date:: 12/3/2020  Patient is seen today at bedside she is more energetic today her blood pressure has slightly improved.  She denies any chest pains no dizziness.          Review of Systems   Constitution: Negative.   HENT: Negative.    Cardiovascular: Negative.    Respiratory: Negative.    Skin:  "Negative.    Gastrointestinal: Negative.    Neurological: Negative.          Objective   Objective      Vital Signs  Temp:  [97.5 °F (36.4 °C)-98.2 °F (36.8 °C)] 98.2 °F (36.8 °C)  Heart Rate:  [61-76] 76  Resp:  [16-20] 20  BP: ()/(43-76) 145/76  Oxygen Therapy  SpO2: 93 %  Pulse Oximetry Type: Intermittent  Device (Oxygen Therapy): room air  Flowsheet Rows      First Filed Value   Admission Height  162.6 cm (64\") Documented at 11/30/2020 1241   Admission Weight  49.9 kg (110 lb) Documented at 11/30/2020 1241        Intake & Output (last 3 days)       11/30 0701 - 12/01 0700 12/01 0701 - 12/02 0700 12/02 0701 - 12/03 0700 12/03 0701 - 12/04 0700    P.O. 480  700     IV Piggyback   250     Total Intake(mL/kg) 480 (9.2)  950 (19.3)     Urine (mL/kg/hr) 1200 700 (0.6) 150 (0.1)     Total Output 1200 700 150     Net -720 -700 +800                 Lines, Drains & Airways    Active LDAs     Name:   Placement date:   Placement time:   Site:   Days:    Peripheral IV 12/02/20 0245 Anterior;Left Forearm   12/02/20    0245    Forearm   1    External Urinary Catheter   11/30/20    1905    --   2                  Physical Exam:    Physical Exam  Constitutional:       Appearance: Normal appearance.   HENT:      Head: Normocephalic and atraumatic.      Mouth/Throat:      Mouth: Mucous membranes are moist.   Eyes:      Pupils: Pupils are equal, round, and reactive to light.   Neck:      Musculoskeletal: Neck supple.   Cardiovascular:      Rate and Rhythm: Normal rate and regular rhythm.      Heart sounds: Normal heart sounds.   Pulmonary:      Effort: Pulmonary effort is normal.      Breath sounds: Normal breath sounds.   Abdominal:      General: Abdomen is flat. Bowel sounds are normal.      Palpations: Abdomen is soft.   Skin:     General: Skin is warm and dry.      Capillary Refill: Capillary refill takes less than 2 seconds.   Neurological:      General: No focal deficit present.      Mental Status: She is alert and " "oriented to person, place, and time.                       Results Review:     I reviewed the patient's new clinical results.      Lab Results (last 24 hours)     Procedure Component Value Units Date/Time    Procalcitonin [[id number]]  (Normal) Collected: 12/02/20 2033    Specimen: Blood Updated: 12/02/20 2155     Procalcitonin 0.08 ng/mL     Narrative:      As a Marker for Sepsis (Non-Neonates):   1. <0.5 ng/mL represents a low risk of severe sepsis and/or septic shock.  1. >2 ng/mL represents a high risk of severe sepsis and/or septic shock.    As a Marker for Lower Respiratory Tract Infections that require antibiotic therapy:  PCT on Admission     Antibiotic Therapy             6-12 Hrs later  > 0.5                Strongly Recommended            >0.25 - <0.5         Recommended  0.1 - 0.25           Discouraged                   Remeasure/reassess PCT  <0.1                 Strongly Discouraged          Remeasure/reassess PCT      As 28 day mortality risk marker: \"Change in Procalcitonin Result\" (> 80 % or <=80 %) if Day 0 (or Day 1) and Day 4 values are available. Refer to http://www.[employer]-pct-calculator.com/   Change in PCT <=80 %   A decrease of PCT levels below or equal to 80 % defines a positive change in PCT test result representing a higher risk for 28-day all-cause mortality of patients diagnosed with severe sepsis or septic shock.  Change in PCT > 80 %   A decrease of PCT levels of more than 80 % defines a negative change in PCT result representing a lower risk for 28-day all-cause mortality of patients diagnosed with severe sepsis or septic shock.                Results may be falsely decreased if patient taking Biotin.     C-reactive Protein [[id number]]  (Abnormal) Collected: 12/02/20 2033    Specimen: Blood Updated: 12/02/20 2152     C-Reactive Protein 0.61 mg/dL     Urinalysis, Microscopic Only - Urine, Clean Catch [[id number]]  (Abnormal) Collected: 12/02/20 1426    Specimen: Urine, Clean Catch " Updated: 12/02/20 1552     RBC, UA 0-2 /HPF      WBC, UA 0-2 /HPF      Bacteria, UA 1+ /HPF      Squamous Epithelial Cells, UA 7-12 /HPF      Hyaline Casts, UA 3-6 /LPF      Starch, UA Moderate/2+ /HPF      Methodology Manual Light Microscopy    Urinalysis With Culture If Indicated - Urine, Clean Catch [[id number]]  (Abnormal) Collected: 12/02/20 1426    Specimen: Urine, Clean Catch Updated: 12/02/20 1520     Color, UA Yellow     Appearance, UA Cloudy     Comment: Result checked         pH, UA 6.0     Specific Gravity, UA 1.019     Glucose, UA Negative     Ketones, UA Negative     Bilirubin, UA Negative     Blood, UA Negative     Protein, UA Negative     Leuk Esterase, UA Small (1+)     Nitrite, UA Negative     Urobilinogen, UA 0.2 E.U./dL        No results found for: HGBA1C  Results from last 7 days   Lab Units 11/30/20  1319   INR  1.02           No results found for: LIPASE  Lab Results   Component Value Date    CHOL 127 08/05/2017    TRIG 90 08/05/2017    HDL 49 08/05/2017    LDL 62 08/05/2017       No results found for: INTRAOP, PREDX, FINALDX, COMDX    Microbiology Results (last 10 days)     Procedure Component Value - Date/Time    COVID-19,[facility] IN-HOUSE,Nasal Swab No Transport Media 3-4 HR TAT - Swab, Nasal Cavity [[id number]]  (Normal) Collected: 11/30/20 1328    Lab Status: Final result Specimen: Swab from Nasal Cavity Updated: 11/30/20 1403     COVID19 Not Detected    Narrative:      Fact sheet for providers: https://www.fda.gov/media/407266/download     Fact sheet for patients: https://www.fda.gov/media/225589/download          ECG/EMG Results (most recent)     Procedure Component Value Units Date/Time    ECG 12 Lead [[id number]] Collected: 11/30/20 1322     Updated: 12/01/20 1610     QT Interval 472 ms     Narrative:      HEART RATE= 63  bpm  RR Interval= 958  ms  MA Interval= 121  ms  P Horizontal Axis= 40  deg  P Front Axis= 13  deg  QRSD Interval= 140  ms  QT Interval= 472  ms  QRS Axis= 223  deg  T  Wave Axis= 17  deg  - ABNORMAL ECG -  Atrial-sensed ventricular-paced rhythm  When compared with ECG of 30-Sep-2020 9:46:53,  No significant change  Electronically Signed By: Lawrence Mckeon (MAGO) 01-Dec-2020 16:07:22  Date and Time of Study: 2020-11-30 13:22:06    Adult Transthoracic Echo Complete W/ Cont if Necessary Per Protocol [[id number]] Collected: 12/01/20 1600     Updated: 12/01/20 2038     BSA 1.5 m^2      RVIDd 1.8 cm      IVSd 1.3 cm      LVIDd 3.1 cm      LVIDs 1.9 cm      LVPWd 1.3 cm      IVS/LVPW 0.99     FS 39.8 %      EDV(Teich) 38.4 ml      ESV(Teich) 10.8 ml      EF(Teich) 71.8 %      EDV(cubed) 30.3 ml      ESV(cubed) 6.6 ml      EF(cubed) 78.1 %      LV mass(C)d 134.8 grams      LV mass(C)dI 87.5 grams/m^2      SV(Teich) 27.6 ml      SI(Teich) 17.9 ml/m^2      SV(cubed) 23.6 ml      SI(cubed) 15.4 ml/m^2      Ao root diam 2.1 cm      Ao root area 3.6 cm^2      asc Aorta Diam 2.0 cm      LVOT diam 1.8 cm      LVOT area 2.6 cm^2      EDV(MOD-sp4) 40.2 ml      ESV(MOD-sp4) 16.8 ml      EF(MOD-sp4) 58.2 %      SV(MOD-sp4) 23.4 ml      SI(MOD-sp4) 15.2 ml/m^2      Ao root area (BSA corrected) 1.4     LV Malin Vol (BSA corrected) 26.1 ml/m^2      LV Sys Vol (BSA corrected) 10.9 ml/m^2      Aortic R-R 1.0 sec      Aortic HR 59.5 BPM      MV E max deon 66.0 cm/sec      MV A max deon 46.7 cm/sec      MV E/A 1.4     MV V2 max 82.5 cm/sec      MV max PG 2.7 mmHg      MV V2 mean 41.0 cm/sec      MV mean PG 0.81 mmHg      MV V2 VTI 22.8 cm      MVA(VTI) 3.3 cm^2      MV dec slope 301.3 cm/sec^2      MV dec time 0.22 sec      Ao pk deon 185.9 cm/sec      Ao max PG 13.8 mmHg      Ao max PG (full) 7.6 mmHg      Ao V2 mean 134.9 cm/sec      Ao mean PG 8.4 mmHg      Ao mean PG (full) 4.6 mmHg      Ao V2 VTI 37.0 cm      CHARIS(I,A) 2.0 cm^2      CHARIS(I,D) 2.0 cm^2      CHARIS(V,A) 1.7 cm^2      CHARIS(V,D) 1.7 cm^2      LV V1 max PG 6.2 mmHg      LV V1 mean PG 3.8 mmHg      LV V1 max 124.8 cm/sec      LV V1 mean 90.7 cm/sec       LV V1 VTI 29.0 cm      CO(Ao) 8.0 l/min      CI(Ao) 5.2 l/min/m^2      SV(Ao) 133.9 ml      SI(Ao) 86.9 ml/m^2      CO(LVOT) 4.5 l/min      CI(LVOT) 2.9 l/min/m^2      SV(LVOT) 75.5 ml      SI(LVOT) 49.0 ml/m^2      PA V2 max 108.7 cm/sec      PA max PG 4.7 mmHg      PA max PG (full) 2.0 mmHg      PA V2 mean 65.0 cm/sec      PA mean PG 2.1 mmHg      PA mean PG (full) 1.1 mmHg      PA V2 VTI 17.8 cm      PA acc time 0.08 sec      RV V1 max PG 2.8 mmHg      RV V1 mean PG 1.0 mmHg      RV V1 max 83.3 cm/sec      RV V1 mean 45.5 cm/sec      RV V1 VTI 15.2 cm      TR max deon 307.5 cm/sec      RVSP(TR) 40.8 mmHg      RAP systole 3.0 mmHg      PA pr(Accel) 41.3 mmHg       CV ECHO PARVEZ - BZI_BMI 19.6 kilograms/m^2       CV ECHO PARVEZ - BSA(HAYCOCK) 1.5 m^2       CV ECHO PARVEZ - BZI_METRIC_WEIGHT 51.7 kg       CV ECHO PARVEZ - BZI_METRIC_HEIGHT 162.6 cm      EF(MOD-bp) 58.0 %      LA dimension(2D) 3.2 cm     Narrative:      · Left ventricular ejection fraction appears to be 56 - 60%.  · Left ventricular wall thickness is consistent with moderate concentric   hypertrophy.  · There is a TAVR valve present.  · Moderate tricuspid valve regurgitation is present.  · Left ventricular diastolic function is consistent with (grade I)   impaired relaxation.  · No pericardial effusion noted             Results for orders placed during the hospital encounter of 02/28/20   Duplex Carotid Ultrasound CAR    Narrative · Proximal right internal carotid artery moderate stenosis.  · Proximal left internal carotid artery mild stenosis.          Results for orders placed during the hospital encounter of 11/30/20   Adult Transthoracic Echo Complete W/ Cont if Necessary Per Protocol    Narrative · Left ventricular ejection fraction appears to be 56 - 60%.  · Left ventricular wall thickness is consistent with moderate concentric   hypertrophy.  · There is a TAVR valve present.  · Moderate tricuspid valve regurgitation is present.  · Left  ventricular diastolic function is consistent with (grade I)   impaired relaxation.  · No pericardial effusion noted          Xr Shoulder 2+ View Left    Result Date: 11/30/2020  No acute left shoulder findings. The left shoulder prosthesis appears intact. No definite acute periprosthetic fracture is seen. No dislocation.  Electronically Signed By-Tonja Chauhan MD On:11/30/2020 2:12 PM This report was finalized on [id number] by  Tonja Chauhan MD.    Ct Head Without Contrast    Result Date: 12/2/2020  1. No acute process is demonstrated. Negative for hemorrhage or mass effect. 2. There is moderately severe white matter abnormality, increased from 2014. This is most often due to chronic small vessel disease. 3. There is an area of encephalomalacia in the right temporal lobe which is slightly larger than on the prior. 4. If acute ischemia is suspected clinically, would recommend consideration of MR.  Electronically Signed By-Theresa Santillan MD On:12/2/2020 8:20 PM This report was finalized on [id number] by  Theresa Santillan MD.    Xr Chest 1 View    Result Date: 11/30/2020  Chronic interstitial fibrotic changes in both lungs. Stable cardiomegaly. No acute chest findings.  Electronically Signed By-Tonja Chauhan MD On:11/30/2020 2:10 PM This report was finalized on [id number] by  Tonja Chauhan MD.    Ct Chest Pulmonary Embolism    Result Date: 12/1/2020   1. No pulmonary embolism. 2. FINDINGS suggestive of mild interstitial and alveolar pulmonary edema superimposed upon emphysema. 3. Stable cardiac megaly with aortic valve replacement and CABG. 4. Calcified pleural plaque. Correlate for history of asbestos exposure. 5. Chronic T4 and L1 vertebral body compression fractures.    Electronically Signed By-Tonja Chauhan MD On:12/1/2020 12:49 PM This report was finalized on [id number] by  Tonja Chauhan MD.          Xrays, labs reviewed personally by physician.    Medication Review:   I have reviewed the  patient's current medication list      Scheduled Meds  acebutolol, 200 mg, Oral, BID  apixaban, 2.5 mg, Oral, Q12H  atorvastatin, 20 mg, Oral, Nightly  cefTRIAXone, 1 g, Intravenous, Q24H  sodium chloride, 10 mL, Intravenous, Q12H        Meds Infusions  sodium chloride, 75 mL/hr, Last Rate: 75 mL/hr (12/02/20 5253)        Meds PRN  acetaminophen  •  melatonin  •  nitroglycerin  •  [COMPLETED] Insert peripheral IV **AND** sodium chloride  •  sodium chloride        Assessment/Plan   Assessment/Plan     Active Hospital Problems    Diagnosis  POA   • Dyspnea [R06.00]  Yes      Resolved Hospital Problems   No resolved problems to display.       MEDICAL DECISION MAKING COMPLEXITY BY PROBLEM:     85-year-old pleasant female with multiple comorbidities significant for coronary disease, status post CABG (1999), severe aortic stenosis, status post TAVR (5/20) history of SSS, status post PPM, hypertension, PAF, DJD, hospitalist recent left shoulder surgery , nonsustained V. tach, and hyperlipidemia.  Presented to [facility] ED with  Complains of few days history of progressive weakness with dyspnea on exertion and shortness of breath.  She stated that symptoms progressively worsened in the past few days and with difficulty ambulating short distant with dyspnea.  Denies chest pain, no fever, chills no cough, nausea, vomiting, and no reported PND orthopnea.  She denies weight gain and lower extremity swelling.     EKG showed atrial sensed ventricular paced rhythm, and laboratory revealed elevated BNP of 1872,  cardiac biomarkers within normal limit, and Covid-19 screen nonreactive.  Chest x-ray showed chronic interstitial fibrotic changes in both lung with stable cardiomegaly, no acute cardiopulmonary findings.  Most recent 2D echo on 7/2/2020, showed preserved LV function, with estimated EF of 60%.  In ED, she was treated with Lasix with improvement.    Assessment    Hypotension-12/2/2020 unclear etiology possibly secondary to  UTI  Pressure has improved status post bolus 12/2/2020  Lisinopril and Lasix on hold  Continue to monitor blood pressure next 24 hours  Status post  vancomycin 12/2/2020  Procalcitonin unremarkable-0.08  WBC 7.5  CRP 0.61  CXR neg for consolidation  Covid  -19  Previously neg  Hold Antihypertensive meds for now  Bolus NS 250cc  Continue ceftriaxone  Cont with gentle hydration      Tachybradycardia syndrome and status post pacemaker placement   Doing well cardiologist is following the patient     Elevated D-dimer   PE ruled out  By CT     Dypsea/acute HFpEF  Cardiology CX appreciated  cardiac biomarkers within normal limits, and chest x-ray show chronic fibrotic changes with stable cardiomegaly.  No acute cardiopulmonary findings  Repeat echo  ·  Left ventricular ejection fraction appears to be 56 - 60%.  · Left ventricular wall thickness is consistent with moderate concentric hypertrophy.  · There is a TAVR valve present.  · Moderate tricuspid valve regurgitation is present.  · Left ventricular diastolic function is consistent with (grade I) impaired relaxation.  No pericardial effusion noted      Coronary artery disease, status post CABG (1999)  Cont home meds     PAF  Cont  Eliquis     SSS-S/p PPM  Cardiology cx appreciated       Aortic stenosis    S/p TAVR (5/20)     Essential hypertension  BP low  Continue to hold meds         Hyperlipidemia        -Lipitor     DJD, recent left shoulder surgery  Stable           Physical deconditioning- multifactorial  Cont with PT/OT eval      VTE Prophylaxis -   Mechanical Order History:     None      Pharmalogical Order History:      Ordered     Dose Route Frequency Stop    11/30/20 1723  apixaban (ELIQUIS) tablet 2.5 mg      2.5 mg PO Every 12 Hours Scheduled --                  Code Status -   Code Status and Medical Interventions:   Ordered at: 11/30/20 1723     Level Of Support Discussed With:    Patient     Code Status:    CPR     Medical Interventions (Level of Support  Prior to Arrest):    Full       This patient has been examined wearing appropriate Personal Protective Equipment      Discharge Planning  Home with PT and OT next 24 hours        Electronically signed by Clarisse Velez MD, 12/03/20, 07:52 EST.  [facility] Hospitalist Team

## 2020-12-03 NOTE — PROGRESS NOTES
"Referring Provider: Hospitalist    Reason for follow-up: Shortness of breath     Patient Care Team:  Tigre Duran MD as PCP - General  Tigre Duran MD as PCP - Family Medicine  Wyatt Kwok MD as Consulting Physician (Cardiology)    Subjective . Feels better.  No chest pain or shortness of breath    Objective  Lying in bed comfortably     Review of Systems   Constitution: Negative for fever and malaise/fatigue.   HENT: Negative for ear pain and nosebleeds.    Eyes: Negative for blurred vision and double vision.   Cardiovascular: Negative for chest pain, dyspnea on exertion and palpitations.   Respiratory: Negative for cough and shortness of breath.    Skin: Negative for rash.   Musculoskeletal: Negative for joint pain.   Gastrointestinal: Negative for abdominal pain, nausea and vomiting.   Neurological: Negative for focal weakness and headaches.   Psychiatric/Behavioral: Negative for depression. The patient is not nervous/anxious.    All other systems reviewed and are negative.      Sulfa antibiotics    Scheduled Meds:acebutolol, 200 mg, Oral, BID  apixaban, 2.5 mg, Oral, Q12H  atorvastatin, 20 mg, Oral, Nightly  cefTRIAXone, 1 g, Intravenous, Q24H  sodium chloride, 10 mL, Intravenous, Q12H      Continuous Infusions:sodium chloride, 75 mL/hr, Last Rate: 75 mL/hr (12/02/20 2153)      PRN Meds:.acetaminophen  •  melatonin  •  nitroglycerin  •  [COMPLETED] Insert peripheral IV **AND** sodium chloride  •  sodium chloride        VITAL SIGNS  Vitals:    12/02/20 1711 12/02/20 2004 12/03/20 0436 12/03/20 0852   BP: 90/50 90/53 145/76 99/47   BP Location:  Left arm Left arm    Patient Position:  Lying Lying    Pulse:  64 76 74   Resp:  16 20    Temp:  97.8 °F (36.6 °C) 98.2 °F (36.8 °C)    TempSrc:  Oral Axillary    SpO2:  96% 93%    Weight:       Height:           Flowsheet Rows      First Filed Value   Admission Height  162.6 cm (64\") Documented at 11/30/2020 1241   Admission Weight  49.9 kg (110 lb) Documented " at 11/30/2020 1241           TELEMETRY: Sinus rhythm  Physical Exam:  Constitutional:       Appearance: Well-developed.   Eyes:      General: No scleral icterus.     Conjunctiva/sclera: Conjunctivae normal.      Pupils: Pupils are equal, round, and reactive to light.   HENT:      Head: Normocephalic and atraumatic.   Neck:      Musculoskeletal: Normal range of motion and neck supple.      Vascular: No carotid bruit or JVD.   Pulmonary:      Effort: Pulmonary effort is normal.      Breath sounds: Normal breath sounds. No wheezing. No rales.   Cardiovascular:      Normal rate. Regular rhythm.      Murmurs: There is a systolic murmur.   Pulses:     Intact distal pulses.   Abdominal:      General: Bowel sounds are normal.      Palpations: Abdomen is soft.   Musculoskeletal: Normal range of motion.   Skin:     General: Skin is warm and dry.      Findings: No rash.   Neurological:      Mental Status: Alert.      Comments: No focal deficits          Results Review:   I reviewed the patient's new clinical results.  Lab Results (last 24 hours)     Procedure Component Value Units Date/Time    Comprehensive Metabolic Panel [[id number]]  (Abnormal) Collected: 12/03/20 0849    Specimen: Blood Updated: 12/03/20 0936     Glucose 103 mg/dL      BUN 18 mg/dL      Creatinine 0.96 mg/dL      Sodium 128 mmol/L      Potassium 4.3 mmol/L      Comment: Slight hemolysis detected by analyzer. Results may be affected.        Chloride 89 mmol/L      CO2 31.0 mmol/L      Calcium 8.7 mg/dL      Total Protein 6.2 g/dL      Albumin 3.20 g/dL      ALT (SGPT) 7 U/L      AST (SGOT) 18 U/L      Alkaline Phosphatase 76 U/L      Total Bilirubin 0.4 mg/dL      eGFR Non African Amer 55 mL/min/1.73      Globulin 3.0 gm/dL      A/G Ratio 1.1 g/dL      BUN/Creatinine Ratio 18.8     Anion Gap 8.0 mmol/L     Narrative:      GFR Normal >60  Chronic Kidney Disease <60  Kidney Failure <15      CBC & Differential [[id number]]  (Abnormal) Collected: 12/03/20 0849     "Specimen: Blood Updated: 12/03/20 0910    Narrative:      The following orders were created for panel order CBC & Differential.  Procedure                               Abnormality         Status                     ---------                               -----------         ------                     CBC Auto Differential[[id number]]        Abnormal            Final result                 Please view results for these tests on the individual orders.    CBC Auto Differential [[id number]]  (Abnormal) Collected: 12/03/20 0849    Specimen: Blood Updated: 12/03/20 0910     WBC 7.50 10*3/mm3      RBC 3.65 10*6/mm3      Hemoglobin 11.3 g/dL      Hematocrit 34.4 %      MCV 94.4 fL      MCH 31.1 pg      MCHC 33.0 g/dL      RDW 14.3 %      RDW-SD 47.7 fl      MPV 8.4 fL      Platelets 265 10*3/mm3      Neutrophil % 68.3 %      Lymphocyte % 14.5 %      Monocyte % 13.6 %      Eosinophil % 2.2 %      Basophil % 1.4 %      Neutrophils, Absolute 5.10 10*3/mm3      Lymphocytes, Absolute 1.10 10*3/mm3      Monocytes, Absolute 1.00 10*3/mm3      Eosinophils, Absolute 0.20 10*3/mm3      Basophils, Absolute 0.10 10*3/mm3      nRBC 0.1 /100 WBC     Procalcitonin [[id number]]  (Normal) Collected: 12/02/20 2033    Specimen: Blood Updated: 12/02/20 2155     Procalcitonin 0.08 ng/mL     Narrative:      As a Marker for Sepsis (Non-Neonates):   1. <0.5 ng/mL represents a low risk of severe sepsis and/or septic shock.  1. >2 ng/mL represents a high risk of severe sepsis and/or septic shock.    As a Marker for Lower Respiratory Tract Infections that require antibiotic therapy:  PCT on Admission     Antibiotic Therapy             6-12 Hrs later  > 0.5                Strongly Recommended            >0.25 - <0.5         Recommended  0.1 - 0.25           Discouraged                   Remeasure/reassess PCT  <0.1                 Strongly Discouraged          Remeasure/reassess PCT      As 28 day mortality risk marker: \"Change in Procalcitonin Result\" " (> 80 % or <=80 %) if Day 0 (or Day 1) and Day 4 values are available. Refer to http://[email or web address]-pct-calculator.com/   Change in PCT <=80 %   A decrease of PCT levels below or equal to 80 % defines a positive change in PCT test result representing a higher risk for 28-day all-cause mortality of patients diagnosed with severe sepsis or septic shock.  Change in PCT > 80 %   A decrease of PCT levels of more than 80 % defines a negative change in PCT result representing a lower risk for 28-day all-cause mortality of patients diagnosed with severe sepsis or septic shock.                Results may be falsely decreased if patient taking Biotin.     C-reactive Protein [[id number]]  (Abnormal) Collected: 12/02/20 2033    Specimen: Blood Updated: 12/02/20 2152     C-Reactive Protein 0.61 mg/dL     Urinalysis, Microscopic Only - Urine, Clean Catch [[id number]]  (Abnormal) Collected: 12/02/20 1426    Specimen: Urine, Clean Catch Updated: 12/02/20 1552     RBC, UA 0-2 /HPF      WBC, UA 0-2 /HPF      Bacteria, UA 1+ /HPF      Squamous Epithelial Cells, UA 7-12 /HPF      Hyaline Casts, UA 3-6 /LPF      Starch, UA Moderate/2+ /HPF      Methodology Manual Light Microscopy    Urinalysis With Culture If Indicated - Urine, Clean Catch [[id number]]  (Abnormal) Collected: 12/02/20 1426    Specimen: Urine, Clean Catch Updated: 12/02/20 1520     Color, UA Yellow     Appearance, UA Cloudy     Comment: Result checked         pH, UA 6.0     Specific Gravity, UA 1.019     Glucose, UA Negative     Ketones, UA Negative     Bilirubin, UA Negative     Blood, UA Negative     Protein, UA Negative     Leuk Esterase, UA Small (1+)     Nitrite, UA Negative     Urobilinogen, UA 0.2 E.U./dL          Imaging Results (Last 24 Hours)     Procedure Component Value Units Date/Time    CT Head Without Contrast [[id number]] Collected: 12/02/20 2017     Updated: 12/02/20 2123    Narrative:      CT HEAD WO CONTRAST-     Date of Exam: 12/2/2020 7:20 PM        Indication: Delirium; R06.00-Dyspnea, unspecified; I50.9-Heart failure,  unspecified.     Comparison: CT head 08/14/2014     Technique:  Without contrast, contiguous axial CT images of the head  were obtained from skull base to vertex.  Coronal and sagittal  reconstructions were performed.  Automated exposure control and  iterative reconstruction methods were used.     FINDINGS  No intracranial hemorrhage or mass effect is seen. There is mild  cerebral atrophy. There is moderately severe decreased density in the  white matter, which appears greater than on 08/14/2014. There is a area  of encephalomalacia in the right temporal lobe that appears larger than  on the prior exam. The gray-white matter differentiation is preserved.  No skull abnormality is noted.        Impression:      1. No acute process is demonstrated. Negative for hemorrhage or mass  effect.  2. There is moderately severe white matter abnormality, increased from  2014. This is most often due to chronic small vessel disease.  3. There is an area of encephalomalacia in the right temporal lobe which  is slightly larger than on the prior.  4. If acute ischemia is suspected clinically, would recommend  consideration of MR.     Electronically Signed By-Theresa Santillan MD On:12/2/2020 8:20 PM  This report was finalized on [id number] by  Theresa Santillan MD.          EKG      I personally viewed and interpreted the patient's EKG/Telemetry data:    ECHOCARDIOGRAM:    STRESS MYOVIEW:    CARDIAC CATHETERIZATION:    OTHER:         Assessment/Plan     Active Problems:    Dyspnea  History of coronary disease  History of severe aortic stenosis status post TAVR  Hypertension  Hyperlipidemia  Sick sinus syndrome  Status post pacemaker placement  History of atrial fibrillation    Patient presents with shortness of breath and is ruled out for MI by enzymes  Patient had an echocardiogram which showed normal function with normally functioning TAVR e  abnormalities  Patient had echocardiogram 6 months ago which showed normal LV function and a normally functioning TAVR valve  Patient had elevated D-dimer and had a CT scan which showed no pulmonary bolus him  Continue current medical therapy and follow with echocardiogram results  Blood pressure and heart rate are stable  Patient has history of paroxysmal atrial fibrillation is currently on Eliquis  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

## 2020-12-04 PROBLEM — E43 SEVERE MALNUTRITION (HCC): Status: ACTIVE | Noted: 2020-01-01

## 2020-12-04 NOTE — PLAN OF CARE
Goal Outcome Evaluation:  Plan of Care Reviewed With: patient, daughter  Progress: no change  Outcome Summary: Patient remains confused and having signs of hallucinations. She is to have MRI done today. Daughter at bedside and is able to redirect patient.

## 2020-12-04 NOTE — THERAPY TREATMENT NOTE
Patient Name: Olga Curtis  : 1935    MRN: [id number]                              Today's Date: 2020       Admit Date: 2020    Visit Dx:     ICD-10-CM ICD-9-CM   1. Dyspnea, unspecified type  R06.00 786.09   2. Acute congestive heart failure, unspecified heart failure type (CMS/HCC)  I50.9 428.0     Patient Active Problem List   Diagnosis   • Atrial premature complex   • Chronic coronary artery disease   • Dyslipidemia   • Essential hypertension   • Nonsustained ventricular tachycardia (CMS/HCC)   • Presence of cardiac pacemaker   • Shortness of breath   • Sick sinus syndrome (CMS/HCC)   • Allergic reaction   • Tachy-martin syndrome (CMS/HCC)   • Dyspnea   • Paroxysmal atrial fibrillation (CMS/HCC)   • Nonrheumatic aortic valve stenosis   • Chronic diastolic congestive heart failure (CMS/HCC)   • S/P TAVR (transcatheter aortic valve replacement)   • Primary localized osteoarthrosis of left shoulder region   • DJD of left shoulder   • Severe malnutrition (CMS/HCC)     Past Medical History:   Diagnosis Date   • Aortic stenosis 10/28/2014   • Atrial premature complex 2015   • Chronic coronary artery disease 10/28/2014   • Dyslipidemia 10/28/2014   • Hyperlipidemia    • Hypertension 10/28/2014   • Nausea 2020   • Nonsustained ventricular tachycardia (CMS/HCC) 2015   • Partial paralysis of right hand (CMS/HCC)     states right arm only.  Cannot have sticks in arm, hand only   • Shoulder pain, left      Past Surgical History:   Procedure Laterality Date   • AORTIC VALVE REPAIR/REPLACEMENT N/A 2020    Procedure: TTE TRANSFEMORAL TRANSCATHETER AORTIC VALVE REPLACEMENT PERCUTANEOUS APPROACH;  Surgeon: J Carlos Leon MD;  Location: [facility] ;  Service: Cardiothoracic;  Laterality: N/A;   • AORTIC VALVE REPAIR/REPLACEMENT N/A 2020    Procedure: Transfemoral Transcatheter Aortic Valve Replacement w/Intra Op TTE and Possible Open Rescue Surgery;  Surgeon: Janis  Vasiliy Gould MD;  Location: [facility] HYBRID OR 18/19;  Service: Cardiovascular;  Laterality: N/A;   • APPENDECTOMY     • CARDIAC CATHETERIZATION N/A 3/2/2020    Procedure: Left Heart Cath and coronary angiogram;  Surgeon: Wyatt Kwok MD;  Location: [facility] CATH INVASIVE LOCATION;  Service: Cardiovascular;  Laterality: N/A;   • CARDIAC CATHETERIZATION N/A 3/2/2020    Procedure: Saphenous Vein Graft;  Surgeon: Wyatt Kwok MD;  Location: [facility] CATH INVASIVE LOCATION;  Service: Cardiovascular;  Laterality: N/A;   • CARDIAC PACEMAKER PLACEMENT  2017   • CARDIAC SURGERY  1999    Bypass surgery   • EYE SURGERY Bilateral     cat ext   • HERNIA REPAIR     • TOTAL SHOULDER ARTHROPLASTY W/ DISTAL CLAVICLE EXCISION Left 10/6/2020    Procedure: TOTAL SHOULDER REVERSE ARTHROPLASTY;  Surgeon: Oli Barcenas MD;  Location: [facility] MAIN OR;  Service: Orthopedics;  Laterality: Left;   • TOTAL SHOULDER REVISION  10/06/2020    left     General Information     Row Name 12/04/20 8547          Physical Therapy Time and Intention    Document Type  therapy note (daily note)  -     Mode of Treatment  physical therapy  -     Row Name 12/04/20 9297          Cognition    Orientation Status (Cognition)  person;oriented to Hallucinating at times, confusion present.  -       User Key  (r) = Recorded By, (t) = Taken By, (c) = Cosigned By    Initials Name Provider Type     Marilu Monahan PTA Physical Therapy Assistant        Mobility     Row Name 12/04/20 3030          Bed Mobility    Bed Mobility  bed mobility (all) activities  -     All Activities, [place] (Bed Mobility)  verbal cues;minimum assist (75% patient effort)  -     Supine-Sit [place] (Bed Mobility)  1 person assist  -     Assistive Device (Bed Mobility)  bed rails;head of bed elevated  -     Comment (Bed Mobility)  Delayed motor planning/processing. Min A to bring BLEs into bed.  -     Row Name 12/04/20 6049          Sit-Stand Transfer     Sit-Stand [place] (Transfers)  verbal cues;contact guard;1 person assist  -     Assistive Device (Sit-Stand Transfers)  -- HHA c utilized gait belt.  -[facility] Name 12/04/20 1556          Gait/Stairs (Locomotion)    [place] Level (Gait)  contact guard;1 person assist  -     Assistive Device (Gait)  -- HHA c utilized gait belt.  -     Distance in Feet (Gait)  30' x2  -     Comment (Gait/Stairs)  FF posture, decreased step length, slowed maria dolores, guarded trunk rotation, instability/ weakness present, high falls risk.  -       User Key  (r) = Recorded By, (t) = Taken By, (c) = Cosigned By    Initials Name Provider Type     Marilu Monahan PTA Physical Therapy Assistant        Obj/Interventions     [facility] Name 12/04/20 1601          Balance    Balance Assessment  sitting dynamic balance  -     Static Sitting Balance  WFL  -     Dynamic Sitting Balance  mild impairment  -     Static Standing Balance  mild impairment  -     Dynamic Standing Balance  mild impairment  -     Comment, Balance  Limited recovery away from midline.  -       User Key  (r) = Recorded By, (t) = Taken By, (c) = Cosigned By    Initials Name Provider Type     Marilu Monahan PTA Physical Therapy Assistant        Goals/Plan    No documentation.       Clinical Impression     [facility] Name 12/04/20 1604          Pain    Additional Documentation  Pain Scale: FACES Pre/Post-Treatment (Group)  -LH     Row Name 12/04/20 1604          Pain Scale: FACES Pre/Post-Treatment    Posttreatment Pain Rating  2-->hurts little bit  -     Pain Location - Side  Bilateral  -     Pre/Posttreatment Pain Comment  Pt. reports her lungs hurt when breathing, unable to quantify.  -[facility] Name 12/04/20 5488          Plan of Care Review    Plan of Care Reviewed With  patient  -     Progress  no change  -     Outcome Summary  Required cga/min A for safe, functional mobility. Presents c confusion and requiring encouragement for oob activities.  Cga to toilet, requiring assist c toileting hygiene for standing stability. Low activity tolerance, limited away from midline c transitional moves. Amb. 30' x 2 c HHA and utilized gait belt. FF posture c decreased step length, weakness/instability present. Will progress as able, will need rehab at d/c to address deficits. PPE worn: Mask, gloves, shield.  -     Row Name 12/04/20 1604          Therapy Assessment/Plan (PT)    Patient/Family Therapy Goals Statement (PT)  --  -     Row Name 12/04/20 1604          Vital Signs    O2 Delivery Pre Treatment  room air  -     O2 Delivery Intra Treatment  room air  -     O2 Delivery Post Treatment  room air  -     Pre Patient Position  Supine  -     Intra Patient Position  Standing  -     Post Patient Position  Supine  -     Row Name 12/04/20 1604          Positioning and Restraints    Pre-Treatment Position  in bed  -LH     Post Treatment Position  bed  -LH     In Bed  notified nsg;fowlers;call light within reach;exit alarm on;with family/caregiver  -       User Key  (r) = Recorded By, (t) = Taken By, (c) = Cosigned By    Initials Name Provider Type    Marilu Barros PTA Physical Therapy Assistant        Outcome Measures     Row Name 12/04/20 1610          How much help from another person do you currently need...    Turning from your back to your side while in flat bed without using bedrails?  3  -LH     Moving from lying on back to sitting on the side of a flat bed without bedrails?  3  -LH     Moving to and from a bed to a chair (including a wheelchair)?  3  -LH     Standing up from a chair using your arms (e.g., wheelchair, bedside chair)?  3  -LH     Climbing 3-5 steps with a railing?  2  -LH     To walk in hospital room?  3  -LH     AM-PAC 6 Clicks Score (PT)  17  -       User Key  (r) = Recorded By, (t) = Taken By, (c) = Cosigned By    Initials Name Provider Type    Marilu Barros PTA Physical Therapy Assistant        Physical Therapy Education                  Title: PT OT SLP Therapies (Not Started)     Topic: Physical Therapy (In Progress)     Point: Mobility training (In Progress)     Learning Progress Summary           Patient Nonacceptance, E, NL by  at 12/4/2020 1611    Comment: Attempting to educate pt.on why therapist couldn't leave her at eob and expect her to remain safe, pt. voicing not understanding, but returned to supine without difficulty. Poor safety judgement.    Acceptance, E,TB, VU by  at 12/2/2020 1655                   Point: Home exercise program (In Progress)     Learning Progress Summary           Patient Nonacceptance, E, NL by  at 12/4/2020 1611    Comment: Attempting to educate pt.on why therapist couldn't leave her at eob and expect her to remain safe, pt. voicing not understanding, but returned to supine without difficulty. Poor safety judgement.                   Point: Precautions (In Progress)     Learning Progress Summary           Patient Nonacceptance, E, NL by  at 12/4/2020 1611    Comment: Attempting to educate pt.on why therapist couldn't leave her at eob and expect her to remain safe, pt. voicing not understanding, but returned to supine without difficulty. Poor safety judgement.    Acceptance, E,TB, VU by  at 12/2/2020 1655                               User Key     Initials Effective Dates Name Provider Type Discipline     03/01/19 -  Marilu Monahan PTA Physical Therapy Assistant PT     06/23/20 -  Dane Velazquez PT Physical Therapist PT              PT Recommendation and Plan     Plan of Care Reviewed With: patient, daughter  Progress: no change  Outcome Summary: Required cga/min A for safe, functional mobility. Presents c confusion and requiring encouragement for oob activities. Cga to toilet, requiring assist c toileting hygiene for standing stability. Low activity tolerance, limited away from midline c transitional moves. Amb. 30' x 2 c HHA and utilized gait belt. FF posture c decreased step length,  weakness/instability present. Will progress as able, will need rehab at d/c to address deficits. PPE worn: Mask, gloves, shield.     Time Calculation:   PT Charges     Row Name 12/04/20 1618             Time Calculation    Start Time  0840  -      Stop Time  0905  -      Time Calculation (min)  25 min  -      PT Received On  12/04/20  -      PT - Next Appointment  12/07/20  -         Time Calculation- PT    Total Timed Code Minutes- PT  25 minute(s)  -         Timed Charges    63838 - Gait Training Minutes   8  -      57155 - PT Therapeutic Activity Minutes  17  -        User Key  (r) = Recorded By, (t) = Taken By, (c) = Cosigned By    Initials Name Provider Type     Marilu Monahan PTA Physical Therapy Assistant        Therapy Charges for Today     Code Description Service Date Service Provider Modifiers Qty    47452613287 HC GAIT TRAINING EA 15 MIN 12/4/2020 Marilu Monahan PTA GP 1    [id number]  PT THERAPEUTIC ACT EA 15 MIN 12/4/2020 Marilu Monahan PTA GP 1          PT G-Codes  Outcome Measure Options: AM-PAC 6 Clicks Daily Activity (OT)  AM-PAC 6 Clicks Score (PT): 17  AM-PAC 6 Clicks Score (OT): 17    Marilu Monahan PTA  12/4/2020

## 2020-12-04 NOTE — PROGRESS NOTES
Continued Stay Note   Julian     Patient Name: Olga Curtis  MRN: [id number]  Today's Date: 12/4/2020    Admit Date: 11/30/2020    Discharge Plan     Row Name 12/04/20 1204       Plan    Plan  Dc Plan: Pending referral to 1st choice [place]. 2nd choice [facility], and 3rd choice [facility]. No PASRR or precert required for 1st choice.    Plan Comments  SW met with pt at bedside wearing appropriate PPE (mask, goggles, 6ft away). Pt daughter Conchis was at bedside and provided SW with rehab choices.        Discharge Codes    No documentation.       Expected Discharge Date and Time     Expected Discharge Date Expected Discharge Time    Dec 4, 2020         Jordi NINA   Cell: [phone]  Office: [phone]  Fax: [phone]

## 2020-12-04 NOTE — PROGRESS NOTES
Continued Stay Note  [facility]     Patient Name: Olga Curtis  MRN: [id number]  Today's Date: 12/4/2020    Admit Date: 11/30/2020    Discharge Plan     Row Name 12/04/20 1449       Plan    Plan  DC Plan: [facility] accepted. No PASRR or precert required. Possible DC 12/5.    Plan Comments  Increased confusion, MD may DC tomorrow.    Row Name 12/04/20 1332       Plan    Plan  DC Plan: [facility] accepted. No PASRR or precert required.    Plan Comments  SW left a voice message for pt daughter on acceptance.    Row Name 12/04/20 1201       Plan    Plan  Dc Plan: Pending referral to 1st choice [facility]. 2nd choice [facility], and 3rd choice [facility]. No PASRR or precert required for 1st choice.    Plan Comments  SW met with pt at bedside wearing appropriate PPE (mask, goggles, 6ft away). Pt daughter Conchis was at bedside and provided SW with rehab choices.        Chart review only       Expected Discharge Date and Time     Expected Discharge Date Expected Discharge Time    Dec 4, 2020             Efren Whitfield RN

## 2020-12-04 NOTE — PROGRESS NOTES
Continued Stay Note  [facility]     Patient Name: Olga Curtis  MRN: [id number]  Today's Date: 12/4/2020    Admit Date: 11/30/2020    Discharge Plan     Row Name 12/04/20 1332       Plan    Plan  DC Plan: [facility] accepted. No PASRR or precert required.    Plan Comments  SW left a voice message for pt daughter on acceptance.    Row Name 12/04/20 1208       Plan    Plan  Dc Plan: Pending referral to 1st choice [facility]. 2nd choice [facility], and 3rd choice [facility]. No PASRR or precert required for 1st choice.    Plan Comments  SW met with pt at bedside wearing appropriate PPE (mask, goggles, 6ft away). Pt daughter Conchis was at bedside and provided SW with rehab choices.        Discharge Codes    No documentation.       Expected Discharge Date and Time     Expected Discharge Date Expected Discharge Time    Dec 4, 2020         Jordi SETH   Cell: [phone]  Office: [phone]  Fax: [phone]

## 2020-12-04 NOTE — PROGRESS NOTES
"Referring Provider: Hospitalist    Reason for follow-up: Shortness of breath     Patient Care Team:  Tigre Duran MD as PCP - General  Tigre Duran MD as PCP - Family Medicine  Wyatt Kwok MD as Consulting Physician (Cardiology)    Subjective . Feels better.  No chest pain or shortness of breath    Objective  Lying in bed comfortably     Review of Systems   Constitution: Negative for fever and malaise/fatigue.   HENT: Negative for ear pain and nosebleeds.    Eyes: Negative for blurred vision and double vision.   Cardiovascular: Negative for chest pain, dyspnea on exertion and palpitations.   Respiratory: Negative for cough and shortness of breath.    Skin: Negative for rash.   Musculoskeletal: Negative for joint pain.   Gastrointestinal: Negative for abdominal pain, nausea and vomiting.   Neurological: Negative for focal weakness and headaches.   Psychiatric/Behavioral: Negative for depression. The patient is not nervous/anxious.    All other systems reviewed and are negative.      Sulfa antibiotics    Scheduled Meds:acebutolol, 200 mg, Oral, BID  apixaban, 2.5 mg, Oral, Q12H  atorvastatin, 20 mg, Oral, Nightly  cefTRIAXone, 1 g, Intravenous, Q24H  sodium chloride, 10 mL, Intravenous, Q12H      Continuous Infusions:   PRN Meds:.acetaminophen  •  melatonin  •  nitroglycerin  •  [COMPLETED] Insert peripheral IV **AND** sodium chloride  •  sodium chloride        VITAL SIGNS  Vitals:    12/03/20 1143 12/03/20 1932 12/04/20 0245 12/04/20 1219   BP: 115/64 120/66 157/73 176/76   BP Location:  Left arm Left arm Left arm   Patient Position:  Lying Lying Lying   Pulse: 69 82 83    Resp: 18 18 18 17   Temp: 97.6 °F (36.4 °C) 98.1 °F (36.7 °C) 97.9 °F (36.6 °C) 97.8 °F (36.6 °C)   TempSrc: Oral Oral Oral Oral   SpO2:       Weight:   51.3 kg (113 lb 1.5 oz)    Height:           Flowsheet Rows      First Filed Value   Admission Height  162.6 cm (64\") Documented at 11/30/2020 1241   Admission Weight  49.9 kg (110 lb) " Documented at 11/30/2020 1241           TELEMETRY: Sinus rhythm  Physical Exam:  Constitutional:       Appearance: Well-developed.   Eyes:      General: No scleral icterus.     Conjunctiva/sclera: Conjunctivae normal.      Pupils: Pupils are equal, round, and reactive to light.   HENT:      Head: Normocephalic and atraumatic.   Neck:      Musculoskeletal: Normal range of motion and neck supple.      Vascular: No carotid bruit or JVD.   Pulmonary:      Effort: Pulmonary effort is normal.      Breath sounds: Normal breath sounds. No wheezing. No rales.   Cardiovascular:      Normal rate. Regular rhythm.      Murmurs: There is a systolic murmur.   Pulses:     Intact distal pulses.   Abdominal:      General: Bowel sounds are normal.      Palpations: Abdomen is soft.   Musculoskeletal: Normal range of motion.   Skin:     General: Skin is warm and dry.      Findings: No rash.   Neurological:      Mental Status: Alert.      Comments: No focal deficits          Results Review:   I reviewed the patient's new clinical results.  Lab Results (last 24 hours)     Procedure Component Value Units Date/Time    Vitamin E [[id number]] Collected: 12/04/20 1123    Specimen: Blood Updated: 12/04/20 1140          Imaging Results (Last 24 Hours)     ** No results found for the last 24 hours. **          EKG      I personally viewed and interpreted the patient's EKG/Telemetry data:    ECHOCARDIOGRAM:    STRESS MYOVIEW:    CARDIAC CATHETERIZATION:    OTHER:         Assessment/Plan     Active Problems:    Dyspnea  History of coronary disease  History of severe aortic stenosis status post TAVR  Hypertension  Hyperlipidemia  Sick sinus syndrome  Status post pacemaker placement  History of atrial fibrillation    Patient presents with shortness of breath and is ruled out for MI by enzymes  Patient had an echocardiogram which showed normal function with normally functioning TAVR e abnormalities  Patient had echocardiogram 6 months ago which showed  normal LV function and a normally functioning TAVR valve  Patient had elevated D-dimer and had a CT scan which showed no pulmonary bolus him  Continue current medical therapy and follow with echocardiogram results  Blood pressure and heart rate are stable  Patient has history of paroxysmal atrial fibrillation is currently on Eliquis  Patient also had tachybradycardia syndrome and status post pacemaker placement her pacemaker is working very well  Patient has history of hyperlipidemia and is on a statin    I discussed the patients findings and my recommendations with patient and nurse    Wyatt Kwok MD  12/04/20  12:56 EST

## 2020-12-04 NOTE — PROGRESS NOTES
[facility] Medicine Services Daily Progress Note      Hospitalist Team  LOS 2 days      Patient Care Team:  Tigre Duran MD as PCP - General  Tigre Duran MD as PCP - Family Medicine  Wyatt Kwok MD as Consulting Physician (Cardiology)    Patient Location: 222/1      Subjective   Subjective     Chief Complaint / Subjective  Chief Complaint   Patient presents with   • Shortness of Breath         Brief Synopsis of Hospital Course/HPI    85-year-old pleasant female with multiple comorbidities significant for coronary disease, status post CABG (1999), severe aortic stenosis, status post TAVR (5/20) history of SSS, status post PPM, hypertension, PAF, DJD, hospitalist recent left shoulder surgery , nonsustained V. tach, and hyperlipidemia.  Presented to [facility] ED with  Complains of few days history of progressive weakness with dyspnea on exertion and shortness of breath.  She stated that symptoms progressively worsened in the past few days and with difficulty ambulating short distant with dyspnea.  Denies chest pain, no fever, chills no cough, nausea, vomiting, and no reported PND orthopnea.  She denies weight gain and lower extremity swelling.     EKG showed atrial sensed ventricular paced rhythm, and laboratory revealed elevated BNP of 1872,  cardiac biomarkers within normal limit, and Covid-19 screen nonreactive.  Chest x-ray showed chronic interstitial fibrotic changes in both lung with stable cardiomegaly, no acute cardiopulmonary findings.  Most recent 2D echo on 7/2/2020, showed preserved LV function, with estimated EF of 60%.  In ED, she was treated with Lasix with improvement.       Date:: 12/4/2020  Patient is seen today at bedside, her daughter is present.  She seems to be more confused today.  She is talking gibberish  No fever overnight she denies chest pains no shortness of breath.  Blood pressure has improved.        Review of Systems   Constitution: Negative.   HENT:  "Negative.    Eyes: Negative.    Cardiovascular: Negative.    Respiratory: Negative.    Skin: Negative.    Gastrointestinal: Negative.    Neurological: Negative.          Objective   Objective      Vital Signs  Temp:  [97.8 °F (36.6 °C)-98.1 °F (36.7 °C)] 97.8 °F (36.6 °C)  Heart Rate:  [82-83] 83  Resp:  [17-18] 17  BP: (120-176)/(66-76) 176/76  Oxygen Therapy  SpO2: 93 %  Pulse Oximetry Type: Intermittent  Device (Oxygen Therapy): room air  Flowsheet Rows      First Filed Value   Admission Height  162.6 cm (64\") Documented at 11/30/2020 1241   Admission Weight  49.9 kg (110 lb) Documented at 11/30/2020 1241        Intake & Output (last 3 days)       12/01 0701 - 12/02 0700 12/02 0701 - 12/03 0700 12/03 0701 - 12/04 0700 12/04 0701 - 12/05 0700    P.O.  700 240 240    IV Piggyback  250      Total Intake(mL/kg)  950 (19.3) 240 (4.7) 240 (4.7)    Urine (mL/kg/hr) 700 (0.6) 150 (0.1) 200 (0.2)     Total Output 700 150 200     Net -700 +800 +40 +240            Urine Unmeasured Occurrence   1 x         Lines, Drains & Airways    Active LDAs     Name:   Placement date:   Placement time:   Site:   Days:    Peripheral IV 12/02/20 0245 Anterior;Left Forearm   12/02/20    0245    Forearm   2                  Physical Exam:    Physical Exam  Vitals signs reviewed.   Constitutional:       Appearance: Normal appearance.   HENT:      Head: Normocephalic and atraumatic.      Mouth/Throat:      Pharynx: Oropharynx is clear.   Eyes:      Pupils: Pupils are equal, round, and reactive to light.   Neck:      Musculoskeletal: Normal range of motion and neck supple.   Cardiovascular:      Rate and Rhythm: Normal rate and regular rhythm.      Heart sounds: Normal heart sounds.   Pulmonary:      Effort: Pulmonary effort is normal.      Breath sounds: Normal breath sounds.   Abdominal:      General: Bowel sounds are normal.      Palpations: Abdomen is soft.   Skin:     General: Skin is warm and dry.      Capillary Refill: Capillary refill " takes less than 2 seconds.   Neurological:      General: No focal deficit present.      Mental Status: She is alert. She is disoriented.                     Results Review:     I reviewed the patient's new clinical results.      Lab Results (last 24 hours)     Procedure Component Value Units Date/Time    Vitamin E [[id number]] Collected: 12/04/20 1123    Specimen: Blood Updated: 12/04/20 1140        No results found for: HGBA1C  Results from last 7 days   Lab Units 11/30/20  1319   INR  1.02           No results found for: LIPASE  Lab Results   Component Value Date    CHOL 127 08/05/2017    TRIG 90 08/05/2017    HDL 49 08/05/2017    LDL 62 08/05/2017       No results found for: INTRAOP, PREDX, FINALDX, COMDX    Microbiology Results (last 10 days)     Procedure Component Value - Date/Time    COVID-19,[facility] [employer] IN-HOUSE,Nasal Swab No Transport Media 3-4 HR TAT - Swab, Nasal Cavity [[id number]]  (Normal) Collected: 11/30/20 1328    Lab Status: Final result Specimen: Swab from Nasal Cavity Updated: 11/30/20 1403     COVID19 Not Detected    Narrative:      Fact sheet for providers: https://www.fda.gov/media/396865/download     Fact sheet for patients: https://www.fda.gov/media/029932/download          ECG/EMG Results (most recent)     Procedure Component Value Units Date/Time    ECG 12 Lead [[id number]] Collected: 11/30/20 1322     Updated: 12/01/20 1610     QT Interval 472 ms     Narrative:      HEART RATE= 63  bpm  RR Interval= 958  ms  OR Interval= 121  ms  P Horizontal Axis= 40  deg  P Front Axis= 13  deg  QRSD Interval= 140  ms  QT Interval= 472  ms  QRS Axis= 223  deg  T Wave Axis= 17  deg  - ABNORMAL ECG -  Atrial-sensed ventricular-paced rhythm  When compared with ECG of 30-Sep-2020 9:46:53,  No significant change  Electronically Signed By: Lawrence Mckeon (MAGO) 01-Dec-2020 16:07:22  Date and Time of Study: 2020-11-30 13:22:06    Adult Transthoracic Echo Complete W/ Cont if Necessary Per Protocol [[id number]] Collected:  12/01/20 1600     Updated: 12/01/20 2038     BSA 1.5 m^2      RVIDd 1.8 cm      IVSd 1.3 cm      LVIDd 3.1 cm      LVIDs 1.9 cm      LVPWd 1.3 cm      IVS/LVPW 0.99     FS 39.8 %      EDV(Teich) 38.4 ml      ESV(Teich) 10.8 ml      EF(Teich) 71.8 %      EDV(cubed) 30.3 ml      ESV(cubed) 6.6 ml      EF(cubed) 78.1 %      LV mass(C)d 134.8 grams      LV mass(C)dI 87.5 grams/m^2      SV(Teich) 27.6 ml      SI(Teich) 17.9 ml/m^2      SV(cubed) 23.6 ml      SI(cubed) 15.4 ml/m^2      Ao root diam 2.1 cm      Ao root area 3.6 cm^2      asc Aorta Diam 2.0 cm      LVOT diam 1.8 cm      LVOT area 2.6 cm^2      EDV(MOD-sp4) 40.2 ml      ESV(MOD-sp4) 16.8 ml      EF(MOD-sp4) 58.2 %      SV(MOD-sp4) 23.4 ml      SI(MOD-sp4) 15.2 ml/m^2      Ao root area (BSA corrected) 1.4     LV Malin Vol (BSA corrected) 26.1 ml/m^2      LV Sys Vol (BSA corrected) 10.9 ml/m^2      Aortic R-R 1.0 sec      Aortic HR 59.5 BPM      MV E max deon 66.0 cm/sec      MV A max deon 46.7 cm/sec      MV E/A 1.4     MV V2 max 82.5 cm/sec      MV max PG 2.7 mmHg      MV V2 mean 41.0 cm/sec      MV mean PG 0.81 mmHg      MV V2 VTI 22.8 cm      MVA(VTI) 3.3 cm^2      MV dec slope 301.3 cm/sec^2      MV dec time 0.22 sec      Ao pk deon 185.9 cm/sec      Ao max PG 13.8 mmHg      Ao max PG (full) 7.6 mmHg      Ao V2 mean 134.9 cm/sec      Ao mean PG 8.4 mmHg      Ao mean PG (full) 4.6 mmHg      Ao V2 VTI 37.0 cm      CHARIS(I,A) 2.0 cm^2      CHARIS(I,D) 2.0 cm^2      CHARIS(V,A) 1.7 cm^2      CHARIS(V,D) 1.7 cm^2      LV V1 max PG 6.2 mmHg      LV V1 mean PG 3.8 mmHg      LV V1 max 124.8 cm/sec      LV V1 mean 90.7 cm/sec      LV V1 VTI 29.0 cm      CO(Ao) 8.0 l/min      CI(Ao) 5.2 l/min/m^2      SV(Ao) 133.9 ml      SI(Ao) 86.9 ml/m^2      CO(LVOT) 4.5 l/min      CI(LVOT) 2.9 l/min/m^2      SV(LVOT) 75.5 ml      SI(LVOT) 49.0 ml/m^2      PA V2 max 108.7 cm/sec      PA max PG 4.7 mmHg      PA max PG (full) 2.0 mmHg      PA V2 mean 65.0 cm/sec      PA mean PG 2.1 mmHg      PA  mean PG (full) 1.1 mmHg      PA V2 VTI 17.8 cm      PA acc time 0.08 sec      RV V1 max PG 2.8 mmHg      RV V1 mean PG 1.0 mmHg      RV V1 max 83.3 cm/sec      RV V1 mean 45.5 cm/sec      RV V1 VTI 15.2 cm      TR max deon 307.5 cm/sec      RVSP(TR) 40.8 mmHg      RAP systole 3.0 mmHg      PA pr(Accel) 41.3 mmHg       CV ECHO PARVEZ - BZI_BMI 19.6 kilograms/m^2       CV ECHO PARVEZ - BSA(HAYCOCK) 1.5 m^2       CV ECHO PARVEZ - BZI_METRIC_WEIGHT 51.7 kg       CV ECHO PARVEZ - BZI_METRIC_HEIGHT 162.6 cm      EF(MOD-bp) 58.0 %      LA dimension(2D) 3.2 cm     Narrative:      · Left ventricular ejection fraction appears to be 56 - 60%.  · Left ventricular wall thickness is consistent with moderate concentric   hypertrophy.  · There is a TAVR valve present.  · Moderate tricuspid valve regurgitation is present.  · Left ventricular diastolic function is consistent with (grade I)   impaired relaxation.  · No pericardial effusion noted             Results for orders placed during the hospital encounter of 02/28/20   Duplex Carotid Ultrasound CAR    Narrative · Proximal right internal carotid artery moderate stenosis.  · Proximal left internal carotid artery mild stenosis.          Results for orders placed during the hospital encounter of 11/30/20   Adult Transthoracic Echo Complete W/ Cont if Necessary Per Protocol    Narrative · Left ventricular ejection fraction appears to be 56 - 60%.  · Left ventricular wall thickness is consistent with moderate concentric   hypertrophy.  · There is a TAVR valve present.  · Moderate tricuspid valve regurgitation is present.  · Left ventricular diastolic function is consistent with (grade I)   impaired relaxation.  · No pericardial effusion noted          Xr Shoulder 2+ View Left    Result Date: 11/30/2020  No acute left shoulder findings. The left shoulder prosthesis appears intact. No definite acute periprosthetic fracture is seen. No dislocation.  Electronically Signed By-Tonja  MD Tien On:11/30/2020 2:12 PM This report was finalized on [id number] by  Tonja Chauhan MD.    Ct Head Without Contrast    Result Date: 12/2/2020  1. No acute process is demonstrated. Negative for hemorrhage or mass effect. 2. There is moderately severe white matter abnormality, increased from 2014. This is most often due to chronic small vessel disease. 3. There is an area of encephalomalacia in the right temporal lobe which is slightly larger than on the prior. 4. If acute ischemia is suspected clinically, would recommend consideration of MR.  Electronically Signed By-Theresa Santillan MD On:12/2/2020 8:20 PM This report was finalized on [id number] by  Theresa Santillan MD.    Xr Chest 1 View    Result Date: 11/30/2020  Chronic interstitial fibrotic changes in both lungs. Stable cardiomegaly. No acute chest findings.  Electronically Signed By-Tonja Chauhan MD On:11/30/2020 2:10 PM This report was finalized on [id number] by  Tonja Chauhan MD.    Ct Chest Pulmonary Embolism    Result Date: 12/1/2020   1. No pulmonary embolism. 2. FINDINGS suggestive of mild interstitial and alveolar pulmonary edema superimposed upon emphysema. 3. Stable cardiac megaly with aortic valve replacement and CABG. 4. Calcified pleural plaque. Correlate for history of asbestos exposure. 5. Chronic T4 and L1 vertebral body compression fractures.    Electronically Signed By-Tonja Chauhan MD On:12/1/2020 12:49 PM This report was finalized on [id number] by  Tonja Chauhan MD.          Xrays, labs reviewed personally by physician.    Medication Review:   I have reviewed the patient's current medication list      Scheduled Meds  acebutolol, 200 mg, Oral, BID  apixaban, 2.5 mg, Oral, Q12H  atorvastatin, 20 mg, Oral, Nightly  cefTRIAXone, 1 g, Intravenous, Q24H  sodium chloride, 10 mL, Intravenous, Q12H        Meds Infusions       Meds PRN  acetaminophen  •  melatonin  •  nitroglycerin  •  [COMPLETED] Insert peripheral IV **AND**  sodium chloride  •  sodium chloride        Assessment/Plan   Assessment/Plan     Active Hospital Problems    Diagnosis  POA   • Severe malnutrition (CMS/HCC) [E43]  Yes   • Dyspnea [R06.00]  Yes      Resolved Hospital Problems   No resolved problems to display.       MEDICAL DECISION MAKING COMPLEXITY BY PROBLEM:       85-year-old pleasant female with multiple comorbidities significant for coronary disease, status post CABG (1999), severe aortic stenosis, status post TAVR (5/20) history of SSS, status post PPM, hypertension, PAF, DJD, hospitalist recent left shoulder surgery , nonsustained V. tach, and hyperlipidemia.  Presented to [facility] ED with  Complains of few days history of progressive weakness with dyspnea on exertion and shortness of breath.  She stated that symptoms progressively worsened in the past few days and with difficulty ambulating short distant with dyspnea.  Denies chest pain, no fever, chills no cough, nausea, vomiting, and no reported PND orthopnea.  She denies weight gain and lower extremity swelling.     EKG showed atrial sensed ventricular paced rhythm, and laboratory revealed elevated BNP of 1872,  cardiac biomarkers within normal limit, and Covid-19 screen nonreactive.  Chest x-ray showed chronic interstitial fibrotic changes in both lung with stable cardiomegaly, no acute cardiopulmonary findings.  Most recent 2D echo on 7/2/2020, showed preserved LV function, with estimated EF of 60%.  In ED, she was treated with Lasix with improvement.     Assessment     Hypotension-12/2/2020 unclear etiology possibly secondary to UTI-now resolved  Pressure has improved status post bolus 12/2/2020  Continue to hold lisinopril and Lasix   Status post  vancomycin 12/2/2020  Procalcitonin unremarkable-0.08  WBC 7.5  CRP 0.61  CXR neg for consolidation  Covid  -19  Previously neg  Hold Antihypertensive meds for now  Continue ceftriaxone  Discontinue IV fluids    Acute delirium state  Obtain neurology  consult  Head CT was done unremarkable for acute stroke    Hyponatremia  Continue to hold Lasix  Continue IV fluids and repeat in the morning        Tachybradycardia syndrome and status post pacemaker placement   Doing well cardiologist is following the patient     Elevated D-dimer   PE ruled out  By CT     Dypsea/acute HFpEF resolving  Cardiology CX appreciated  cardiac biomarkers within normal limits, and chest x-ray show chronic fibrotic changes with stable cardiomegaly.  No acute cardiopulmonary findings  Repeat echo  ·  Left ventricular ejection fraction appears to be 56 - 60%.  · Left ventricular wall thickness is consistent with moderate concentric hypertrophy.  · There is a TAVR valve present.  · Moderate tricuspid valve regurgitation is present.  · Left ventricular diastolic function is consistent with (grade I) impaired relaxation.  No pericardial effusion noted        Coronary artery disease, status post CABG (1999)  Cont home meds     PAF  Cont  Eliquis     SSS-S/p PPM  Cardiology cx appreciated       Aortic stenosis    S/p TAVR (5/20)     Essential hypertension  BP low  Continue to hold meds         Hyperlipidemia        -Lipitor     DJD, recent left shoulder surgery  Stable           Physical deconditioning- multifactorial  Cont with PT/OT eval      VTE Prophylaxis -   Mechanical Order History:     None      Pharmalogical Order History:      Ordered     Dose Route Frequency Stop    11/30/20 1723  apixaban (ELIQUIS) tablet 2.5 mg      2.5 mg PO Every 12 Hours Scheduled --                  Code Status -   Code Status and Medical Interventions:   Ordered at: 11/30/20 1723     Level Of Support Discussed With:    Patient     Code Status:    CPR     Medical Interventions (Level of Support Prior to Arrest):    Full       This patient has been examined wearing appropriate Personal Protective Equipment      Discharge Planning  Pending clinical improvement        Electronically signed by Clarisse Velez MD,  12/04/20, 13:06 EST.  [facility] Hospitalist Team

## 2020-12-04 NOTE — PROGRESS NOTES
Nutrition Services    Patient Name: Olga Curtis  YOB: 1935  MRN: [id number]  Admission date: 11/30/2020    PPE Documentation        PPE Worn By Provider N/A, assessed remotely, s/w family via phone   PPE Worn By Patient  N/A     PROGRESS NOTE      Encounter Information: 12/4: S/w Pt's daughter via room telephone due to need for clinician to quarantine. Daughter states pt is doing fairly well with her meals and is taking some of the Boost supplements. Denies any issues at this time. Daughter is encouraging po intake.       PO Diet: Diet Regular   PO Supplements: -Boost TID   PO Intake:  -56% x last 4 meals       Nutrition support orders: -N/A   Nutrition support review: -N/A       Labs (reviewed below): -Hyponatremia, MD is addressing        GI Function:  -last BM 12/1, 3 days if no BM today       Nutrition Intervention: Continue Regular diet with Boost Plus supplement TID.       Results from last 7 days   Lab Units 12/03/20  0849 11/30/20  1319   SODIUM mmol/L 128* 129*   POTASSIUM mmol/L 4.3 4.7   CHLORIDE mmol/L 89* 90*   CO2 mmol/L 31.0* 31.0*   BUN mg/dL 18 11   CREATININE mg/dL 0.96 0.66   CALCIUM mg/dL 8.7 9.5   BILIRUBIN mg/dL 0.4 0.6   ALK PHOS U/L 76 88   ALT (SGPT) U/L 7 7   AST (SGOT) U/L 18 19   GLUCOSE mg/dL 103* 87     Results from last 7 days   Lab Units 12/03/20  0849   HEMOGLOBIN g/dL 11.3*   HEMATOCRIT % 34.4     COVID19   Date Value Ref Range Status   11/30/2020 Not Detected Not Detected - Ref. Range Final     Lab Results   Component Value Date    HGBA1C 5.5 09/30/2020       RD to follow up per protocol.    Electronically signed by:  Qing Moseley RD  12/04/20 13:33 EST

## 2020-12-04 NOTE — DISCHARGE PLACEMENT REQUEST
"Tena Curtis (85 y.o. Female)     Date of Birth Social Security Number Address Home Phone MRN    1935  [id number] [facility] [place] [address] [phone] [id number]    Mormon Marital Status          Other        Admission Date Admission Type Admitting Provider Attending Provider Department, Room/Bed    11/30/20 Emergency Juan José-Dov Cunningham MD [place], Sandra, MD [facility] 2B MEDICAL INPATIENT, 222/1    Discharge Date Discharge Disposition Discharge Destination                       Attending Provider: Clarisse Velez MD    Allergies: Sulfa Antibiotics    Isolation: None   Infection: None   Code Status: CPR    Ht: 162.6 cm (64\")   Wt: 51.3 kg (113 lb 1.5 oz)    Admission Cmt: None   Principal Problem: None                Active Insurance as of 11/30/2020     Primary Coverage     Payor Plan Insurance Group Employer/Plan Group    MEDICARE MEDICARE A & B      Payor Plan Address Payor Plan Phone Number Payor Plan Fax Number Effective Dates    [address] [phone]  4/1/2001 - None Entered    [facility] [id number]       Subscriber Name Subscriber Birth Date Member ID       TENA CURTIS 1935 [id number]           Secondary Coverage     Payor Plan Insurance Group Employer/Plan Group    ANTHEM BLUE CROSS ANTHEM [facility] SUPP [id number]     Payor Plan Address Payor Plan Phone Number Payor Plan Fax Number Effective Dates    [address]   12/1/2016 - None Entered    [facility] [address]       Subscriber Name Subscriber Birth Date Member ID       TENA CURTIS 1935 [id number]                 Emergency Contacts      (Rel.) Home Phone Work Phone Mobile Phone    ADRIANNE CURTIS (Spouse) [phone] -- --    ROGELIO ROSE (Daughter) [phone] -- --            Insurance Information                MEDICARE/MEDICARE A & B Phone: [phone]    Subscriber: Kirsten Tena LELIA Subscriber#: [id number]    Group#:  Precert#:         NEMESIO GONZALEZ/NEMESIO [facility] SUPP Phone:     " Subscriber: Olga Curtis Subscriber#: [id number]    Group#: [id number] Precert#:              History & Physical      Juan José-Dov Cunningham MD at 20 1723                [facility] Medicine Services      Patient Name: Olga Curtis  : 1935  MRN: [id number]  Primary Care Physician: Tigre Duran MD  Date of admission: 2020    Patient Care Team:  Tigre Duran MD as PCP - General  Tigre Duran MD as PCP - Family Medicine  Wyatt Kwok MD as Consulting Physician (Cardiology)          Subjective   History Present Illness   Shortness of breath with generalized weakness  Chief Complaint:   Chief Complaint   Patient presents with   • Shortness of Breath          History of Present Illness   85-year-old pleasant female with multiple comorbidities significant for coronary disease, status post CABG (), severe aortic stenosis, status post TAVR () history of SSS, status post PPM, hypertension, PAF, DJD, hospitalist recent left shoulder surgery , nonsustained V. tach, and hyperlipidemia.  Presented to [facility] ED this afternoon complaining of few days history of progressive weakness with dyspnea on exertion and shortness of breath.  She stated that symptoms progressively worsened in the past few days and with difficulty ambulating short distant with dyspnea.  Denies chest pain, no fever, chills no cough, nausea, vomiting, and no reported PND orthopnea.  She denies weight gain and lower extremity swelling.    EKG showed atrial sensed ventricular paced rhythm, and laboratory revealed elevated BNP of 1872,  cardiac biomarkers within normal limit, and Covid-19 screen nonreactive.  Chest x-ray showed chronic interstitial fibrotic changes in both lung with stable cardiomegaly, no acute cardiopulmonary findings.  Most recent 2D echo on 2020, showed preserved LV function, with estimated EF of 60%.  In ED, she was treated with Lasix with improvement.      Review of Systems      Constitution: Negative.   HENT: Negative.    Eyes: Negative.    Cardiovascular: Positive for dyspnea on exertion.   Respiratory: Positive for shortness of breath.    Endocrine: Negative.    Skin: Negative.    Musculoskeletal: Negative.    Gastrointestinal: Negative.    Genitourinary: Negative.    Neurological: Negative.    Psychiatric/Behavioral: Negative.            Personal History     Past Medical History:   Past Medical History:   Diagnosis Date   • Aortic stenosis 10/28/2014   • Atrial premature complex 12/18/2015   • Chronic coronary artery disease 10/28/2014   • Dyslipidemia 10/28/2014   • Hyperlipidemia    • Hypertension 10/28/2014   • Nausea 09/2020   • Nonsustained ventricular tachycardia (CMS/HCC) 12/18/2015   • Partial paralysis of right hand (CMS/HCC)     states right arm only.  Cannot have sticks in arm, hand only   • Shoulder pain, left        Surgical History:      Past Surgical History:   Procedure Laterality Date   • AORTIC VALVE REPAIR/REPLACEMENT N/A 5/26/2020    Procedure: TTE TRANSFEMORAL TRANSCATHETER AORTIC VALVE REPLACEMENT PERCUTANEOUS APPROACH;  Surgeon: J Carlos Leon MD;  Location: [facility] OR 18/19;  Service: Cardiothoracic;  Laterality: N/A;   • AORTIC VALVE REPAIR/REPLACEMENT N/A 5/26/2020    Procedure: Transfemoral Transcatheter Aortic Valve Replacement w/Intra Op TTE and Possible Open Rescue Surgery;  Surgeon: Vasiliy Bruce MD;  Location: [facility] OR 18/19;  Service: Cardiovascular;  Laterality: N/A;   • APPENDECTOMY     • CARDIAC CATHETERIZATION N/A 3/2/2020    Procedure: Left Heart Cath and coronary angiogram;  Surgeon: Wyatt Kwok MD;  Location: [facility] CATH INVASIVE LOCATION;  Service: Cardiovascular;  Laterality: N/A;   • CARDIAC CATHETERIZATION N/A 3/2/2020    Procedure: Saphenous Vein Graft;  Surgeon: Wyatt Kwok MD;  Location: [facility] CATH INVASIVE LOCATION;  Service: Cardiovascular;  Laterality: N/A;   • CARDIAC PACEMAKER PLACEMENT  2017   •  CARDIAC SURGERY  1999    Bypass surgery   • EYE SURGERY Bilateral     cat ext   • HERNIA REPAIR     • TOTAL SHOULDER ARTHROPLASTY W/ DISTAL CLAVICLE EXCISION Left 10/6/2020    Procedure: TOTAL SHOULDER REVERSE ARTHROPLASTY;  Surgeon: Oli Barcenas MD;  Location: [facility] MAIN OR;  Service: Orthopedics;  Laterality: Left;   • TOTAL SHOULDER REVISION  10/06/2020    left           Family History: family history includes No Known Problems in her brother, father, mother, and sister. Otherwise pertinent FHx was reviewed and unremarkable.     Social History:  reports that she has never smoked. She has never used smokeless tobacco. She reports that she does not drink alcohol or use drugs.      Medications:  Prior to Admission medications    Medication Sig Start Date End Date Taking? Authorizing Provider   acebutolol (SECTRAL) 200 MG capsule TAKE ONE CAPSULE BY MOUTH TWICE A DAY 5/6/20  Yes Wyatt Kwok MD   ELIQUIS 2.5 MG tablet tablet TAKE ONE TABLET BY MOUTH EVERY 12 HOURS 4/13/20  Yes Wyatt Kwok MD   lisinopril (PRINIVIL,ZESTRIL) 20 MG tablet TAKE ONE TABLET BY MOUTH DAILY 10/5/20  Yes Wyatt Kwok MD   pravastatin (PRAVACHOL) 20 MG tablet TAKE ONE TABLET BY MOUTH DAILY 11/23/20  Yes Wyatt Kwok MD   melatonin 5 MG tablet tablet Take 5 mg by mouth At Night As Needed (sleep).    Provider, MD Yeyo       Allergies:    Allergies   Allergen Reactions   • Sulfa Antibiotics Hives       Objective   Objective     Vital Signs  Temp:  [98 °F (36.7 °C)] 98 °F (36.7 °C)  Heart Rate:  [68-70] 70  Resp:  [14-16] 16  BP: (155-158)/(63-78) 156/73  SpO2:  [98 %-99 %] 98 %  on   ;   Device (Oxygen Therapy): room air  Body mass index is 18.88 kg/m².    Physical Exam  Constitutional:       General: She is in acute distress.      Appearance: Normal appearance. She is normal weight. She is not ill-appearing.   HENT:      Head: Normocephalic and atraumatic.      Nose: Nose normal. No congestion or rhinorrhea.       Mouth/Throat:      Mouth: Mucous membranes are moist.   Eyes:      Extraocular Movements: Extraocular movements intact.      Pupils: Pupils are equal, round, and reactive to light.   Neck:      Musculoskeletal: Normal range of motion and neck supple. No neck rigidity.   Cardiovascular:      Rate and Rhythm: Normal rate and regular rhythm.      Pulses: Normal pulses.      Heart sounds: No murmur. No friction rub.   Pulmonary:      Effort: Pulmonary effort is normal. No respiratory distress.      Breath sounds: Normal breath sounds. No wheezing or rales.   Abdominal:      Palpations: Abdomen is soft.   Lymphadenopathy:      Cervical: No cervical adenopathy.   Skin:     General: Skin is warm.   Neurological:      General: No focal deficit present.      Mental Status: She is alert. Mental status is at baseline.   Psychiatric:         Mood and Affect: Mood normal.         Behavior: Behavior normal.         Results Review:  I have personally reviewed most recent  and agree with findings    Results from last 7 days   Lab Units 11/30/20  1319   WBC 10*3/mm3 8.70   HEMOGLOBIN g/dL 11.7*   HEMATOCRIT % 35.4   PLATELETS 10*3/mm3 297   INR  1.02     Results from last 7 days   Lab Units 11/30/20  1319   SODIUM mmol/L 129*   POTASSIUM mmol/L 4.7   CHLORIDE mmol/L 90*   CO2 mmol/L 31.0*   BUN mg/dL 11   CREATININE mg/dL 0.66   GLUCOSE mg/dL 87   CALCIUM mg/dL 9.5   ALT (SGPT) U/L 7   AST (SGOT) U/L 19   TROPONIN T ng/mL <0.010   PROBNP pg/mL 1,872.0*     Estimated Creatinine Clearance: 41.2 mL/min (by C-G formula based on SCr of 0.66 mg/dL).  Brief Urine Lab Results  (Last result in the past 365 days)      Color   Clarity   Blood   Leuk Est   Nitrite   Protein   CREAT   Urine HCG        05/21/20 0909 Yellow Cloudy Trace Large (3+) Negative Negative               Microbiology Results (last 10 days)     Procedure Component Value - Date/Time    COVID-19,[facility] IN-HOUSE,Nasal Swab No Transport Media 3-4 HR TAT - Swab, Nasal Cavity  [[id number]]  (Normal) Collected: 11/30/20 1328    Lab Status: Final result Specimen: Swab from Nasal Cavity Updated: 11/30/20 1403     COVID19 Not Detected    Narrative:      Fact sheet for providers: https://www.fda.gov/media/423155/download     Fact sheet for patients: https://www.fda.gov/media/862427/download          ECG/EMG Results (most recent)     Procedure Component Value Units Date/Time    ECG 12 Lead [[id number]] Collected: 11/30/20 1322     Updated: 11/30/20 1324     QT Interval 472 ms     Narrative:      HEART RATE= 63  bpm  RR Interval= 958  ms  IA Interval= 121  ms  P Horizontal Axis= 40  deg  P Front Axis= 13  deg  QRSD Interval= 140  ms  QT Interval= 472  ms  QRS Axis= 223  deg  T Wave Axis= 17  deg  - ABNORMAL ECG -  Atrial-sensed ventricular-paced rhythm  When compared with ECG of 30-Sep-2020 9:46:53,  No significant change  Electronically Signed By:   Date and Time of Study: 2020-11-30 13:22:06          Results for orders placed during the hospital encounter of 02/28/20   Duplex Carotid Ultrasound CAR    Narrative · Proximal right internal carotid artery moderate stenosis.  · Proximal left internal carotid artery mild stenosis.          Results for orders placed during the hospital encounter of 07/02/20   Adult Transthoracic Echo Complete W/ Cont if Necessary Per Protocol    Narrative · Left ventricular systolic function is normal. Calculated EF = 60.4%.   Estimated EF was in agreement with the calculated EF. Normal left   ventricular cavity size noted. All left ventricular wall segments contract   normally. Left ventricular wall thickness is consistent with   mild-to-moderate concentric hypertrophy. Left ventricular diastolic   dysfunction is noted (grade II w/high LAP) consistent with   pseudonormalization.  · Left atrial cavity size is moderately dilated.  · There is a prosthetic aortic valve. Aortic valve maximum pressure   gradient is 23.6 mmHg. There is a 23 mm TAVR valve present. The aortic     valve peak and mean gradients are within defined limits. The prosthetic   valve is grossly normal. There is a mild anterior paravalvular leak.  · Moderate mitral valve regurgitation is present.  · Moderate tricuspid valve regurgitation is present. Estimated right   ventricular systolic pressure from tricuspid regurgitation is mildly   elevated (35-45 mmHg). Calculated right ventricular systolic pressure from   tricuspid regurgitation is 38.7 mmHg.  · No dilation of the aortic root is present. Mild dilation of the   ascending aorta is present. 3.7cm.          Xr Shoulder 2+ View Left    Result Date: 11/30/2020  No acute left shoulder findings. The left shoulder prosthesis appears intact. No definite acute periprosthetic fracture is seen. No dislocation.  Electronically Signed By-Tonja Chauhan MD On:11/30/2020 2:12 PM This report was finalized on 20201130141240 by  Tonja Chauhan MD.    Xr Chest 1 View    Result Date: 11/30/2020  Chronic interstitial fibrotic changes in both lungs. Stable cardiomegaly. No acute chest findings.  Electronically Signed By-Tonja Chauhan MD On:11/30/2020 2:10 PM This report was finalized on [id number] by  Tonja Chauhan MD.        Estimated Creatinine Clearance: 41.2 mL/min (by C-G formula based on SCr of 0.66 mg/dL).    Assessment/Plan   Assessment/Plan       Active Hospital Problems    Diagnosis  POA   • Dyspnea [R06.00]  Yes      Resolved Hospital Problems   No resolved problems to display.       1.  Dypsea/acute HFpEF        -feels better after  diurese--> Lasix, cont lisinopril          -cardiac biomarkers within normal limits, and chest x-ray show chronic fibrotic changes with stable cardiomegaly.  No acute cardiopulmonary findings         - follow up on D-dimer and 2D echo pending         -evaluated by cardiologist with recommendation noted      2.  Coronary artery disease, status post CABG (1999)         -Lisinopril/Lipitor     3.  PAF        -on Sectral and Eliquis     4.   SSS-S/p PPM     5.  Aortic stenosis        -S/p TAVR (5/20)     6.  Essential hypertension     7.  Hyperlipidemia        -Lipitor     8.  DJD, recent left shoulder surgery     9.  History of nonsustained V. tach                  VTE Prophylaxis -   Mechanical Order History:     None      Pharmalogical Order History:      Ordered     Dose Route Frequency Stop    11/30/20 1723  apixaban (ELIQUIS) tablet 2.5 mg      2.5 mg PO Every 12 Hours --                CODE STATUS:    Code Status and Medical Interventions:   Ordered at: 11/30/20 1723     Level Of Support Discussed With:    Patient     Code Status:    CPR     Medical Interventions (Level of Support Prior to Arrest):    Full       I discussed the patient's findings and my recommendations with patient and she expressed understanding.        Electronically signed by Dov Seth MD, 11/30/20, 5:24 PM EST.  [facility] Hospitalist Team          Electronically signed by Dov Seth MD at 12/01/20 0656       {Outbreak/Travel/Exposure Documentation......;  Question Available Choices Patient Response   Outbreak Screen: Do you currently have a new onset of the following symptoms?        Fever/Chils, Cough, Shortness of air, Loss of taste or smell, No, Unknown  (!) Shortness of Air (11/30/20 1241)   Outbreak Screen: In the last 14 days, have you had contact with anyone who is ill, has show any of the symptoms listed above and/or has been diagnosis with the 2019 Novel Coronavirus? This includes any immediate household members but excludes any patients with whom you have been in contact within your normal work duties wearing proper PPE, if you are a healthcare worker.  Yes, No, Unknown              No (11/30/20 1241)   Outbreak Screen: Who was notified?    Free text  er charge  (11/30/20 1241)   Travel Screen: Have you traveled in the last month? If so, to what country have you traveled? If [place] what state? Yes, No, Unknown  List of all countries  List of  all States No (11/30/20 1240)  (not recorded)  (not recorded)   Infection Risk: Do you currently have the following symptoms?  (If cough is selected, the Tuberculosis Screen is performed.) Cough, Fever, Rash, No No (11/30/20 1240)   Tuberculosis Screen: Do you have any of the following Tuberculosis Risks?  · Have you lived or spent time with anyone who had or may have TB?  · Have you lived in or visited any of the following areas for more than one month: [place], [place], [place], [place] or [place], the [place] or [place]?  · Do you have HIV/AIDS?  · Have you lived in or worked in a nursing home, homeless shelter, correctional facility, or substance abuse treatment facility?   · No    If Yes do you have any of the following symptoms? Yes responses display to the right    If Yes, symptoms listed are:  Cough greater than or equal to 3 weeks, Loss of appetite, Unexplained weight loss, Night sweats, Bloody sputum or hemoptysis, Hoarseness, Fever, Fatigue, Chest pain, No (not recorded)  (not recorded)   Exposure Screen: Have you been exposed to any of these contagious diseases in the last month? Measles, Chickenpox, Meningitis, Pertussis, Whooping Cough, No No (11/30/20 1240)       Current Facility-Administered Medications   Medication Dose Route Frequency Provider Last Rate Last Admin   • acebutolol (SECTRAL) capsule 200 mg  200 mg Oral BID Dov Seth MD   200 mg at 12/04/20 0930   • acetaminophen (TYLENOL) tablet 650 mg  650 mg Oral Q6H PRN Clarisse Velez MD   650 mg at 12/02/20 1054   • apixaban (ELIQUIS) tablet 2.5 mg  2.5 mg Oral Q12H Dov Seth MD   2.5 mg at 12/04/20 0930   • atorvastatin (LIPITOR) tablet 20 mg  20 mg Oral Nightly Dov Seth MD   20 mg at 12/03/20 2056   • cefTRIAXone (ROCEPHIN) 1 g in sodium chloride 0.9 % 100 mL IVPB  1 g Intravenous Q24H Clarisse Velez  mL/hr at 12/03/20 1409 1 g at 12/03/20 1409   • melatonin tablet 5 mg  5 mg Oral  Nightly PRN Dov Seth MD   5 mg at 20   • nitroglycerin (NITROSTAT) SL tablet 0.4 mg  0.4 mg Sublingual Q5 Min PRN Dov Seth MD       • sodium chloride 0.9 % flush 10 mL  10 mL Intravenous PRN Dov Seth MD       • sodium chloride 0.9 % flush 10 mL  10 mL Intravenous Q12H Dov Seth MD   10 mL at 20 0930   • sodium chloride 0.9 % flush 10 mL  10 mL Intravenous PRN Dov Seth MD         Physician Progress Notes (last 24 hours) (Notes from 20 1202 through 20 120)    No notes of this type exist for this encounter.            Physical Therapy Notes (last 48 hours) (Notes from 20 1202 through 20 120)      Dane Velazquez PT at 20  Version 1 of        Goal Outcome Evaluation:  Plan of Care Reviewed With: patient, daughter  Progress: no change  Outcome Summary: Pt is 86 YO F admitted with c/o dypsnea, recent fall at home, and history of TSA in october. Pt reports living wiht spouse, prior to surgery pt was very independent. Since surgery pt has still be able to ambulate in home without AD, with one recent fall which pt attributes to dizziness. Pt this date performs bed mobility with MIN A, comes to standing and ambulates with CGA. Following ambulating 50 feet pt with minor compliants of SOA. O2 sat assessed and was 94%. Pt with no LOB during ambulation and no complaints of dizziness. Recommendation at this time is return home with spouse assistance and HHPT following d/c. PPE worn includes gloves and mask with goggles.    Electronically signed by Dane Velazquez PT at 20     Dane Velazquez PT at 20  Version 1 of 1         Patient Name: Olga Curtis  : 1935    MRN: [id number]                              Today's Date: 2020       Admit Date: 2020    Visit Dx:     ICD-10-CM ICD-9-CM   1. Dyspnea, unspecified type  R06.00 786.09   2. Acute congestive heart failure, unspecified heart  failure type (CMS/HCC)  I50.9 428.0     Patient Active Problem List   Diagnosis   • Atrial premature complex   • Chronic coronary artery disease   • Dyslipidemia   • Essential hypertension   • Nonsustained ventricular tachycardia (CMS/HCC)   • Presence of cardiac pacemaker   • Shortness of breath   • Sick sinus syndrome (CMS/HCC)   • Allergic reaction   • Tachy-martin syndrome (CMS/HCC)   • Dyspnea   • Paroxysmal atrial fibrillation (CMS/HCC)   • Nonrheumatic aortic valve stenosis   • Chronic diastolic congestive heart failure (CMS/HCC)   • S/P TAVR (transcatheter aortic valve replacement)   • Primary localized osteoarthrosis of left shoulder region   • DJD of left shoulder     Past Medical History:   Diagnosis Date   • Aortic stenosis 10/28/2014   • Atrial premature complex 12/18/2015   • Chronic coronary artery disease 10/28/2014   • Dyslipidemia 10/28/2014   • Hyperlipidemia    • Hypertension 10/28/2014   • Nausea 09/2020   • Nonsustained ventricular tachycardia (CMS/HCC) 12/18/2015   • Partial paralysis of right hand (CMS/HCC)     states right arm only.  Cannot have sticks in arm, hand only   • Shoulder pain, left      Past Surgical History:   Procedure Laterality Date   • AORTIC VALVE REPAIR/REPLACEMENT N/A 5/26/2020    Procedure: TTE TRANSFEMORAL TRANSCATHETER AORTIC VALVE REPLACEMENT PERCUTANEOUS APPROACH;  Surgeon: J Carlos Leon MD;  Location: [facility] OR 18/19;  Service: Cardiothoracic;  Laterality: N/A;   • AORTIC VALVE REPAIR/REPLACEMENT N/A 5/26/2020    Procedure: Transfemoral Transcatheter Aortic Valve Replacement w/Intra Op TTE and Possible Open Rescue Surgery;  Surgeon: Vasiliy Bruce MD;  Location: [facility] OR 18/19;  Service: Cardiovascular;  Laterality: N/A;   • APPENDECTOMY     • CARDIAC CATHETERIZATION N/A 3/2/2020    Procedure: Left Heart Cath and coronary angiogram;  Surgeon: Wyatt Kwok MD;  Location: [facility] CATH INVASIVE LOCATION;  Service: Cardiovascular;   Laterality: N/A;   • CARDIAC CATHETERIZATION N/A 3/2/2020    Procedure: Saphenous Vein Graft;  Surgeon: Wyatt Kwok MD;  Location: [facility] CATH INVASIVE LOCATION;  Service: Cardiovascular;  Laterality: N/A;   • CARDIAC PACEMAKER PLACEMENT  2017   • CARDIAC SURGERY  1999    Bypass surgery   • EYE SURGERY Bilateral     cat ext   • HERNIA REPAIR     • TOTAL SHOULDER ARTHROPLASTY W/ DISTAL CLAVICLE EXCISION Left 10/6/2020    Procedure: TOTAL SHOULDER REVERSE ARTHROPLASTY;  Surgeon: Oli Barcenas MD;  Location: [facility] MAIN OR;  Service: Orthopedics;  Laterality: Left;   • TOTAL SHOULDER REVISION  10/06/2020    left     General Information     Row Name 12/02/20 1648          Physical Therapy Time and Intention    Document Type  evaluation  -     Mode of Treatment  physical therapy;co-treatment;occupational therapy  -     Row Name 12/02/20 1648          General Information    Patient Profile Reviewed  yes  -EL     Prior Level of Function  independent:;all household mobility;ADL's;driving Prior to shoulder surgery in Oct  -     Row Name 12/02/20 1648          Living Environment    Lives With  spouse  -     Row Name 12/02/20 1648          Cognition    Orientation Status (Cognition)  oriented x 4  -EL     Row Name 12/02/20 1648          Safety Issues, Functional Mobility    Impairments Affecting Function (Mobility)  balance;shortness of breath;endurance/activity tolerance;strength  -EL       User Key  (r) = Recorded By, (t) = Taken By, (c) = Cosigned By    Initials Name Provider Type    EL Dane Velazquez PT Physical Therapist        Mobility     Row Name 12/02/20 1649          Bed Mobility    Bed Mobility  supine-sit  -EL     Supine-Sit [place] (Bed Mobility)  minimum assist (75% patient effort)  -EL     Assistive Device (Bed Mobility)  bed rails  -EL     Row Name 12/02/20 1649          Bed-Chair Transfer    Bed-Chair [place] (Transfers)  standby assist  -     Row Name 12/02/20 1649           Sit-Stand Transfer    Sit-Stand [place] (Transfers)  contact guard  -EL     Row Name 12/02/20 1649          Gait/Stairs (Locomotion)    [place] Level (Gait)  contact guard  -EL     Distance in Feet (Gait)  50 feet  -EL     Deviations/Abnormal Patterns (Gait)  gait speed decreased  -EL     Bilateral Gait Deviations  forward flexed posture;heel strike decreased  -EL       User Key  (r) = Recorded By, (t) = Taken By, (c) = Cosigned By    Initials Name Provider Type    Dane Torres PT Physical Therapist        Obj/Interventions     Row Name 12/02/20 1649          Range of Motion Comprehensive    General Range of Motion  bilateral lower extremity ROM WFL  -EL     Row Name 12/02/20 1649          Strength Comprehensive (MMT)    General Manual Muscle Testing (MMT) Assessment  lower extremity strength deficits identified  -EL     Comment, General Manual Muscle Testing (MMT) Assessment  BLE 4-/5  -EL     Row Name 12/02/20 1649          Balance    Balance Assessment  sitting static balance;standing static balance;standing dynamic balance  -EL     Static Sitting Balance  WFL  -EL     Static Standing Balance  WFL  -EL     Dynamic Standing Balance  mild impairment  -EL     Row Name 12/02/20 1649          Sensory Assessment (Somatosensory)    Sensory Assessment (Somatosensory)  sensation intact  -EL       User Key  (r) = Recorded By, (t) = Taken By, (c) = Cosigned By    Initials Name Provider Type    Dane Torres, TAVON Physical Therapist        Goals/Plan     Row Name 12/02/20 1654          Bed Mobility Goal 1 (PT)    Activity/Assistive Device (Bed Mobility Goal 1, PT)  bed mobility activities, all  -EL     [place] Level/Cues Needed (Bed Mobility Goal 1, PT)  modified independence  -EL     Time Frame (Bed Mobility Goal 1, PT)  long term goal (LTG);2 weeks  -     Row Name 12/02/20 1654          Transfer Goal 1 (PT)    Activity/Assistive Device (Transfer Goal 1, PT)  transfers, all  -EL     [place] Level/Cues  Needed (Transfer Goal 1, PT)  modified independence  -EL     Time Frame (Transfer Goal 1, PT)  long term goal (LTG);2 weeks  -EL     Row Name 12/02/20 5187          Gait Training Goal 1 (PT)    Activity/Assistive Device (Gait Training Goal 1, PT)  gait (walking locomotion)  -EL     [place] Level (Gait Training Goal 1, PT)  supervision required  -EL     Distance (Gait Training Goal 1, PT)  100  -EL     Time Frame (Gait Training Goal 1, PT)  long term goal (LTG);2 weeks  -EL       User Key  (r) = Recorded By, (t) = Taken By, (c) = Cosigned By    Initials Name Provider Type    Dane Torres, PT Physical Therapist        Clinical Impression     Row Name 12/02/20 1650          Pain    Additional Documentation  Pain Scale: FACES Pre/Post-Treatment (Group)  -EL     Row Name 12/02/20 1650          Pain Scale: FACES Pre/Post-Treatment    Pain: FACES Scale, Pretreatment  0-->no hurt  -EL     Posttreatment Pain Rating  0-->no hurt  -EL     Row Name 12/02/20 7220          Plan of Care Review    Plan of Care Reviewed With  patient;daughter  -EL     Outcome Summary  Pt is 86 YO F admitted with c/o dypsnea, recent fall at home, and history of TSA in october. Pt reports living wiht spouse, prior to surgery pt was very independent. Since surgery pt has still be able to ambulate in home without AD, with one recent fall which pt attributes to dizziness. Pt this date performs bed mobility with MIN A, comes to standing and ambulates with CGA. Following ambulating 50 feet pt with minor compliants of SOA. O2 sat assessed and was 94%. Pt with no LOB during ambulation and no complaints of dizziness. Recommendation at this time is return home with spouse assistance and HHPT following d/c. PPE worn includes gloves and mask with goggles.  -EL     Row Name 12/02/20 5690          Therapy Assessment/Plan (PT)    Rehab Potential (PT)  good, to achieve stated therapy goals  -EL     Criteria for Skilled Interventions Met (PT)  yes  -EL      Predicted Duration of Therapy Intervention (PT)  Until d\c  -EL     Row Name 12/02/20 1650          Vital Signs    O2 Delivery Pre Treatment  room air  -EL     O2 Delivery Intra Treatment  room air  -EL     Post SpO2 (%)  94  -EL     O2 Delivery Post Treatment  room air  -EL     Pre Patient Position  Supine  -EL     Intra Patient Position  Standing  -EL     Post Patient Position  Sitting  -EL     Row Name 12/02/20 1650          Positioning and Restraints    Pre-Treatment Position  in bed  -EL     Post Treatment Position  chair  -EL     In Chair  notified nsg;sitting;call light within reach;encouraged to call for assist  -EL       User Key  (r) = Recorded By, (t) = Taken By, (c) = Cosigned By    Initials Name Provider Type    Dane Torres PT Physical Therapist        Outcome Measures     Row Name 12/02/20 1654          How much help from another person do you currently need...    Turning from your back to your side while in flat bed without using bedrails?  3  -EL     Moving from lying on back to sitting on the side of a flat bed without bedrails?  3  -EL     Moving to and from a bed to a chair (including a wheelchair)?  4  -EL     Standing up from a chair using your arms (e.g., wheelchair, bedside chair)?  3  -EL     Climbing 3-5 steps with a railing?  3  -EL     To walk in hospital room?  3  -EL     AM-PAC 6 Clicks Score (PT)  19  -EL     Row Name 12/02/20 1654          Functional Assessment    Outcome Measure Options  AM-PAC 6 Clicks Basic Mobility (PT)  -EL       User Key  (r) = Recorded By, (t) = Taken By, (c) = Cosigned By    Initials Name Provider Type    Dane Torres PT Physical Therapist        Physical Therapy Education                 Title: PT OT SLP Therapies (Not Started)     Topic: Physical Therapy (In Progress)     Point: Mobility training (Done)     Learning Progress Summary           Patient Acceptance, E,TB, VU by SAI at 12/2/2020 1655                   Point: Home exercise program (Not Started)      Learner Progress:  Not documented in this visit.          Point: Precautions (Done)     Learning Progress Summary           Patient Acceptance, E,TB, VU by  at 12/2/2020 1655                               User Key     Initials Effective Dates Name Provider Type Discipline     06/23/20 -  Dane Velazquez PT Physical Therapist PT              PT Recommendation and Plan  Planned Therapy Interventions (PT): balance training, bed mobility training, neuromuscular re-education, gait training, transfer training, patient/family education, strengthening  Plan of Care Reviewed With: patient, daughter  Outcome Summary: Pt is 84 YO F admitted with c/o dypsnea, recent fall at home, and history of TSA in october. Pt reports living wiht spouse, prior to surgery pt was very independent. Since surgery pt has still be able to ambulate in home without AD, with one recent fall which pt attributes to dizziness. Pt this date performs bed mobility with MIN A, comes to standing and ambulates with CGA. Following ambulating 50 feet pt with minor compliants of SOA. O2 sat assessed and was 94%. Pt with no LOB during ambulation and no complaints of dizziness. Recommendation at this time is return home with spouse assistance and HHPT following d/c. PPE worn includes gloves and mask with goggles.     Time Calculation:   PT Charges     Row Name 12/02/20 1656             Time Calculation    Start Time  1516  -EL      Stop Time  1540  -EL      Time Calculation (min)  24 min  -EL      PT Received On  12/02/20  -EL      PT - Next Appointment  12/04/20  -      PT Goal Re-Cert Due Date  12/16/20  -        User Key  (r) = Recorded By, (t) = Taken By, (c) = Cosigned By    Initials Name Provider Type     Dane Velazquez PT Physical Therapist        Therapy Charges for Today     Code Description Service Date Service Provider Modifiers Qty    55005264400 HC PT EVAL MOD COMPLEXITY 4 12/2/2020 Dane Velazquez PT GP 1          PT G-Codes  Outcome Measure  Options: AM-PAC 6 Clicks Basic Mobility (PT)  AM-PAC 6 Clicks Score (PT): 19    Dane Velazquez, PT  2020      Electronically signed by Dane Velazquez, PT at 20 1657          Occupational Therapy Notes (last 48 hours) (Notes from 20 1202 through 20 1202)      Daya Hill, OT at 20 1336          Patient Name: Olga Curtis  : 1935    MRN: [id number]                              Today's Date: 12/3/2020       Admit Date: 2020    Visit Dx:     ICD-10-CM ICD-9-CM   1. Dyspnea, unspecified type  R06.00 786.09   2. Acute congestive heart failure, unspecified heart failure type (CMS/HCC)  I50.9 428.0     Patient Active Problem List   Diagnosis   • Atrial premature complex   • Chronic coronary artery disease   • Dyslipidemia   • Essential hypertension   • Nonsustained ventricular tachycardia (CMS/HCC)   • Presence of cardiac pacemaker   • Shortness of breath   • Sick sinus syndrome (CMS/HCC)   • Allergic reaction   • Tachy-martin syndrome (CMS/HCC)   • Dyspnea   • Paroxysmal atrial fibrillation (CMS/HCC)   • Nonrheumatic aortic valve stenosis   • Chronic diastolic congestive heart failure (CMS/HCC)   • S/P TAVR (transcatheter aortic valve replacement)   • Primary localized osteoarthrosis of left shoulder region   • DJD of left shoulder     Past Medical History:   Diagnosis Date   • Aortic stenosis 10/28/2014   • Atrial premature complex 2015   • Chronic coronary artery disease 10/28/2014   • Dyslipidemia 10/28/2014   • Hyperlipidemia    • Hypertension 10/28/2014   • Nausea 2020   • Nonsustained ventricular tachycardia (CMS/HCC) 2015   • Partial paralysis of right hand (CMS/HCC)     states right arm only.  Cannot have sticks in arm, hand only   • Shoulder pain, left      Past Surgical History:   Procedure Laterality Date   • AORTIC VALVE REPAIR/REPLACEMENT N/A 2020    Procedure: TTE TRANSFEMORAL TRANSCATHETER AORTIC VALVE REPLACEMENT PERCUTANEOUS APPROACH;   Surgeon: J Carlos Leon MD;  Location: [facility] OR 18/19;  Service: Cardiothoracic;  Laterality: N/A;   • AORTIC VALVE REPAIR/REPLACEMENT N/A 5/26/2020    Procedure: Transfemoral Transcatheter Aortic Valve Replacement w/Intra Op TTE and Possible Open Rescue Surgery;  Surgeon: Vasiliy Bruce MD;  Location: [facility] OR 18/19;  Service: Cardiovascular;  Laterality: N/A;   • APPENDECTOMY     • CARDIAC CATHETERIZATION N/A 3/2/2020    Procedure: Left Heart Cath and coronary angiogram;  Surgeon: Wyatt Kwok MD;  Location: [facility] CATH INVASIVE LOCATION;  Service: Cardiovascular;  Laterality: N/A;   • CARDIAC CATHETERIZATION N/A 3/2/2020    Procedure: Saphenous Vein Graft;  Surgeon: Wyatt Kwok MD;  Location: [facility] CATH INVASIVE LOCATION;  Service: Cardiovascular;  Laterality: N/A;   • CARDIAC PACEMAKER PLACEMENT  2017   • CARDIAC SURGERY  1999    Bypass surgery   • EYE SURGERY Bilateral     cat ext   • HERNIA REPAIR     • TOTAL SHOULDER ARTHROPLASTY W/ DISTAL CLAVICLE EXCISION Left 10/6/2020    Procedure: TOTAL SHOULDER REVERSE ARTHROPLASTY;  Surgeon: Oli Barcenas MD;  Location: [facility] OR;  Service: Orthopedics;  Laterality: Left;   • TOTAL SHOULDER REVISION  10/06/2020    left     General Information     Row Name 12/03/20 1310          OT Time and Intention    Document Type  therapy note (daily note)  -SR     Mode of Treatment  occupational therapy  -SR       User Key  (r) = Recorded By, (t) = Taken By, (c) = Cosigned By    Initials Name Provider Type    SR Daya Hill OT Occupational Therapist        Mobility/ADL's     Row Name 12/03/20 1311          Bed Mobility    Bed Mobility  supine-sit  -SR     Supine-Sit [place] (Bed Mobility)  supervision  -SR     Assistive Device (Bed Mobility)  bed rails  -SR     Row Name 12/03/20 1311          Transfers    Transfers  toilet transfer  -SR     Sit-Stand [place] (Transfers)  contact guard  -SR     [place]  Level (Toilet Transfer)  minimum assist (75% patient effort)  -SR     Assistive Device (Toilet Transfer)  commode low toilet  -SR     Row Name 12/03/20 1311          Functional Mobility    Functional Mobility- Ind. Level  supervision required  -SR     Row Name 12/03/20 1311          Toileting Assessment/Training    [place] Level (Toileting)  minimum assist (75% patient effort)  -SR     Comment (Toileting)  Assist to pull up brief in the back  -SR     Row Name 12/03/20 1311          Lower Body Dressing Assessment/Training    [place] Level (Lower Body Dressing)  don;socks;dependent (less than 25% patient effort)  -SR       User Key  (r) = Recorded By, (t) = Taken By, (c) = Cosigned By    Initials Name Provider Type    Daya Helms OT Occupational Therapist        Obj/Interventions    No documentation.       Goals/Plan    No documentation.       Clinical Impression     Row Name 12/03/20 1325          Pain Scale: FACES Pre/Post-Treatment    Pain: FACES Scale, Pretreatment  0-->no hurt  -SR     Posttreatment Pain Rating  0-->no hurt  -SR     [facility] Name 12/03/20 1325          Plan of Care Review    Outcome Summary  Pt particpiated well in therapy this date.  She demonstrated increased activity tolerence with no SOA noted throughout session.  She requires assist for toileting and lower body dressing due to decreased BUE mobility.  She is near baseline at this time.  She will reuquire IP rehab at discharge.  PPE: mask, shield, gloves.  -     Row Name 12/03/20 1325          Therapy Assessment/Plan (OT)    Therapy Frequency (OT)  3 times/wk  -     Row Name 12/03/20 1325          Therapy Plan Review/Discharge Plan (OT)    Anticipated Discharge Disposition (OT)  home with assist;home with home health  -SR       User Key  (r) = Recorded By, (t) = Taken By, (c) = Cosigned By    Initials Name Provider Type    Daya Helms OT Occupational Therapist        Outcome Measures    No  documentation.       Occupational Therapy Education                 Title: PT OT SLP Therapies (Not Started)     Topic: Occupational Therapy (In Progress)     Point: ADL training (In Progress)     Description:   Instruct learner(s) on proper safety adaptation and remediation techniques during self care or transfers.   Instruct in proper use of assistive devices.              Learning Progress Summary           Patient Acceptance, E,TB, NR by SR at 12/3/2020 1336    Acceptance, E,TB, NR by SR at 12/2/2020 1713                   Point: Home exercise program (Not Started)     Description:   Instruct learner(s) on appropriate technique for monitoring, assisting and/or progressing therapeutic exercises/activities.              Learner Progress:  Not documented in this visit.          Point: Precautions (Not Started)     Description:   Instruct learner(s) on prescribed precautions during self-care and functional transfers.              Learner Progress:  Not documented in this visit.          Point: Body mechanics (In Progress)     Description:   Instruct learner(s) on proper positioning and spine alignment during self-care, functional mobility activities and/or exercises.              Learning Progress Summary           Patient Acceptance, E,TB, NR by SR at 12/3/2020 1336    Acceptance, E,TB, NR by SR at 12/2/2020 1713                               User Key     Initials Effective Dates Name Provider Type Discipline     03/01/19 -  Daya Hill, OT Occupational Therapist OT              OT Recommendation and Plan  Planned Therapy Interventions (OT): activity tolerance training, functional balance retraining, occupation/activity based interventions, ROM/therapeutic exercise, transfer/mobility retraining  Therapy Frequency (OT): 3 times/wk  Plan of Care Review  Outcome Summary: Pt particpiated well in therapy this date.  She demonstrated increased activity tolerence with no SOA noted throughout session.  She  requires assist for toileting and lower body dressing due to decreased BUE mobility.  She is near baseline at this time.  She will reuquire IP rehab at discharge.  PPE: mask, shield, gloves.     Time Calculation:   Time Calculation- OT     Row Name 20 1336             Time Calculation- OT    OT Start Time  1046  -SR      OT Stop Time  1103  -SR      OT Time Calculation (min)  17 min  -SR      Total Timed Code Minutes- OT  17 minute(s)  -SR      OT Received On  20  -SR      OT - Next Appointment  20  -SR        User Key  (r) = Recorded By, (t) = Taken By, (c) = Cosigned By    Initials Name Provider Type    SR Daya Hill OT Occupational Therapist        Therapy Charges for Today     Code Description Service Date Service Provider Modifiers Qty    [id number]  OT EVAL LOW COMPLEXITY 4 2020 Daya Hill OT GO 1    [id number]  OT SELF CARE/MGMT/TRAIN EA 15 MIN 12/3/2020 Daya Hill OT GO 1               Daya Hill OT  12/3/2020    Electronically signed by Daya Hill OT at 20 1336     Daya Hill OT at 20 1336        Goal Outcome Evaluation:  Plan of Care Reviewed With: patient, daughter  Progress: no change  Outcome Summary: Pt particpiated well in therapy this date.  She demonstrated increased activity tolerence with no SOA noted throughout session.  She requires assist for toileting and lower body dressing due to decreased BUE mobility.  She is near baseline at this time.  She will reuquire IP rehab at discharge.  PPE: mask, shield, gloves.    Electronically signed by Daya Hill OT at 20 1336     Dyaa Hill OT at 20 1714          Patient Name: Olga Curtis  : 1935    MRN: [id number]                              Today's Date: 2020       Admit Date: 2020    Visit Dx:     ICD-10-CM ICD-9-CM   1. Dyspnea, unspecified type  R06.00 786.09   2. Acute  congestive heart failure, unspecified heart failure type (CMS/[facility])  I50.9 428.0     Patient Active Problem List   Diagnosis   • Atrial premature complex   • Chronic coronary artery disease   • Dyslipidemia   • Essential hypertension   • Nonsustained ventricular tachycardia (CMS/HCC)   • Presence of cardiac pacemaker   • Shortness of breath   • Sick sinus syndrome (CMS/HCC)   • Allergic reaction   • Tachy-martin syndrome (CMS/HCC)   • Dyspnea   • Paroxysmal atrial fibrillation (CMS/HCC)   • Nonrheumatic aortic valve stenosis   • Chronic diastolic congestive heart failure (CMS/HCC)   • S/P TAVR (transcatheter aortic valve replacement)   • Primary localized osteoarthrosis of left shoulder region   • DJD of left shoulder     Past Medical History:   Diagnosis Date   • Aortic stenosis 10/28/2014   • Atrial premature complex 12/18/2015   • Chronic coronary artery disease 10/28/2014   • Dyslipidemia 10/28/2014   • Hyperlipidemia    • Hypertension 10/28/2014   • Nausea 09/2020   • Nonsustained ventricular tachycardia (CMS/HCC) 12/18/2015   • Partial paralysis of right hand (CMS/HCC)     states right arm only.  Cannot have sticks in arm, hand only   • Shoulder pain, left      Past Surgical History:   Procedure Laterality Date   • AORTIC VALVE REPAIR/REPLACEMENT N/A 5/26/2020    Procedure: TTE TRANSFEMORAL TRANSCATHETER AORTIC VALVE REPLACEMENT PERCUTANEOUS APPROACH;  Surgeon: J Carlos Leon MD;  Location: [facility] OR 18/19;  Service: Cardiothoracic;  Laterality: N/A;   • AORTIC VALVE REPAIR/REPLACEMENT N/A 5/26/2020    Procedure: Transfemoral Transcatheter Aortic Valve Replacement w/Intra Op TTE and Possible Open Rescue Surgery;  Surgeon: Vasiliy Bruce MD;  Location: [facility] OR 18/19;  Service: Cardiovascular;  Laterality: N/A;   • APPENDECTOMY     • CARDIAC CATHETERIZATION N/A 3/2/2020    Procedure: Left Heart Cath and coronary angiogram;  Surgeon: Wyatt Kwok MD;  Location: [facility] INVASIVE  LOCATION;  Service: Cardiovascular;  Laterality: N/A;   • CARDIAC CATHETERIZATION N/A 3/2/2020    Procedure: Saphenous Vein Graft;  Surgeon: Wyatt Kwok MD;  Location: [facility] CATH INVASIVE LOCATION;  Service: Cardiovascular;  Laterality: N/A;   • CARDIAC PACEMAKER PLACEMENT  2017   • CARDIAC SURGERY  1999    Bypass surgery   • EYE SURGERY Bilateral     cat ext   • HERNIA REPAIR     • TOTAL SHOULDER ARTHROPLASTY W/ DISTAL CLAVICLE EXCISION Left 10/6/2020    Procedure: TOTAL SHOULDER REVERSE ARTHROPLASTY;  Surgeon: Oli Barcenas MD;  Location: [facility] MAIN OR;  Service: Orthopedics;  Laterality: Left;   • TOTAL SHOULDER REVISION  10/06/2020    left     General Information     Row Name 12/02/20 1704          OT Time and Intention    Document Type  evaluation  -SR     Mode of Treatment  physical therapy;co-treatment;occupational therapy  -SR     Row Name 12/02/20 1704          General Information    Patient Profile Reviewed  yes  -SR     Row Name 12/02/20 1704          Occupational Profile    Reason for Services/Referral (Occupational Profile)  Pt is 84 YO F admitted with c/o dypsnea, recent fall at home, and history of TSA in october.  -SR     Successful Occupations (Occupational Profile)  She was completely independent prior to TSA though has required some assist for lower body ADLs since surgery.  She walks without AD.  -SR     Row Name 12/02/20 1704          Living Environment    Lives With  spouse  -SR     Row Name 12/02/20 1704          Cognition    Orientation Status (Cognition)  oriented x 4  -SR     Row Name 12/02/20 1704          Safety Issues, Functional Mobility    Impairments Affecting Function (Mobility)  balance;shortness of breath;endurance/activity tolerance;strength  -SR       User Key  (r) = Recorded By, (t) = Taken By, (c) = Cosigned By    Initials Name Provider Type    SR Daya Hill OT Occupational Therapist        Mobility/ADL's     Row Name 12/02/20 1705          Bed  Mobility    Supine-Sit [place] (Bed Mobility)  minimum assist (75% patient effort)  -SR     Assistive Device (Bed Mobility)  bed rails  -SR     Row Name 12/02/20 1705          Transfers    Sit-Stand [place] (Transfers)  contact guard  -     Row Name 12/02/20 1705          Activities of Daily Living    BADL Assessment/Intervention  toileting;lower body dressing  -     Row Name 12/02/20 1705          Toileting Assessment/Training    Comment (Toileting)  Pt simulated toilet hygiene and clothing management with supervision.  -     Row Name 12/02/20 1705          Lower Body Dressing Assessment/Training    [place] Level (Lower Body Dressing)  don;socks;dependent (less than 25% patient effort)  -SR     Comment (Lower Body Dressing)  Reports  assists with this at home  -SR       User Key  (r) = Recorded By, (t) = Taken By, (c) = Cosigned By    Initials Name Provider Type    SR Daya Hill, OT Occupational Therapist        Obj/Interventions     Row Name 12/02/20 1706          Range of Motion Comprehensive    Comment, General Range of Motion  R shoulder and elbow slightly limited.  Reports it has been since birth.  L shoulder flexion to about 90 degrees.  Good distal UE ROM.  -     Row Name 12/02/20 1706          Strength Comprehensive (MMT)    Comment, General Manual Muscle Testing (MMT) Assessment  RUE 4-/5.  Distal LUE 4-/5.  Did not test shoulder due to recent surgery.  -     Row Name 12/02/20 1706          Balance    Static Sitting Balance  WFL  -SR     Static Standing Balance  WFL  -SR     Dynamic Standing Balance  mild impairment  -     Row Name 12/02/20 1706          Therapeutic Exercise    Therapeutic Exercise  -- Pt demonstrated exercises that she has been doing at home for shoulder.  Able to complete seated exercises independently.  Supervision provided for pendulums.  -SR       User Key  (r) = Recorded By, (t) = Taken By, (c) = Cosigned By    Initials Name Provider Type     SR Daya Hill OT Occupational Therapist        Goals/Plan     Row Name 12/02/20 1711          Bathing Goal 1 (OT)    Activity/Device (Bathing Goal 1, OT)  bathing skills, all  -SR     [place] Level/Cues Needed (Bathing Goal 1, OT)  supervision required  -SR     Time Frame (Bathing Goal 1, OT)  2 weeks  -SR     Row Name 12/02/20 1711          Dressing Goal 1 (OT)    Activity/Device (Dressing Goal 1, OT)  lower body dressing  -SR     [place]/Cues Needed (Dressing Goal 1, OT)  moderate assist (50-74% patient effort)  -SR     Time Frame (Dressing Goal 1, OT)  2 weeks  -SR     Row Name 12/02/20 1711          Therapy Assessment/Plan (OT)    Planned Therapy Interventions (OT)  activity tolerance training;functional balance retraining;occupation/activity based interventions;ROM/therapeutic exercise;transfer/mobility retraining  -SR       User Key  (r) = Recorded By, (t) = Taken By, (c) = Cosigned By    Initials Name Provider Type    SR Daya Hill, OT Occupational Therapist        Clinical Impression     Row Name 12/02/20 170          Pain Scale: FACES Pre/Post-Treatment    Pain: FACES Scale, Pretreatment  0-->no hurt  -SR     Posttreatment Pain Rating  0-->no hurt  -SR     Row Name 12/02/20 1709          Plan of Care Review    Outcome Summary  Pt is 84 YO F admitted with c/o dypsnea, recent fall at home, and history of TSA in October.  She lives with  and he assists with some ADLs such as lower body dressing due to decreased functional reach.  She appears to be mobilizing near baseline (s/p shoulder surgery) though demos decreased activity tolerence.  O2 stable after mobility.  She demonstrates only mild balance impairments with no LOB throughout evaluation.  Anticipate she is safe to return home with assist from family and HH PT/OT.  PPE: mask, shield, gloves.  -SR     Row Name 12/02/20 6536          Therapy Assessment/Plan (OT)    Rehab Potential (OT)  good, to achieve stated  therapy goals  -SR     Criteria for Skilled Therapeutic Interventions Met (OT)  yes  -SR     Therapy Frequency (OT)  5 times/wk  -SR     Predicted Duration of Therapy Intervention (OT)  Until discharge  -SR     Row Name 12/02/20 1708          Therapy Plan Review/Discharge Plan (OT)    Anticipated Discharge Disposition (OT)  home with assist;inpatient rehabilitation facility  -SR     Row Name 12/02/20 1708          Positioning and Restraints    Pre-Treatment Position  in bed  -SR     Post Treatment Position  chair  -SR     In Chair  call light within reach;encouraged to call for assist;exit alarm on  -SR       User Key  (r) = Recorded By, (t) = Taken By, (c) = Cosigned By    Initials Name Provider Type    SR Daya Hill OT Occupational Therapist        Outcome Measures     Row Name 12/02/20 1712          How much help from another is currently needed...    Putting on and taking off regular lower body clothing?  2  -SR     Bathing (including washing, rinsing, and drying)  2  -SR     Toileting (which includes using toilet bed pan or urinal)  3  -SR     Putting on and taking off regular upper body clothing  3  -SR     Taking care of personal grooming (such as brushing teeth)  3  -SR     Eating meals  4  -SR     AM-PAC 6 Clicks Score (OT)  17  -SR     Row Name 12/02/20 1712          Functional Assessment    Outcome Measure Options  AM-PAC 6 Clicks Daily Activity (OT)  -SR       User Key  (r) = Recorded By, (t) = Taken By, (c) = Cosigned By    Initials Name Provider Type    SR Daya Hill OT Occupational Therapist        Occupational Therapy Education                 Title: PT OT SLP Therapies (Not Started)     Topic: Occupational Therapy (In Progress)     Point: ADL training (In Progress)     Description:   Instruct learner(s) on proper safety adaptation and remediation techniques during self care or transfers.   Instruct in proper use of assistive devices.              Learning Progress Summary             Patient Acceptance, E,TB, NR by SR at 12/2/2020 1713                   Point: Home exercise program (Not Started)     Description:   Instruct learner(s) on appropriate technique for monitoring, assisting and/or progressing therapeutic exercises/activities.              Learner Progress:  Not documented in this visit.          Point: Precautions (Not Started)     Description:   Instruct learner(s) on prescribed precautions during self-care and functional transfers.              Learner Progress:  Not documented in this visit.          Point: Body mechanics (In Progress)     Description:   Instruct learner(s) on proper positioning and spine alignment during self-care, functional mobility activities and/or exercises.              Learning Progress Summary           Patient Acceptance, E,TB, NR by SR at 12/2/2020 1713                               User Key     Initials Effective Dates Name Provider Type Discipline     03/01/19 -  Daya Hill OT Occupational Therapist OT              OT Recommendation and Plan  Planned Therapy Interventions (OT): activity tolerance training, functional balance retraining, occupation/activity based interventions, ROM/therapeutic exercise, transfer/mobility retraining  Therapy Frequency (OT): 5 times/wk  Plan of Care Review  Outcome Summary: Pt is 86 YO F admitted with c/o dypsnea, recent fall at home, and history of TSA in October.  She lives with  and he assists with some ADLs such as lower body dressing due to decreased functional reach.  She appears to be mobilizing near baseline (s/p shoulder surgery) though demos decreased activity tolerence.  O2 stable after mobility.  She demonstrates only mild balance impairments with no LOB throughout evaluation.  Anticipate she is safe to return home with assist from family and HH PT/OT.  PPE: mask, shield, gloves.     Time Calculation:   Time Calculation- OT     Row Name 12/02/20 1713             Time Calculation-  OT    OT Start Time  1520  -SR      OT Stop Time  1545  -SR      OT Time Calculation (min)  25 min  -SR      Total Timed Code Minutes- OT  0 minute(s)  -SR      OT Non-Billable Time (min)  25 min  -SR      OT Received On  12/02/20  -SR      OT - Next Appointment  12/03/20  -SR      OT Goal Re-Cert Due Date  12/16/20  -SR        User Key  (r) = Recorded By, (t) = Taken By, (c) = Cosigned By    Initials Name Provider Type    SR Daya Hill OT Occupational Therapist        Therapy Charges for Today     Code Description Service Date Service Provider Modifiers Qty    [id number] HC OT EVAL LOW COMPLEXITY 4 12/2/2020 Daya Hill OT GO 1               Daya Hill OT  12/2/2020    Electronically signed by Daya Hill OT at 12/02/20 1714     Daya Hill OT at 12/02/20 1713        Goal Outcome Evaluation:  Plan of Care Reviewed With: patient, daughter  Progress: no change  Outcome Summary: Pt is 84 YO F admitted with c/o dypsnea, recent fall at home, and history of TSA in October.  She lives with  and he assists with some ADLs such as lower body dressing due to decreased functional reach.  She appears to be mobilizing near baseline (s/p shoulder surgery) though demos decreased activity tolerence.  O2 stable after mobility.  She demonstrates only mild balance impairments with no LOB throughout evaluation.  Anticipate she is safe to return home with assist from family and HH PT/OT.  PPE: mask, shield, gloves.    Electronically signed by Daya Hill OT at 12/02/20 1761

## 2020-12-04 NOTE — PLAN OF CARE
Goal Outcome Evaluation:  Plan of Care Reviewed With: patient  Progress: no change  Outcome Summary: Patient out of bed several times throughout the night and did not sleep.  Patient appears to be hallucinating; seeing and talking to people who are not present and stating she saw a fish with hands and gloves.  Patient continues to believe she needs to go somewhere else or needs to accomplish a task; patient reoriented several times throughout the night with some frustration that no one else sees what she sees.  Patient othersies is receiving IV fluid therapy and tolerating well.  Patient is on room air.  Will continue to monitor.

## 2020-12-04 NOTE — PLAN OF CARE
Problem: Adult Inpatient Plan of Care  Goal: Plan of Care Review  12/4/2020 1617 by Marilu Monahan PTA  Flowsheets  Taken 12/4/2020 1617  Plan of Care Reviewed With:   patient   daughter  Taken 12/4/2020 1604  Progress: no change  Outcome Summary: Required cga/min A for safe, functional mobility. Presents c confusion and requiring encouragement for oob activities. Cga to toilet, requiring assist c toileting hygiene for standing stability. Low activity tolerance, limited away from midline c transitional moves. Amb. 30' x 2 c HHA and utilized gait belt. FF posture c decreased step length, weakness/instability present. Will progress as able, will need rehab at d/c to address deficits. PPE worn: Mask, gloves, shield.  12/4/2020 1615 by Marilu Monahan PTA  Outcome: Ongoing, Progressing  12/4/2020 1615 by Marilu Monahan PTA  Outcome: Ongoing, Progressing  Flowsheets  Taken 12/4/2020 1604 by Marilu Monahan PTA  Progress: no change  Plan of Care Reviewed With: patient  Outcome Summary: Required cga/min A for safe, functional mobility. Presents c confusion and requiring encouragement for oob activities. Cga to toilet, requiring assist c toileting hygiene for standing stability. Low activity tolerance, limited away from midline c transitional moves. Amb. 30' x 2 c HHA and utilized gait belt. FF posture c decreased step length, weakness/instability present. Will progress as able, will need rehab at d/c to address deficits. PPE worn: Mask, gloves, shield.  Taken 12/4/2020 0619 by Suzanne Turpin LPN  Outcome Summary:   Patient out of bed several times throughout the night and did not sleep.  Patient appears to be hallucinating   seeing and talking to people who are not present and stating she saw a fish with hands and gloves.  Patient continues to believe she needs to go somewhere else or needs to accomplish a task   patient reoriented several times throughout the night with some frustration that no one else sees what she  sees.  Patient othersies is receiving IV fluid therapy and tolerating well.  Patient is on room air.  Will continue to monitor.   Goal Outcome Evaluation:  Plan of Care Reviewed With: patient  Progress: no change

## 2020-12-04 NOTE — CONSULTS
Chief Complaint:   Evaluate for confusion and hallucination.     HPI:   The patient is a 85 year old lady with multiple medical problems including aortic stenosis, atrial fibrillation, CAD, s/p CABG HLD, HTN, history of non sustained V-Tach, history of SSS, pacemaker in situ who was brought to ER of [facility] secondary to increase in shortness of air for about 4-5 days.  She has been having fluctuating mental status.  She was seeing objects and people who were not present in the room.  On occasions she was aware of this fact.  She also has had labile hypertension.  The patient had a CT Scan of Head done which was unremarkable.  Yesterday, even this morning her mental status was worse.  A significant improvement was noted during my exam by nursing staff as well as her daughter.  The patient denies any double vision, speech and swallowing problems and focal weakness.      ROS: Constitutional: no Headaches, SOA+, CP +, no b/b problems, no speech and swallowing problems, no skin rash.      PMH:  Past Medical History:   Diagnosis Date   • Aortic stenosis 10/28/2014   • Atrial premature complex 12/18/2015   • Chronic coronary artery disease 10/28/2014   • Dyslipidemia 10/28/2014   • Hyperlipidemia    • Hypertension 10/28/2014   • Nausea 09/2020   • Nonsustained ventricular tachycardia (CMS/HCC) 12/18/2015   • Partial paralysis of right hand (CMS/HCC)     states right arm only.  Cannot have sticks in arm, hand only   • Shoulder pain, left      Social History     Socioeconomic History   • Marital status:      Spouse name: Not on file   • Number of children: Not on file   • Years of education: Not on file   • Highest education level: Not on file   Tobacco Use   • Smoking status: Never Smoker   • Smokeless tobacco: Never Used   • Tobacco comment: CAFFEINE USE: 1 CUP COFFEE DAILY   Substance and Sexual Activity   • Alcohol use: No     Frequency: Never   • Drug use: No   • Sexual activity: Defer     Past Surgical History:    Procedure Laterality Date   • AORTIC VALVE REPAIR/REPLACEMENT N/A 5/26/2020    Procedure: TTE TRANSFEMORAL TRANSCATHETER AORTIC VALVE REPLACEMENT PERCUTANEOUS APPROACH;  Surgeon: J Carlos Leon MD;  Location: [facility] OR 18/19;  Service: Cardiothoracic;  Laterality: N/A;   • AORTIC VALVE REPAIR/REPLACEMENT N/A 5/26/2020    Procedure: Transfemoral Transcatheter Aortic Valve Replacement w/Intra Op TTE and Possible Open Rescue Surgery;  Surgeon: Vasiliy Bruce MD;  Location: [facility] OR 18/19;  Service: Cardiovascular;  Laterality: N/A;   • APPENDECTOMY     • CARDIAC CATHETERIZATION N/A 3/2/2020    Procedure: Left Heart Cath and coronary angiogram;  Surgeon: Wyatt Kwok MD;  Location: [facility] CATH INVASIVE LOCATION;  Service: Cardiovascular;  Laterality: N/A;   • CARDIAC CATHETERIZATION N/A 3/2/2020    Procedure: Saphenous Vein Graft;  Surgeon: Wyatt Kwok MD;  Location: [facility] CATH INVASIVE LOCATION;  Service: Cardiovascular;  Laterality: N/A;   • CARDIAC PACEMAKER PLACEMENT  2017   • CARDIAC SURGERY  1999    Bypass surgery   • EYE SURGERY Bilateral     cat ext   • HERNIA REPAIR     • TOTAL SHOULDER ARTHROPLASTY W/ DISTAL CLAVICLE EXCISION Left 10/6/2020    Procedure: TOTAL SHOULDER REVERSE ARTHROPLASTY;  Surgeon: Oli Barcenas MD;  Location: [facility];  Service: Orthopedics;  Laterality: Left;   • TOTAL SHOULDER REVISION  10/06/2020    left     Family History   Problem Relation Age of Onset   • No Known Problems Mother    • No Known Problems Father    • No Known Problems Sister    • No Known Problems Brother    • Malig Hyperthermia Neg Hx        Labs:  Lab Results (last 24 hours)     ** No results found for the last 24 hours. **            Medications:  Schedule Meds  acebutolol, 200 mg, Oral, BID  apixaban, 2.5 mg, Oral, Q12H  atorvastatin, 20 mg, Oral, Nightly  cefTRIAXone, 1 g, Intravenous, Q24H  sodium chloride, 10 mL, Intravenous, Q12H          MED'S  PRN  acetaminophen  •  melatonin  •  nitroglycerin  •  [COMPLETED] Insert peripheral IV **AND** sodium chloride  •  sodium chloride    Vitals:  Vitals:    12/04/20 0245   BP: 157/73   Pulse: 83   Resp: 18   Temp: 97.9 °F (36.6 °C)   SpO2:          Physical Exam:  The patient is lying in bed in no apparent distress. Head NC, neck supple. Abdomen soft.  Ext no edema.   Neurologic Exam:  The patient is awake, alert, oriented to name, month, year, day, date, hospital, name common objects, follow commands.  Interact with her daughter.  Speech is good.  CN VFFC, EOMI, no facial droop. Motor 5/5.  Reflexes +, plantar mute.  Cerebellum coordinated movements noted.  Gait is deferred secondary to patient's condition.     Impression:  The patient is a 85 year old lady with multiple medical problems including CAD, S/P CABG, pace maker in situ,  HTN, HLD, atrial fibrillaiton, history of non sustained v-tach who has been having fluctuating mental status and hallucination.  The clinical features are suggestive of toxic/metabolic encephalopathy.  I also suspect underlying dementia.      Recomendations:  Improvement of metabolic status will likely improve her condition.  Will obtain MRI of Brain without contrast.  Will also do blood work up.  Will follow.

## 2020-12-05 NOTE — DISCHARGE SUMMARY
[facility] Medicine Services  DISCHARGE SUMMARY        Prepared For PCP:  Tigre Duran MD    Patient Name: Olga Curtis  : 1935  MRN: [id number]      Date of Admission:   2020    Date of Discharge:  2020    Length of stay:  LOS: 4 days     Hospital Course     Presenting Problem:   Dyspnea, unspecified type [R06.00]  Acute congestive heart failure, unspecified heart failure type (CMS/HCC) [I50.9]  Dyspnea, unspecified type [R06.00]  Dyspnea, unspecified type [R06.00]      Active Hospital Problems    Diagnosis  POA   • Severe malnutrition (CMS/HCC) [E43]  Yes   • Dyspnea [R06.00]  Yes      Resolved Hospital Problems   No resolved problems to display.           Hospital Course:  Olga Curtis is a 85 y.o. female  with multiple comorbidities significant for coronary disease, status post CABG (), severe aortic stenosis, status post TAVR () history of SSS, status post PPM, hypertension, PAF, DJD, hospitalist recent left shoulder surgery , nonsustained V. tach, and hyperlipidemia.  Presented to [facility] ED with  Complains of few days history of progressive weakness with dyspnea on exertion and shortness of breath.  She stated that symptoms progressively worsened in the past few days and with difficulty ambulating short distant with dyspnea.  Denies chest pain, no fever, chills no cough, nausea, vomiting, and no reported PND orthopnea.  She denies weight gain and lower extremity swelling.     EKG showed atrial sensed ventricular paced rhythm, and laboratory revealed elevated BNP of 1872,  cardiac biomarkers within normal limit, and Covid-19 screen nonreactive.  Chest x-ray showed chronic interstitial fibrotic changes in both lung with stable cardiomegaly, no acute cardiopulmonary findings.  Most recent 2D echo on 2020, showed preserved LV function, with estimated EF of 60%.  In ED, she was treated with Lasix with improvement.       She was   Admitted  To  The  Hospital   And  Was  Followed  By cardiologist .    She   Did   Experience Hypotension-12/2/2020 unclear etiology possibly secondary to UTI  Blood Pressure improved after lisinopril and Lasix was placed on hold  She was given 1 dose of vancomycin and ceftriaxone for UTI   Urine  culture was negative  Procalcitonin unremarkable-0.08  WBC 7.5  CRP 0.61  CXR neg for consolidation  Covid  -19  Previously neg    The patient demonstrated confusion with concerns for dementia in the hospital.  Neurology was consulted and recommendations followed.  Plan was to obtain an MRI however the patient was not very cooperative.    She did appear weak and feeble for which PT was consulted and recommended skilled facility.    BNP was elevated on admission but echo shows normal EF  Cardiology followed the patient and recommendations followed  Tachybradycardia syndrome and status post pacemaker placement   Elevated D-dimer   PE ruled out  By CT  Dypsea/acute HFpEF  Cardiology CX appreciated  cardiac biomarkers within normal limits, and chest x-ray show chronic fibrotic changes with stable cardiomegaly.  No acute cardiopulmonary findings  Repeat echo  ·  Left ventricular ejection fraction appears to be 56 - 60%.  · Left ventricular wall thickness is consistent with moderate concentric hypertrophy.  · There is a TAVR valve present.  · Moderate tricuspid valve regurgitation is present.  · Left ventricular diastolic function is consistent with (grade I) impaired relaxation.  No pericardial effusion noted        Coronary artery disease, status post CABG (1999)  Cont home meds     PAF  Cont  Eliquis     SSS-S/p PPM  Cardiology cx appreciated       Aortic stenosis    S/p TAVR (5/20)     Essential hypertension  BP low  Continue to hold meds           DJD, recent left shoulder surgery  Stable       Patient discharged on cefdinir for UTI    She will go to rehab today    This is a brief discharge summary please refer to daily progress notes for more detailed  hospital course    Her daughter was updated on discharge planning and agreed    Day of Discharge     HPI:   85-year-old pleasant female with multiple comorbidities significant for coronary disease, status post CABG (1999), severe aortic stenosis, status post TAVR (5/20) history of SSS, status post PPM, hypertension, PAF, DJD, hospitalist recent left shoulder surgery , nonsustained V. tach, and hyperlipidemia.  Presented to [facility] ED with  Complains of few days history of progressive weakness with dyspnea on exertion and shortness of breath.  She stated that symptoms progressively worsened in the past few days and with difficulty ambulating short distant with dyspnea.  Denies chest pain, no fever, chills no cough, nausea, vomiting, and no reported PND orthopnea.  She denies weight gain and lower extremity swelling.     EKG showed atrial sensed ventricular paced rhythm, and laboratory revealed elevated BNP of 1872,  cardiac biomarkers within normal limit, and Covid-19 screen nonreactive.  Chest x-ray showed chronic interstitial fibrotic changes in both lung with stable cardiomegaly, no acute cardiopulmonary findings.  Most recent 2D echo on 7/2/2020, showed preserved LV function, with estimated EF of 60%.  In ED, she was treated with Lasix with improvement.               Vital Signs:   Temp:  [97.4 °F (36.3 °C)-98.5 °F (36.9 °C)] 97.4 °F (36.3 °C)  Heart Rate:  [61-73] 73  Resp:  [16-20] 16  BP: (107-118)/(59-65) 107/59     Physical Exam:  Physical Exam  Vitals signs reviewed.   Constitutional:       Appearance: Normal appearance.   HENT:      Head: Normocephalic and atraumatic.      Mouth/Throat:      Mouth: Mucous membranes are moist.   Eyes:      Pupils: Pupils are equal, round, and reactive to light.   Neck:      Musculoskeletal: Neck supple.   Cardiovascular:      Rate and Rhythm: Normal rate and regular rhythm.      Pulses: Normal pulses.      Heart sounds: Normal heart sounds.   Pulmonary:      Effort: Pulmonary  effort is normal.      Breath sounds: Normal breath sounds.   Abdominal:      General: Abdomen is flat. Bowel sounds are normal.      Palpations: Abdomen is soft.   Skin:     General: Skin is warm and dry.      Capillary Refill: Capillary refill takes less than 2 seconds.   Neurological:      General: No focal deficit present.      Mental Status: She is alert. She is disoriented.         Pertinent  and/or Most Recent Results     Results from last 7 days   Lab Units 12/05/20  0246 12/03/20  0849 11/30/20  1319   WBC 10*3/mm3 12.30* 7.50 8.70   HEMOGLOBIN g/dL 11.4* 11.3* 11.7*   HEMATOCRIT % 35.4 34.4 35.4   PLATELETS 10*3/mm3 263 265 297   SODIUM mmol/L 133* 128* 129*   POTASSIUM mmol/L 5.0 4.3 4.7   CHLORIDE mmol/L 97* 89* 90*   CO2 mmol/L 27.0 31.0* 31.0*   BUN mg/dL 16 18 11   CREATININE mg/dL 0.62 0.96 0.66   GLUCOSE mg/dL 134* 103* 87   CALCIUM mg/dL 9.2 8.7 9.5     Results from last 7 days   Lab Units 12/03/20  0849 11/30/20  1319   BILIRUBIN mg/dL 0.4 0.6   ALK PHOS U/L 76 88   ALT (SGPT) U/L 7 7   AST (SGOT) U/L 18 19   PROTIME Seconds  --  11.2   INR   --  1.02   APTT seconds  --  26.9           Invalid input(s): TG, LDLCALC, LDLREALC  Results from last 7 days   Lab Units 12/05/20  0237 12/02/20  2033 12/01/20  0603 11/30/20  1319   TSH uIU/mL 0.658  --  2.050  --    PROBNP pg/mL 13,372.0*  --   --  1,872.0*   TROPONIN T ng/mL  --   --   --  <0.010   PROCALCITONIN ng/mL 0.06 0.08  --   --        Brief Urine Lab Results  (Last result in the past 365 days)      Color   Clarity   Blood   Leuk Est   Nitrite   Protein   CREAT   Urine HCG        12/02/20 1426 Yellow Cloudy  Comment:  Result checked  Negative Small (1+) Negative Negative               Microbiology Results Abnormal     Procedure Component Value - Date/Time    S. Pneumo Ag Urine or CSF - Urine, Urine, Clean Catch [[id number]]  (Normal) Collected: 12/05/20 0440    Lab Status: Final result Specimen: Urine, Clean Catch Updated: 12/05/20 0519     Strep  Pneumo Ag Negative    Legionella Antigen, Urine - Urine, Urine, Clean Catch [[id number]]  (Normal) Collected: 12/05/20 0440    Lab Status: Final result Specimen: Urine, Clean Catch Updated: 12/05/20 0519     LEGIONELLA ANTIGEN, URINE Negative    COVID-19,[employer] IN-HOUSE,Nasal Swab No Transport Media 3-4 HR TAT - Swab, Nasal Cavity [[id number]]  (Normal) Collected: 11/30/20 1328    Lab Status: Final result Specimen: Swab from Nasal Cavity Updated: 11/30/20 1403     COVID19 Not Detected    Narrative:      Fact sheet for providers: https://www.fda.gov/media/238555/download     Fact sheet for patients: https://www.fda.gov/media/138140/download          Xr Shoulder 2+ View Left    Result Date: 11/30/2020  Impression: No acute left shoulder findings. The left shoulder prosthesis appears intact. No definite acute periprosthetic fracture is seen. No dislocation.  Electronically Signed By-Tnoja Chauhan MD On:11/30/2020 2:12 PM This report was finalized on [id number] by  Tonja Chauhan MD.    Ct Head Without Contrast    Result Date: 12/2/2020  Impression: 1. No acute process is demonstrated. Negative for hemorrhage or mass effect. 2. There is moderately severe white matter abnormality, increased from 2014. This is most often due to chronic small vessel disease. 3. There is an area of encephalomalacia in the right temporal lobe which is slightly larger than on the prior. 4. If acute ischemia is suspected clinically, would recommend consideration of MR.  Electronically Signed By-Theresa Santillan MD On:12/2/2020 8:20 PM This report was finalized on [id number] by  Theresa Santillan MD.    Xr Chest 1 View    Result Date: 11/30/2020  Impression: Chronic interstitial fibrotic changes in both lungs. Stable cardiomegaly. No acute chest findings.  Electronically Signed By-Tonja Chauhan MD On:11/30/2020 2:10 PM This report was finalized on [id number] by  Tonja Chauhan MD.    Ct Chest Pulmonary Embolism    Result Date:  12/1/2020  Impression:  1. No pulmonary embolism. 2. FINDINGS suggestive of mild interstitial and alveolar pulmonary edema superimposed upon emphysema. 3. Stable cardiac megaly with aortic valve replacement and CABG. 4. Calcified pleural plaque. Correlate for history of asbestos exposure. 5. Chronic T4 and L1 vertebral body compression fractures.    Electronically Signed By-Tonja Chauhan MD On:12/1/2020 12:49 PM This report was finalized on [id number] by  Tonja Chauhan MD.    Xr Chest Pa & Lateral    Result Date: 12/4/2020  Impression: 1.  Moderate bilateral pulmonary infiltrates, edema versus pneumonia. 2.  Small bilateral pleural effusions. Electronically signed by:  Deanna Smith M.D.  12/4/2020 10:23 PM      Results for orders placed during the hospital encounter of 02/28/20   Duplex Carotid Ultrasound CAR    Narrative · Proximal right internal carotid artery moderate stenosis.  · Proximal left internal carotid artery mild stenosis.          Results for orders placed during the hospital encounter of 02/28/20   Duplex Carotid Ultrasound CAR    Narrative · Proximal right internal carotid artery moderate stenosis.  · Proximal left internal carotid artery mild stenosis.          Results for orders placed during the hospital encounter of 11/30/20   Adult Transthoracic Echo Complete W/ Cont if Necessary Per Protocol    Narrative · Left ventricular ejection fraction appears to be 56 - 60%.  · Left ventricular wall thickness is consistent with moderate concentric   hypertrophy.  · There is a TAVR valve present.  · Moderate tricuspid valve regurgitation is present.  · Left ventricular diastolic function is consistent with (grade I)   impaired relaxation.  · No pericardial effusion noted                  Test Results Pending at Discharge  Pending Labs     Order Current Status    Vitamin E In process            Procedures Performed           Consults:   Consults     Date and Time Order Name Status Description     12/4/2020 0917 Inpatient Neurology Consult General Completed     11/30/2020 1723 Inpatient Cardiology Consult Completed     11/30/2020 1449 Hospitalist (on-call MD unless specified) Completed             Discharge Details        Discharge Medications      New Medications      Instructions Start Date   cefdinir 300 MG capsule  Commonly known as: OMNICEF   300 mg, Oral, 2 Times Daily      nystatin [id number] UNIT/ML suspension  Commonly known as: MYCOSTATIN   500,000 Units, Oral, 4 Times Daily         Continue These Medications      Instructions Start Date   acebutolol 200 MG capsule  Commonly known as: SECTRAL   TAKE ONE CAPSULE BY MOUTH TWICE A DAY      Eliquis 2.5 MG tablet tablet  Generic drug: apixaban   TAKE ONE TABLET BY MOUTH EVERY 12 HOURS      lisinopril 20 MG tablet  Commonly known as: PRINIVIL,ZESTRIL   TAKE ONE TABLET BY MOUTH DAILY      melatonin 5 MG tablet tablet   5 mg, Oral, Nightly PRN      pravastatin 20 MG tablet  Commonly known as: PRAVACHOL   TAKE ONE TABLET BY MOUTH DAILY             Allergies   Allergen Reactions   • Sulfa Antibiotics Hives         Discharge Disposition:  Skilled Nursing Facility (DC - External)    Diet:  Hospital:  Diet Order   Procedures   • Diet Regular         Discharge Activity: As tolerated  Activity Instructions     As tolerated                 CODE STATUS:    Code Status and Medical Interventions:   Ordered at: 11/30/20 1723     Level Of Support Discussed With:    Patient     Code Status:    CPR     Medical Interventions (Level of Support Prior to Arrest):    Full         Follow-up Appointments  Future Appointments   Date Time Provider Department Center   12/9/2020  9:30 AM Kim Dent PT [facility] PT [address] [facility]   1/7/2021 11:30 [facility]   1/7/2021 12:10 PM Wyatt Kwok MD [facility] NA CARD CTR NA   3/18/2021 10:45 AM Oli Barcenas MD [facility] ORTHO NA [facility]   3/31/2021 10:45 AM [facility] ECHO/VAS FRONT [facility] ECHO  [facility]   3/31/2021 11:30 AM Kevin Ricci MD [facility] CD LCGKR None   7/7/2021 11:15 AM Kevin Ricci MD [facility] CD L[facility]KR None       Additional Instructions for the Follow-ups that You Need to Schedule     Ambulatory Referral to Home Health   As directed      Face to Face Visit Date: 12/1/2020    Follow-up provider for Plan of Care?: I treated the patient in an acute care facility and will not continue treatment after discharge.    Follow-up provider: TIGRE WRIGHT [[id number]]    Reason/Clinical Findings: Dypsnea, heart failure    Describe mobility limitations that make leaving home difficult: weakness    Nursing/Therapeutic Services Requested: Other ([facility] resume services)    Frequency: 1 Week 1         Discharge Follow-up with PCP   As directed       Currently Documented PCP:    Tigre Wright MD    PCP Phone Number:    [phone]     Follow Up Details: routine  post  discharge         Discharge Follow-up with Specialty: cardiology; 2 Weeks   As directed      Specialty: cardiology    Follow Up: 2 Weeks                 Condition on Discharge:      Stable      This patient has been examined wearing appropriate Personal Protective Equipment     Electronically signed by Clarisse Velez MD, 12/05/20, 12:31 PM EST.      Time: I spent  35 minutes on this discharge activity which included face-to-face encounter with the patient/reviewing the data in the system/coordination of the care with the nursing staff as well as consultants/documentation/entering orders.

## 2020-12-05 NOTE — PLAN OF CARE
Goal Outcome Evaluation:  Plan of Care Reviewed With: patient, daughter  Progress: declining  Outcome Summary: Patient more confused this shift than yesterday and unable to follow commands, barely acknowledging the existence of her daughter or others in the room.  Patient with considerable hallucinations and carrying out conversations with imaginary people devoid of sense.  Patient given lasix this AM and urinating well.  Patient beginning to become more lucid and more able to follow commands.  Will continue to monitor.

## 2020-12-05 NOTE — PLAN OF CARE
Problem: Adult Inpatient Plan of Care  Goal: Plan of Care Review  Outcome: Met  Goal: Patient-Specific Goal (Individualized)  Outcome: Met  Goal: Absence of Hospital-Acquired Illness or Injury  Outcome: Met  Intervention: Identify and Manage Fall Risk  Recent Flowsheet Documentation  Taken 12/5/2020 1338 by Kamala Najera RN  Safety Promotion/Fall Prevention:   assistive device/personal items within reach   fall prevention program maintained   nonskid shoes/slippers when out of bed   safety round/check completed  Taken 12/5/2020 1123 by Kamala Najera RN  Safety Promotion/Fall Prevention:   assistive device/personal items within reach   fall prevention program maintained   nonskid shoes/slippers when out of bed   safety round/check completed  Taken 12/5/2020 0924 by Kamala Najera RN  Safety Promotion/Fall Prevention:   assistive device/personal items within reach   fall prevention program maintained   nonskid shoes/slippers when out of bed   safety round/check completed  Taken 12/5/2020 0800 by Kamala Najera RN  Safety Promotion/Fall Prevention:   assistive device/personal items within reach   fall prevention program maintained   nonskid shoes/slippers when out of bed   safety round/check completed  Goal: Optimal Comfort and Wellbeing  Outcome: Met  Intervention: Provide Person-Centered Care  Recent Flowsheet Documentation  Taken 12/5/2020 0720 by Kamala Najera RN  Trust Relationship/Rapport: care explained  Goal: Readiness for Transition of Care  Outcome: Met     Problem: Fluid Volume Excess  Goal: Fluid Balance  Outcome: Met  Intervention: Monitor and Manage Hypervolemia  Recent Flowsheet Documentation  Taken 12/5/2020 0720 by Kamala Najera RN  Skin Protection: incontinence pads utilized     Problem: Fall Injury Risk  Goal: Absence of Fall and Fall-Related Injury  Outcome: Met  Intervention: Promote Injury-Free Environment  Recent Flowsheet Documentation  Taken 12/5/2020 1338 by Kamala Najera  RN  Safety Promotion/Fall Prevention:   assistive device/personal items within reach   fall prevention program maintained   nonskid shoes/slippers when out of bed   safety round/check completed  Taken 12/5/2020 1123 by Kamala Najera RN  Safety Promotion/Fall Prevention:   assistive device/personal items within reach   fall prevention program maintained   nonskid shoes/slippers when out of bed   safety round/check completed  Taken 12/5/2020 0924 by Kamala Najera RN  Safety Promotion/Fall Prevention:   assistive device/personal items within reach   fall prevention program maintained   nonskid shoes/slippers when out of bed   safety round/check completed  Taken 12/5/2020 0800 by Kamala Najera RN  Safety Promotion/Fall Prevention:   assistive device/personal items within reach   fall prevention program maintained   nonskid shoes/slippers when out of bed   safety round/check completed     Problem: Skin Injury Risk Increased  Goal: Skin Health and Integrity  Outcome: Met  Intervention: Optimize Skin Protection  Recent Flowsheet Documentation  Taken 12/5/2020 0720 by Kamala Najera RN  Pressure Reduction Techniques: frequent weight shift encouraged  Skin Protection: incontinence pads utilized     Problem: Malnutrition  Goal: Improved Nutritional Intake  Outcome: Met   Goal Outcome Evaluation:  Plan of Care Reviewed With: patient, daughter  Progress: declining

## 2020-12-05 NOTE — NURSING NOTE
Patient has become very lethargic and hard to arouse. Vitals stable. Has been up for 3 days without sleep. Dr Velez notified. Says to keep patient tonight to monitor.

## 2020-12-05 NOTE — PROGRESS NOTES
Cc:  For mental status changes.       S:  Patient is lying in bed.     O:  Vitals stable.  O/E The patient is awake, alert, follow commands. Name common objects, interact with family members. CN EOMI, no facial droop. Motor no abnormal movements noted.     A:  85 year old lady with multiple medical problems with toxic/metabolic encephalopathy.     P:  Will continue same care.  Will follow.

## 2020-12-05 NOTE — PROGRESS NOTES
Case Management/Social Work    Patient Name:  Olga Curtis  YOB: 1935  MRN: [id number]  Admit Date:  11/30/2020        Message sent to Bonita Eddy. Pt ok to transfer to facility today. Primary nurse notified.      Electronically signed by:  Dyana Brennan RN  12/05/20 14:59 EST

## 2020-12-06 NOTE — NURSING NOTE
Patient rested well through the night with no confusion. Anticipating discharge today. Will continue to monitor

## 2020-12-06 NOTE — PROGRESS NOTES
Cc:  For mental status changes.       S:  Patient is resting in bed.     O:  Vitals stable.  O/E the patient is awake, alert, follow commands, interact with family members.  CN EOMI, no facial droop. Motor no abnormal movements noted.     A:  85 year old lady with multiple medical problems with toxic/metabolic encephalopathy. Stable.     P: continue same care.  Agree with plan.  Will sign off.  Please call for further assistance.

## 2020-12-06 NOTE — PROGRESS NOTES
[facility] Medicine Services Daily Progress Note      Hospitalist Team  LOS 4 days      Patient Care Team:  Tigre Duran MD as PCP - General  Tigre Duran MD as PCP - Family Medicine  Wyatt Kwok MD as Consulting Physician (Cardiology)    Patient Location: 222/1      Subjective   Subjective     Chief Complaint / Subjective  Chief Complaint   Patient presents with   • Shortness of Breath         Brief Synopsis of Hospital Course/HPI    85-year-old pleasant female with multiple comorbidities significant for coronary disease, status post CABG (1999), severe aortic stenosis, status post TAVR (5/20) history of SSS, status post PPM, hypertension, PAF, DJD, hospitalist recent left shoulder surgery , nonsustained V. tach, and hyperlipidemia.  Presented to [facility] ED with  Complains of few days history of progressive weakness with dyspnea on exertion and shortness of breath.  She stated that symptoms progressively worsened in the past few days and with difficulty ambulating short distant with dyspnea.  Denies chest pain, no fever, chills no cough, nausea, vomiting, and no reported PND orthopnea.  She denies weight gain and lower extremity swelling.     EKG showed atrial sensed ventricular paced rhythm, and laboratory revealed elevated BNP of 1872,  cardiac biomarkers within normal limit, and Covid-19 screen nonreactive.  Chest x-ray showed chronic interstitial fibrotic changes in both lung with stable cardiomegaly, no acute cardiopulmonary findings.  Most recent 2D echo on 7/2/2020, showed preserved LV function, with estimated EF of 60%.  In ED, she was treated with Lasix with improvement.      Date:: 12/5/2020  Patient seen today she is doing well, discharge was withheld this morning because nurse reported the patient became very somnolent and difficult to arouse.        Review of Systems   Constitution: Negative.   HENT: Negative.    Cardiovascular: Negative.    Respiratory: Negative.     "  Skin: Negative.    Gastrointestinal: Negative.    Neurological: Negative.          Objective   Objective      Vital Signs  Temp:  [97.4 °F (36.3 °C)-98.5 °F (36.9 °C)] 97.4 °F (36.3 °C)  Heart Rate:  [61-73] 73  Resp:  [16-20] 16  BP: (107-118)/(59-65) 107/59  Oxygen Therapy  SpO2: 100 %  Pulse Oximetry Type: Intermittent  Device (Oxygen Therapy): nasal cannula  Flow (L/min): 2.5  Flowsheet Rows      First Filed Value   Admission Height  162.6 cm (64\") Documented at 11/30/2020 1241   Admission Weight  49.9 kg (110 lb) Documented at 11/30/2020 1241        Intake & Output (last 3 days)       12/03 0701 - 12/04 0700 12/04 0701 - 12/05 0700 12/05 0701 - 12/06 0700 12/06 0701 - 12/07 0700    P.O. 240 730      IV Piggyback        Total Intake(mL/kg) 240 (4.7) 730 (13.9)      Urine (mL/kg/hr) 200 (0.2) 1250 (1) 600 (0.5) 200 (1.2)    Total Output 200 1250 600 200    Net +40 -520 -600 -200            Urine Unmeasured Occurrence 1 x 3 x 1 x         Lines, Drains & Airways    Active LDAs     Name:   Placement date:   Placement time:   Site:   Days:    Peripheral IV 12/05/20 1515 Right Forearm   12/05/20    1515    Forearm   less than 1                  Physical Exam:    Physical Exam  Vitals signs reviewed.   Constitutional:       Appearance: Normal appearance.   HENT:      Head: Normocephalic and atraumatic.      Mouth/Throat:      Mouth: Mucous membranes are moist.   Eyes:      Pupils: Pupils are equal, round, and reactive to light.   Neck:      Musculoskeletal: Normal range of motion and neck supple.   Cardiovascular:      Rate and Rhythm: Normal rate and regular rhythm.      Heart sounds: Normal heart sounds.   Pulmonary:      Effort: Pulmonary effort is normal.      Breath sounds: Normal breath sounds.   Abdominal:      General: Bowel sounds are normal.      Palpations: Abdomen is soft.   Skin:     General: Skin is warm and dry.      Capillary Refill: Capillary refill takes less than 2 seconds.   Neurological:      " General: No focal deficit present.      Mental Status: She is alert and oriented to person, place, and time.                 Results Review:     I reviewed the patient's new clinical results.      Lab Results (last 24 hours)     Procedure Component Value Units Date/Time    Vitamin B12 [[id number]]  (Normal) Collected: 12/05/20 0237    Specimen: Blood Updated: 12/05/20 1221     Vitamin B-12 326 pg/mL     Narrative:      Results may be falsely increased if patient taking Biotin.      Folate [[id number]]  (Normal) Collected: 12/05/20 0237    Specimen: Blood Updated: 12/05/20 1221     Folate 7.66 ng/mL     Narrative:      Results may be falsely increased if patient taking Biotin.          No results found for: HGBA1C  Results from last 7 days   Lab Units 11/30/20  1319   INR  1.02           No results found for: LIPASE  Lab Results   Component Value Date    CHOL 127 08/05/2017    TRIG 90 08/05/2017    HDL 49 08/05/2017    LDL 62 08/05/2017       No results found for: INTRAOP, PREDX, FINALDX, COMDX    Microbiology Results (last 10 days)     Procedure Component Value - Date/Time    S. Pneumo Ag Urine or CSF - Urine, Urine, Clean Catch [[id number]]  (Normal) Collected: 12/05/20 0440    Lab Status: Final result Specimen: Urine, Clean Catch Updated: 12/05/20 0519     Strep Pneumo Ag Negative    Legionella Antigen, Urine - Urine, Urine, Clean Catch [[id number]]  (Normal) Collected: 12/05/20 0440    Lab Status: Final result Specimen: Urine, Clean Catch Updated: 12/05/20 0519     LEGIONELLA ANTIGEN, URINE Negative    COVID-19,[facility] IN-HOUSE,Nasal Swab No Transport Media 3-4 HR TAT - Swab, Nasal Cavity [[id number]]  (Normal) Collected: 11/30/20 1328    Lab Status: Final result Specimen: Swab from Nasal Cavity Updated: 11/30/20 1403     COVID19 Not Detected    Narrative:      Fact sheet for providers: https://www.fda.gov/media/373085/download     Fact sheet for patients: https://www.fda.gov/media/507320/download          ECG/EMG  Results (most recent)     Procedure Component Value Units Date/Time    ECG 12 Lead [[id number]] Collected: 11/30/20 1322     Updated: 12/01/20 1610     QT Interval 472 ms     Narrative:      HEART RATE= 63  bpm  RR Interval= 958  ms  HI Interval= 121  ms  P Horizontal Axis= 40  deg  P Front Axis= 13  deg  QRSD Interval= 140  ms  QT Interval= 472  ms  QRS Axis= 223  deg  T Wave Axis= 17  deg  - ABNORMAL ECG -  Atrial-sensed ventricular-paced rhythm  When compared with ECG of 30-Sep-2020 9:46:53,  No significant change  Electronically Signed By: Lawrence Mckeon (MAGO) 01-Dec-2020 16:07:22  Date and Time of Study: 2020-11-30 13:22:06    Adult Transthoracic Echo Complete W/ Cont if Necessary Per Protocol [[id number]] Collected: 12/01/20 1600     Updated: 12/01/20 2038     BSA 1.5 m^2      RVIDd 1.8 cm      IVSd 1.3 cm      LVIDd 3.1 cm      LVIDs 1.9 cm      LVPWd 1.3 cm      IVS/LVPW 0.99     FS 39.8 %      EDV(Teich) 38.4 ml      ESV(Teich) 10.8 ml      EF(Teich) 71.8 %      EDV(cubed) 30.3 ml      ESV(cubed) 6.6 ml      EF(cubed) 78.1 %      LV mass(C)d 134.8 grams      LV mass(C)dI 87.5 grams/m^2      SV(Teich) 27.6 ml      SI(Teich) 17.9 ml/m^2      SV(cubed) 23.6 ml      SI(cubed) 15.4 ml/m^2      Ao root diam 2.1 cm      Ao root area 3.6 cm^2      asc Aorta Diam 2.0 cm      LVOT diam 1.8 cm      LVOT area 2.6 cm^2      EDV(MOD-sp4) 40.2 ml      ESV(MOD-sp4) 16.8 ml      EF(MOD-sp4) 58.2 %      SV(MOD-sp4) 23.4 ml      SI(MOD-sp4) 15.2 ml/m^2      Ao root area (BSA corrected) 1.4     LV Malin Vol (BSA corrected) 26.1 ml/m^2      LV Sys Vol (BSA corrected) 10.9 ml/m^2      Aortic R-R 1.0 sec      Aortic HR 59.5 BPM      MV E max deon 66.0 cm/sec      MV A max deon 46.7 cm/sec      MV E/A 1.4     MV V2 max 82.5 cm/sec      MV max PG 2.7 mmHg      MV V2 mean 41.0 cm/sec      MV mean PG 0.81 mmHg      MV V2 VTI 22.8 cm      MVA(VTI) 3.3 cm^2      MV dec slope 301.3 cm/sec^2      MV dec time 0.22 sec      Ao pk deon 185.9 cm/sec       Ao max PG 13.8 mmHg      Ao max PG (full) 7.6 mmHg      Ao V2 mean 134.9 cm/sec      Ao mean PG 8.4 mmHg      Ao mean PG (full) 4.6 mmHg      Ao V2 VTI 37.0 cm      CHARIS(I,A) 2.0 cm^2      CHARIS(I,D) 2.0 cm^2      CHARIS(V,A) 1.7 cm^2      CHARIS(V,D) 1.7 cm^2      LV V1 max PG 6.2 mmHg      LV V1 mean PG 3.8 mmHg      LV V1 max 124.8 cm/sec      LV V1 mean 90.7 cm/sec      LV V1 VTI 29.0 cm      CO(Ao) 8.0 l/min      CI(Ao) 5.2 l/min/m^2      SV(Ao) 133.9 ml      SI(Ao) 86.9 ml/m^2      CO(LVOT) 4.5 l/min      CI(LVOT) 2.9 l/min/m^2      SV(LVOT) 75.5 ml      SI(LVOT) 49.0 ml/m^2      PA V2 max 108.7 cm/sec      PA max PG 4.7 mmHg      PA max PG (full) 2.0 mmHg      PA V2 mean 65.0 cm/sec      PA mean PG 2.1 mmHg      PA mean PG (full) 1.1 mmHg      PA V2 VTI 17.8 cm      PA acc time 0.08 sec      RV V1 max PG 2.8 mmHg      RV V1 mean PG 1.0 mmHg      RV V1 max 83.3 cm/sec      RV V1 mean 45.5 cm/sec      RV V1 VTI 15.2 cm      TR max deon 307.5 cm/sec      RVSP(TR) 40.8 mmHg      RAP systole 3.0 mmHg      PA pr(Accel) 41.3 mmHg       CV ECHO PARVEZ - BZI_BMI 19.6 kilograms/m^2       CV ECHO PARVEZ - BSA(HAYCOCK) 1.5 m^2       CV ECHO PARVEZ - BZI_METRIC_WEIGHT 51.7 kg       CV ECHO PARVEZ - BZI_METRIC_HEIGHT 162.6 cm      EF(MOD-bp) 58.0 %      LA dimension(2D) 3.2 cm     Narrative:      · Left ventricular ejection fraction appears to be 56 - 60%.  · Left ventricular wall thickness is consistent with moderate concentric   hypertrophy.  · There is a TAVR valve present.  · Moderate tricuspid valve regurgitation is present.  · Left ventricular diastolic function is consistent with (grade I)   impaired relaxation.  · No pericardial effusion noted             Results for orders placed during the hospital encounter of 02/28/20   Duplex Carotid Ultrasound CAR    Narrative · Proximal right internal carotid artery moderate stenosis.  · Proximal left internal carotid artery mild stenosis.          Results for orders placed  during the hospital encounter of 11/30/20   Adult Transthoracic Echo Complete W/ Cont if Necessary Per Protocol    Narrative · Left ventricular ejection fraction appears to be 56 - 60%.  · Left ventricular wall thickness is consistent with moderate concentric   hypertrophy.  · There is a TAVR valve present.  · Moderate tricuspid valve regurgitation is present.  · Left ventricular diastolic function is consistent with (grade I)   impaired relaxation.  · No pericardial effusion noted          Xr Shoulder 2+ View Left    Result Date: 11/30/2020  No acute left shoulder findings. The left shoulder prosthesis appears intact. No definite acute periprosthetic fracture is seen. No dislocation.  Electronically Signed By-Tonja Chauhan MD On:11/30/2020 2:12 PM This report was finalized on [id number] by  Tonja Chauhan MD.    Ct Head Without Contrast    Result Date: 12/2/2020  1. No acute process is demonstrated. Negative for hemorrhage or mass effect. 2. There is moderately severe white matter abnormality, increased from 2014. This is most often due to chronic small vessel disease. 3. There is an area of encephalomalacia in the right temporal lobe which is slightly larger than on the prior. 4. If acute ischemia is suspected clinically, would recommend consideration of MR.  Electronically Signed By-Theresa Santillan MD On:12/2/2020 8:20 PM This report was finalized on [id number] by  Theresa Santillan MD.    Xr Chest 1 View    Result Date: 11/30/2020  Chronic interstitial fibrotic changes in both lungs. Stable cardiomegaly. No acute chest findings.  Electronically Signed By-Tonja Chauhan MD On:11/30/2020 2:10 PM This report was finalized on [id number] by  Tonja Chauhan MD.    Ct Chest Pulmonary Embolism    Result Date: 12/1/2020   1. No pulmonary embolism. 2. FINDINGS suggestive of mild interstitial and alveolar pulmonary edema superimposed upon emphysema. 3. Stable cardiac megaly with aortic valve replacement and CABG. 4.  Calcified pleural plaque. Correlate for history of asbestos exposure. 5. Chronic T4 and L1 vertebral body compression fractures.    Electronically Signed By-Tonja Chauhan MD On:12/1/2020 12:49 PM This report was finalized on [id number] by  Tonja Chauhan MD.    Xr Chest Pa & Lateral    Result Date: 12/4/2020  1.  Moderate bilateral pulmonary infiltrates, edema versus pneumonia. 2.  Small bilateral pleural effusions. Electronically signed by:  Deanna Smith M.D.  12/4/2020 10:23 PM          Xrays, labs reviewed personally by physician.    Medication Review:   I have reviewed the patient's current medication list      Scheduled Meds  acebutolol, 200 mg, Oral, BID  apixaban, 2.5 mg, Oral, Q12H  atorvastatin, 20 mg, Oral, Nightly  cefTRIAXone, 1 g, Intravenous, Q24H  furosemide, 40 mg, Oral, Daily  nystatin, 5 mL, Oral, 4x Daily  sodium chloride, 10 mL, Intravenous, Q12H        Meds Infusions       Meds PRN  acetaminophen  •  melatonin  •  nitroglycerin  •  [COMPLETED] Insert peripheral IV **AND** sodium chloride  •  sodium chloride        Assessment/Plan   Assessment/Plan     Active Hospital Problems    Diagnosis  POA   • Severe malnutrition (CMS/HCC) [E43]  Yes   • Dyspnea [R06.00]  Yes      Resolved Hospital Problems   No resolved problems to display.       MEDICAL DECISION MAKING COMPLEXITY BY PROBLEM:     85-year-old pleasant female with multiple comorbidities significant for coronary disease, status post CABG (1999), severe aortic stenosis, status post TAVR (5/20) history of SSS, status post PPM, hypertension, PAF, DJD, hospitalist recent left shoulder surgery , nonsustained V. tach, and hyperlipidemia.  Presented to [facility] ED with  Complains of few days history of progressive weakness with dyspnea on exertion and shortness of breath.  She stated that symptoms progressively worsened in the past few days and with difficulty ambulating short distant with dyspnea.  Denies chest pain, no fever, chills no cough,  nausea, vomiting, and no reported PND orthopnea.  She denies weight gain and lower extremity swelling.     EKG showed atrial sensed ventricular paced rhythm, and laboratory revealed elevated BNP of 1872,  cardiac biomarkers within normal limit, and Covid-19 screen nonreactive.  Chest x-ray showed chronic interstitial fibrotic changes in both lung with stable cardiomegaly, no acute cardiopulmonary findings.  Most recent 2D echo on 7/2/2020, showed preserved LV function, with estimated EF of 60%.  In ED, she was treated with Lasix with improvement.     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  No acute cardiopulmonary findings  Repeat echo  ·  Left ventricular ejection fraction appears to be 56 - 60%.  · Left ventricular wall thickness is consistent with moderate concentric hypertrophy.  · There is a TAVR valve present.  · Moderate tricuspid valve regurgitation is present.  · Left ventricular diastolic function is consistent with (grade I) impaired relaxation.  No pericardial effusion noted        Coronary artery disease, status post CABG (1999)  Cont home meds     PAF  Cont  Eliquis     SSS-S/p  PPM  Cardiology cx appreciated       Aortic stenosis    S/p TAVR (5/20)     Essential hypertension  BP low  Continue to hold meds         Hyperlipidemia        -Lipitor     DJD, recent left shoulder surgery  Stable           Physical deconditioning- multifactorial  Cont with PT/OT eval           VTE Prophylaxis -   Mechanical Order History:     None      Pharmalogical Order History:      Ordered     Dose Route Frequency Stop    11/30/20 1723  apixaban (ELIQUIS) tablet 2.5 mg      2.5 mg PO Every 12 Hours Scheduled --                  Code Status -   Code Status and Medical Interventions:   Ordered at: 11/30/20 1723     Level Of Support Discussed With:    Patient     Code Status:    CPR     Medical Interventions (Level of Support Prior to Arrest):    Full       This patient has been examined wearing appropriate Personal Protective Equipment       Discharge Planning  Rehab         Electronically signed by Clarisse Velez MD, 12/06/20, 10:15 EST.  [facility] Hospitalist Team

## 2020-12-07 NOTE — PROGRESS NOTES
Case Management Discharge Note      Final Note: [facility] acute inpt rehab.         Selected Continued Care - Discharged on 12/6/2020 Admission date: 11/30/2020 - Discharge disposition: Skilled Nursing Facility (DC - External)    Destination Coordination complete    Service Provider Selected Services Address Phone Fax Patient Preferred    [facility]  Inpatient Rehabilitation [address] [place] [address] [phone] --              Home Medical Care Coordination complete    Service Provider Selected Services Address Phone Fax Patient Preferred    [facility] [address] [facility] [place] [address] [phone] -- --                Selected Continued Care - Prior Encounters Includes selections from prior encounters from 9/1/2020 to 12/6/2020    Discharged on 10/7/2020 Admission date: 10/6/2020 - Discharge disposition: Home-Health Care [facility]    Home Medical Care     Service Provider Selected Services Address Phone Fax Patient Preferred    [facility] [address] [place] [id number] [phone] -- --                         Final Discharge Disposition Code: 62 - inpatient rehab facility

## 2020-12-08 NOTE — TELEPHONE ENCOUNTER
Daughter called to report that patient is currently in-pt at [facility]. She has been away from device transmitter. Just keyon. Jean-Paul

## 2020-12-12 PROBLEM — R55 SYNCOPE AND COLLAPSE: Status: ACTIVE | Noted: 2020-01-01

## 2020-12-12 PROBLEM — N17.9 ACUTE RENAL FAILURE (ARF) (HCC): Status: ACTIVE | Noted: 2020-01-01

## 2020-12-12 PROBLEM — D72.829 LEUKOCYTOSIS: Status: ACTIVE | Noted: 2020-01-01

## 2020-12-12 PROBLEM — R19.7 DIARRHEA: Status: ACTIVE | Noted: 2020-01-01

## 2020-12-12 NOTE — THERAPY EVALUATION
Patient Name: Olga Curtis  : 1935    MRN: [id number]                              Today's Date: 2020       Admit Date: 2020    Visit Dx:     ICD-10-CM ICD-9-CM   1. Syncope and collapse  R55 780.2   2. Acute UTI  N39.0 599.0   3. Diarrhea, unspecified type  R19.7 787.91     Patient Active Problem List   Diagnosis   • Atrial premature complex   • Chronic coronary artery disease   • Dyslipidemia   • Essential hypertension   • Nonsustained ventricular tachycardia (CMS/HCC)   • Presence of cardiac pacemaker   • Shortness of breath   • Sick sinus syndrome (CMS/HCC)   • Allergic reaction   • Tachy-martin syndrome (CMS/HCC)   • Dyspnea   • Paroxysmal atrial fibrillation (CMS/HCC)   • Nonrheumatic aortic valve stenosis   • Chronic diastolic congestive heart failure (CMS/HCC)   • S/P TAVR (transcatheter aortic valve replacement)   • Primary localized osteoarthrosis of left shoulder region   • DJD of left shoulder   • Severe malnutrition (CMS/HCC)   • Syncope and collapse   • Diarrhea   • Acute renal failure (ARF) (CMS/HCC)   • Leukocytosis     Past Medical History:   Diagnosis Date   • Aortic stenosis 10/28/2014   • Arthritis    • Atrial premature complex 2015   • CHF (congestive heart failure) (CMS/HCC)    • Chronic coronary artery disease 10/28/2014   • Dyslipidemia 10/28/2014   • Elevated cholesterol    • History of transfusion    • Hyperlipidemia    • Hypertension 10/28/2014   • Nausea 2020   • Nonsustained ventricular tachycardia (CMS/HCC) 2015   • Partial paralysis of right hand (CMS/HCC)     states right arm only.  Cannot have sticks in arm, hand only   • Shoulder pain, left      Past Surgical History:   Procedure Laterality Date   • AORTIC VALVE REPAIR/REPLACEMENT N/A 2020    Procedure: TTE TRANSFEMORAL TRANSCATHETER AORTIC VALVE REPLACEMENT PERCUTANEOUS APPROACH;  Surgeon: J Carlos Leon MD;  Location: [facility] ;  Service: Cardiothoracic;  Laterality: N/A;    • AORTIC VALVE REPAIR/REPLACEMENT N/A 5/26/2020    Procedure: Transfemoral Transcatheter Aortic Valve Replacement w/Intra Op TTE and Possible Open Rescue Surgery;  Surgeon: Vasiliy Bruce MD;  Location: [facility] OR 18/19;  Service: Cardiovascular;  Laterality: N/A;   • APPENDECTOMY     • CARDIAC CATHETERIZATION N/A 3/2/2020    Procedure: Left Heart Cath and coronary angiogram;  Surgeon: Wyatt Kwok MD;  Location: [facility] CATH INVASIVE LOCATION;  Service: Cardiovascular;  Laterality: N/A;   • CARDIAC CATHETERIZATION N/A 3/2/2020    Procedure: Saphenous Vein Graft;  Surgeon: Wyatt Kwok MD;  Location: [facility] CATH INVASIVE LOCATION;  Service: Cardiovascular;  Laterality: N/A;   • CARDIAC PACEMAKER PLACEMENT  2017   • CARDIAC SURGERY  1999    Bypass surgery   • EYE SURGERY Bilateral     cat ext   • HERNIA REPAIR     • TOTAL SHOULDER ARTHROPLASTY W/ DISTAL CLAVICLE EXCISION Left 10/6/2020    Procedure: TOTAL SHOULDER REVERSE ARTHROPLASTY;  Surgeon: Oli Barcenas MD;  Location: [facility] OR;  Service: Orthopedics;  Laterality: Left;   • TOTAL SHOULDER REVISION  10/06/2020    left     General Information     Row Name 12/12/20 1654          Physical Therapy Time and Intention    Document Type  evaluation  -     Mode of Treatment  physical therapy  -     Row Name 12/12/20 1654          General Information    Patient Profile Reviewed  yes  -     Prior Level of Function  independent:  -     Barriers to Rehab  none identified  -     Row Name 12/12/20 1654          Living Environment    Lives With  spouse  -     Row Name 12/12/20 1654          Cognition    Orientation Status (Cognition)  oriented x 4  -     Row Name 12/12/20 1654          Safety Issues, Functional Mobility    Impairments Affecting Function (Mobility)  endurance/activity tolerance;strength;balance  -       User Key  (r) = Recorded By, (t) = Taken By, (c) = Cosigned By    Initials Name Provider Type      Kaykay Rios, PT Physical Therapist        Mobility     Row Name 12/12/20 1655          Bed Mobility    Bed Mobility  supine-sit  -     Supine-Sit Reeves (Bed Mobility)  modified independence  -     Assistive Device (Bed Mobility)  bed rails;head of bed elevated  -     Row Name 12/12/20 1655          Sit-Stand Transfer    Sit-Stand Reeves (Transfers)  contact guard;1 person assist  -     Assistive Device (Sit-Stand Transfers)  walker, front-wheeled  -     Row Name 12/12/20 1655          Gait/Stairs (Locomotion)    Reeves Level (Gait)  contact guard;1 person assist;1 person to manage equipment  -     Assistive Device (Gait)  walker, front-wheeled  -     Distance in Feet (Gait)  30' in room  -     Comment (Gait/Stairs)  steady gait pattern, does well using RW  -       User Key  (r) = Recorded By, (t) = Taken By, (c) = Cosigned By    Initials Name Provider Type     Kaykay Rios, TAVON Physical Therapist        Obj/Interventions     Row Name 12/12/20 1655          Range of Motion Comprehensive    General Range of Motion  bilateral lower extremity ROM WFL  -     Comment, General Range of Motion  RUE with deficits from birth lacking full elbow extension and shoulder flexion.  LUE with h/o TSR and has not regained full ROM  -     Row Name 12/12/20 1655          Strength Comprehensive (MMT)    Comment, General Manual Muscle Testing (MMT) Assessment  LEs 5/5  -     Row Name 12/12/20 1655          Sensory Assessment (Somatosensory)    Sensory Assessment (Somatosensory)  sensation intact  -       User Key  (r) = Recorded By, (t) = Taken By, (c) = Cosigned By    Initials Name Provider Type     Kaykay Rios, TAVON Physical Therapist        Goals/Plan     Row Name 12/12/20 1658          Transfer Goal 1 (PT)    Activity/Assistive Device (Transfer Goal 1, PT)  sit-to-stand/stand-to-sit;bed-to-chair/chair-to-bed  -     Reeves Level/Cues Needed (Transfer Goal 1, PT)  independent   -     Time Frame (Transfer Goal 1, PT)  long term goal (LTG);2 weeks  -[facility] Name 12/12/20 1658          Gait Training Goal 1 (PT)    Activity/Assistive Device (Gait Training Goal 1, PT)  gait (walking locomotion);assistive device use;walker, rolling  -     [place] Level (Gait Training Goal 1, PT)  modified independence  -     Distance (Gait Training Goal 1, PT)  100'  -     Time Frame (Gait Training Goal 1, PT)  long term goal (LTG);2 weeks  -       User Key  (r) = Recorded By, (t) = Taken By, (c) = Cosigned By    Initials Name Provider Type     Kaykay Rios, PT Physical Therapist        Clinical Impression     [facility] Name 12/12/20 1656          Pain    Additional Documentation  Pain Scale: Numbers Pre/Post-Treatment (Group)  -[facility] Name 12/12/20 1656          Pain Scale: Numbers Pre/Post-Treatment    Pretreatment Pain Rating  0/10 - no pain  -     Posttreatment Pain Rating  0/10 - no pain  -     Row Name 12/12/20 1656          Plan of Care Review    Plan of Care Reviewed With  patient  -     Outcome Summary  86 yo female admitted with SOA, has been in/out of hospitals and rehab since 12/6.  Pt is from home with spouse, is normally independent.  Pt has c diff and is incontinent of bowel and bladder.  Pt does well with mobliity, up in room with CGAx1 using RW.  Will follow 3x/week, safe for home with spouse and HHPT at d/c.  PPE worn:  gloves, mask, goggles, gown.  -     Row Name 12/12/20 1656          Therapy Assessment/Plan (PT)    Rehab Potential (PT)  good, to achieve stated therapy goals  -     Criteria for Skilled Interventions Met (PT)  yes;skilled treatment is necessary  -     Row Name 12/12/20 1656          Vital Signs    O2 Delivery Pre Treatment  room air  -     O2 Delivery Intra Treatment  room air  -     O2 Delivery Post Treatment  room air  -[facility] Name 12/12/20 1656          Positioning and Restraints    Pre-Treatment Position  in bed  -     Post  Treatment Position  chair  -     In Chair  notified nsg;sitting;call light within reach  -       User Key  (r) = Recorded By, (t) = Taken By, (c) = Cosigned By    Initials Name Provider Type     Kaykay Rios PT Physical Therapist        Outcome Measures    No documentation.       Physical Therapy Education                 Title: PT OT SLP Therapies (Done)     Topic: Physical Therapy (Done)     Point: Mobility training (Done)     Learning Progress Summary           Patient Acceptance, E,TB, VU by  at 12/12/2020 1659                               User Key     Initials Effective Dates Name Provider Type [facility] 03/01/19 -  Kaykay Rios PT Physical Therapist PT              PT Recommendation and Plan  Planned Therapy Interventions (PT): balance training, bed mobility training, gait training, transfer training, patient/family education, strengthening  Plan of Care Reviewed With: patient  Outcome Summary: 86 yo female admitted with SOA, has been in/out of hospitals and rehab since 12/6.  Pt is from home with spouse, is normally independent.  Pt has c diff and is incontinent of bowel and bladder.  Pt does well with mobliity, up in room with CGAx1 using RW.  Will follow 3x/week, safe for home with spouse and HHPT at d/c.  PPE worn:  gloves, mask, goggles, gown.     Time Calculation:   PT Charges     Row Name 12/12/20 3159             Time Calculation    Start Time  1357  -      Stop Time  1420  -      Time Calculation (min)  23 min  -      PT Received On  12/12/20  -      PT - Next Appointment  12/14/20  -      PT Goal Re-Cert Due Date  12/26/20  -         Time Calculation- PT    Total Timed Code Minutes- PT  0 minute(s)  -        User Key  (r) = Recorded By, (t) = Taken By, (c) = Cosigned By    Initials Name Provider Type     Kaykay Rios PT Physical Therapist        Therapy Charges for Today     Code Description Service Date Service Provider Modifiers Qty    [id number] HC PT EVAL  MOD COMPLEXITY 3 12/12/2020 Kaykay Rios, PT GP 1               Kaykay Rios, PT  12/12/2020

## 2020-12-12 NOTE — PLAN OF CARE
Goal Outcome Evaluation:  Plan of Care Reviewed With: patient  Progress: no change  Outcome Summary: 84 yo female admitted with SOA, has been in/out of hospitals and rehab since 12/6.  Pt is from home with spouse, is normally independent.  Pt has c diff and is incontinent of bowel and bladder.  Pt does well with mobliity, up in room with CGAx1 using RW.  Will follow 3x/week, safe for home with spouse and HHPT at d/c.  PPE worn:  gloves, mask, goggles, gown.

## 2020-12-12 NOTE — PLAN OF CARE
Goal Outcome Evaluation:  Plan of Care Reviewed With: patient  Progress: no change  Outcome Summary: patient resting in bed throughout shift. home meds continued per MD. fluids d/c per MD. CDiff positive. no complaints during shift. will continue to monitor.

## 2020-12-12 NOTE — ED NOTES
"Family states patient was \"zoning out\" checked vitals at home. BP was noted low. Patient has been hallucinating and multiple bowel movements. Was just discharged from [facility].      Mercedes Dickson RN  12/12/20 0146    "

## 2020-12-12 NOTE — SIGNIFICANT NOTE
Pt was d/c'd on 12/6/20 to [facility]. Pt was transported to [facility] for dyspnea and declined return to IP rehab at that time. HHC was arranged with [facility] and pt was d/c'd home. Pt returned to hospital with dyspnea on 12/11/20. S/W pt by phone and she declines any IP rehab at this time. She states that she has support at home with family and only wants HHC at this time. She states that she has DME at home but does not use any assistive devices. PCP: Dr. Duran.    Discharge Planning Assessment  [facility]     Patient Name: Olga Curtis  MRN: [id number]  Today's Date: 12/12/2020    Admit Date: 12/11/2020    Discharge Needs Assessment    No documentation.       Discharge Plan     Row Name 12/12/20 1053       Plan    Plan  DC plan: anticipate return home with [facility]. Current with [facility].    Provided Post Acute Provider List?  N/A    N/A Provider List Comment  pt declining IP rehab and is current with [facility]    Plan Comments  Phone communication only - no physical contact with patient or family. Pt states that she has assistance with lives spouse and family assists at home. Pt was discharged to [facility] 1 week ago and states that she does not wish to return and is declining any IP rehab at this time. She states that her family is able to care for her and is current with [facility]. PCP verified and pt states that she has no difficulty obtaining medications.        Dyana Brennan RN

## 2020-12-12 NOTE — H&P
"      [facility] Medicine Services      Patient Name: Olga Curtis  : 1935  MRN: [id number]  Primary Care Physician: Tigre Duran MD  Date of admission: 2020    Patient Care Team:  Tigre Duran MD as PCP - General  Tigre Duran MD as PCP - Family Medicine  Wyatt Kwok MD as Consulting Physician (Cardiology)          Subjective   History Present Illness     Chief Complaint:   Chief Complaint   Patient presents with   • Hypotension     nausea.         85 year old female with PMH of CAD s/p CABG , severe aortic stenosis s/p TAVR in May, SSS with cardiac pacemaker in situ, HTN, PAF, DJD post shoulder surgery, non sustained V tach, HLDpresents with complaints of progressive weakness and dyspnea. She is awake and alert and appropriate but lethargic. She denies chest pain or syncope but per ED report EMS reports she had a \"spell\" en route  with hypotension . In ED no hypotension or arrythmmia seen . Per ED report pacemaker interrogated with no unusual results. She is Covid-19 negative. Review of records shows she was just admitted here 20 to 20 for similar complaints and seen by both neurology and cardiology. Per notes it was felt to be metabolic encephalopathy from possible UTI and she was discharged on Cefdinir. She reports diarrhea past 2 days without hematochezia or melena or abdominal pain. C-Diff culture ordered and pending. UA shows no bacteria but too numerous to count wbc . She was started on IV Ceftriaxone in ED with urine and blood culture pending. D-Dimer was elevated. CT chest per radiology today:  \"IMPRESSION:  1. No pulmonary embolism.  2. Reticular and ground glass densities diffusely may represent edema or pneumonia superimposed on chronic interstitial change.  3. Stable surgical changes and cardiomegaly.  4. Calcified left pleural plaque.\"    Labs show wbc 17, K 5.4, Na 128, Cr 1.24 ( was 0.64), BNP 4400 was 13,000 20, Hgb 10, " procalcitonin 1.40.     She reports she left here after last admission and went to rehab then was at [facility] and just discharged yesterday. No records of [facility] in EMR and records have been requested. She will be admitted for further evaluation and treatment .       Review of Systems   Constitution: Positive for malaise/fatigue.   HENT: Negative.    Eyes: Negative.    Cardiovascular: Positive for dyspnea on exertion.   Respiratory: Positive for shortness of breath.    Endocrine: Negative.    Hematologic/Lymphatic: Negative.    Skin: Negative.    Musculoskeletal: Positive for joint pain and muscle weakness.   Gastrointestinal: Positive for diarrhea.   Genitourinary: Negative.    Neurological: Positive for weakness.   Psychiatric/Behavioral: Negative.    Allergic/Immunologic: Negative.            Personal History     Past Medical History:   Past Medical History:   Diagnosis Date   • Aortic stenosis 10/28/2014   • Atrial premature complex 12/18/2015   • Chronic coronary artery disease 10/28/2014   • Dyslipidemia 10/28/2014   • Hyperlipidemia    • Hypertension 10/28/2014   • Nausea 09/2020   • Nonsustained ventricular tachycardia (CMS/HCC) 12/18/2015   • Partial paralysis of right hand (CMS/HCC)     states right arm only.  Cannot have sticks in arm, hand only   • Shoulder pain, left        Surgical History:      Past Surgical History:   Procedure Laterality Date   • AORTIC VALVE REPAIR/REPLACEMENT N/A 5/26/2020    Procedure: TTE TRANSFEMORAL TRANSCATHETER AORTIC VALVE REPLACEMENT PERCUTANEOUS APPROACH;  Surgeon: J Carlos Leon MD;  Location: [facility] OR 18/19;  Service: Cardiothoracic;  Laterality: N/A;   • AORTIC VALVE REPAIR/REPLACEMENT N/A 5/26/2020    Procedure: Transfemoral Transcatheter Aortic Valve Replacement w/Intra Op TTE and Possible Open Rescue Surgery;  Surgeon: Vasiliy Bruce MD;  Location: [facility] OR 18/19;  Service: Cardiovascular;  Laterality: N/A;   • APPENDECTOMY     •  CARDIAC CATHETERIZATION N/A 3/2/2020    Procedure: Left Heart Cath and coronary angiogram;  Surgeon: Wyatt Kwok MD;  Location: [facility] CATH INVASIVE LOCATION;  Service: Cardiovascular;  Laterality: N/A;   • CARDIAC CATHETERIZATION N/A 3/2/2020    Procedure: Saphenous Vein Graft;  Surgeon: Wyatt Kwok MD;  Location: [facility] CATH INVASIVE LOCATION;  Service: Cardiovascular;  Laterality: N/A;   • CARDIAC PACEMAKER PLACEMENT  2017   • CARDIAC SURGERY  1999    Bypass surgery   • EYE SURGERY Bilateral     cat ext   • HERNIA REPAIR     • TOTAL SHOULDER ARTHROPLASTY W/ DISTAL CLAVICLE EXCISION Left 10/6/2020    Procedure: TOTAL SHOULDER REVERSE ARTHROPLASTY;  Surgeon: Oli Barcenas MD;  Location: [facility] MAIN OR;  Service: Orthopedics;  Laterality: Left;   • TOTAL SHOULDER REVISION  10/06/2020    left           Family History: family history includes No Known Problems in her brother, father, mother, and sister. Otherwise pertinent FHx was reviewed and unremarkable.     Social History:  reports that she has never smoked. She has never used smokeless tobacco. She reports that she does not drink alcohol or use drugs.      Medications:  Prior to Admission medications    Medication Sig Start Date End Date Taking? Authorizing Provider   acebutolol (SECTRAL) 200 MG capsule TAKE ONE CAPSULE BY MOUTH TWICE A DAY 5/6/20  Yes Wyatt Kwok MD   cefdinir (OMNICEF) 300 MG capsule Take 1 capsule by mouth 2 (Two) Times a Day for 5 days. 12/7/20 12/12/20 Yes Clarisse Velez MD   ELIQUIS 2.5 MG tablet tablet TAKE ONE TABLET BY MOUTH EVERY 12 HOURS 4/13/20  Yes Wyatt Kwok MD   furosemide (LASIX) 20 MG tablet Take 20 mg by mouth.   Yes Yeyo Bergman MD   lisinopril (PRINIVIL,ZESTRIL) 20 MG tablet TAKE ONE TABLET BY MOUTH DAILY 10/5/20  Yes Wyatt Kwok MD   nystatin (MYCOSTATIN) 519233 UNIT/ML suspension Swish and swallow 500,000 Units.   Yes Yeyo Bergman MD   pravastatin (PRAVACHOL) 20 MG tablet TAKE  ONE TABLET BY MOUTH DAILY 11/23/20  Yes Wyatt Kwok MD   QUEtiapine (SEROquel) 25 MG tablet Take 25 mg by mouth.   Yes ProviderYeyo MD   melatonin 5 MG tablet tablet Take 5 mg by mouth At Night As Needed (sleep).  12/12/20  ProviderYeyo MD       Allergies:    Allergies   Allergen Reactions   • Sulfa Antibiotics Hives       Objective   Objective     Vital Signs  Temp:  [97.6 °F (36.4 °C)] 97.6 °F (36.4 °C)  Heart Rate:  [73-84] 84  Resp:  [20] 20  BP: ()/(41-72) 152/48  SpO2:  [93 %-99 %] 98 %  on   ;      Body mass index is 17.75 kg/m².    Physical Exam  Vitals signs reviewed.   Constitutional:       Appearance: Normal appearance.      Comments: BMI 17.7   HENT:      Head: Normocephalic and atraumatic.      Right Ear: External ear normal.      Left Ear: External ear normal.      Nose: Nose normal.      Mouth/Throat:      Mouth: Mucous membranes are moist.   Eyes:      Extraocular Movements: Extraocular movements intact.   Neck:      Musculoskeletal: Normal range of motion and neck supple.   Cardiovascular:      Rate and Rhythm: Normal rate.      Comments: V paced   Pulmonary:      Effort: Pulmonary effort is normal.      Breath sounds: Normal breath sounds.   Abdominal:      Palpations: Abdomen is soft.   Genitourinary:     Comments: deferred  Musculoskeletal: Normal range of motion.   Skin:     General: Skin is warm and dry.   Neurological:      General: No focal deficit present.      Mental Status: She is oriented to person, place, and time.   Psychiatric:         Mood and Affect: Mood normal.         Behavior: Behavior normal.         Thought Content: Thought content normal.         Judgment: Judgment normal.      Comments: Lethargic but appropriate          Results Review:  I have personally reviewed most recent lab results and agree with findings, most notably: cbc, UA CMP.    Results from last 7 days   Lab Units 12/12/20  0058   WBC 10*3/mm3 17.00*   HEMOGLOBIN g/dL 10.0*      HEMATOCRIT % 30.7*   PLATELETS 10*3/mm3 301   INR  1.02     Results from last 7 days   Lab Units 12/12/20  0116 12/12/20  0058   SODIUM mmol/L  --  128*   POTASSIUM mmol/L  --  5.4*   CHLORIDE mmol/L  --  91*   CO2 mmol/L  --  31.0*   BUN mg/dL  --  23   CREATININE mg/dL  --  1.24*   GLUCOSE mg/dL  --  100*   CALCIUM mg/dL  --  9.0   ALT (SGPT) U/L  --  13   AST (SGOT) U/L  --  22   TROPONIN T ng/mL  --  0.019   PROBNP pg/mL  --  4,443.0*   LACTATE mmol/L 1.0  --    PROCALCITONIN ng/mL  --  1.40*     Estimated Creatinine Clearance: 26.6 mL/min (A) (by C-G formula based on SCr of 1.24 mg/dL (H)).  Brief Urine Lab Results  (Last result in the past 365 days)      Color   Clarity   Blood   Leuk Est   Nitrite   Protein   CREAT   Urine HCG        12/12/20 0134 Yellow Cloudy  Comment:  Result checked  Negative Moderate (2+) Negative Negative               Microbiology Results (last 10 days)     Procedure Component Value - Date/Time    Respiratory Panel PCR w/COVID-19(SARS-CoV-2) REINIER/JOVANY/MAGO/PAD/COR/MAD/REAGAN In-House, NP Swab in UTM/VTM, 3-4 HR TAT - Swab, Nasopharynx [[id number]]  (Normal) Collected: 12/12/20 0023    Lab Status: Final result Specimen: Swab from Nasopharynx Updated: 12/12/20 0132     ADENOVIRUS, PCR Not Detected     Coronavirus 229E Not Detected     Coronavirus HKU1 Not Detected     Coronavirus NL63 Not Detected     Coronavirus OC43 Not Detected     COVID19 Not Detected     Human Metapneumovirus Not Detected     Human Rhinovirus/Enterovirus Not Detected     Influenza A PCR Not Detected     Influenza B PCR Not Detected     Parainfluenza Virus 1 Not Detected     Parainfluenza Virus 2 Not Detected     Parainfluenza Virus 3 Not Detected     Parainfluenza Virus 4 Not Detected     RSV, PCR Not Detected     Bordetella pertussis pcr Not Detected     Bordetella parapertussis PCR Not Detected     Chlamydophila pneumoniae PCR Not Detected     Mycoplasma pneumo by PCR Not Detected    Narrative:      Fact sheet for  providers: https://docs.[employer]/wp-content/uploads/MHV4360-0060-UD2.1-EUA-Provider-Fact-Sheet-3.pdf    Fact sheet for patients: https://docs.[employer]/wp-content/uploads/KOT7732-2130-HC2.1-EUA-Patient-Fact-Sheet-1.pdf    Test performed by PCR.    S. Pneumo Ag Urine or CSF - Urine, Urine, Clean Catch [[id number]]  (Normal) Collected: 12/05/20 0440    Lab Status: Final result Specimen: Urine, Clean Catch Updated: 12/05/20 0519     Strep Pneumo Ag Negative    Legionella Antigen, Urine - Urine, Urine, Clean Catch [[id number]]  (Normal) Collected: 12/05/20 0440    Lab Status: Final result Specimen: Urine, Clean Catch Updated: 12/05/20 0519     LEGIONELLA ANTIGEN, URINE Negative          ECG/EMG Results (most recent)     Procedure Component Value Units Date/Time    ECG 12 Lead [[id number]] Collected: 12/12/20 0322     Updated: 12/12/20 0324     QT Interval 419 ms     Narrative:      HEART RATE= 82  bpm  RR Interval= 731  ms  IA Interval= 122  ms  P Horizontal Axis= 12  deg  P Front Axis= -29  deg  QRSD Interval= 131  ms  QT Interval= 419  ms  QRS Axis= 212  deg  T Wave Axis= 31  deg  - ABNORMAL ECG -  Atrial-sensed ventricular-paced rhythm  Electronically Signed By:   Date and Time of Study: 2020-12-12 03:22:32          Results for orders placed during the hospital encounter of 02/28/20   Duplex Carotid Ultrasound CAR    Narrative · Proximal right internal carotid artery moderate stenosis.  · Proximal left internal carotid artery mild stenosis.          Results for orders placed during the hospital encounter of 11/30/20   Adult Transthoracic Echo Complete W/ Cont if Necessary Per Protocol    Narrative · Left ventricular ejection fraction appears to be 56 - 60%.  · Left ventricular wall thickness is consistent with moderate concentric   hypertrophy.  · There is a TAVR valve present.  · Moderate tricuspid valve regurgitation is present.  · Left ventricular diastolic function is consistent with (grade I)   impaired  relaxation.  · No pericardial effusion noted          Ct Chest Pulmonary Embolism    Result Date: 12/12/2020  1. No pulmonary embolism. 2. Reticular and ground glass densities diffusely may represent edema or pneumonia superimposed on chronic interstitial change. 3. Stable surgical changes and cardiomegaly. 4. Calcified left pleural plaque. Electronically signed by:  Isidro Staley M.D.  12/12/2020 1:11 AM    Xr Chest Pa & Lateral    Result Date: 12/4/2020  1.  Moderate bilateral pulmonary infiltrates, edema versus pneumonia. 2.  Small bilateral pleural effusions. Electronically signed by:  Deanna Smith M.D.  12/4/2020 10:23 PM        Estimated Creatinine Clearance: 26.6 mL/min (A) (by C-G formula based on SCr of 1.24 mg/dL (H)).    Assessment/Plan   Assessment/Plan       Active Hospital Problems    Diagnosis  POA   • Syncope and collapse [R55]  Yes   • Diarrhea [R19.7]  Yes   • Acute renal failure (ARF) (CMS/HCC) [N17.9]  Yes   • Leukocytosis [D72.829]  Yes   • Shortness of breath [R06.02]  Yes      Resolved Hospital Problems   No resolved problems to display.     Syncope and collapse ? Per EMS - pt denies pt reports progressive weakness and dyspnea  Pacemaker interrogated in ED - not hypotensive here, recent cardiac evaluation here a week ago troponin not elevated denies chest pain , maybe secondary to metabolic encephalopathy see below : continuous cardiac monitoring , Covid-19 negative , case management and PT eval and treat may need longer stay at rehab, [facility] records pending     Diarrhea - recently on ABX- c- diff culture ordered and pending - await results to sart Flagyl    Leukocytosis - Too numerous to count wbc in urine , culture pending, possible pneumonia per CT report continue Ceftriaxone IV for now with blood culture pending , stable on 2l oxygen nasal canula     Acute renal failure CR 1.24 was 0.64 hyponatremia 128 and hyperkalemia 5.4 likely secondary to dehydration from diarrhea, gentle IVF NS @  50 cc hr given PMH HFpEF see below , trend renal function , 15 mg Kayexalate po x1      HFpEF 56-60% per echo dated 12/2/20 impaired relaxation BNP 4400 was 13,000 12/5/20, home meds unverified was on po Lasix     CAD s/p CABG no chest pain troponin not elevated , stable continue home meds once verified     TAVR/SSS s/p pacemaker in situ was on Eliquis meds unverified at this time     DJD recent left shoulder surgery     Depression/ neurology suggested dementia last admission - was on Seroquel meds unverified at this time     Low BMI 17.7 consider nutrition consult               VTE Prophylaxis - was on home Eliquis unverified at this time SCD for now   Mechanical Order History:     None      Pharmalogical Order History:     None          CODE STATUS:    Code Status and Medical Interventions:   Ordered at: 12/12/20 0435     Code Status:    CPR     Medical Interventions (Level of Support Prior to Arrest):    Full       This patient has been 12/12/20      I discussed the patient's findings and my recommendations with patient.      Signature:Electronically signed by HERMINIO Davis, 12/12/20, 4:32 AM EST.    Attending attestation:  The patient is a 85-year-old female who was recently hospitalized for syncopal episode and was seen by neurology and cardiology was felt to have had TME from medical condition (UTI) and the patient completed treatment for the UTI with Omnicef.  Unfortunately it does not appear that there was a urine culture performed during that hospital stay.  The patient reports that she was also admitted to [facility] for evaluation and was sent back to rehab after 1 day.  Patient complains of some problems with shortness of breath.  She reports some increased urination related to Lasix.  She has been having some diarrhea couple times per day for several days.  No complaints of fever, chills, sweats, shortness of breath, chest pain or  complaints.    Physical exam: Well-developed  well-nourished female in no acute distress sitting up in the chair comfortable on room air awake and alert; mucous membranes moist; sclerae anicteric; lungs clear to auscultation bilaterally; CV regular rate and rhythm; abdomen soft nontender nondistended; extremities with no edema, cyanosis or calf tenderness; palpable pedal pulses bilaterally; neurologic exam grossly nonfocal; no Quezada catheter.    Assessment and plan: Acute C. difficile colitis with diarrhea related to recent antibiotic use  -Patient has been started on vancomycin orally as well as Florastor and WelChol.    ?  Acute urinary tract infection  -Patient has been started on Rocephin and culture is pending    Patient was seen and examined and chart reviewed.  I agree with the assessment and plan of the APRN.  Charity Hinojosa MD  Electronically signed by Charity Hinojosa MD, 12/12/20, 3:21 PM EST.      [facility], [place] Hospitalist Team

## 2020-12-12 NOTE — ED PROVIDER NOTES
Subjective   85-year-old female transferred by EMS from home status post episode of decreased responsiveness and hypotension.  Per medic, per family, patient was lethargic for 5 to 10 minutes with low blood pressure.  There was no shaking observed.  Upon EMS arrival, patient was mildly drowsy but appropriate with normal blood pressure.  In route, patient was on the monitor but there was no arrhythmia but there was episodic low blood pressure, 60/40 at which time patient was drowsy.  At bedside, blood pressure is normal, patient is alert and oriented complaining of mild to moderate dyspnea, no chest pain, no cough, no fever.  Patient does admit to diarrhea but denies any abdominal pain or urinary symptoms or vomiting.  Patient has had intermittent dizziness, but no headache.          Review of Systems   Constitutional: Positive for fatigue.   Respiratory: Positive for shortness of breath.    Gastrointestinal: Positive for diarrhea.   Neurological:        As per HPI, no focal weakness or numbness   All other systems reviewed and are negative.      Past Medical History:   Diagnosis Date   • Aortic stenosis 10/28/2014   • Atrial premature complex 12/18/2015   • Chronic coronary artery disease 10/28/2014   • Dyslipidemia 10/28/2014   • Hyperlipidemia    • Hypertension 10/28/2014   • Nausea 09/2020   • Nonsustained ventricular tachycardia (CMS/HCC) 12/18/2015   • Partial paralysis of right hand (CMS/HCC)     states right arm only.  Cannot have sticks in arm, hand only   • Shoulder pain, left        Allergies   Allergen Reactions   • Sulfa Antibiotics Hives       Past Surgical History:   Procedure Laterality Date   • AORTIC VALVE REPAIR/REPLACEMENT N/A 5/26/2020    Procedure: TTE TRANSFEMORAL TRANSCATHETER AORTIC VALVE REPLACEMENT PERCUTANEOUS APPROACH;  Surgeon: J Carlos Leon MD;  Location: [facility] 18/19;  Service: Cardiothoracic;  Laterality: N/A;   • AORTIC VALVE REPAIR/REPLACEMENT N/A 5/26/2020     Procedure: Transfemoral Transcatheter Aortic Valve Replacement w/Intra Op TTE and Possible Open Rescue Surgery;  Surgeon: Vasiliy Bruce MD;  Location: [facility] OR 18/19;  Service: Cardiovascular;  Laterality: N/A;   • APPENDECTOMY     • CARDIAC CATHETERIZATION N/A 3/2/2020    Procedure: Left Heart Cath and coronary angiogram;  Surgeon: Wyatt Kwok MD;  Location: [facility] CATH INVASIVE LOCATION;  Service: Cardiovascular;  Laterality: N/A;   • CARDIAC CATHETERIZATION N/A 3/2/2020    Procedure: Saphenous Vein Graft;  Surgeon: Wyatt Kwok MD;  Location: [facility] CATH INVASIVE LOCATION;  Service: Cardiovascular;  Laterality: N/A;   • CARDIAC PACEMAKER PLACEMENT  2017   • CARDIAC SURGERY  1999    Bypass surgery   • EYE SURGERY Bilateral     cat ext   • HERNIA REPAIR     • TOTAL SHOULDER ARTHROPLASTY W/ DISTAL CLAVICLE EXCISION Left 10/6/2020    Procedure: TOTAL SHOULDER REVERSE ARTHROPLASTY;  Surgeon: Oli Barcenas MD;  Location: [facility] MAIN OR;  Service: Orthopedics;  Laterality: Left;   • TOTAL SHOULDER REVISION  10/06/2020    left       Family History   Problem Relation Age of Onset   • No Known Problems Mother    • No Known Problems Father    • No Known Problems Sister    • No Known Problems Brother    • Malig Hyperthermia Neg Hx        Social History     Socioeconomic History   • Marital status:      Spouse name: Not on file   • Number of children: Not on file   • Years of education: Not on file   • Highest education level: Not on file   Tobacco Use   • Smoking status: Never Smoker   • Smokeless tobacco: Never Used   • Tobacco comment: CAFFEINE USE: 1 CUP COFFEE DAILY   Substance and Sexual Activity   • Alcohol use: No     Frequency: Never   • Drug use: No   • Sexual activity: Defer           Objective   Physical Exam  Constitutional:       Appearance: Normal appearance.   HENT:      Head: Normocephalic and atraumatic.      Mouth/Throat:      Mouth: Mucous membranes are moist.       Pharynx: Oropharynx is clear.   Eyes:      Extraocular Movements: Extraocular movements intact.      Conjunctiva/sclera: Conjunctivae normal.      Pupils: Pupils are equal, round, and reactive to light.   Neck:      Musculoskeletal: Normal range of motion and neck supple.   Cardiovascular:      Rate and Rhythm: Normal rate.      Comments: 3/6 murmur  Pulmonary:      Effort: Pulmonary effort is normal.      Comments: Left basilar rhonchi  Abdominal:      General: Bowel sounds are normal. There is no distension.      Palpations: Abdomen is soft.      Tenderness: There is no abdominal tenderness.   Musculoskeletal:      Comments: Left upper extremity edema distal arm, improving per patient.  Patient tells me the staff at the nursing home she was recently discharged from was rough on her, denies any significant pain with range of motion, no bony deformity or tenderness   Skin:     General: Skin is warm and dry.      Capillary Refill: Capillary refill takes less than 2 seconds.   Neurological:      Mental Status: She is alert and oriented to person, place, and time.      Cranial Nerves: No cranial nerve deficit.      Sensory: No sensory deficit.      Motor: No weakness.   Psychiatric:         Mood and Affect: Mood normal.         Behavior: Behavior normal.         Procedures           ED Course  ED Course as of Dec 12 0333   Sat Dec 12, 2020   0330 EKG interpretation: Atrially paced rhythm, rate 82    [JR]      ED Course User Index  [JR] Lawrence Viera MD                                           [facility]  Number of Diagnoses or Management Options  Acute UTI:   Diarrhea, unspecified type:   Syncope and collapse:   Diagnosis management comments: Results for orders placed or performed during the hospital encounter of 12/11/20  -Respiratory Panel PCR w/COVID-19(SARS-CoV-2) REINIER/JOVANY/MAGO/PAD/COR/MAD/REAGAN In-House, NP Swab in UTM/VTM, 3-4 HR TAT - Swab, Nasopharynx  Specimen: Nasopharynx; Swab       Result                       Value             Ref Range           ADENOVIRUS, PCR             Not Detected      Not Detected        Coronavirus 229E            Not Detected      Not Detected        Coronavirus HKU1            Not Detected      Not Detected        Coronavirus NL63            Not Detected      Not Detected        Coronavirus OC43            Not Detected      Not Detected        COVID19                     Not Detected      Not Detected*       Human Metapneumovirus       Not Detected      Not Detected        Human Rhinovirus/Enter*     Not Detected      Not Detected        Influenza A PCR             Not Detected      Not Detected        Influenza B PCR             Not Detected      Not Detected        Parainfluenza Virus 1       Not Detected      Not Detected        Parainfluenza Virus 2       Not Detected      Not Detected        Parainfluenza Virus 3       Not Detected      Not Detected        Parainfluenza Virus 4       Not Detected      Not Detected        RSV, PCR                    Not Detected      Not Detected        Bordetella pertussis p*     Not Detected      Not Detected        Bordetella parapertuss*     Not Detected      Not Detected        Chlamydophila pneumoni*     Not Detected      Not Detected        Mycoplasma pneumo by P*     Not Detected      Not Detected   -Comprehensive Metabolic Panel  Specimen: Blood       Result                      Value             Ref Range           Glucose                     100 (H)           65 - 99 mg/dL       BUN                         23                8 - 23 mg/dL        Creatinine                  1.24 (H)          0.57 - 1.00 *       Sodium                      128 (L)           136 - 145 mm*       Potassium                   5.4 (H)           3.5 - 5.2 mm*       Chloride                    91 (L)            98 - 107 mmo*       CO2                         31.0 (H)          22.0 - 29.0 *       Calcium                     9.0               8.6 - 10.5 m*       Total Protein                6.5               6.0 - 8.5 g/*       Albumin                     3.30 (L)          3.50 - 5.20 *       ALT (SGPT)                  13                1 - 33 U/L          AST (SGOT)                  22                1 - 32 U/L          Alkaline Phosphatase        90                39 - 117 U/L        Total Bilirubin             0.6               0.0 - 1.2 mg*       eGFR Non  Amer       41 (L)            >60 mL/min/1*       Globulin                    3.2               gm/dL               A/G Ratio                   1.0               g/dL                BUN/Creatinine Ratio        18.5              7.0 - 25.0          Anion Gap                   6.0               5.0 - 15.0 m*  -CBC Auto Differential  Specimen: Blood       Result                      Value             Ref Range           WBC                         17.00 (H)         3.40 - 10.80*       RBC                         3.20 (L)          3.77 - 5.28 *       Hemoglobin                  10.0 (L)          12.0 - 15.9 *       Hematocrit                  30.7 (L)          34.0 - 46.6 %       MCV                         95.8              79.0 - 97.0 *       MCH                         31.2              26.6 - 33.0 *       MCHC                        32.6              31.5 - 35.7 *       RDW                         13.8              12.3 - 15.4 %       RDW-SD                      45.9              37.0 - 54.0 *       MPV                         8.6               6.0 - 12.0 fL       Platelets                   301               140 - 450 10*       Neutrophil %                79.7 (H)          42.7 - 76.0 %       Lymphocyte %                7.3 (L)           19.6 - 45.3 %       Monocyte %                  11.8              5.0 - 12.0 %        Eosinophil %                0.6               0.3 - 6.2 %         Basophil %                  0.6               0.0 - 1.5 %         Neutrophils, Absolute       13.60 (H)         1.70 - 7.00 *        Lymphocytes, Absolute       1.20              0.70 - 3.10 *       Monocytes, Absolute         2.00 (H)          0.10 - 0.90 *       Eosinophils, Absolute       0.10              0.00 - 0.40 *       Basophils, Absolute         0.10              0.00 - 0.20 *       nRBC                        0.1               0.0 - 0.2 /1*  -Protime-INR  Specimen: Blood       Result                      Value             Ref Range           Protime                     11.2              9.6 - 11.7 S*       INR                         1.02              0.93 - 1.10    -aPTT  Specimen: Blood       Result                      Value             Ref Range           PTT                         26.4              24.0 - 31.0 *  -BNP  Specimen: Blood       Result                      Value             Ref Range           proBNP                      4,443.0 (H)       0.0-1,800.0 *  -Urinalysis With Culture If Indicated - Urine, Catheter  Specimen: Urine, Catheter       Result                      Value             Ref Range           Color, UA                   Yellow            Yellow, Straw       Appearance, UA              Cloudy (A)        Clear               pH, UA                      6.0               5.0 - 8.0           Specific [place], UA        1.013             1.005 - 1.030       Glucose, UA                 Negative          Negative            Ketones, UA                 Negative          Negative            Bilirubin, UA               Negative          Negative            Blood, UA                   Negative          Negative            Protein, UA                 Negative          Negative            Leuk Esterase, UA                             Negative        Moderate (2+) (A)       Nitrite, UA                 Negative          Negative            Urobilinogen, UA            0.2 E.U./dL       0.2 - 1.0 E.*  -Troponin  Specimen: Blood       Result                      Value             Ref Range           Troponin T                   0.019             0.000 - 0.03*  -Procalcitonin  Specimen: Blood       Result                      Value             Ref Range           Procalcitonin               1.40 (H)          0.00 - 0.25 *  -D-dimer, Quantitative  Specimen: Blood       Result                      Value             Ref Range           D-Dimer, Quantitative       2.04 (H)          0.00 - 0.59 *  -Urinalysis, Microscopic Only - Urine, Catheter  Specimen: Urine, Catheter       Result                      Value             Ref Range           RBC, UA                     3-5 (A)           None Seen /H*       WBC, UA                                       None Seen /H*   Too Numerous to Count (A)       Bacteria, UA                None Seen         None Seen /H*       Squamous Epithelial Ce*     3-6 (A)           None Seen, 0*       Hyaline Casts, UA           0-2               None Seen /L*       Methodology                                                   Manual Light Microscopy  -POC Lactate  Specimen: Blood       Result                      Value             Ref Range           Lactate                     1.0               0.5 - 2.0 mm*  -Light Blue Top       Result                      Value             Ref Range           Extra Tube                                                    hold for add-on  -Lavender Top       Result                      Value             Ref Range           Extra Tube                                                    hold for add-on  -Gold Top - SST       Result                      Value             Ref Range           Extra Tube                                                    Hold for add-ons.  Ct Chest Pulmonary Embolism    Result Date: 12/12/2020  1. No pulmonary embolism. 2. Reticular and ground glass densities diffusely may represent edema or pneumonia superimposed on chronic interstitial change. 3. Stable surgical changes and cardiomegaly. 4. Calcified left pleural plaque. Electronically signed by:   Isidro Staley M.D.  12/12/2020 1:11 AM    Patient has done well in the emergency department with resolution of her dyspnea. Blood pressure has been low normal but there is been no hypotension. Patient did have an episode of diarrhea with fecal contamination of the urethra. Differential would include antibiotic associated diarrhea versus C. difficile, patient has no abdominal pain or distention, stool cultures have been ordered. Urine culture has been ordered, will cover with Rocephin until available. Possible underlying pneumonia, will cover with just Rocephin for now as there is concern for worsening diarrhea with further antibiotic utilization. Patient did have pacemaker interrogation and I discussed with the technician who reported that there was no arrhythmia. He did state there is a alert for ventricular arrhythmia but he said that was a prior event that has not been cleared out of the system.       Amount and/or Complexity of Data Reviewed  Clinical lab tests: reviewed  Tests in the radiology section of CPT®: reviewed  Decide to obtain previous medical records or to obtain history from someone other than the patient: yes        Final diagnoses:   Syncope and collapse   Acute UTI   Diarrhea, unspecified type            Lawrence Viera MD  12/12/20 0330       Lawrence Viera MD  12/12/20 0333

## 2020-12-12 NOTE — DISCHARGE PLACEMENT REQUEST
"Tena Curtis (85 y.o. Female)     Date of Birth Social Security Number Address Home Phone MRN    [id number]  [id number] [facility] [place] [address] [phone] [id number]    Druze Marital Status          Other        Admission Date Admission Type Admitting Provider Attending Provider Department, Room/Bed    12/11/20 Emergency Charity Hinojosa MD Hall, Kelli G, MD [facility] 3C MEDICAL INPATIENT, 359/1    Discharge Date Discharge Disposition Discharge Destination                       Attending Provider: Charity Hinojosa MD    Allergies: Sulfa Antibiotics    Isolation: Enh Drop/Con, Spore   Infection: COVID (rule out) (12/11/20), C.difficile (12/12/20)   Code Status: CPR    Ht: 167.6 cm (66\")   Wt: 53.6 kg (118 lb 2.7 oz)    Admission Cmt: None   Principal Problem: None                Active Insurance as of 12/11/2020     Primary Coverage     Payor Plan Insurance Group Employer/Plan Group    MEDICARE MEDICARE A & B      Payor Plan Address Payor Plan Phone Number Payor Plan Fax Number Effective Dates    [address] [phone]  4/1/2001 - None Entered    [facility] [id number]       Subscriber Name Subscriber Birth Date Member ID       TENA CURTIS 1935 [id number]           Secondary Coverage     Payor Plan Insurance Group Employer/Plan Group    [facility] SUPP [id number]     Payor Plan Address Payor Plan Phone Number Payor Plan Fax Number Effective Dates    [address]   12/1/2016 - None Entered    [facility] [id number]       Subscriber Name Subscriber Birth Date Member ID       TENA CURTIS 1935 [id number]                 Emergency Contacts      (Rel.) Home Phone Work Phone Mobile Phone    ADRIANNE CURTIS (Spouse) [phone] -- --    ROGELIO ROSE (Daughter) [phone] -- --               History & Physical      Essing-Sammi Mcgrath APRN at 12/12/20 0416                [facility] Medicine Services      Patient Name: Tena ROWELL " "Kirsten  : 1935  MRN: [id number]  Primary Care Physician: Tigre Duran MD  Date of admission: 2020    Patient Care Team:  Tigre Duran MD as PCP - General  Tigre Duran MD as PCP - Family Medicine  Wyatt Kwok MD as Consulting Physician (Cardiology)          Subjective   History Present Illness     Chief Complaint:   Chief Complaint   Patient presents with   • Hypotension     nausea.                  85 year old female with PMH of CAD s/p CABG , severe aortic stenosis s/p TAVR in May, SSS with cardiac pacemaker in situ, HTN, PAF, DJD post shoulder surgery, non sustained V tach, HLDpresents with complaints of progressive weakness and dyspnea. She is awake and alert and appropriate but lethargic. She denies chest pain or syncope but per ED report EMS reports she had a \"spell\" en route  with hypotension . In ED no hypotension or arrythmmia seen . Per ED report pacemaker interrogated with no unusual results. She is Covid-19 negative. Review of records shows she was just admitted here 20 to 20 for similar complaints and seen by both neurology and cardiology. Per notes it was felt to be metabolic encephalopathy from possible UTI and she was discharged on Cefdinir. She reports diarrhea past 2 days without hematochezia or melena or abdominal pain. C-Diff culture ordered and pending. UA shows no bacteria but too numerous to count wbc . She was started on IV Ceftriaxone in ED with urine and blood culture pending. D-Dimer was elevated. CT chest per radiology today:  \"IMPRESSION:  1. No pulmonary embolism.  2. Reticular and ground glass densities diffusely may represent edema or pneumonia superimposed on chronic interstitial change.  3. Stable surgical changes and cardiomegaly.  4. Calcified left pleural plaque.\"    Labs show wbc 17, K 5.4, Na 128, Cr 1.24 ( was 0.64), BNP 4400 was 13,000 20, Hgb 10, procalcitonin 1.40.     She reports she left here after last admission and went to " rehab then was at [facility][1] and just discharged yesterday. No records of [facility] in EMR and records have been requested. She will be admitted for further evaluation and treatment .       Review of Systems   Constitution: Positive for malaise/fatigue.   HENT: Negative.    Eyes: Negative.    Cardiovascular: Positive for dyspnea on exertion.   Respiratory: Positive for shortness of breath.    Endocrine: Negative.    Hematologic/Lymphatic: Negative.    Skin: Negative.    Musculoskeletal: Positive for joint pain and muscle weakness.   Gastrointestinal: Positive for diarrhea.   Genitourinary: Negative.    Neurological: Positive for weakness.   Psychiatric/Behavioral: Negative.    Allergic/Immunologic: Negative.            Personal History     Past Medical History:   Past Medical History:   Diagnosis Date   • Aortic stenosis 10/28/2014   • Atrial premature complex 12/18/2015   • Chronic coronary artery disease 10/28/2014   • Dyslipidemia 10/28/2014   • Hyperlipidemia    • Hypertension 10/28/2014   • Nausea 09/2020   • Nonsustained ventricular tachycardia (CMS/HCC) 12/18/2015   • Partial paralysis of right hand (CMS/HCC)     states right arm only.  Cannot have sticks in arm, hand only   • Shoulder pain, left        Surgical History:      Past Surgical History:   Procedure Laterality Date   • AORTIC VALVE REPAIR/REPLACEMENT N/A 5/26/2020    Procedure: TTE TRANSFEMORAL TRANSCATHETER AORTIC VALVE REPLACEMENT PERCUTANEOUS APPROACH;  Surgeon: J Carlos Leon MD;  Location: [facility] OR 18/19;  Service: Cardiothoracic;  Laterality: N/A;   • AORTIC VALVE REPAIR/REPLACEMENT N/A 5/26/2020    Procedure: Transfemoral Transcatheter Aortic Valve Replacement w/Intra Op TTE and Possible Open Rescue Surgery;  Surgeon: Vasiliy Bruce MD;  Location: [facility] OR 18/19;  Service: Cardiovascular;  Laterality: N/A;   • APPENDECTOMY     • CARDIAC CATHETERIZATION N/A 3/2/2020    Procedure: Left Heart Cath and coronary  angiogram;  Surgeon: Wyatt Kwok MD;  Location: [facility] CATH INVASIVE LOCATION;  Service: Cardiovascular;  Laterality: N/A;   • CARDIAC CATHETERIZATION N/A 3/2/2020    Procedure: Saphenous Vein Graft;  Surgeon: Wyatt Kwok MD;  Location: [facility] CATH INVASIVE LOCATION;  Service: Cardiovascular;  Laterality: N/A;   • CARDIAC PACEMAKER PLACEMENT  2017   • CARDIAC SURGERY  1999    Bypass surgery   • EYE SURGERY Bilateral     cat ext   • HERNIA REPAIR     • TOTAL SHOULDER ARTHROPLASTY W/ DISTAL CLAVICLE EXCISION Left 10/6/2020    Procedure: TOTAL SHOULDER REVERSE ARTHROPLASTY;  Surgeon: Oli Barcenas MD;  Location: [facility] MAIN OR;  Service: Orthopedics;  Laterality: Left;   • TOTAL SHOULDER REVISION  10/06/2020    left           Family History: family history includes No Known Problems in her brother, father, mother, and sister. Otherwise pertinent FHx was reviewed and unremarkable.     Social History:  reports that she has never smoked. She has never used smokeless tobacco. She reports that she does not drink alcohol or use drugs.      Medications:  Prior to Admission medications    Medication Sig Start Date End Date Taking? Authorizing Provider   acebutolol (SECTRAL) 200 MG capsule TAKE ONE CAPSULE BY MOUTH TWICE A DAY 5/6/20  Yes Wyatt Kwok MD   cefdinir (OMNICEF) 300 MG capsule Take 1 capsule by mouth 2 (Two) Times a Day for 5 days. 12/7/20 12/12/20 Yes Clarisse Velez MD   ELIQUIS 2.5 MG tablet tablet TAKE ONE TABLET BY MOUTH EVERY 12 HOURS 4/13/20  Yes Wyatt Kwok MD   furosemide (LASIX) 20 MG tablet Take 20 mg by mouth.   Yes ProviderYeyo MD   lisinopril (PRINIVIL,ZESTRIL) 20 MG tablet TAKE ONE TABLET BY MOUTH DAILY 10/5/20  Yes Wyatt Kwok MD   nystatin (MYCOSTATIN) 388176 UNIT/ML suspension Swish and swallow 500,000 Units.   Yes Yeyo Bergman MD   pravastatin (PRAVACHOL) 20 MG tablet TAKE ONE TABLET BY MOUTH DAILY 11/23/20  Yes Wyatt Kwok MD   QUEtiapine  (SEROquel) 25 MG tablet Take 25 mg by mouth.   Yes Provider, MD Yeyo   melatonin 5 MG tablet tablet Take 5 mg by mouth At Night As Needed (sleep).  12/12/20  Provider, MD Yeyo       Allergies:    Allergies   Allergen Reactions   • Sulfa Antibiotics Hives       Objective   Objective     Vital Signs  Temp:  [97.6 °F (36.4 °C)] 97.6 °F (36.4 °C)  Heart Rate:  [73-84] 84  Resp:  [20] 20  BP: ()/(41-72) 152/48  SpO2:  [93 %-99 %] 98 %  on   ;      Body mass index is 17.75 kg/m².    Physical Exam  Vitals signs reviewed.   Constitutional:       Appearance: Normal appearance.      Comments: BMI 17.7   HENT:      Head: Normocephalic and atraumatic.      Right Ear: External ear normal.      Left Ear: External ear normal.      Nose: Nose normal.      Mouth/Throat:      Mouth: Mucous membranes are moist.   Eyes:      Extraocular Movements: Extraocular movements intact.   Neck:      Musculoskeletal: Normal range of motion and neck supple.   Cardiovascular:      Rate and Rhythm: Normal rate.      Comments: V paced   Pulmonary:      Effort: Pulmonary effort is normal.      Breath sounds: Normal breath sounds.   Abdominal:      Palpations: Abdomen is soft.   Genitourinary:     Comments: deferred  Musculoskeletal: Normal range of motion.   Skin:     General: Skin is warm and dry.   Neurological:      General: No focal deficit present.      Mental Status: She is oriented to person, place, and time.   Psychiatric:         Mood and Affect: Mood normal.         Behavior: Behavior normal.         Thought Content: Thought content normal.         Judgment: Judgment normal.      Comments: Lethargic but appropriate          Results Review:  I have personally reviewed most recent lab results and agree with findings, most notably: cbc, UA CMP.    Results from last 7 days   Lab Units 12/12/20  0058   WBC 10*3/mm3 17.00*   HEMOGLOBIN g/dL 10.0*   HEMATOCRIT % 30.7*   PLATELETS 10*3/mm3 301   INR  1.02     Results from last 7  days   Lab Units 12/12/20  0116 12/12/20  0058   SODIUM mmol/L  --  128*   POTASSIUM mmol/L  --  5.4*   CHLORIDE mmol/L  --  91*   CO2 mmol/L  --  31.0*   BUN mg/dL  --  23   CREATININE mg/dL  --  1.24*   GLUCOSE mg/dL  --  100*   CALCIUM mg/dL  --  9.0   ALT (SGPT) U/L  --  13   AST (SGOT) U/L  --  22   TROPONIN T ng/mL  --  0.019   PROBNP pg/mL  --  4,443.0*   LACTATE mmol/L 1.0  --    PROCALCITONIN ng/mL  --  1.40*     Estimated Creatinine Clearance: 26.6 mL/min (A) (by C-G formula based on SCr of 1.24 mg/dL (H)).  Brief Urine Lab Results  (Last result in the past 365 days)      Color   Clarity   Blood   Leuk Est   Nitrite   Protein   CREAT   Urine HCG        12/12/20 0134 Yellow Cloudy  Comment:  Result checked  Negative Moderate (2+) Negative Negative               Microbiology Results (last 10 days)     Procedure Component Value - Date/Time    Respiratory Panel PCR w/COVID-19(SARS-CoV-2) REINIER/JOVANY/MAGO/PAD/COR/MAD/REAGAN In-House, NP Swab in UTM/VTM, 3-4 HR TAT - Swab, Nasopharynx [[id number]]  (Normal) Collected: 12/12/20 0023    Lab Status: Final result Specimen: Swab from Nasopharynx Updated: 12/12/20 0132     ADENOVIRUS, PCR Not Detected     Coronavirus 229E Not Detected     Coronavirus HKU1 Not Detected     Coronavirus NL63 Not Detected     Coronavirus OC43 Not Detected     COVID19 Not Detected     Human Metapneumovirus Not Detected     Human Rhinovirus/Enterovirus Not Detected     Influenza A PCR Not Detected     Influenza B PCR Not Detected     Parainfluenza Virus 1 Not Detected     Parainfluenza Virus 2 Not Detected     Parainfluenza Virus 3 Not Detected     Parainfluenza Virus 4 Not Detected     RSV, PCR Not Detected     Bordetella pertussis pcr Not Detected     Bordetella parapertussis PCR Not Detected     Chlamydophila pneumoniae PCR Not Detected     Mycoplasma pneumo by PCR Not Detected    Narrative:      Fact sheet for providers:  https://docs.[employer]/wp-content/uploads/BAT3689-4984-GX1.1-EUA-Provider-Fact-Sheet-3.pdf    Fact sheet for patients: https://docs.[employer]/wp-content/uploads/HAT0172-7125-NS7.1-EUA-Patient-Fact-Sheet-1.pdf    Test performed by PCR.    S. Pneumo Ag Urine or CSF - Urine, Urine, Clean Catch [[id number]]  (Normal) Collected: 12/05/20 0440    Lab Status: Final result Specimen: Urine, Clean Catch Updated: 12/05/20 0519     Strep Pneumo Ag Negative    Legionella Antigen, Urine - Urine, Urine, Clean Catch [[id number]]  (Normal) Collected: 12/05/20 0440    Lab Status: Final result Specimen: Urine, Clean Catch Updated: 12/05/20 0519     LEGIONELLA ANTIGEN, URINE Negative          ECG/EMG Results (most recent)     Procedure Component Value Units Date/Time    ECG 12 Lead [[id number]] Collected: 12/12/20 0322     Updated: 12/12/20 0324     QT Interval 419 ms     Narrative:      HEART RATE= 82  bpm  RR Interval= 731  ms  WI Interval= 122  ms  P Horizontal Axis= 12  deg  P Front Axis= -29  deg  QRSD Interval= 131  ms  QT Interval= 419  ms  QRS Axis= 212  deg  T Wave Axis= 31  deg  - ABNORMAL ECG -  Atrial-sensed ventricular-paced rhythm  Electronically Signed By:   Date and Time of Study: 2020-12-12 03:22:32          Results for orders placed during the hospital encounter of 02/28/20   Duplex Carotid Ultrasound CAR    Narrative · Proximal right internal carotid artery moderate stenosis.  · Proximal left internal carotid artery mild stenosis.          Results for orders placed during the hospital encounter of 11/30/20   Adult Transthoracic Echo Complete W/ Cont if Necessary Per Protocol    Narrative · Left ventricular ejection fraction appears to be 56 - 60%.  · Left ventricular wall thickness is consistent with moderate concentric   hypertrophy.  · There is a TAVR valve present.  · Moderate tricuspid valve regurgitation is present.  · Left ventricular diastolic function is consistent with (grade I)   impaired  relaxation.  · No pericardial effusion noted          Ct Chest Pulmonary Embolism    Result Date: 12/12/2020  1. No pulmonary embolism. 2. Reticular and ground glass densities diffusely may represent edema or pneumonia superimposed on chronic interstitial change. 3. Stable surgical changes and cardiomegaly. 4. Calcified left pleural plaque. Electronically signed by:  Isidro Staley M.D.  12/12/2020 1:11 AM    Xr Chest Pa & Lateral    Result Date: 12/4/2020  1.  Moderate bilateral pulmonary infiltrates, edema versus pneumonia. 2.  Small bilateral pleural effusions. Electronically signed by:  Deanna Smith M.D.  12/4/2020 10:23 PM        Estimated Creatinine Clearance: 26.6 mL/min (A) (by C-G formula based on SCr of 1.24 mg/dL (H)).    Assessment/Plan   Assessment/Plan       Active Hospital Problems    Diagnosis  POA   • Syncope and collapse [R55]  Yes   • Diarrhea [R19.7]  Yes   • Acute renal failure (ARF) (CMS/HCC) [N17.9]  Yes   • Leukocytosis [D72.829]  Yes   • Shortness of breath [R06.02]  Yes      Resolved Hospital Problems   No resolved problems to display.     Syncope and collapse ? Per EMS - pt denies pt reports progressive weakness and dyspnea  Pacemaker interrogated in ED - not hypotensive here, recent cardiac evaluation here a week ago troponin not elevated denies chest pain , maybe secondary to metabolic encephalopathy see below : continuous cardiac monitoring , Covid-19 negative , case management and PT eval and treat may need longer stay at rehab, [facility] records pending     Diarrhea - recently on ABX- c- diff culture ordered and pending - await results to sart Flagyl    Leukocytosis - Too numerous to count wbc in urine , culture pending, possible pneumonia per CT report continue Ceftriaxone IV for now with blood culture pending , stable on 2l oxygen nasal canula     Acute renal failure CR 1.24 was 0.64 hyponatremia 128 and hyperkalemia 5.4 likely secondary to dehydration from diarrhea, gentle IVF NS @  50 cc hr given PMH HFpEF see below , trend renal function , 15 mg Kayexalate po x1      HFpEF 56-60% per echo dated 12/2/20 impaired relaxation BNP 4400 was 13,000 12/5/20, home meds unverified was on po Lasix     CAD s/p CABG no chest pain troponin not elevated , stable continue home meds once verified     TAVR/SSS s/p pacemaker in situ was on Eliquis meds unverified at this time     DJD recent left shoulder surgery     Depression/ neurology suggested dementia last admission - was on Seroquel meds unverified at this time     Low BMI 17.7 consider nutrition consult               VTE Prophylaxis - was on home Eliquis unverified at this time SCD for now   Mechanical Order History:     None      Pharmalogical Order History:     None          CODE STATUS:    Code Status and Medical Interventions:   Ordered at: 12/12/20 4389     Code Status:    CPR     Medical Interventions (Level of Support Prior to Arrest):    Full       This patient has been 12/12/20      I discussed the patient's findings and my recommendations with patient.      Signature:Electronically signed by HERMINIO Davis, 12/12/20, 4:32 AM EST.    [facility] Hospitalist Team          Electronically signed by Sammi Wylie APRN at 12/12/20 8235

## 2020-12-12 NOTE — ED NOTES
Called [facility] for records.  Left voicemail with house sup.     Darrell Morrison, PCT  12/12/20 0150

## 2020-12-13 NOTE — PROGRESS NOTES
"      [facility] Medicine Services Daily Progress Note      Hospitalist Team  LOS 0 days      Patient Care Team:  Tigre Duran MD as PCP - General  Tigre Duran MD as PCP - Family Medicine  Wyatt Kwok MD as Consulting Physician (Cardiology)    Patient Location: 359/1      Subjective   Subjective     Chief Complaint / Subjective  Chief Complaint   Patient presents with   • Hypotension     nausea.         Brief Synopsis of Hospital Course/HPI  85 year old female with PMH of CAD s/p CABG 1999, severe aortic stenosis s/p TAVR in May, SSS with cardiac pacemaker in situ, HTN, PAF, DJD post shoulder surgery, non sustained V tach, HLDpresents with complaints of progressive weakness and dyspnea. She is awake and alert and appropriate but lethargic. She denies chest pain or syncope but per ED report EMS reports she had a \"spell\" en route  with hypotension . In ED no hypotension or arrythmmia seen . Per ED report pacemaker interrogated with no unusual results. She is Covid-19 negative. Review of records shows she was just admitted here 11/30/20 to 12/6/20 for similar complaints and seen by both neurology and cardiology. Per notes it was felt to be metabolic encephalopathy from possible UTI and she was discharged on Cefdinir. She reports diarrhea past 2 days without hematochezia or melena or abdominal pain. C-Diff culture ordered and pending. UA shows no bacteria but too numerous to count wbc . She was started on IV Ceftriaxone in ED with urine and blood culture pending. D-Dimer was elevated. CT chest per radiology today:  \"IMPRESSION:  1. No pulmonary embolism.  2. Reticular and ground glass densities diffusely may represent edema or pneumonia superimposed on chronic interstitial change.  3. Stable surgical changes and cardiomegaly.  4. Calcified left pleural plaque.\"     Labs show wbc 17, K 5.4, Na 128, Cr 1.24 ( was 0.64), BNP 4400 was 13,000 12/5/20, Hgb 10, procalcitonin 1.40.      She " "reports she left here after last admission and went to rehab then was at [facility] and just discharged yesterday. No records of [place] in EMR and records have been requested. She will be admitted for further evaluation and treatment .           Date: 12/13/2020: The patient reports just feeling generally ill and she has had difficulty staying warm today, but she has had no fever, sweats, nausea or vomiting.  No chest pain, cough or shortness of breath.  She reports her diarrhea is improving.      ROS  12 point review of systems was reviewed and was negative except as above.    Objective   Objective      Vital Signs  Temp:  [97.4 °F (36.3 °C)-98 °F (36.7 °C)] 97.4 °F (36.3 °C)  Heart Rate:  [56-94] 86  Resp:  [16-22] 16  BP: ()/(56-78) 102/62  Oxygen Therapy  SpO2: 95 %  Pulse Oximetry Type: Intermittent  Device (Oxygen Therapy): room air  Flowsheet Rows      First Filed Value   Admission Height  167.6 cm (66\") Documented at 12/11/2020 2338   Admission Weight  49.9 kg (110 lb) Documented at 12/11/2020 2338        Intake & Output (last 3 days)       12/10 0701 - 12/11 0700 12/11 0701 - 12/12 0700 12/12 0701 - 12/13 0700 12/13 0701 - 12/14 0700    P.O.   660     Total Intake(mL/kg)   660 (12.1)     Net   +660             Urine Unmeasured Occurrence   1 x 1 x    Stool Unmeasured Occurrence   2 x 3 x        Lines, Drains & Airways    Active LDAs     Name:   Placement date:   Placement time:   Site:   Days:    Peripheral IV 12/12/20 0401 Left Forearm   12/12/20 0401    Forearm   1    Peripheral IV 12/12/20 0401 Right Forearm   12/12/20 0401    Forearm   1                  Physical Exam:    Physical Exam  Well-developed well-nourished female in no acute distress sitting up in bed awake and alert; mucous membranes moist; sclerae anicteric; neck supple; lungs clear to auscultation bilaterally; CV regular rate and rhythm; abdomen soft nontender nondistended with active bowel sounds; extremities with no edema, " cyanosis or calf tenderness; palpable pedal pulses bilaterally; neurologic exam grossly nonfocal; no Quezada catheter.       Wounds (last 24 hours)      LDA Wound     Row Name 12/13/20 0756 12/12/20 1920          Wound 12/12/20 0509 medial coccyx Pressure Injury    Wound - Properties Group Placement Date: 12/12/20  -DD Placement Time: 0509 -DD Orientation: medial  -DD Location: coccyx  -DD Primary Wound Type: Pressure inj  -DD Stage, Pressure Injury : Stage 1  -DD    Dressing Appearance  open to air  -SW  open to air  -DD     Closure  None  -SW  --     Base  non-blanchable;red  -SW  non-blanchable  -DD     Drainage Amount  none  -SW  --     Retired Wound - Properties Group Date first assessed: 12/12/20 -DD Time first assessed: 0509 -DD Location: coccyx  -DD Primary Wound Type: Pressure inj  -DD      User Key  (r) = Recorded By, (t) = Taken By, (c) = Cosigned By    Initials Name Provider Type    Kortney Aguirre RN Registered Nurse    Thania Mujica LPN Licensed Nurse          Procedures:              Results Review:     I reviewed the patient's new clinical results.      Lab Results (last 24 hours)     Procedure Component Value Units Date/Time    Urine Culture - Urine, Urine, Catheter [[id number]]  (Abnormal) Collected: 12/12/20 0134    Specimen: Urine, Catheter Updated: 12/13/20 1016     Urine Culture Yeast isolated     Comment: No further workup.       Basic Metabolic Panel [[id number]]  (Abnormal) Collected: 12/13/20 0326    Specimen: Blood Updated: 12/13/20 0425     Glucose 101 mg/dL      BUN 18 mg/dL      Creatinine 0.83 mg/dL      Sodium 131 mmol/L      Potassium 4.5 mmol/L      Chloride 94 mmol/L      CO2 31.0 mmol/L      Calcium 8.5 mg/dL      eGFR Non African Amer 65 mL/min/1.73      BUN/Creatinine Ratio 21.7     Anion Gap 6.0 mmol/L     Narrative:      GFR Normal >60  Chronic Kidney Disease <60  Kidney Failure <15      Magnesium [[id number]]  (Normal) Collected: 12/13/20 0326    Specimen: Blood  Updated: 12/13/20 0425     Magnesium 1.6 mg/dL     Phosphorus [[id number]]  (Normal) Collected: 12/13/20 0326    Specimen: Blood Updated: 12/13/20 0425     Phosphorus 3.1 mg/dL     CBC & Differential [[id number]]  (Abnormal) Collected: 12/13/20 0326    Specimen: Blood Updated: 12/13/20 0340    Narrative:      The following orders were created for panel order CBC & Differential.  Procedure                               Abnormality         Status                     ---------                               -----------         ------                     CBC Auto Differential[[id number]]        Abnormal            Final result                 Please view results for these tests on the individual orders.    CBC Auto Differential [[id number]]  (Abnormal) Collected: 12/13/20 0326    Specimen: Blood Updated: 12/13/20 0340     WBC 16.30 10*3/mm3      RBC 3.04 10*6/mm3      Hemoglobin 9.6 g/dL      Hematocrit 29.4 %      MCV 96.5 fL      MCH 31.5 pg      MCHC 32.7 g/dL      RDW 14.3 %      RDW-SD 48.6 fl      MPV 8.4 fL      Platelets 284 10*3/mm3      Neutrophil % 77.9 %      Lymphocyte % 7.9 %      Monocyte % 12.4 %      Eosinophil % 1.0 %      Basophil % 0.8 %      Neutrophils, Absolute 12.70 10*3/mm3      Lymphocytes, Absolute 1.30 10*3/mm3      Monocytes, Absolute 2.00 10*3/mm3      Eosinophils, Absolute 0.20 10*3/mm3      Basophils, Absolute 0.10 10*3/mm3      nRBC 0.0 /100 WBC     Blood Culture - Blood, Arm, Right [[id number]] Collected: 12/12/20 0058    Specimen: Blood from Arm, Right Updated: 12/13/20 0115     Blood Culture No growth at 24 hours    Blood Culture - Blood, Arm, Left [[id number]] Collected: 12/12/20 0058    Specimen: Blood from Arm, Left Updated: 12/13/20 0115     Blood Culture No growth at 24 hours        No results found for: HGBA1C  Results from last 7 days   Lab Units 12/12/20 0058   INR  1.02           No results found for: LIPASE  Lab Results   Component Value Date    CHOL 127 08/05/2017    TRIG 90  08/05/2017    HDL 49 08/05/2017    LDL 62 08/05/2017       No results found for: INTRAOP, PREDX, FINALDX, COMDX    Microbiology Results (last 10 days)     Procedure Component Value - Date/Time    Gastrointestinal Panel, PCR - Stool, Per Rectum [[id number]]  (Normal) Collected: 12/12/20 0134    Lab Status: Final result Specimen: Stool from Per Rectum Updated: 12/12/20 0743     Campylobacter Not Detected     Plesiomonas shigelloides Not Detected     Salmonella Not Detected     Vibrio Not Detected     Vibrio cholerae Not Detected     Yersinia enterocolitica Not Detected     Enteroaggregative E. coli (EAEC) Not Detected     Enteropathogenic E. coli (EPEC) Not Detected     Enterotoxigenic E. coli (ETEC) lt/st Not Detected     Shiga-like toxin-producing E. coli (STEC) stx1/stx2 Not Detected     Shigella/Enteroinvasive E. coli (EIEC) Not Detected     Cryptosporidium Not Detected     Cyclospora cayetanensis Not Detected     Entamoeba histolytica Not Detected     Giardia lamblia Not Detected     Adenovirus F40/41 Not Detected     Astrovirus Not Detected     Norovirus GI/GII Not Detected     Rotavirus A Not Detected     Sapovirus (I, II, IV or V) Not Detected    Narrative:      If Aeromonas, Staphylococcus aureus or Bacillus cereus are suspected, please order [id number]: Stool Culture, Aeromonas, S aureus, B Cereus.    Clostridium Difficile Toxin - Stool, Per Rectum [[id number]]  (Abnormal) Collected: 12/12/20 0134    Lab Status: Final result Specimen: Stool from Per Rectum Updated: 12/12/20 0708    Narrative:      The following orders were created for panel order Clostridium Difficile Toxin - Stool, Per Rectum.  Procedure                               Abnormality         Status                     ---------                               -----------         ------                     Clostridium Difficile EI...[[id number]]  Abnormal            Final result                 Please view results for these tests on the individual  orders.    Clostridium Difficile EIA - Stool, Per Rectum [[id number]]  (Abnormal) Collected: 12/12/20 0134    Lab Status: Final result Specimen: Stool from Per Rectum Updated: 12/12/20 0708     C Diff GDH / Toxin Positive    Urine Culture - Urine, Urine, Catheter [[id number]]  (Abnormal) Collected: 12/12/20 0134    Lab Status: Edited Result - FINAL Specimen: Urine, Catheter Updated: 12/13/20 1016     Urine Culture Yeast isolated     Comment: No further workup.       Blood Culture - Blood, Arm, Right [[id number]] Collected: 12/12/20 0058    Lab Status: Preliminary result Specimen: Blood from Arm, Right Updated: 12/13/20 0115     Blood Culture No growth at 24 hours    Blood Culture - Blood, Arm, Left [[id number]] Collected: 12/12/20 0058    Lab Status: Preliminary result Specimen: Blood from Arm, Left Updated: 12/13/20 0115     Blood Culture No growth at 24 hours    Respiratory Panel PCR w/COVID-19(SARS-CoV-2) REINIER/JOVANY/MAGO/PAD/COR/MAD/REAGAN In-House, NP Swab in UTM/VTM, 3-4 HR TAT - Swab, Nasopharynx [[id number]]  (Normal) Collected: 12/12/20 0023    Lab Status: Final result Specimen: Swab from Nasopharynx Updated: 12/12/20 0132     ADENOVIRUS, PCR Not Detected     Coronavirus 229E Not Detected     Coronavirus HKU1 Not Detected     Coronavirus NL63 Not Detected     Coronavirus OC43 Not Detected     COVID19 Not Detected     Human Metapneumovirus Not Detected     Human Rhinovirus/Enterovirus Not Detected     Influenza A PCR Not Detected     Influenza B PCR Not Detected     Parainfluenza Virus 1 Not Detected     Parainfluenza Virus 2 Not Detected     Parainfluenza Virus 3 Not Detected     Parainfluenza Virus 4 Not Detected     RSV, PCR Not Detected     Bordetella pertussis pcr Not Detected     Bordetella parapertussis PCR Not Detected     Chlamydophila pneumoniae PCR Not Detected     Mycoplasma pneumo by PCR Not Detected    Narrative:      Fact sheet for providers:  https://docs.[employer]/wp-content/uploads/WMH5182-5984-AL1.1-EUA-Provider-Fact-Sheet-3.pdf    Fact sheet for patients: https://docs.[employer]/wp-content/uploads/BJR1039-1759-AJ8.1-EUA-Patient-Fact-Sheet-1.pdf    Test performed by PCR.    S. Pneumo Ag Urine or CSF - Urine, Urine, Clean Catch [[id number]]  (Normal) Collected: 12/05/20 0440    Lab Status: Final result Specimen: Urine, Clean Catch Updated: 12/05/20 0519     Strep Pneumo Ag Negative    Legionella Antigen, Urine - Urine, Urine, Clean Catch [[id number]]  (Normal) Collected: 12/05/20 0440    Lab Status: Final result Specimen: Urine, Clean Catch Updated: 12/05/20 0519     LEGIONELLA ANTIGEN, URINE Negative          ECG/EMG Results (most recent)     Procedure Component Value Units Date/Time    ECG 12 Lead [[id number]] Collected: 12/12/20 0322     Updated: 12/13/20 0927     QT Interval 419 ms     Narrative:      HEART RATE= 82  bpm  RR Interval= 731  ms  UT Interval= 122  ms  P Horizontal Axis= 12  deg  P Front Axis= -29  deg  QRSD Interval= 131  ms  QT Interval= 419  ms  QRS Axis= 212  deg  T Wave Axis= 31  deg  - ABNORMAL ECG -  Atrial-sensed ventricular-paced rhythm  When compared with ECG of 30-Nov-2020 13:22:06,  No significant change  Electronically Signed By: Lawrence Viera (Tre) 13-Dec-2020 09:25:09  Date and Time of Study: 2020-12-12 03:22:32          Results for orders placed during the hospital encounter of 02/28/20   Duplex Carotid Ultrasound CAR    Narrative · Proximal right internal carotid artery moderate stenosis.  · Proximal left internal carotid artery mild stenosis.          Results for orders placed during the hospital encounter of 11/30/20   Adult Transthoracic Echo Complete W/ Cont if Necessary Per Protocol    Narrative · Left ventricular ejection fraction appears to be 56 - 60%.  · Left ventricular wall thickness is consistent with moderate concentric   hypertrophy.  · There is a TAVR valve present.  · Moderate tricuspid valve  regurgitation is present.  · Left ventricular diastolic function is consistent with (grade I)   impaired relaxation.  · No pericardial effusion noted          Xr Chest 1 View    Result Date: 12/12/2020  1.Bilateral airspace opacities appear slightly decreased. 2.Small pleural effusions also appears slightly decreased. 3.Cardiomegaly.  Electronically Signed By-Isidro Jackson MD On:12/12/2020 8:57 AM This report was finalized on [id number] by  Isidro Jackson MD.    Ct Chest Pulmonary Embolism    Result Date: 12/12/2020  1. No pulmonary embolism. 2. Reticular and ground glass densities diffusely may represent edema or pneumonia superimposed on chronic interstitial change. 3. Stable surgical changes and cardiomegaly. 4. Calcified left pleural plaque. Electronically signed by:  Isidro Staley M.D.  12/12/2020 1:11 AM          Xrays, labs reviewed personally by physician.    Medication Review:   I have reviewed the patient's current medication list      Scheduled Meds  apixaban, 2.5 mg, Oral, Q12H  atorvastatin, 10 mg, Oral, Daily  cefTRIAXone, 1 g, Intravenous, Q24H  colesevelam, 625 mg, Oral, BID With Meals  furosemide, 20 mg, Oral, Daily  lisinopril, 20 mg, Oral, Daily  metoprolol tartrate, 25 mg, Oral, Q12H  nystatin, 500,000 Units, Swish & Swallow, 4x Daily  QUEtiapine, 25 mg, Oral, Nightly  saccharomyces boulardii, 250 mg, Oral, BID  sodium chloride, 3 mL, Intravenous, Q12H  vancomycin, 125 mg, Oral, Q6H        Meds Infusions       Meds PRN  •  acetaminophen **OR** acetaminophen **OR** acetaminophen  •  nitroglycerin  •  ondansetron **OR** ondansetron  •  sodium chloride  •  sodium chloride        Assessment/Plan   Assessment/Plan     Active Hospital Problems    Diagnosis  POA   • Syncope and collapse [R55]  Yes   • Diarrhea [R19.7]  Yes   • Acute renal failure (ARF) (CMS/HCC) [N17.9]  Yes   • Leukocytosis [D72.829]  Yes   • Shortness of breath [R06.02]  Yes      Resolved Hospital Problems   No resolved problems to  display.       MEDICAL DECISION MAKING COMPLEXITY BY PROBLEM:     Assessment and plan:   Acute C. difficile colitis with diarrhea related to recent antibiotic use  -Patient has been started on vancomycin orally as well as Florastor and WelChol.     Acute urinary tract infection culture showing candiduria  -Initiate Diflucan    Medial coccyx stage I pressure injury  -Mepilex dressing  -Turn every 2 hours    VTE Prophylaxis -   Mechanical Order History:      Ordered        12/12/20 0548  Place Sequential Compression Device  Once         12/12/20 0548  Maintain Sequential Compression Device  Continuous         12/12/20 0435  Place Sequential Compression Device  Once         12/12/20 0435  Maintain Sequential Compression Device  Continuous                 Pharmalogical Order History:      Ordered     Dose Route Frequency Stop    12/12/20 1302  apixaban (ELIQUIS) tablet 2.5 mg      2.5 mg PO Every 12 Hours --                  Code Status -   Code Status and Medical Interventions:   Ordered at: 12/12/20 0435     Code Status:    CPR     Medical Interventions (Level of Support Prior to Arrest):    Full       This patient has been examined wearing appropriate Personal Protective Equipment       Discharge Planning  Home once leukocytosis and clinical condition improves.        Electronically signed by Charity Hinojosa MD, 12/13/20, 14:47 EST.  [facility] ([facility] Hospitalist Team

## 2020-12-13 NOTE — PLAN OF CARE
Goal Outcome Evaluation:  Plan of Care Reviewed With: patient  Progress: no change  Outcome Summary: Patient confused this morning.  Will continue to montior

## 2020-12-13 NOTE — PLAN OF CARE
Goal Outcome Evaluation:  Plan of Care Reviewed With: patient  Progress: no change  Outcome Summary: patient napping in bed throughout shift. no complaints. some confusion throughout the day. will continue to monitor.

## 2020-12-14 PROBLEM — A04.72 C. DIFFICILE DIARRHEA: Status: ACTIVE | Noted: 2020-01-01

## 2020-12-14 NOTE — PROGRESS NOTES
Case Management Readmission Assessment Note  Phone communication only - no physical contact with patient or family.Only information form 12/14 is from this .     Case Management Readmission Assessment (all recorded)      Readmission Interview     Row Name 12/14/20 1026 12/12/20 1057          Readmission Indications    Is this hospitalization related to the prior hospital diagnosis?  Yes  Yes     What was the reason you were admitted?  On 10/6 patient was admitted for Left total shoulder arthroscopy. She dc home with [facility]. On 11/30 patient was admitted with Dyspnea, avute on chronic chf, progressive weakness and UTI. She dc to [place] [facility] on abx. [facility]. She refused to go back to rehab and went home with [facility]. On 12/11 she arrived to [facility] ER via EMS, with decreased LOC and hypotension. Per MS, she had pressure at 60/40 on one reading. Bun 23, creat 1.24, CO2 31, Na 128, D diemer 2.04, WBC 17,000> she is admitted with acute UTI as well as possible pna per CT. Today she was a fast call and now is on dopamine and awaoting transfer to PCU.  --     Row Name 12/14/20 1026 12/12/20 1057          Recommendation for rehospitalization    Did you speak with your physician prior to coming to the hospital  No  No     Did you seek care elsewhere prior to coming to the hospital?  No  No     Row Name 12/14/20 1026 12/12/20 1057          Follow up appointment    Do you have a PCP?  Yes Dr Duran  Yes     Did you have an appointment with PCP/specialist after hospitalization within 7 days?  -- No appointment was scheduled per AVS  -- pt d/c'd to [place] 1 week ago. Transferrred to [facility] from rehab and d/c'd home from [facility].     Row Name 12/14/20 1026 12/12/20 1057          Medications    Did you have newly prescribed medications at discharge?  Yes ABX per avs  No     Row Name 12/14/20 1026 12/12/20 1057          Discharge Instructions    Did you  understand your discharge instructions?  --  Yes     Did your family/caregiver hear your instructions?  Yes  Yes     Were you told to eat a special diet?  No  --     Were you given a number of someone to call if you had questions or concerns?  Yes  --     Row Name 12/14/20 1026 12/12/20 1057          Index discharge location/services    Where did you go upon discharge?  [facility] [facility]     Do you have supportive family or friends in the home?  --  Yes     Row Name 12/14/20 1026 12/12/20 1057          Discharge Readiness    On a scale of 1-5 (5 being well prepared), how ready were you for discharge  3  --     Recommendation based on interview  Goals of care discussion/advanced care planning;Education on diagnosis/self management  Education on diagnosis/self management          Per MD notes today, patient also with Cdiff.      Lucy Barnard RN  Complex Case Manager  [facility] Care Coordination  [phone]-cell  [phone]-office  [phone]-fax  [email or web address]

## 2020-12-14 NOTE — NURSING NOTE
Dopamine ordered. PA at bedside. Pt BP 97/58. PA stated to hold off on Dopamine gtt as long as BP stays above 90 sys. BP taking q 15min per order. Pt tolerating well at this time. Nurse at bedside. Will continue to monitor.

## 2020-12-14 NOTE — PLAN OF CARE
86 yo female, admitting dx of shortness of breath, syncope and collapse.  Pt in contact spore precautions r/t to C-Diff, oral vancomycin administered as ordered, pt had one episode of diarrhea and loose stools, pt initially febrile at 100.9 F, PRN Tylenol, routine vancomycin and new order of Fluconazole administered with success, temperature returned to normal ranges.  Pt's blood pressure towards end of shift at 0500 noted to be 85/45, Physician Assistant notified with new order for one time bolus of Lactated Ringers at 500 cc with no improvement afterwards.  Physician Assistant again notified, with new order for one time dose of Midodrine 10 mg oral, administered as ordered and FAST team called for this patient with an additional 250 cc of lactated ringers bolus ordered, close monitoring of blood pressure in place with fluctuating numbers and minimal improvement.  New orders to transfer patient to higher level of care and initiate dopamine drip.  Will continue to monitor and observe.    Problem: Adult Inpatient Plan of Care  Goal: Plan of Care Review  Flowsheets (Taken 12/14/2020 4221)  Plan of Care Reviewed With: patient

## 2020-12-14 NOTE — NURSING NOTE
85-year-old female presented to the hospital with complaints of increasing with weakness and dyspnea.  Asked to see patient and evaluate perineal and buttock area.  Patient does present with moisture associated dermatitis.  Skin is excoriated and tender to touch when cleansed gently.  No open areas are noted at this time patient is not able to provide any type of a history she is confused at the moment she is incontinent of stool and urine.  Patient currently has Dahlia's Magic Butt cream in place this has an antifungal as well as a zinc oxide this should be appropriate for patient.  I recommend to continue and continue with pressure injury prevention strategies

## 2020-12-14 NOTE — PROGRESS NOTES
"      [facility] Medicine Services Daily Progress Note          Hospitalist Team  LOS 1 days      Patient Care Team:  Tigre Duran MD as PCP - General  Tigre Duran MD as PCP - Family Medicine  Wyatt Kwok MD as Consulting Physician (Cardiology)    Patient Location: 359/1      Subjective   Subjective     Chief Complaint / Subjective  Chief Complaint   Patient presents with   • Hypotension     nausea.         Brief Synopsis of Hospital Course/HPI  85 year old female with PMH of CAD s/p CABG 1999, severe aortic stenosis s/p TAVR in May, SSS with cardiac pacemaker in situ, HTN, PAF, DJD post shoulder surgery, non sustained V tach, HLDpresents with complaints of progressive weakness and dyspnea. She is awake and alert and appropriate but lethargic. She denies chest pain or syncope but per ED report EMS reports she had a \"spell\" en route  with hypotension . In ED no hypotension or arrythmmia seen . Per ED report pacemaker interrogated with no unusual results. She is Covid-19 negative. Review of records shows she was just admitted here 11/30/20 to 12/6/20 for similar complaints and seen by both neurology and cardiology. Per notes it was felt to be metabolic encephalopathy from possible UTI and she was discharged on Cefdinir. She reports diarrhea past 2 days without hematochezia or melena or abdominal pain. C-Diff culture ordered and pending. UA shows no bacteria but too numerous to count wbc . She was started on IV Ceftriaxone in ED with urine and blood culture pending. D-Dimer was elevated. CT chest per radiology today:  \"IMPRESSION:  1. No pulmonary embolism.  2. Reticular and ground glass densities diffusely may represent edema or pneumonia superimposed on chronic interstitial change.  3. Stable surgical changes and cardiomegaly.  4. Calcified left pleural plaque.\"     Labs show wbc 17, K 5.4, Na 128, Cr 1.24 ( was 0.64), BNP 4400 was 13,000 12/5/20, Hgb 10, procalcitonin 1.40.      She " "reports she left here after last admission and went to rehab then was at [facility] and just discharged yesterday. No records of [place] in EMR and records have been requested. She will be admitted for further evaluation and treatment .        Date: 12/13/2020: The patient reports just feeling generally ill and she has had difficulty staying warm today, but she has had no fever, sweats, nausea or vomiting.  No chest pain, cough or shortness of breath.  She reports her diarrhea is improving.    12/14/2020  Alerted by night shift PA that pt having hypotension, didn't respond to 750cc bolus, now on dopamine drip and her cardiologist has been consulted. Pt is lethargic, but responsive. She denies any chest pains or abdominal pains, she continues to have SOA and diarrhea.    Review of Systems   Constitution: Positive for malaise/fatigue. Negative for chills and fever.   HENT: Negative.    Eyes: Negative.    Cardiovascular: Negative for chest pain and leg swelling.   Respiratory: Positive for shortness of breath. Negative for cough, sputum production and wheezing.    Skin: Negative.    Musculoskeletal: Negative.    Gastrointestinal: Positive for diarrhea. Negative for bloating, abdominal pain, constipation, nausea and vomiting.   Genitourinary: Negative.    Neurological: Negative.    Psychiatric/Behavioral: Negative.    All other systems reviewed and are negative.        Objective   Objective      Vital Signs  Temp:  [97.4 °F (36.3 °C)-100.9 °F (38.3 °C)] 97.4 °F (36.3 °C)  Heart Rate:  [70-99] 90  Resp:  [16-20] 18  BP: ()/(46-73) 134/73  Oxygen Therapy  SpO2: 97 %  Pulse Oximetry Type: Intermittent  Device (Oxygen Therapy): nasal cannula  Flow (L/min): 2  Flowsheet Rows      First Filed Value   Admission Height  167.6 cm (66\") Documented at 12/11/2020 2338   Admission Weight  49.9 kg (110 lb) Documented at 12/11/2020 2338        Intake & Output (last 3 days)       12/11 0701 - 12/12 0700 12/12 0701 - 12/13 0700 " 12/13 0701 - 12/14 0700 12/14 0701 - 12/15 0700    P.O.  660 144     Total Intake(mL/kg)  660 (12.1) 144 (2.8)     Net  +660 +144             Urine Unmeasured Occurrence  1 x 1 x     Stool Unmeasured Occurrence  2 x 3 x         Lines, Drains & Airways    Active LDAs     Name:   Placement date:   Placement time:   Site:   Days:    Peripheral IV 12/12/20 0401 Left Forearm   12/12/20 0401    Forearm   2    Peripheral IV 12/12/20 0401 Right Forearm   12/12/20    0401    Forearm   2                  Physical Exam:  General: well-developed, frail and cachectic NAD  HEENT: NC/AT, EOMI, PERRLA, +JVD  Heart: RRR. + murmur   Chest: Bilateral rales throughout, no wheezing or rhonchi, respiratory effort mildly labored  Abdominal: Soft. NT/ND. Bowel sounds present.  Musculoskeletal: Normal ROM.  No edema. No calf tenderness.  Neurological: AAOx3, no focal deficits  Skin: Skin is warm and dry. No rash.  Scattered ecchymosis  Psychiatric: Lethargic       Wounds (last 24 hours)      LDA Wound     Row Name 12/14/20 0730             Wound 12/12/20 0509 medial coccyx Pressure Injury    Wound - Properties Group Placement Date: 12/12/20  -DD Placement Time: 0509 -DD Orientation: medial  -DD Location: coccyx  -DD Primary Wound Type: Pressure inj  -DD Stage, Pressure Injury : Stage 1  -DD    Dressing Appearance  intact  -HW      Base  non-blanchable  -HW      Retired Wound - Properties Group Date first assessed: 12/12/20  -DD Time first assessed: 0509 -DD Location: coccyx  -DD Primary Wound Type: Pressure inj  -DD      User Key  (r) = Recorded By, (t) = Taken By, (c) = Cosigned By    Initials Name Provider Type     Mami Larsen RN Registered Nurse    Thania Mujica LPN Licensed Nurse          Procedures:           Results Review:     I reviewed the patient's new clinical results.    Results from last 7 days   Lab Units 12/13/20  0326 12/12/20  0539 12/12/20  0058   WBC 10*3/mm3 16.30* 16.40* 17.00*   HEMOGLOBIN g/dL 9.6*  9.7* 10.0*   HEMATOCRIT % 29.4* 30.5* 30.7*   PLATELETS 10*3/mm3 284 268 301     Results from last 7 days   Lab Units 12/13/20  0326 12/12/20  0539 12/12/20  0058   SODIUM mmol/L 131* 127* 128*   POTASSIUM mmol/L 4.5 5.2 5.4*   CHLORIDE mmol/L 94* 92* 91*   CO2 mmol/L 31.0* 29.0 31.0*   BUN mg/dL 18 22 23   CREATININE mg/dL 0.83 1.03* 1.24*   GLUCOSE mg/dL 101* 88 100*   ALBUMIN g/dL  --   --  3.30*   BILIRUBIN mg/dL  --   --  0.6   ALK PHOS U/L  --   --  90   AST (SGOT) U/L  --   --  22   ALT (SGPT) U/L  --   --  13   CALCIUM mg/dL 8.5* 8.7 9.0     Cr Clearance Estimated Creatinine Clearance: 41.2 mL/min (by C-G formula based on SCr of 0.83 mg/dL).    Coag   Results from last 7 days   Lab Units 12/12/20 0058   INR  1.02   APTT seconds 26.4       HbA1C   Lab Results   Component Value Date    HGBA1C 5.5 09/30/2020    HGBA1C 5.90 (H) 05/21/2020     Blood Glucose       Troponin   Results from last 7 days   Lab Units 12/14/20  0652 12/12/20 0058   TROPONIN T ng/mL  --  0.019   PROBNP pg/mL 32,130.0* 4,443.0*     Lipids    Lab Results   Component Value Date    CHOL 127 08/05/2017    TRIG 90 08/05/2017    HDL 49 08/05/2017    LDL 62 08/05/2017       UA    Results from last 7 days   Lab Units 12/12/20 0134   NITRITE UA  Negative   WBC UA /HPF Too Numerous to Count*   BACTERIA UA /HPF None Seen   SQUAM EPITHEL UA /HPF 3-6*   URINECX  Yeast isolated*       Microbiology   Results from last 7 days   Lab Units 12/12/20  0134 12/12/20 0058   BLOODCX   --  No growth at 2 days  No growth at 2 days   URINECX  Yeast isolated*  --        ABG        EKG  ECG 12 Lead   Final Result   HEART RATE= 82  bpm   RR Interval= 731  ms   IA Interval= 122  ms   P Horizontal Axis= 12  deg   P Front Axis= -29  deg   QRSD Interval= 131  ms   QT Interval= 419  ms   QRS Axis= 212  deg   T Wave Axis= 31  deg   - ABNORMAL ECG -   Atrial-sensed ventricular-paced rhythm   When compared with ECG of 30-Nov-2020 13:22:06,   No significant change      Electronically Signed By: Lawrence Viera (Tre) 13-Dec-2020 09:25:09   Date and Time of Study: 2020-12-12 03:22:32          Imaging:  Xr Chest 1 View    Result Date: 12/12/2020  1.Bilateral airspace opacities appear slightly decreased. 2.Small pleural effusions also appears slightly decreased. 3.Cardiomegaly.  Electronically Signed By-Isidro Jackson MD On:12/12/2020 8:57 AM This report was finalized on [id number] by  Isidro Jackson MD.    Ct Chest Pulmonary Embolism    Result Date: 12/12/2020  1. No pulmonary embolism. 2. Reticular and ground glass densities diffusely may represent edema or pneumonia superimposed on chronic interstitial change. 3. Stable surgical changes and cardiomegaly. 4. Calcified left pleural plaque. Electronically signed by:  Isidro Staley M.D.  12/12/2020 1:11 AM    Results for orders placed during the hospital encounter of 11/30/20   Adult Transthoracic Echo Complete W/ Cont if Necessary Per Protocol    Narrative · Left ventricular ejection fraction appears to be 56 - 60%.  · Left ventricular wall thickness is consistent with moderate concentric   hypertrophy.  · There is a TAVR valve present.  · Moderate tricuspid valve regurgitation is present.  · Left ventricular diastolic function is consistent with (grade I)   impaired relaxation.  · No pericardial effusion noted        Results for orders placed during the hospital encounter of 02/28/20   Duplex Carotid Ultrasound CAR    Narrative · Proximal right internal carotid artery moderate stenosis.  · Proximal left internal carotid artery mild stenosis.          Xrays, labs reviewed personally by physician.    Medication Review:   I have reviewed the patient's current medication list      Scheduled Meds  apixaban, 2.5 mg, Oral, Q12H  atorvastatin, 10 mg, Oral, Daily  colesevelam, 625 mg, Oral, BID With Meals  lisinopril, 20 mg, Oral, Daily  metoprolol tartrate, 25 mg, Oral, Q12H  nystatin, 500,000 Units, Swish & Swallow, 4x Daily  QUEtiapine,  25 mg, Oral, Nightly  saccharomyces boulardii, 250 mg, Oral, BID  sodium chloride, 3 mL, Intravenous, Q12H  vancomycin, 250 mg, Oral, Q6H        Meds Infusions  DOPamine, 2-20 mcg/kg/min, Last Rate: 5 mcg/kg/min (12/14/20 0853)        Meds PRN  •  acetaminophen **OR** acetaminophen **OR** acetaminophen  •  magic butt paste  •  nitroglycerin  •  ondansetron **OR** ondansetron  •  sodium chloride  •  sodium chloride      Assessment/Plan   Assessment/Plan     Active Hospital Problems    Diagnosis  POA   • **C. difficile diarrhea [A04.72]  Yes   • Syncope and collapse [R55]  Yes   • Acute renal failure (ARF) (CMS/LTAC, located within [facility]) [N17.9]  Yes   • Leukocytosis [D72.829]  Yes   • Chronic diastolic congestive heart failure (CMS/LTAC, located within [facility]) [I50.32]  Yes   • Tachy-martin syndrome (CMS/LTAC, located within [facility]) [I49.5]  Yes   • Presence of cardiac pacemaker [Z95.0]  Yes   • Sick sinus syndrome (CMS/LTAC, located within [facility]) [I49.5]  Yes   • Shortness of breath [R06.02]  Yes   • Chronic coronary artery disease [I25.10]  Yes   • Essential hypertension [I10]  Yes      Resolved Hospital Problems   No resolved problems to display.       MEDICAL DECISION MAKING COMPLEXITY BY PROBLEM:     C. difficile associated diarrhea  -Multiple rounds of antibiotics recent UTI  -Current UA does not indicate any presence of UTI  -UA shows yeast, this is contamination from the skin, discontinue fluconazole     -Add nystatin powder to perineum  -Increase oral vancomycin to 250 mg every 6     -May need to consider Vanco enema  -Increased dosing of Florastor and WelChol for optimization  -Magic Butt cream to be applied as needed    Leukocytosis  -Due to C. difficile associated diarrhea  -No signs or symptoms of sepsis at this time  -Continue to monitor CBC and WBC count    Hypotension  Syncope and collapse  -s/p 750 cc bolus earlier this morning  -Chronic low blood pressure due to diastolic CHF  -Midodrine added  -Dopamine drip initiated this morning  -Continue to monitor BP and vitals closely  -Cardiology  consulted for further assistance in managing BP and fluids  -May need to be transferred to NHC/PCU level if unable to wean off dopamine    Dyspnea  -Due to the above  -Fluid overloaded BNP greater than 32,000  -Will need to gently diuresis at some point if blood pressure allows  -Continuous pulse oximetry  -O2 supplementation to keep sats > 93%    Acute renal failure  -Creatinine 1.24 on admission, previously 0.6 on last admission  -Likely due to dehydration from diarrhea  -Gradually improving, currently 0.83  -Will need to cautiously diurese as noted above  --Consult Nephrology to assist with fluid imbalance    Chronic diastolic congestive heart failure  Aortic stenosis  Sick sinus syndrome/tachybradycardia syndrome  Presence of cardiac pacemaker  Chronic coronary artery disease  -Last echo on 12/1/2020 reviewed and noted above  -Continue lisinopril, metoprolol, atorvastatin  -Furosemide being held currently  -Cardiology to see    Essential hypertension  -Continue lisinopril and metoprolol, midodrine added for hypotension  -Continue to monitor BP and vitals    Medial coccyx stage I pressure injury  -Mepilex dressing  -Turn every 2 hours      VTE Prophylaxis  Mechanical Order History:      Ordered        12/12/20 0548  Place Sequential Compression Device  Once         12/12/20 0548  Maintain Sequential Compression Device  Continuous         12/12/20 0435  Place Sequential Compression Device  Once         12/12/20 0435  Maintain Sequential Compression Device  Continuous                 Pharmalogical Order History:      Ordered     Dose Route Frequency Stop    12/12/20 1302  apixaban (ELIQUIS) tablet 2.5 mg      2.5 mg PO Every 12 Hours --                Code Status  Code Status and Medical Interventions:   Ordered at: 12/12/20 0435     Code Status:    CPR     Medical Interventions (Level of Support Prior to Arrest):    Full       This patient has been examined wearing appropriate Personal Protective Equipment.  12/14/20      Discharge Planning    Pending clinical course.      Electronically signed by Balbina Sy MD, 12/14/20, 09:03 EST.    [facility] Hospitalist Team

## 2020-12-14 NOTE — PROGRESS NOTES
Discharge Planning Assessment  [facility]     Patient Name: Olga Curtis  MRN: [id number]  Today's Date: 12/14/2020    Admit Date: 12/11/2020    Discharge Needs Assessment     Row Name 12/14/20 1146       Living Environment    Lives With  spouse    Current Living Arrangements  home/apartment/condo    Potentially Unsafe Housing Conditions  unable to assess    Primary Care Provided by  spouse/significant other children help her wiwth spouse    Provides Primary Care For  no one, unable/limited ability to care for self    Family Caregiver if Needed  spouse;child(javier), adult    Able to Return to Prior Arrangements  other (see comments) unsure and pending clinical needs       Resource/Environmental Concerns    Resource/Environmental Concerns  none    Transportation Concerns  car, none       Transition Planning    Patient/Family Anticipates Transition to  home with help/services    Patient/Family Anticipated Services at Transition      Transportation Anticipated  family or friend will provide       Discharge Needs Assessment    Readmission Within the Last 30 Days  previous discharge plan unsuccessful    Equipment Currently Used at Home  walker, standard;commode    Anticipated Changes Related to Illness  inability to care for self    Provided Post Acute Provider Quality & Resource List?  N/A    N/A Quality & Resource List Comment  patient is current with [facility] health        Discharge Plan     Row Name 12/14/20 0710       Plan    Plan  pending clinical course of patient  and she is current with [facility] home health    Plan Comments  spoke on phone to daughter srinivas; she states that patient is current with [facility] health; she states patient is weak and children and spouse help her with care and ambulation; she has walker at home; she has been to [place] for rehab recently;       Carol naegele rn  Case management  Office number [phone]  Cell phone [phone]

## 2020-12-14 NOTE — PLAN OF CARE
Problem: Skin Injury Risk Increased  Goal: Skin Health and Integrity  Outcome: Ongoing, Progressing   Goal Outcome Evaluation:  Plan of Care Reviewed With: patient  Patient arrived from [facility] post fast call for low BP. Patient started on dopamine and BP and HR stable at this time. Consult to cardiology and nephrology. Will continue to monitor.

## 2020-12-14 NOTE — PROGRESS NOTES
Malnutrition Severity Assessment    Patient Name:  Olga Curtis  YOB: 1935  MRN: [id number]  Admit Date:  12/11/2020    Patient meets criteria for : Severe Malnutrition    Malnutrition (severe, acute) related to ongoing insufficient PO intake; as evidenced by NFPE revealing moderate muscle and fat wasting; with ongoing poor intake at meals meeting less than 50% of meals for at least the last five days (including prior to admission).      Will keep NPO until appropriate, per MD, for diet advancement.  When diet resumes, will also add oral supplements to support adequate intake.    Malnutrition Severity Assessment  Malnutrition Type: Acute Disease or Injury - Related Malnutrition     Malnutrition Type (last 8 hours)      Malnutrition Severity Assessment     Row Name 12/14/20 1716       Malnutrition Severity Assessment    Malnutrition Type  Acute Disease or Injury - Related Malnutrition    Row Name 12/14/20 1716       Insufficient Energy Intake     Insufficient Energy Intake Findings  Severe    Insufficient Energy Intake   < or equal to 50% of est. energy requirement for > or equal to 5d)    Row Name 12/14/20 1716       Muscle Loss    Loss of Muscle Mass Findings  Moderate    [place] Region  Moderate - slight depression    Clavicle Bone Region  Moderate - some protrusion in females, visible in males    Acromion Bone Region  Moderate - acromion may slightly protrude    Scapular Bone Region  Moderate - mild depression, bones may show slightly    Dorsal Hand Region  Moderate - slight depression    Row Name 12/14/20 1716       Fat Loss    Subcutaneous Fat Loss Findings  Moderate    Orbital Region   Moderate -  somewhat hollowness, slightly dark circles    Thoracic & Lumbar Region  Moderate - ribs visible with mild depressions, iliac crest somewhat prominent    Row Name 12/14/20 1716       Criteria Met (Must meet criteria for severity in at least 2 of these categories: M Wasting, Fat Loss, Fluid, Secondary  Signs, Wt. Status, Intake)    Patient meets criteria for   Severe Malnutrition          Electronically signed by:  Sharon Monahan RD  12/14/20 17:17 EST

## 2020-12-14 NOTE — NURSING NOTE
Pt low BP of 86/47, call placed to [facility]. Received order for 500 cc LR bolus & re-check BP after. Will con't to monitor

## 2020-12-14 NOTE — PLAN OF CARE
Goal Outcome Evaluation:  Patient had FAST team called early this AM and currently on Dopamine drip. Hold off Physical Therapy treatment this date. Did not enter room.

## 2020-12-14 NOTE — NURSING NOTE
Dr. Sy in to see patient. Dopamine drip titrated back down to 5mcg/kg/min. Pressures remain stable 123/68

## 2020-12-14 NOTE — CONSULTS
INITIAL CONSULT NOTE      Patient Name: Olga Curtis  : 1935  MRN: [id number]  Primary Care Physician: Tigre Duran MD  Date of admission: 2020    Patient Care Team:  Tigre Duran MD as PCP - General  Tigre Duran MD as PCP - Family Medicine  Wyatt Kwok MD as Consulting Physician (Cardiology)        Reason for Consult:       Hyperkalemia hyponatremia  Subjective   History of Present Illness:   Chief Complaint:   Chief Complaint   Patient presents with   • Hypotension     nausea.     HISTORY:  Olga Curtis is a 85 y.o. female with past medical history of aortic stenosis, arthritis, hyperlipidemia, hypertension, history of TAVR, history of chronic diastolic heart failure, who presents with Weakness not feeling well patient blood pressures running low received some IV fluid.  Has bilateral crackles and congested.  BNP level was significantly elevated.  Sodium level was 127 with a high potassium repeat potassium at 4.9.  Urine output present but not well documented.  Transfer to PCU because of hypotension and patient supposed to be on dopamine drip.          Review of systems:  Could not be obtained patient is extremely lethargic not responsive        Personal History:     Past Medical History:   Past Medical History:   Diagnosis Date   • Aortic stenosis 10/28/2014   • Arthritis    • Atrial premature complex 2015   • CHF (congestive heart failure) (CMS/[facility])    • Chronic coronary artery disease 10/28/2014   • Dyslipidemia 10/28/2014   • Elevated cholesterol    • History of transfusion    • Hyperlipidemia    • Hypertension 10/28/2014   • Nausea 2020   • Nonsustained ventricular tachycardia (CMS/HCC) 2015   • Partial paralysis of right hand (CMS/[facility])     states right arm only.  Cannot have sticks in arm, hand only   • Shoulder pain, left        Surgical History:      Past Surgical History:   Procedure Laterality Date   • AORTIC VALVE REPAIR/REPLACEMENT N/A 2020     Procedure: TTE TRANSFEMORAL TRANSCATHETER AORTIC VALVE REPLACEMENT PERCUTANEOUS APPROACH;  Surgeon: J Carlos Leon MD;  Location: [facility] OR 18/19;  Service: Cardiothoracic;  Laterality: N/A;   • AORTIC VALVE REPAIR/REPLACEMENT N/A 5/26/2020    Procedure: Transfemoral Transcatheter Aortic Valve Replacement w/Intra Op TTE and Possible Open Rescue Surgery;  Surgeon: Vasiliy Bruce MD;  Location: [facility] OR 18/19;  Service: Cardiovascular;  Laterality: N/A;   • APPENDECTOMY     • CARDIAC CATHETERIZATION N/A 3/2/2020    Procedure: Left Heart Cath and coronary angiogram;  Surgeon: Wyatt Kwok MD;  Location: [facility] CATH INVASIVE LOCATION;  Service: Cardiovascular;  Laterality: N/A;   • CARDIAC CATHETERIZATION N/A 3/2/2020    Procedure: Saphenous Vein Graft;  Surgeon: Wyatt Kwok MD;  Location: [facility] CATH INVASIVE LOCATION;  Service: Cardiovascular;  Laterality: N/A;   • CARDIAC PACEMAKER PLACEMENT  2017   • CARDIAC SURGERY  1999    Bypass surgery   • EYE SURGERY Bilateral     cat ext   • HERNIA REPAIR     • TOTAL SHOULDER ARTHROPLASTY W/ DISTAL CLAVICLE EXCISION Left 10/6/2020    Procedure: TOTAL SHOULDER REVERSE ARTHROPLASTY;  Surgeon: Oli Barcenas MD;  Location: [facility] MAIN OR;  Service: Orthopedics;  Laterality: Left;   • TOTAL SHOULDER REVISION  10/06/2020    left       Family History: family history includes No Known Problems in her brother, father, mother, and sister. Otherwise pertinent FHx was reviewed and unremarkable.     Social History:  reports that she has never smoked. She has never used smokeless tobacco. She reports that she does not drink alcohol or use drugs.    Medications:  Prior to Admission medications    Medication Sig Start Date End Date Taking? Authorizing Provider   acebutolol (SECTRAL) 200 MG capsule TAKE ONE CAPSULE BY MOUTH TWICE A DAY 5/6/20  Yes Wyatt Kwok MD   ELIQUIS 2.5 MG tablet tablet TAKE ONE TABLET BY MOUTH EVERY 12 HOURS 4/13/20  Yes  Wyatt Kwok MD   furosemide (LASIX) 20 MG tablet Take 20 mg by mouth.   Yes ProviderYeyo MD   lisinopril (PRINIVIL,ZESTRIL) 20 MG tablet TAKE ONE TABLET BY MOUTH DAILY 10/5/20  Yes Wyatt Kwok MD   nystatin (MYCOSTATIN) 833130 UNIT/ML suspension Swish and swallow 500,000 Units.   Yes ProviderYeyo MD   pravastatin (PRAVACHOL) 20 MG tablet TAKE ONE TABLET BY MOUTH DAILY 11/23/20  Yes Wyatt Kwok MD   QUEtiapine (SEROquel) 25 MG tablet Take 25 mg by mouth.   Yes ProviderYeyo MD     Scheduled Meds:apixaban, 2.5 mg, Oral, Q12H  atorvastatin, 10 mg, Oral, Daily  colesevelam, 1,250 mg, Oral, BID With Meals  metoprolol tartrate, 25 mg, Oral, Q12H  nystatin, 500,000 Units, Swish & Swallow, 4x Daily  nystatin, , Topical, Q8H  QUEtiapine, 25 mg, Oral, Nightly  saccharomyces boulardii, 500 mg, Oral, BID  sodium chloride, 3 mL, Intravenous, Q12H  vancomycin, 250 mg, Oral, Q6H      Continuous Infusions:DOPamine, 2-20 mcg/kg/min, Last Rate: 5 mcg/kg/min (12/14/20 0853)      PRN Meds:•  acetaminophen **OR** acetaminophen **OR** acetaminophen  •  magic butt paste  •  nitroglycerin  •  ondansetron **OR** ondansetron  •  sodium chloride  •  sodium chloride  Allergies:    Allergies   Allergen Reactions   • Sulfa Antibiotics Hives       Objective   Exam:     Vital Signs  Temp:  [97.4 °F (36.3 °C)-100.9 °F (38.3 °C)] 97.8 °F (36.6 °C)  Heart Rate:  [70-99] 79  Resp:  [18-20] 18  BP: ()/(32-73) 101/43  SpO2:  [72 %-100 %] 95 %  on  Flow (L/min):  [2-3] 3;   Device (Oxygen Therapy): nasal cannula  Body mass index is 18.4 kg/m².  EXAM  General: Elderly cachectic  female in no acute distress.    Head:      Normocephalic and atraumatic.    Eyes:      PERRL/EOM intact, conjunctivae and sclerae clear without nystagmus.    Neck:      No masses, thyromegaly,  trachea central   Lungs:    Clear bilaterally to auscultation.    Heart:      Regular rate and rhythm, there is systolic murmur no gallop  Abd:         Soft, nontender, not distended, bowel sounds positive, no shifting dullness.  Msk:        No deformity or scoliosis noted of thoracic or lumbar spine.    Pulses:   Pulses normal in all 4 extremities.    Extremities:        No cyanosis or clubbing--no significant edema.    Neuro:    No focal deficits.   alert oriented x3  Skin:       Intact without lesions or rashes.    Psych:    Alert and cooperative; normal mood and affect; normal attention span       Results Review:  I have personally reviewed most recent Data :  [facility]  CBC    Results from last 7 days   Lab Units 12/14/20  1045 12/13/20  0326 12/12/20  0539 12/12/20  0058   WBC 10*3/mm3 27.00* 16.30* 16.40* 17.00*   HEMOGLOBIN g/dL 11.1* 9.6* 9.7* 10.0*   PLATELETS 10*3/mm3 364 284 268 301     CMP   Results from last 7 days   Lab Units 12/14/20  0652 12/13/20  0326 12/12/20  0539 12/12/20  0058   SODIUM mmol/L 130* 131* 127* 128*   POTASSIUM mmol/L 4.9 4.5 5.2 5.4*   CHLORIDE mmol/L 95* 94* 92* 91*   CO2 mmol/L 24.0 31.0* 29.0 31.0*   BUN mg/dL 22 18 22 23   CREATININE mg/dL 1.47* 0.83 1.03* 1.24*   GLUCOSE mg/dL 103* 101* 88 100*   ALBUMIN g/dL 2.40*  --   --  3.30*   BILIRUBIN mg/dL 0.4  --   --  0.6   ALK PHOS U/L 86  --   --  90   AST (SGOT) U/L 25  --   --  22   ALT (SGPT) U/L 10  --   --  13     ABG      Xr Chest 1 View    Result Date: 12/12/2020  1.Bilateral airspace opacities appear slightly decreased. 2.Small pleural effusions also appears slightly decreased. 3.Cardiomegaly.  Electronically Signed By-Isidro Jackson MD On:12/12/2020 8:57 AM This report was finalized on [id number] by  Isidro Jackson MD.    Ct Chest Pulmonary Embolism    Result Date: 12/12/2020  1. No pulmonary embolism. 2. Reticular and ground glass densities diffusely may represent edema or pneumonia superimposed on chronic interstitial change. 3. Stable surgical changes and cardiomegaly. 4. Calcified left pleural plaque. Electronically signed by:  Isidro Staley,  M.D.  12/12/2020 1:11 AM    Xr Abdomen Kub    Result Date: 12/14/2020  Mild gaseous distention of small bowel loops is nonspecific and may reflect changes of ileus. No convincing evidence of high-grade small bowel obstruction this time.  Electronically Signed By-Tonja Chauhan MD On:12/14/2020 9:12 AM This report was finalized on [id number] by  Tonja Chauhan MD.      Results for orders placed during the hospital encounter of 11/30/20   Adult Transthoracic Echo Complete W/ Cont if Necessary Per Protocol    Narrative · Left ventricular ejection fraction appears to be 56 - 60%.  · Left ventricular wall thickness is consistent with moderate concentric   hypertrophy.  · There is a TAVR valve present.  · Moderate tricuspid valve regurgitation is present.  · Left ventricular diastolic function is consistent with (grade I)   impaired relaxation.  · No pericardial effusion noted            Assessment/Plan   Assessment and Plan:         C. difficile diarrhea    Chronic coronary artery disease    Essential hypertension    Presence of cardiac pacemaker    Shortness of breath    Sick sinus syndrome (CMS/HCC)    Tachy-martin syndrome (CMS/HCC)    Chronic diastolic congestive heart failure (CMS/HCC)    Syncope and collapse    Acute renal failure (ARF) (CMS/HCC)    Leukocytosis    ASSESSMENT:  • Hyponatremia likely because of the congestive heart failure and may be some volume overload  • Hyperkalemia NICK with some congestive heart failure  • Acute kidney injury  • Significant leukocytosis rule out C. difficile colitis  • History of congestive heart failure but mainly diastolic dysfunctions  • History of aortic stenosis history of TAVR  • History of hypertension  • History of pacemaker insertion        Plan:     · Patient has significant leukocytosis likely infection may be pneumonia but BNP level is also elevated.  · Hold IV fluid and diuretics for today as patient is significantly hypotensive  · Depending upon the volume  status respiratory condition may need diuretics by tomorrow morning.  · Follow-up with a urine output  · Follow-up with repeat labs tomorrow morning  · Thank you for letting me evaluate  · Need to follow volume status closely especially the presence of significant diastolic failure  Note started  by Huy Carlin MD,   [facility] kidney consultant  [phone]

## 2020-12-14 NOTE — NURSING NOTE
PCU charge arrived at bedside at this time. Pt awaiting high acuity bed at this time. Will continue to monitor.

## 2020-12-14 NOTE — DISCHARGE PLACEMENT REQUEST
"Tena Curtis (85 y.o. Female)     Date of Birth Social Security Number Address Home Phone MRN    1935  [id number] [facility] [place] [address] [phone] [id number]    [place] Marital Status          Other        Admission Date Admission Type Admitting Provider Attending Provider Department, Room/Bed    12/11/20 Emergency Balbina Sy MD Kapadia, Shefali A, MD [facility], 2109/1    Discharge Date Discharge Disposition Discharge Destination                       Attending Provider: Balbina Sy MD    Allergies: Sulfa Antibiotics    Isolation: Spore   Infection: C.difficile (12/12/20)   Code Status: CPR    Ht: 167.6 cm (66\")   Wt: 51.7 kg (113 lb 15.7 oz)    Admission Cmt: None   Principal Problem: C. difficile diarrhea [A04.72]                 Active Insurance as of 12/11/2020     Primary Coverage     Payor Plan Insurance Group Employer/Plan Group    MEDICARE MEDICARE A & B      Payor Plan Address Payor Plan Phone Number Payor Plan Fax Number Effective Dates    [address] [phone]  4/1/2001 - None Entered    [facility] [id number]       Subscriber Name Subscriber Birth Date Member ID       TENA CURTIS 1935 [id number]           Secondary Coverage     Payor Plan Insurance Group Employer/Plan Group    [facility] SUPP [id number]     Payor Plan Address Payor Plan Phone Number Payor Plan Fax Number Effective Dates    [address]   12/1/2016 - None Entered    [facility] [id number]       Subscriber Name Subscriber Birth Date Member ID       TENA CURTIS 1935 [id number]                 Emergency Contacts      (Rel.) Home Phone Work Phone Mobile Phone    ADRIANNE CURTIS (Spouse) [phone] -- --    ROGELIO ROSE (Daughter) [phone] -- --              "

## 2020-12-14 NOTE — CONSULTS
Referring Provider: Dr. Sy   Reason for Consultation: Diastolic heart failure    Patient Care Team:  Tigre Duran MD as PCP - General  Tigre Duran MD as PCP - Family Medicine  Wyatt Kwok MD as Consulting Physician (Cardiology)    Chief complaint -- diarrhea (no complaints patient is currently very lethargic)    Subjective .     History of present illness:  Olga Curtis is a 85 y.o. female who presents with worsening diarrhea and dehydration on 12/11/2020.  Patient has past medical history of Aortic stenosis s/p TAVR, sss with pacemaker, chronic diastolic heart failure, hypertension, hyperlipidemia.  Patient is currently lethargic and gives no history.  Discussed with bedside RN and Dr. Sy..  Patient has been on antibiotics for recurrent UTI and developed CDiff, in which patient has become dehydrated and hypotensive.  Patient was given fluid bolus during the night now having worsening crackles/congestion. Labs reviewed pro BNP >32,000 up from 4,000; worsening renal function.   Patient is currently on dopamine drip awaiting transfer to PCU room.     Review of Systems   Unable to perform ROS: mental status change       History  Past Medical History:   Diagnosis Date   • Aortic stenosis 10/28/2014   • Arthritis    • Atrial premature complex 12/18/2015   • CHF (congestive heart failure) (CMS/[facility])    • Chronic coronary artery disease 10/28/2014   • Dyslipidemia 10/28/2014   • Elevated cholesterol    • History of transfusion    • Hyperlipidemia    • Hypertension 10/28/2014   • Nausea 09/2020   • Nonsustained ventricular tachycardia (CMS/HCC) 12/18/2015   • Partial paralysis of right hand (CMS/HCC)     states right arm only.  Cannot have sticks in arm, hand only   • Shoulder pain, left        Past Surgical History:   Procedure Laterality Date   • AORTIC VALVE REPAIR/REPLACEMENT N/A 5/26/2020    Procedure: TTE TRANSFEMORAL TRANSCATHETER AORTIC VALVE REPLACEMENT PERCUTANEOUS APPROACH;  Surgeon: Edward  J Carlos Knapp MD;  Location: [facility] OR ;  Service: Cardiothoracic;  Laterality: N/A;   • AORTIC VALVE REPAIR/REPLACEMENT N/A 2020    Procedure: Transfemoral Transcatheter Aortic Valve Replacement w/Intra Op TTE and Possible Open Rescue Surgery;  Surgeon: Vasiliy Bruce MD;  Location: [facility] OR ;  Service: Cardiovascular;  Laterality: N/A;   • APPENDECTOMY     • CARDIAC CATHETERIZATION N/A 3/2/2020    Procedure: Left Heart Cath and coronary angiogram;  Surgeon: Wyatt Kwok MD;  Location: [facility] CATH INVASIVE LOCATION;  Service: Cardiovascular;  Laterality: N/A;   • CARDIAC CATHETERIZATION N/A 3/2/2020    Procedure: Saphenous Vein Graft;  Surgeon: Wyatt Kwok MD;  Location: [facility] CATH INVASIVE LOCATION;  Service: Cardiovascular;  Laterality: N/A;   • CARDIAC PACEMAKER PLACEMENT     • CARDIAC SURGERY      Bypass surgery   • EYE SURGERY Bilateral     cat ext   • HERNIA REPAIR     • TOTAL SHOULDER ARTHROPLASTY W/ DISTAL CLAVICLE EXCISION Left 10/6/2020    Procedure: TOTAL SHOULDER REVERSE ARTHROPLASTY;  Surgeon: Oli Barcenas MD;  Location: [facility] MAIN OR;  Service: Orthopedics;  Laterality: Left;   • TOTAL SHOULDER REVISION  10/06/2020    left       Family History   Problem Relation Age of Onset   • No Known Problems Mother    • No Known Problems Father    • No Known Problems Sister    • No Known Problems Brother    • Malig Hyperthermia Neg Hx        Social History     Tobacco Use   • Smoking status: Never Smoker   • Smokeless tobacco: Never Used   • Tobacco comment: CAFFEINE USE: 1 CUP COFFEE DAILY   Substance Use Topics   • Alcohol use: No     Frequency: Never   • Drug use: No        Medications Prior to Admission   Medication Sig Dispense Refill Last Dose   • acebutolol (SECTRAL) 200 MG capsule TAKE ONE CAPSULE BY MOUTH TWICE A  capsule 2    • [] cefdinir (OMNICEF) 300 MG capsule Take 1 capsule by mouth 2 (Two) Times a Day for 5 days. 10  "capsule 0    • ELIQUIS 2.5 MG tablet tablet TAKE ONE TABLET BY MOUTH EVERY 12 HOURS 180 tablet 3    • furosemide (LASIX) 20 MG tablet Take 20 mg by mouth.      • lisinopril (PRINIVIL,ZESTRIL) 20 MG tablet TAKE ONE TABLET BY MOUTH DAILY 90 tablet 1    • nystatin (MYCOSTATIN) 036974 UNIT/ML suspension Swish and swallow 500,000 Units.      • pravastatin (PRAVACHOL) 20 MG tablet TAKE ONE TABLET BY MOUTH DAILY 90 tablet 2    • QUEtiapine (SEROquel) 25 MG tablet Take 25 mg by mouth.          Allergies:  Sulfa antibiotics    Scheduled Meds:apixaban, 2.5 mg, Oral, Q12H  atorvastatin, 10 mg, Oral, Daily  colesevelam, 1,250 mg, Oral, BID With Meals  furosemide, 20 mg, Intravenous, Once  metoprolol tartrate, 25 mg, Oral, Q12H  nystatin, 500,000 Units, Swish & Swallow, 4x Daily  nystatin, , Topical, Q8H  QUEtiapine, 25 mg, Oral, Nightly  saccharomyces boulardii, 500 mg, Oral, BID  sodium chloride, 3 mL, Intravenous, Q12H  vancomycin, 250 mg, Oral, Q6H      Continuous Infusions:DOPamine, 2-20 mcg/kg/min, Last Rate: 5 mcg/kg/min (12/14/20 0853)      PRN Meds:.•  acetaminophen **OR** acetaminophen **OR** acetaminophen  •  magic butt paste  •  nitroglycerin  •  ondansetron **OR** ondansetron  •  sodium chloride  •  sodium chloride    Objective     VITAL SIGNS  Vitals:    12/14/20 0943 12/14/20 0953 12/14/20 1040 12/14/20 1100   BP: 119/69 113/67 124/71 114/48   BP Location:       Patient Position:       Pulse: 86 86  90   Resp:    18   Temp:    97.7 °F (36.5 °C)   TempSrc:       SpO2:  98% 99% 100%   Weight:       Height:           Flowsheet Rows      First Filed Value   Admission Height  167.6 cm (66\") Documented at 12/11/2020 2338   Admission Weight  49.9 kg (110 lb) Documented at 12/11/2020 2338           TELEMETRY: paced rhythm with intermittent tachycardia     Physical Exam:  Vitals signs and nursing note reviewed.   Constitutional:       Appearance: Frail. Chronically ill-appearing.   Neck:      Vascular: JVD normal.   "   Pulmonary:      Breath sounds: Rales present.      Comments: Few scattered rales   Cardiovascular:      PMI at left midclavicular line. Normal rate. Occasional ectopic beats. Intermittent tachycardia  Regular rhythm. Normal S1. Normal S2.      Murmurs: There is a systolic murmur.      No gallop. No click. No rub.   Pulses:     Intact distal pulses.   Edema:     Peripheral edema absent.   Abdominal:      Palpations: Abdomen is soft.   Skin:     General: Skin is warm and dry.   Neurological:      Mental Status: Lethargic.          Results Review:   I reviewed the patient's new clinical results.  Lab Results (last 24 hours)     Procedure Component Value Units Date/Time    Magnesium [[id number]]  (Normal) Collected: 12/14/20 0652    Specimen: Blood Updated: 12/14/20 1122     Magnesium 1.7 mg/dL     CBC (No Diff) [[id number]]  (Abnormal) Collected: 12/14/20 1045    Specimen: Blood Updated: 12/14/20 1111     WBC 27.00 10*3/mm3      RBC 3.56 10*6/mm3      Hemoglobin 11.1 g/dL      Hematocrit 34.5 %      MCV 96.8 fL      MCH 31.1 pg      MCHC 32.2 g/dL      RDW 14.2 %      RDW-SD 48.1 fl      MPV 8.3 fL      Platelets 364 10*3/mm3     Comprehensive Metabolic Panel [[id number]]  (Abnormal) Collected: 12/14/20 0652    Specimen: Blood Updated: 12/14/20 1058     Glucose 103 mg/dL      BUN 22 mg/dL      Creatinine 1.47 mg/dL      Sodium 130 mmol/L      Potassium 4.9 mmol/L      Comment: Slight hemolysis detected by analyzer. Results may be affected.        Chloride 95 mmol/L      CO2 24.0 mmol/L      Calcium 8.3 mg/dL      Total Protein 5.3 g/dL      Albumin 2.40 g/dL      ALT (SGPT) 10 U/L      AST (SGOT) 25 U/L      Alkaline Phosphatase 86 U/L      Total Bilirubin 0.4 mg/dL      eGFR Non African Amer 34 mL/min/1.73      Globulin 2.9 gm/dL      A/G Ratio 0.8 g/dL      BUN/Creatinine Ratio 15.0     Anion Gap 11.0 mmol/L     Narrative:      GFR Normal >60  Chronic Kidney Disease <60  Kidney Failure <15      Comprehensive Metabolic  Panel [[id number]] Collected: 12/14/20 1046    Specimen: Blood Updated: 12/14/20 1053    BNP [[id number]]  (Abnormal) Collected: 12/14/20 0652    Specimen: Blood Updated: 12/14/20 0738     proBNP 32,130.0 pg/mL     Narrative:      Among patients with dyspnea, NT-proBNP is highly sensitive for the detection of acute congestive heart failure. In addition NT-proBNP of <300 pg/ml effectively rules out acute congestive heart failure with 99% negative predictive value.    Results may be falsely decreased if patient taking Biotin.      Blood Culture - Blood, Arm, Right [[id number]] Collected: 12/12/20 0058    Specimen: Blood from Arm, Right Updated: 12/14/20 0115     Blood Culture No growth at 2 days    Blood Culture - Blood, Arm, Left [[id number]] Collected: 12/12/20 0058    Specimen: Blood from Arm, Left Updated: 12/14/20 0115     Blood Culture No growth at 2 days          Imaging Results (Last 24 Hours)     Procedure Component Value Units Date/Time    XR Abdomen KUB [[id number]] Collected: 12/14/20 0911     Updated: 12/14/20 0915    Narrative:      DATE OF EXAM:  12/14/2020 8:55 AM     PROCEDURE:  XR ABDOMEN KUB-     INDICATIONS:  Cdiff colitis; R55-Syncope and collapse; N39.0-Urinary tract infection,  site not specified; R19.7-Diarrhea, unspecified     COMPARISON:  No Comparisons Available     TECHNIQUE:   Single radiographic view of the abdomen was obtained.        FINDINGS:  Nonspecific bowel gas pattern with mild to moderate distention of  multiple small bowel loops. Small amount of air is seen more distally  within the descending and sigmoid colon. Mild stool burden is seen in  the vicinity of the transverse colon. No free air is seen on the supine  study. No pneumatosis is seen. Surgical clips are seen over the  mediastinum. Pacemaker device in place. Signs of aortic valve  replacement. Left lower lobe lung airspace disease.        Impression:      Mild gaseous distention of small bowel loops is nonspecific and  may  reflect changes of ileus. No convincing evidence of high-grade small  bowel obstruction this time.     Electronically Signed By-Tonja Chauhan MD On:12/14/2020 9:12 AM  This report was finalized on [id number] by  Tonja Chauhan MD.          EKG          I personally viewed and interpreted the patient's EKG/Telemetry data:    ECHOCARDIOGRAM:      STRESS MYOVIEW:    CARDIAC CATHETERIZATION:    OTHER:         Assessment/Plan     Principal Problem:    C. difficile diarrhea  Active Problems:    Chronic coronary artery disease    Essential hypertension    Presence of cardiac pacemaker    Shortness of breath    Sick sinus syndrome (CMS/HCC)    Tachy-martin syndrome (CMS/HCC)    Chronic diastolic congestive heart failure (CMS/HCC)    Syncope and collapse    Acute renal failure (ARF) (CMS/HCC)    Leukocytosis      PLAN:  Patient presented with altered mental status, hypotension (60/40s)  Patient has had Cdiff diarrhea and dehydration   Patient became less responsive and hypotensive again last night and received IV fluid bolus  Pro BNP now 32,000 up from 4000  Will give lasix IV X1  Renal has been consulted for worsening renal function   Patient is currently on dopamine drip for hypotension  Will monitor closely and try to wean off as tolerated   Patient was also given 1 dose of midodrine  Will discontinue lisinopril for now and continue beta blocker as tolerated   Repeat labs pending - will replete electrolytes as needed   Pacemaker interrogated in the ED and working well, no arrhythmias reported             I discussed the patients findings and my recommendations with bedside RN, PCU charge nurse.       maggie garcia M.D.    ..Electronically signed by Maggie Garcia MD, 12/14/20, 4:53 PM EST.

## 2020-12-14 NOTE — CONSULTS
Nutrition Services    Patient Name: Olga Crutis  YOB: 1935  MRN: [id number]  Admission date: 12/11/2020     Malnutrition (severe, acute) related to ongoing insufficient PO intake; as evidenced by NFPE revealing moderate muscle and fat wasting; with ongoing poor intake at meals meeting less than 50% of meals for at least the last five days (including prior to admission).     Will keep NPO until appropriate, per MD, for diet advancement.  When diet resumes, will also add oral supplements to support adequate intake.      PPE Documentation        PPE Worn By Provider Protective eyewear, mask, gown, gloves    PPE Worn By Patient  None     CLINICAL NUTRITION ASSESSMENT      Reason for Assessment 12/14:  BMI, MST score 2+     H&P:  85 year old female with PMH of CAD s/p CABG 1999, severe aortic stenosis s/p TAVR in May, SSS with cardiac pacemaker in situ, HTN, PAF, DJD post shoulder surgery, non sustained V tach, HLDpresents with complaints of progressive weakness and dyspnea.     Past Medical History:   Diagnosis Date   • Aortic stenosis 10/28/2014   • Arthritis    • Atrial premature complex 12/18/2015   • CHF (congestive heart failure) (CMS/HCC)    • Chronic coronary artery disease 10/28/2014   • Dyslipidemia 10/28/2014   • Elevated cholesterol    • History of transfusion    • Hyperlipidemia    • Hypertension 10/28/2014   • Nausea 09/2020   • Nonsustained ventricular tachycardia (CMS/HCC) 12/18/2015   • Partial paralysis of right hand (CMS/HCC)     states right arm only.  Cannot have sticks in arm, hand only   • Shoulder pain, left        Past Surgical History:   Procedure Laterality Date   • AORTIC VALVE REPAIR/REPLACEMENT N/A 5/26/2020    Procedure: TTE TRANSFEMORAL TRANSCATHETER AORTIC VALVE REPLACEMENT PERCUTANEOUS APPROACH;  Surgeon: J Carlos Leon MD;  Location: [facility] 18/19;  Service: Cardiothoracic;  Laterality: N/A;   • AORTIC VALVE REPAIR/REPLACEMENT N/A 5/26/2020    Procedure:  Transfemoral Transcatheter Aortic Valve Replacement w/Intra Op TTE and Possible Open Rescue Surgery;  Surgeon: Vasiliy Bruce MD;  Location: [facility] HYBRID OR 18/19;  Service: Cardiovascular;  Laterality: N/A;   • APPENDECTOMY     • CARDIAC CATHETERIZATION N/A 3/2/2020    Procedure: Left Heart Cath and coronary angiogram;  Surgeon: Wyatt Kwok MD;  Location: [facility] CATH INVASIVE LOCATION;  Service: Cardiovascular;  Laterality: N/A;   • CARDIAC CATHETERIZATION N/A 3/2/2020    Procedure: Saphenous Vein Graft;  Surgeon: Wyatt Kwok MD;  Location: [facility] CATH INVASIVE LOCATION;  Service: Cardiovascular;  Laterality: N/A;   • CARDIAC PACEMAKER PLACEMENT  2017   • CARDIAC SURGERY  1999    Bypass surgery   • EYE SURGERY Bilateral     cat ext   • HERNIA REPAIR     • TOTAL SHOULDER ARTHROPLASTY W/ DISTAL CLAVICLE EXCISION Left 10/6/2020    Procedure: TOTAL SHOULDER REVERSE ARTHROPLASTY;  Surgeon: Oli Barcenas MD;  Location: [facility] MAIN OR;  Service: Orthopedics;  Laterality: Left;   • TOTAL SHOULDER REVISION  10/06/2020    left        Current Problems:   Per hospitalist note 12/14:     C. difficile associated diarrhea  -Multiple rounds of antibiotics recent UTI  -Current UA does not indicate any presence of UTI  -UA shows yeast, this is contamination from the skin, discontinue fluconazole     -Add nystatin powder to perineum  -Increase oral vancomycin to 250 mg every 6     -May need to consider Vanco enema  -Increased dosing of Florastor and WelChol for optimization  -Magic Butt cream to be applied as needed     Leukocytosis  -Due to C. difficile associated diarrhea  -No signs or symptoms of sepsis at this time  -Continue to monitor CBC and WBC count     Hypotension  Syncope and collapse  -s/p 750 cc bolus earlier this morning  -Chronic low blood pressure due to diastolic CHF  -Midodrine added  -Dopamine drip initiated this morning  -Continue to monitor BP and vitals closely  -Cardiology consulted  for further assistance in managing BP and fluids  -May need to be transferred to NHC/PCU level if unable to wean off dopamine     Dyspnea  -Due to the above  -Fluid overloaded BNP greater than 32,000  -Will need to gently diuresis at some point if blood pressure allows  -Continuous pulse oximetry  -O2 supplementation to keep sats > 93%     Acute renal failure  -Creatinine 1.24 on admission, previously 0.6 on last admission  -Likely due to dehydration from diarrhea  -Gradually improving, currently 0.83  -Will need to cautiously diurese as noted above  --Consult Nephrology to assist with fluid imbalance     Chronic diastolic congestive heart failure  Aortic stenosis  Sick sinus syndrome/tachybradycardia syndrome  Presence of cardiac pacemaker  Chronic coronary artery disease  -Last echo on 12/1/2020 reviewed and noted above  -Continue lisinopril, metoprolol, atorvastatin  -Furosemide being held currently  -Cardiology to see     Essential hypertension  -Continue lisinopril and metoprolol, midodrine added for hypotension  -Continue to monitor BP and vitals     Medial coccyx stage I pressure injury  -Mepilex dressing  -Turn every 2 hours       Nutrition/Diet History         Narrative     12/14: Pt seen due to concern for malnutrition. Pt extremely drowsy at visit to room - had recently transferred to PCU due to hypotension. Performed limited NFPE based on ability with pt status; per assessment of upper body, pt with both muscle and fat wasting (orbital, temporal, buccal, clavicular, sternal/ribs, and dorsal hand.) Per family report, pt with diminished appetite prior to admission with poor intake; continues with poor intake since admission. Pt recently assessed by dietetic intern, Kadie Doyle on 12/02/2020; agree with Kadie's NFPE based on follow up today - pt still with signs of wasting. During that assessment, family reported pt willing to take Boost supplements -- will add these when diet resumes (pt currently NPO).      "  Functional Status Requires assistance with some ADLs; Receives home health care, as well as help from spouse/family      Food Allergies NKFA     Factors Affecting   Nutritional Intake Diminished appetite secondary to C. Diff colitis      Anthropometrics        Current Height, Weight Height: 167.6 cm (66\")  Weight: 51.7 kg (113 lb 15.7 oz) (12/14/20 0408)        Admit Height, Weight -    Flowsheet Rows      First Filed Value   Admission Height  167.6 cm (66\") Documented at 12/11/2020 2338   Admission Weight  49.9 kg (110 lb) Documented at 12/11/2020 2338             Ideal Body Weight (IBW) 136 lb   % Ideal Body Weight 83%       Usual Body Weight UTD   % Usual Body Weight UTD   Wt Change Observation Wt has, overall, been stable for the last several months, but pt chronically low weight.   Weight Hx    Wt Readings from Last 30 Encounters:   12/14/20 0408 51.7 kg (113 lb 15.7 oz)   12/13/20 0410 54.5 kg (120 lb 2.4 oz)   12/12/20 0445 53.6 kg (118 lb 2.7 oz)   12/11/20 2338 49.9 kg (110 lb)   12/06/20 0420 51 kg (112 lb 8 oz)   12/05/20 0408 52.5 kg (115 lb 12.8 oz)   12/04/20 0245 51.3 kg (113 lb 1.5 oz)   12/02/20 0346 49.3 kg (108 lb 11 oz)   12/01/20 1625 51.7 kg (114 lb)   12/01/20 0335 52 kg (114 lb 10.2 oz)   11/30/20 1951 52.1 kg (114 lb 13.8 oz)   11/30/20 1241 49.9 kg (110 lb)   11/18/20 0959 60.3 kg (133 lb)   10/21/20 0857 51.3 kg (113 lb)   10/07/20 0005 51.3 kg (113 lb 3.2 oz)   10/06/20 0949 51.3 kg (113 lb)   09/28/20 1247 51.3 kg (113 lb)   07/27/20 0942 51.7 kg (114 lb)   07/02/20 1052 52.2 kg (115 lb)   07/02/20 1144 51.7 kg (114 lb)   06/25/20 1118 52.2 kg (115 lb)   06/03/20 1127 52.4 kg (115 lb 9.6 oz)   05/27/20 0710 52.2 kg (115 lb)   05/26/20 0807 52.2 kg (115 lb)   05/21/20 0733 51.7 kg (114 lb)   03/03/20 0526 56.8 kg (125 lb 3.5 oz)   03/01/20 0446 56.2 kg (124 lb)   02/29/20 1123 59.9 kg (132 lb)   02/29/20 0400 60.1 kg (132 lb 9.6 oz)   02/28/20 1700 59 kg (130 lb 1.1 oz)   02/28/20 1036 " 59.9 kg (132 lb)   02/24/20 0836 56.7 kg (125 lb)   12/09/19 1225 57.2 kg (126 lb 3.2 oz)   10/14/19 1421 59.2 kg (130 lb 8 oz)   10/09/19 0350 58.5 kg (128 lb 15.5 oz)   10/09/19 0021 59 kg (130 lb)   09/09/19 1357 60.2 kg (132 lb 12.8 oz)   01/17/19 1100 64.3 kg (141 lb 12 oz)   05/14/18 1557 65.7 kg (144 lb 12 oz)   09/25/17 1502 65.2 kg (143 lb 12 oz)   04/24/17 1341 68.5 kg (151 lb)   09/12/16 1252 70.8 kg (156 lb)   02/01/16 1030 69.4 kg (153 lb)        BMI kg/m2 Body mass index is 18.4 kg/m².       Labs/Medications         Pertinent Labs -   Results from last 7 days   Lab Units 12/14/20  0652 12/13/20  0326 12/12/20  0539 12/12/20  0058   SODIUM mmol/L 130* 131* 127* 128*   POTASSIUM mmol/L 4.9 4.5 5.2 5.4*   CHLORIDE mmol/L 95* 94* 92* 91*   CO2 mmol/L 24.0 31.0* 29.0 31.0*   BUN mg/dL 22 18 22 23   CREATININE mg/dL 1.47* 0.83 1.03* 1.24*   CALCIUM mg/dL 8.3* 8.5* 8.7 9.0   BILIRUBIN mg/dL 0.4  --   --  0.6   ALK PHOS U/L 86  --   --  90   ALT (SGPT) U/L 10  --   --  13   AST (SGOT) U/L 25  --   --  22   GLUCOSE mg/dL 103* 101* 88 100*     Results from last 7 days   Lab Units 12/14/20  1045 12/14/20  0652 12/13/20  0326   MAGNESIUM mg/dL  --  1.7 1.6   PHOSPHORUS mg/dL  --   --  3.1   HEMOGLOBIN g/dL 11.1*  --  9.6*   HEMATOCRIT % 34.5  --  29.4*     COVID19   Date Value Ref Range Status   12/12/2020 Not Detected Not Detected - Ref. Range Final     Lab Results   Component Value Date    HGBA1C 5.5 09/30/2020         Pertinent Medications Eliquis, lipitor, welchol, lopressor, seroquel, florastor, vancocin    Currently with Dopamine drip infusing      Physical Findings        Overall Physical   Appearance, MSA Appears malnourished on exam    --  Edema  No edema observed     Gastrointestinal BM 12/13     Tubes No feeding tube      Oral/Mouth Cavity Pt with some missing teeth, but has dentures     Skin Stage 1 coccyx; incision    Wound care RN note reports moisture-associated dermatitis to buttock area       --  Current Nutrition Orders & Evaluation of Intake       Oral Nutrition     Current PO Diet NPO Diet   Supplement -   PO Evaluation     % PO Intake When diet was active, intake was poor-fair - eating about 25-50% at meals    --  Clinical Course    Nutrition Course Details 12/12 Regular diet  12/14 NPO (current)     Nutritional Risk Screening        NRS-2002 Score   -       Nutrition Diagnosis         Nutrition Dx Problem 1 Malnutrition (severe, acute) related to ongoing insufficient PO intake; as evidenced by NFPE revealing moderate muscle and fat wasting; with ongoing poor intake at meals meeting less than 50% of meals for at least the last five days (including prior to admission).       Nutrition Dx Problem 2 -       Intervention Goal         Intervention Goal(s) Pt resumes intake within 1-3 days, unless contraindicated      Nutrition Intervention        RD/Tech Action Performed NFPE today; will wait on modifying diet/supplements, as pt currently NPO      Nutrition Prescription          Diet Prescription Will continue NPO until approved by physician for diet advancement    Supplement Prescription When diet advances, will add Boost Plus TID   --  Monitor/Evaluation        Monitor PO intake, Supplement intake, Pertinent labs, Weight, Skin status, GI status     Electronically signed by:  Sharon Monahan RD  12/14/20 16:52 EST

## 2020-12-15 NOTE — PROGRESS NOTES
PROGRESS NOTE      Patient Name: Olga Curtis  : 1935  MRN: [id number]  Primary Care Physician: Tigre Duran MD  Date of admission: 2020    Patient Care Team:  Tigre Duran MD as PCP - General  Tigre Duran MD as PCP - Family Medicine  Wyatt Kwok MD as Consulting Physician (Cardiology)        Subjective   Subjective:     Acute kidney injury better, patient is still complaining of weakness  Review of systems:  All other review of system unremarkable      Allergies:    Allergies   Allergen Reactions   • Sulfa Antibiotics Hives       Objective   Exam:     Vital Signs  Temp:  [97.8 °F (36.6 °C)-99.1 °F (37.3 °C)] 98 °F (36.7 °C)  Heart Rate:  [] 119  Resp:  [16-20] 16  BP: ()/() 100/54  SpO2:  [75 %-100 %] 100 %  on  Flow (L/min):  [2-3] 2;   Device (Oxygen Therapy): nasal cannula  Body mass index is 18.65 kg/m².    General: Elderly white  female in no acute distress.    Head:      Normocephalic and atraumatic.    Eyes:      PERRL/EOM intact, conjunctiva and sclera clear with out nystagmus.    Neck:      No masses, thyromegaly,  trachea central with normal respiratory effort   Lungs:    Clear bilaterally to auscultation.    Heart:      Regular rate and rhythm, no murmur no gallop  Abd:        Soft, nontender, not distended, bowel sounds positive, no shifting dullness   Pulses:   Pulses palpable  Extr:        No cyanosis or clubbing--mild edema.    Neuro:    No focal deficits.   alert oriented x3  Skin:       Intact without lesions or rashes.    Psych:    Alert and cooperative; normal mood and affect; .      Results Review:  I have personally reviewed most recent Data :  CBC    Results from last 7 days   Lab Units 12/15/20  0331 20  1045 20  0326 20  0539 20  0058   WBC 10*3/mm3 25.40* 27.00* 16.30* 16.40* 17.00*   HEMOGLOBIN g/dL 11.7* 11.1* 9.6* 9.7* 10.0*   PLATELETS 10*3/mm3 227 364 284 268 301     CMP   Results from last 7 days   Lab Units  12/15/20  0656 12/14/20  1830 12/14/20  0652 12/13/20  0326 12/12/20  0539 12/12/20  0058   SODIUM mmol/L 128* 132* 130* 131* 127* 128*   POTASSIUM mmol/L 4.1 4.3 4.9 4.5 5.2 5.4*   CHLORIDE mmol/L 91* 93* 95* 94* 92* 91*   CO2 mmol/L 27.0 30.0* 24.0 31.0* 29.0 31.0*   BUN mg/dL 22 25* 22 18 22 23   CREATININE mg/dL 1.20* 1.51* 1.47* 0.83 1.03* 1.24*   GLUCOSE mg/dL 73 75 103* 101* 88 100*   ALBUMIN g/dL 2.60*  --  2.40*  --   --  3.30*   BILIRUBIN mg/dL 0.4  --  0.4  --   --  0.6   ALK PHOS U/L 142*  --  86  --   --  90   AST (SGOT) U/L 26  --  25  --   --  22   ALT (SGPT) U/L 12  --  10  --   --  13     ABG      Xr Chest 1 View    Result Date: 12/15/2020  1. Improved bilateral interstitial opacities likely related to resolving infection or edema. 2. Persistent left basilar airspace opacity.  Electronically Signed By-Hill Askew MD On:12/15/2020 9:04 AM This report was finalized on [id number] by  Hill Askew MD.    Xr Chest 1 View    Result Date: 12/12/2020  1.Bilateral airspace opacities appear slightly decreased. 2.Small pleural effusions also appears slightly decreased. 3.Cardiomegaly.  Electronically Signed By-Isidro Jackson MD On:12/12/2020 8:57 AM This report was finalized on [id number] by  Isidro Jackson MD.    Ct Chest Pulmonary Embolism    Result Date: 12/12/2020  1. No pulmonary embolism. 2. Reticular and ground glass densities diffusely may represent edema or pneumonia superimposed on chronic interstitial change. 3. Stable surgical changes and cardiomegaly. 4. Calcified left pleural plaque. Electronically signed by:  Isidro Staley M.D.  12/12/2020 1:11 AM    Xr Abdomen Kub    Result Date: 12/14/2020  Mild gaseous distention of small bowel loops is nonspecific and may reflect changes of ileus. No convincing evidence of high-grade small bowel obstruction this time.  Electronically Signed By-Tonja Chauhan MD On:12/14/2020 9:12 AM This report was finalized on [id number] by  Tonja  MD Tien.      Results for orders placed during the hospital encounter of 11/30/20   Adult Transthoracic Echo Complete W/ Cont if Necessary Per Protocol    Narrative · Left ventricular ejection fraction appears to be 56 - 60%.  · Left ventricular wall thickness is consistent with moderate concentric   hypertrophy.  · There is a TAVR valve present.  · Moderate tricuspid valve regurgitation is present.  · Left ventricular diastolic function is consistent with (grade I)   impaired relaxation.  · No pericardial effusion noted        Scheduled Meds:apixaban, 2.5 mg, Oral, Q12H  atorvastatin, 10 mg, Oral, Daily  colesevelam, 1,250 mg, Oral, BID With Meals  methylPREDNISolone sodium succinate, 20 mg, Intravenous, Daily  nystatin, , Topical, Q8H  QUEtiapine, 25 mg, Oral, Nightly  saccharomyces boulardii, 500 mg, Oral, BID  sodium chloride, 3 mL, Intravenous, Q12H  vancomycin, 250 mg, Oral, Q6H      Continuous Infusions:DOPamine, 2-20 mcg/kg/min, Last Rate: 9.5 mcg/kg/min (12/15/20 0351)      PRN Meds:•  acetaminophen **OR** acetaminophen **OR** acetaminophen  •  magic butt paste  •  magnesium sulfate **OR** magnesium sulfate **OR** magnesium sulfate  •  nitroglycerin  •  ondansetron **OR** ondansetron  •  sodium chloride  •  sodium chloride    Assessment/Plan   Assessment and Plan:         C. difficile diarrhea    Chronic coronary artery disease    Essential hypertension    Presence of cardiac pacemaker    Shortness of breath    Sick sinus syndrome (CMS/HCC)    Tachy-martin syndrome (CMS/HCC)    Chronic diastolic congestive heart failure (CMS/HCC)    Syncope and collapse    Acute renal failure (ARF) (CMS/HCC)    Leukocytosis    ASSESSMENT:  · Hyponatremia likely because of the congestive heart failure and may be some volume overload  · Hyperkalemia NICK with some congestive heart failure  · Acute kidney injury  · Significant leukocytosis rule out C. difficile colitis  · History of congestive heart failure but mainly  diastolic dysfunctions  · History of aortic stenosis history of TAVR  · History of hypertension  · History of pacemaker insertion           Plan:      · Patient has significant leukocytosis likely infection may be pneumonia but BNP level is also elevated.  · Sodium level is slightly less than yesterday  · Still has significantly high BNP level  · Hyperkalemia has improved NICK is improving  · BNP level is significantly better than yesterday  · Follow-up with a urine output  · Still significant leukocytosis  · Follow-up with repeat labs tomorrow morning  · Thank you for letting me evaluate  · Need to follow volume status closely especially the presence of significant diastolic failure  •           Note started  by Huy Carlin MD,   [facility] kidney consultant

## 2020-12-15 NOTE — CONSULTS
Group: [facility]         CONSULT NOTE    Patient Identification:  Olga Curtis  85 y.o.  female  1935  [id number]            Requesting physician: Attending physician    Reason for Consultation: Hypotension       History of Present Illness:    85 year old female who presented to the ED on 12/11/20 for generalized weakness with hypotension. She has a significant cardiac history that includes aortic stenosis, hypertension, TAVR, diastolic heart failure, and CAD. She was recently treated for UTI with antibiotics and currently has CDiff with dehydration and hypotension. She was given fluid bolus during the previous night and developed pulmonary congestion and a proBNP that jumped from 4000 to 32,000. ON 12/14/20 she was a fast call for hypotension, started on dopamine and transferred to higher acuity unit.     Assessment:     CT scan suggestive of pulmonary edema cannot rule out underlying interstitial lung disease, proBNP today 14,923    Hypotension  And  Hyponatremia and renal insufficiency suggestive of low cardiac output most likely related to valvular heart disease  TAVR valve present     no evidence of pulmonary embolism on CT scan    COVID 19 and respiratory panel negative 12/12/20     C diff colitis    Recommendations:     her presentation compatible with cardiac decompensation, Currently on dopamine drip with tachycardia     Anticoagulation eliquis    ADD low dose steroids     p.o.  vancomycin for C diff colitis      Review of Sytems:  Review of Systems   Constitutional: Positive for fatigue.   Respiratory: Positive for cough and shortness of breath.    Cardiovascular: Positive for palpitations and leg swelling.       Past Medical History:  Past Medical History:   Diagnosis Date   • Aortic stenosis 10/28/2014   • Arthritis    • Atrial premature complex 12/18/2015   • CHF (congestive heart failure) (CMS/[facility])    • Chronic coronary artery disease 10/28/2014   • Dyslipidemia 10/28/2014   •  Elevated cholesterol    • History of transfusion    • Hyperlipidemia    • Hypertension 10/28/2014   • Nausea 09/2020   • Nonsustained ventricular tachycardia (CMS/HCC) 12/18/2015   • Partial paralysis of right hand (CMS/HCC)     states right arm only.  Cannot have sticks in arm, hand only   • Shoulder pain, left        Past Surgical History:  Past Surgical History:   Procedure Laterality Date   • AORTIC VALVE REPAIR/REPLACEMENT N/A 5/26/2020    Procedure: TTE TRANSFEMORAL TRANSCATHETER AORTIC VALVE REPLACEMENT PERCUTANEOUS APPROACH;  Surgeon: J Carlos Leon MD;  Location: [facility] OR 18/19;  Service: Cardiothoracic;  Laterality: N/A;   • AORTIC VALVE REPAIR/REPLACEMENT N/A 5/26/2020    Procedure: Transfemoral Transcatheter Aortic Valve Replacement w/Intra Op TTE and Possible Open Rescue Surgery;  Surgeon: Vasiliy Bruce MD;  Location: [facility] OR 18/19;  Service: Cardiovascular;  Laterality: N/A;   • APPENDECTOMY     • CARDIAC CATHETERIZATION N/A 3/2/2020    Procedure: Left Heart Cath and coronary angiogram;  Surgeon: Wyatt Kwok MD;  Location: [facility] CATH INVASIVE LOCATION;  Service: Cardiovascular;  Laterality: N/A;   • CARDIAC CATHETERIZATION N/A 3/2/2020    Procedure: Saphenous Vein Graft;  Surgeon: Wyatt Kwok MD;  Location: [facility] CATH INVASIVE LOCATION;  Service: Cardiovascular;  Laterality: N/A;   • CARDIAC PACEMAKER PLACEMENT  2017   • CARDIAC SURGERY  1999    Bypass surgery   • EYE SURGERY Bilateral     cat ext   • HERNIA REPAIR     • TOTAL SHOULDER ARTHROPLASTY W/ DISTAL CLAVICLE EXCISION Left 10/6/2020    Procedure: TOTAL SHOULDER REVERSE ARTHROPLASTY;  Surgeon: Oli Barcenas MD;  Location: [facility] OR;  Service: Orthopedics;  Laterality: Left;   • TOTAL SHOULDER REVISION  10/06/2020    left        Home Meds:  Medications Prior to Admission   Medication Sig Dispense Refill Last Dose   • acebutolol (SECTRAL) 200 MG capsule TAKE ONE CAPSULE BY MOUTH TWICE A DAY  "180 capsule 2    • [] cefdinir (OMNICEF) 300 MG capsule Take 1 capsule by mouth 2 (Two) Times a Day for 5 days. 10 capsule 0    • ELIQUIS 2.5 MG tablet tablet TAKE ONE TABLET BY MOUTH EVERY 12 HOURS 180 tablet 3    • furosemide (LASIX) 20 MG tablet Take 20 mg by mouth.      • lisinopril (PRINIVIL,ZESTRIL) 20 MG tablet TAKE ONE TABLET BY MOUTH DAILY 90 tablet 1    • nystatin (MYCOSTATIN) 711371 UNIT/ML suspension Swish and swallow 500,000 Units.      • pravastatin (PRAVACHOL) 20 MG tablet TAKE ONE TABLET BY MOUTH DAILY 90 tablet 2    • QUEtiapine (SEROquel) 25 MG tablet Take 25 mg by mouth.          Allergies:  Allergies   Allergen Reactions   • Sulfa Antibiotics Hives       Social History:   Social History     Socioeconomic History   • Marital status:      Spouse name: Not on file   • Number of children: Not on file   • Years of education: Not on file   • Highest education level: Not on file   Tobacco Use   • Smoking status: Never Smoker   • Smokeless tobacco: Never Used   • Tobacco comment: CAFFEINE USE: 1 CUP COFFEE DAILY   Substance and Sexual Activity   • Alcohol use: No     Frequency: Never   • Drug use: No   • Sexual activity: Defer       Family History:  Family History   Problem Relation Age of Onset   • No Known Problems Mother    • No Known Problems Father    • No Known Problems Sister    • No Known Problems Brother    • Malig Hyperthermia Neg Hx        Physical Exam:  /57   Pulse (!) 121   Temp 99.1 °F (37.3 °C) (Oral)   Resp 18   Ht 167.6 cm (66\")   Wt 52.4 kg (115 lb 8.3 oz)   SpO2 100%   BMI 18.65 kg/m²  Body mass index is 18.65 kg/m². 100% 52.4 kg (115 lb 8.3 oz)  Physical Exam  Vitals signs reviewed.   Cardiovascular:      Heart sounds: Murmur present.   Pulmonary:      Breath sounds: Rhonchi and rales present.   Skin:     General: Skin is warm and dry.   Neurological:      Mental Status: She is alert.         LABS:  Lab Results   Component Value Date    CALCIUM 8.4 (L) " 12/14/2020    PHOS 3.1 12/13/2020     Results from last 7 days   Lab Units 12/15/20  0407 12/15/20  0331 12/14/20  1830 12/14/20  1045 12/14/20  0652 12/13/20  0326  12/12/20  0058   MAGNESIUM mg/dL 1.6  --   --   --  1.7 1.6  --   --    SODIUM mmol/L  --   --  132*  --  130* 131*   < > 128*   POTASSIUM mmol/L  --   --  4.3  --  4.9 4.5   < > 5.4*   CHLORIDE mmol/L  --   --  93*  --  95* 94*   < > 91*   CO2 mmol/L  --   --  30.0*  --  24.0 31.0*   < > 31.0*   BUN mg/dL  --   --  25*  --  22 18   < > 23   CREATININE mg/dL  --   --  1.51*  --  1.47* 0.83   < > 1.24*   GLUCOSE mg/dL  --   --  75  --  103* 101*   < > 100*   CALCIUM mg/dL  --   --  8.4*  --  8.3* 8.5*   < > 9.0   WBC 10*3/mm3  --  25.40*  --  27.00*  --  16.30*   < > 17.00*   HEMOGLOBIN g/dL  --  11.7*  --  11.1*  --  9.6*   < > 10.0*   PLATELETS 10*3/mm3  --  227  --  364  --  284   < > 301   ALT (SGPT) U/L  --   --   --   --  10  --   --  13   AST (SGOT) U/L  --   --   --   --  25  --   --  22   PROBNP pg/mL 14,913.0*  --   --   --  32,130.0*  --   --  4,443.0*   PROCALCITONIN ng/mL  --   --   --   --   --   --   --  1.40*    < > = values in this interval not displayed.     Lab Results   Component Value Date    TROPONINT 0.019 12/12/2020     Results from last 7 days   Lab Units 12/12/20  0058   TROPONIN T ng/mL 0.019     Results from last 7 days   Lab Units 12/12/20  0134 12/12/20  0058   BLOODCX   --  No growth at 3 days  No growth at 3 days   URINECX  Yeast isolated*  --      Results from last 7 days   Lab Units 12/12/20  0116 12/12/20  0058   PROCALCITONIN ng/mL  --  1.40*   LACTATE mmol/L 1.0  --          Results from last 7 days   Lab Units 12/12/20  0134 12/12/20  0023   ADENOVIRUS DETECTION BY PCR   --  Not Detected   ADENOVIRUS  Not Detected  --    CORONAVIRUS 229E   --  Not Detected   CORONAVIRUS HKU1   --  Not Detected   CORONAVIRUS NL63   --  Not Detected   CORONAVIRUS OC43   --  Not Detected   HUMAN METAPNEUMOVIRUS   --  Not Detected    HUMAN RHINOVIRUS/ENTEROVIRUS   --  Not Detected   INFLUENZA B PCR   --  Not Detected   PARAINFLUENZA 1   --  Not Detected   PARAINFLUENZA VIRUS 2   --  Not Detected   PARAINFLUENZA VIRUS 3   --  Not Detected   PARAINFLUENZA VIRUS 4   --  Not Detected   BORDETELLA PERTUSSIS PCR   --  Not Detected   CHLAMYDOPHILA PNEUMONIAE PCR   --  Not Detected   MYCOPLAMA PNEUMO PCR   --  Not Detected   INFLUENZA A PCR   --  Not Detected   RSV, PCR   --  Not Detected     Results from last 7 days   Lab Units 12/12/20  0058   INR  1.02     Results from last 7 days   Lab Units 12/12/20  0134 12/12/20 0058   BLOODCX   --  No growth at 3 days  No growth at 3 days   URINECX  Yeast isolated*  --      Lab Results   Component Value Date    TSH 0.658 12/05/2020     Estimated Creatinine Clearance: 22.9 mL/min (A) (by C-G formula based on SCr of 1.51 mg/dL (H)).  Results from last 7 days   Lab Units 12/12/20 0134   NITRITE UA  Negative   WBC UA /HPF Too Numerous to Count*   BACTERIA UA /HPF None Seen   SQUAM EPITHEL UA /HPF 3-6*   URINECX  Yeast isolated*        Imaging:  Imaging Results (Last 24 Hours)     Procedure Component Value Units Date/Time    XR Abdomen KUB [[id number]] Collected: 12/14/20 0911     Updated: 12/14/20 0915    Narrative:      DATE OF EXAM:  12/14/2020 8:55 AM     PROCEDURE:  XR ABDOMEN KUB-     INDICATIONS:  Cdiff colitis; R55-Syncope and collapse; N39.0-Urinary tract infection,  site not specified; R19.7-Diarrhea, unspecified     COMPARISON:  No Comparisons Available     TECHNIQUE:   Single radiographic view of the abdomen was obtained.        FINDINGS:  Nonspecific bowel gas pattern with mild to moderate distention of  multiple small bowel loops. Small amount of air is seen more distally  within the descending and sigmoid colon. Mild stool burden is seen in  the vicinity of the transverse colon. No free air is seen on the supine  study. No pneumatosis is seen. Surgical clips are seen over the  mediastinum.  Pacemaker device in place. Signs of aortic valve  replacement. Left lower lobe lung airspace disease.        Impression:      Mild gaseous distention of small bowel loops is nonspecific and may  reflect changes of ileus. No convincing evidence of high-grade small  bowel obstruction this time.     Electronically Signed By-Tonja Chauhan MD On:12/14/2020 9:12 AM  This report was finalized on [id number] by  Tonja Chauhan MD.            Current Meds:   SCHEDULE  apixaban, 2.5 mg, Oral, Q12H  atorvastatin, 10 mg, Oral, Daily  colesevelam, 1,250 mg, Oral, BID With Meals  nystatin, 500,000 Units, Swish & Swallow, 4x Daily  nystatin, , Topical, Q8H  QUEtiapine, 25 mg, Oral, Nightly  saccharomyces boulardii, 500 mg, Oral, BID  sodium chloride, 3 mL, Intravenous, Q12H  vancomycin, 250 mg, Oral, Q6H      Infusions  DOPamine, 2-20 mcg/kg/min, Last Rate: 9.5 mcg/kg/min (12/15/20 0351)      PRNs  •  acetaminophen **OR** acetaminophen **OR** acetaminophen  •  magic butt paste  •  magnesium sulfate **OR** magnesium sulfate **OR** magnesium sulfate  •  nitroglycerin  •  ondansetron **OR** ondansetron  •  sodium chloride  •  sodium chloride        HERMINIO Coyle  12/15/2020  07:18 EST

## 2020-12-15 NOTE — SIGNIFICANT NOTE
Per RN pt BP and HR labile while on dopamine drip. Not yet appropriate for OOB activity this date.

## 2020-12-15 NOTE — DISCHARGE PLACEMENT REQUEST
"Tena Curtis (85 y.o. Female)     Date of Birth Social Security Number Address Home Phone MRN    1935  [id number] [facility] [place] [address] [phone] [id number]    Sikh Marital Status          Other        Admission Date Admission Type Admitting Provider Attending Provider Department, Room/Bed    12/11/20 Emergency Balbina Sy MD Kapadia, Shefali A, MD [facility], 2109/1    Discharge Date Discharge Disposition Discharge Destination                       Attending Provider: Balbina Sy MD    Allergies: Sulfa Antibiotics    Isolation: Spore   Infection: C.difficile (12/12/20)   Code Status: No CPR    Ht: 167.6 cm (66\")   Wt: 52.4 kg (115 lb 8.3 oz)    Admission Cmt: None   Principal Problem: C. difficile diarrhea [A04.72]                 Active Insurance as of 12/11/2020     Primary Coverage     Payor Plan Insurance Group Employer/Plan Group    MEDICARE MEDICARE A & B      Payor Plan Address Payor Plan Phone Number Payor Plan Fax Number Effective Dates    [address] [phone]  4/1/2001 - None Entered    [facility] [id number]       Subscriber Name Subscriber Birth Date Member ID       TENA CURTIS 1935 [id number]           Secondary Coverage     Payor Plan Insurance Group Employer/Plan Group    [facility] SUPP [id number]     Payor Plan Address Payor Plan Phone Number Payor Plan Fax Number Effective Dates    [address]   12/1/2016 - None Entered    [facility] [id number]       Subscriber Name Subscriber Birth Date Member ID       TENA CURTIS 1935 [id number]                 Emergency Contacts      (Rel.) Home Phone Work Phone Mobile Phone    RHINAADRIANNE ROWELL (Spouse) [phone] -- --    ROGELIO ROSE 1st contact (Daughter) [phone] -- --              "

## 2020-12-15 NOTE — PROGRESS NOTES
"Referring Provider: Hospitalist    Reason for follow-up: Diarrhea     Patient Care Team:  Tigre Duran MD as PCP - General  Tigre Duran MD as PCP - Family Medicine  Wyatt Kwok MD as Consulting Physician (Cardiology)    Subjective .  Patient not very verbal    Objective  Being in bed comfortably     Review of Systems   Unable to perform ROS: patient nonverbal       Sulfa antibiotics    Scheduled Meds:apixaban, 2.5 mg, Oral, Q12H  atorvastatin, 10 mg, Oral, Daily  colesevelam, 1,250 mg, Oral, BID With Meals  methylPREDNISolone sodium succinate, 20 mg, Intravenous, Daily  nystatin, , Topical, Q8H  QUEtiapine, 25 mg, Oral, Nightly  saccharomyces boulardii, 500 mg, Oral, BID  sodium chloride, 3 mL, Intravenous, Q12H  vancomycin, 250 mg, Oral, Q6H      Continuous Infusions:DOPamine, 2-20 mcg/kg/min, Last Rate: 9.5 mcg/kg/min (12/15/20 0351)      PRN Meds:.•  acetaminophen **OR** acetaminophen **OR** acetaminophen  •  magic butt paste  •  magnesium sulfate **OR** magnesium sulfate **OR** magnesium sulfate  •  nitroglycerin  •  ondansetron **OR** ondansetron  •  sodium chloride  •  sodium chloride        VITAL SIGNS  Vitals:    12/15/20 0715 12/15/20 0730 12/15/20 0745 12/15/20 1012   BP:    99/54   BP Location:       Patient Position:       Pulse: (!) 125 (!) 125 113 113   Resp:    16   Temp:    98 °F (36.7 °C)   TempSrc:    Oral   SpO2: 98% (!) 75% 98% 100%   Weight:       Height:           Flowsheet Rows      First Filed Value   Admission Height  167.6 cm (66\") Documented at 12/11/2020 2338   Admission Weight  49.9 kg (110 lb) Documented at 12/11/2020 2338           TELEMETRY: Ventricular pacemaker rhythm    Physical Exam:  Constitutional:       Appearance: Well-developed.   Eyes:      General: No scleral icterus.     Conjunctiva/sclera: Conjunctivae normal.   HENT:      Head: Normocephalic and atraumatic.   Neck:      Musculoskeletal: Normal range of motion and neck supple.      Vascular: No carotid bruit " or JVD.   Pulmonary:      Effort: Pulmonary effort is normal.      Breath sounds: Normal breath sounds. No wheezing. No rales.   Cardiovascular:      Normal rate. Regular rhythm.   Pulses:     Intact distal pulses.   Abdominal:      General: Bowel sounds are normal.      Palpations: Abdomen is soft.   Skin:     General: Skin is warm and dry.      Findings: No rash.   Neurological:      Mental Status: Alert.          Results Review:   I reviewed the patient's new clinical results.  Lab Results (last 24 hours)     Procedure Component Value Units Date/Time    Comprehensive Metabolic Panel [[id number]]  (Abnormal) Collected: 12/15/20 0656    Specimen: Blood Updated: 12/15/20 0804     Glucose 73 mg/dL      BUN 22 mg/dL      Creatinine 1.20 mg/dL      Sodium 128 mmol/L      Potassium 4.1 mmol/L      Chloride 91 mmol/L      CO2 27.0 mmol/L      Calcium 8.2 mg/dL      Total Protein 5.7 g/dL      Albumin 2.60 g/dL      ALT (SGPT) 12 U/L      AST (SGOT) 26 U/L      Alkaline Phosphatase 142 U/L      Total Bilirubin 0.4 mg/dL      eGFR Non African Amer 43 mL/min/1.73      Globulin 3.1 gm/dL      A/G Ratio 0.8 g/dL      BUN/Creatinine Ratio 18.3     Anion Gap 10.0 mmol/L     Narrative:      GFR Normal >60  Chronic Kidney Disease <60  Kidney Failure <15      Phosphorus [[id number]]  (Normal) Collected: 12/15/20 0656    Specimen: Blood Updated: 12/15/20 0738     Phosphorus 3.8 mg/dL     Magnesium [[id number]]  (Normal) Collected: 12/15/20 0407    Specimen: Blood Updated: 12/15/20 0512     Magnesium 1.6 mg/dL     BNP [[id number]]  (Abnormal) Collected: 12/15/20 0407    Specimen: Blood Updated: 12/15/20 0507     proBNP 14,913.0 pg/mL     Narrative:      Among patients with dyspnea, NT-proBNP is highly sensitive for the detection of acute congestive heart failure. In addition NT-proBNP of <300 pg/ml effectively rules out acute congestive heart failure with 99% negative predictive value.    Results may be falsely decreased if patient  taking Biotin.      CBC & Differential [[id number]]  (Abnormal) Collected: 12/15/20 0331    Specimen: Blood Updated: 12/15/20 0448    Narrative:      The following orders were created for panel order CBC & Differential.  Procedure                               Abnormality         Status                     ---------                               -----------         ------                     Scan Slide[[id number]]                                       Final result               CBC Auto Differential[[id number]]        Abnormal            Final result                 Please view results for these tests on the individual orders.    CBC Auto Differential [[id number]]  (Abnormal) Collected: 12/15/20 0331    Specimen: Blood Updated: 12/15/20 0448     WBC 25.40 10*3/mm3      RBC 3.82 10*6/mm3      Hemoglobin 11.7 g/dL      Hematocrit 36.4 %      MCV 95.3 fL      MCH 30.5 pg      MCHC 32.0 g/dL      RDW 13.8 %      RDW-SD 45.1 fl      MPV 8.9 fL      Platelets 227 10*3/mm3      Neutrophil % 87.6 %      Lymphocyte % 2.6 %      Monocyte % 8.9 %      Eosinophil % 0.6 %      Basophil % 0.3 %      Neutrophils, Absolute 22.20 10*3/mm3      Lymphocytes, Absolute 0.70 10*3/mm3      Monocytes, Absolute 2.30 10*3/mm3      Eosinophils, Absolute 0.10 10*3/mm3      Basophils, Absolute 0.10 10*3/mm3      nRBC 0.1 /100 WBC     Scan Slide [[id number]] Collected: 12/15/20 0331    Specimen: Blood Updated: 12/15/20 0448     RBC Morphology Normal     WBC Morphology Normal     Platelet Estimate Adequate     Large Platelets Slight/1+    Blood Culture - Blood, Arm, Right [[id number]] Collected: 12/12/20 0058    Specimen: Blood from Arm, Right Updated: 12/15/20 0115     Blood Culture No growth at 3 days    Blood Culture - Blood, Arm, Left [[id number]] Collected: 12/12/20 0058    Specimen: Blood from Arm, Left Updated: 12/15/20 0115     Blood Culture No growth at 3 days    Basic Metabolic Panel [[id number]]  (Abnormal) Collected: 12/14/20 1830     Specimen: Blood Updated: 12/14/20 2055     Glucose 75 mg/dL      BUN 25 mg/dL      Creatinine 1.51 mg/dL      Sodium 132 mmol/L      Potassium 4.3 mmol/L      Chloride 93 mmol/L      CO2 30.0 mmol/L      Calcium 8.4 mg/dL      eGFR Non African Amer 33 mL/min/1.73      BUN/Creatinine Ratio 16.6     Anion Gap 9.0 mmol/L     Narrative:      GFR Normal >60  Chronic Kidney Disease <60  Kidney Failure <15      Magnesium [[id number]]  (Normal) Collected: 12/14/20 0652    Specimen: Blood Updated: 12/14/20 1122     Magnesium 1.7 mg/dL     CBC (No Diff) [[id number]]  (Abnormal) Collected: 12/14/20 1045    Specimen: Blood Updated: 12/14/20 1111     WBC 27.00 10*3/mm3      RBC 3.56 10*6/mm3      Hemoglobin 11.1 g/dL      Hematocrit 34.5 %      MCV 96.8 fL      MCH 31.1 pg      MCHC 32.2 g/dL      RDW 14.2 %      RDW-SD 48.1 fl      MPV 8.3 fL      Platelets 364 10*3/mm3           Imaging Results (Last 24 Hours)     Procedure Component Value Units Date/Time    XR Chest 1 View [[id number]] Collected: 12/15/20 0903     Updated: 12/15/20 0913    Narrative:      EXAMINATION: XR CHEST 1 VW-     DATE OF EXAM: 12/15/2020 8:44 AM     INDICATION: pulmonary edema; R55-Syncope and collapse; N39.0-Urinary  tract infection, site not specified; R19.7-Diarrhea, unspecified.     COMPARISON: Chest radiograph dated 12/12/2020     TECHNIQUE: Portable AP view of the chest was obtained.     FINDINGS:  There is a left chest wall pacemaker with leads in similar position.  There is a cardiac valve replacement and postsurgical changes of prior  CABG. The cardiac silhouette is unchanged. There is aortic arch  atherosclerotic calcification. There is decreasing interstitial  opacities likely related to resolving pulmonary edema or infection.  There is a persistent left basilar airspace opacity. There is no  significant pleural effusion. No evidence of pneumothorax. Advanced  degenerative changes of the right glenohumeral joint and a left reverse  total  shoulder prosthesis.       Impression:      1. Improved bilateral interstitial opacities likely related to resolving  infection or edema.  2. Persistent left basilar airspace opacity.     Electronically Signed By-Hill Askew MD On:12/15/2020 9:04 AM  This report was finalized on [id number] by  Hill Askew MD.          EKG      I personally viewed and interpreted the patient's EKG/Telemetry data:    ECHOCARDIOGRAM:    STRESS MYOVIEW:    CARDIAC CATHETERIZATION:    OTHER:         Assessment/Plan     Principal Problem:    C. difficile diarrhea  Active Problems:    Chronic coronary artery disease    Essential hypertension    Presence of cardiac pacemaker    Shortness of breath    Sick sinus syndrome (CMS/HCC)    Tachy-martin syndrome (CMS/HCC)    Chronic diastolic congestive heart failure (CMS/HCC)    Syncope and collapse    Acute renal failure (ARF) (CMS/HCC)    Leukocytosis       Patient has significant diarrhea and dehydration and became less responsive and hypotensive  Patient presented with hypotension and was treated with IV fluid bolus  Patient has congestive heart failure diastolic dysfunction but no systolic dysfunction  Patient has a pacemaker is working very well  Patient blood pressure is stable now  Patient also has acute renal failure and nephrologist is following the patient  Patient recently had a echocardiogram and hence no further testing at this time    I discussed the patients findings and my recommendations with patient's nurse    Wyatt Kwok MD  12/15/20  11:03 EST

## 2020-12-15 NOTE — PROGRESS NOTES
Nutrition Services    Patient Name: Olga Curtis  YOB: 1935  MRN: [id number]  Admission date: 12/11/2020    PPE Documentation        PPE Worn By Provider Did not enter room 12/15; care provided remotely    PPE Worn By Patient  N/A     PROGRESS NOTE      Encounter Information: Progress note to check on diet advancement. Pt now on Regular diet, but only eating 0-25% intake at meals so far. Per RN, pt remains confused, but slightly more oriented today than yesterday. Was able to get up to chair for lunch meal (ate 25%). Communicated to RN that this writer would start oral nutrition supplements today.        PO Diet: Diet Regular   PO Supplements: -    PO Intake:  0-25% (poor intake)       Nutrition support orders: -    Nutrition support review: -       Labs (reviewed below): Hyponatremia - possibly related to ongoing C diff colitis, GI losses        GI Function:  BM 12/15       Nutrition Intervention: Will add Boost Plus BID and Magic Cup BID in addition to diet.       Results from last 7 days   Lab Units 12/15/20  0656 12/14/20  1830 12/14/20  0652  12/12/20  0058   SODIUM mmol/L 128* 132* 130*   < > 128*   POTASSIUM mmol/L 4.1 4.3 4.9   < > 5.4*   CHLORIDE mmol/L 91* 93* 95*   < > 91*   CO2 mmol/L 27.0 30.0* 24.0   < > 31.0*   BUN mg/dL 22 25* 22   < > 23   CREATININE mg/dL 1.20* 1.51* 1.47*   < > 1.24*   CALCIUM mg/dL 8.2* 8.4* 8.3*   < > 9.0   BILIRUBIN mg/dL 0.4  --  0.4  --  0.6   ALK PHOS U/L 142*  --  86  --  90   ALT (SGPT) U/L 12  --  10  --  13   AST (SGOT) U/L 26  --  25  --  22   GLUCOSE mg/dL 73 75 103*   < > 100*    < > = values in this interval not displayed.     Results from last 7 days   Lab Units 12/15/20  0656 12/15/20  0407 12/15/20  0331  12/14/20  0652 12/13/20  0326   MAGNESIUM mg/dL  --  1.6  --   --  1.7 1.6   PHOSPHORUS mg/dL 3.8  --   --   --   --  3.1   HEMOGLOBIN g/dL  --   --  11.7*   < >  --  9.6*   HEMATOCRIT %  --   --  36.4   < >  --  29.4*    < > = values in this  interval not displayed.     COVID19   Date Value Ref Range Status   12/12/2020 Not Detected Not Detected - Ref. Range Final     Lab Results   Component Value Date    HGBA1C 5.5 09/30/2020       RD to follow up per protocol.    Electronically signed by:  Sharon Monahan RD  12/15/20 14:47 EST

## 2020-12-15 NOTE — PLAN OF CARE
Goal Outcome Evaluation:  Plan of Care Reviewed With: patient  Progress: no change  Patient stable, but confused. Patient is easily orientate. Patient continues on Dopamine gtt; which was titrated up twice (current rate 9.5mcg) due to hypotension. HR runs paced and ST. No complaints or distress noted. Patient continues on 2L/NC. Patient didn't rest well during my shift; she was up most of the night. Will continue to monitor.

## 2020-12-15 NOTE — PROGRESS NOTES
Continued Stay Note   Julian     Patient Name: Olga Curtis  MRN: [id number]  Today's Date: 12/15/2020    Admit Date: 12/11/2020    Discharge Plan     Row Name 12/15/20 1422       Plan    Plan  D/C Plan : New referral to [facility] vs [facility] (pt is current with [facility])    Provided Post Acute Provider List?  Yes    Post Acute Provider List  Inpatient Rehab    Provided Post Acute Provider Quality & Resource List?  Yes    Post Acute Provider Quality and Resource List  Inpatient Rehab    Delivered To  Patient;Support Person    Support Person  larissa Balderrama.        Discharge Codes    No documentation.       Met with patient in room wearing PPE: mask, face shield/goggles,      Maintained distance greater than six feet and spent less than 15 minutes in the room.          Tayler Trent RN

## 2020-12-15 NOTE — PROGRESS NOTES
"      [facility] Medicine Services Daily Progress Note          Hospitalist Team  LOS 2 days      Patient Care Team:  Tigre Duran MD as PCP - General  Tigre Duran MD as PCP - Family Medicine  Wyatt Kwok MD as Consulting Physician (Cardiology)    Patient Location: 2109/1      Subjective   Subjective     Chief Complaint / Subjective  Chief Complaint   Patient presents with   • Hypotension     nausea.         Brief Synopsis of Hospital Course/HPI  85 year old female with PMH of CAD s/p CABG 1999, severe aortic stenosis s/p TAVR in May, SSS with cardiac pacemaker in situ, HTN, PAF, DJD post shoulder surgery, non sustained V tach, HLDpresents with complaints of progressive weakness and dyspnea. She is awake and alert and appropriate but lethargic. She denies chest pain or syncope but per ED report EMS reports she had a \"spell\" en route  with hypotension . In ED no hypotension or arrythmmia seen . Per ED report pacemaker interrogated with no unusual results. She is Covid-19 negative. Review of records shows she was just admitted here 11/30/20 to 12/6/20 for similar complaints and seen by both neurology and cardiology. Per notes it was felt to be metabolic encephalopathy from possible UTI and she was discharged on Cefdinir. She reports diarrhea past 2 days without hematochezia or melena or abdominal pain. C-Diff culture ordered and pending. UA shows no bacteria but too numerous to count wbc . She was started on IV Ceftriaxone in ED with urine and blood culture pending. D-Dimer was elevated. CT chest per radiology today:  \"IMPRESSION:  1. No pulmonary embolism.  2. Reticular and ground glass densities diffusely may represent edema or pneumonia superimposed on chronic interstitial change.  3. Stable surgical changes and cardiomegaly.  4. Calcified left pleural plaque.\"     Labs show wbc 17, K 5.4, Na 128, Cr 1.24 ( was 0.64), BNP 4400 was 13,000 12/5/20, Hgb 10, procalcitonin 1.40.      She " reports she left here after last admission and went to rehab then was at [facility] and just discharged yesterday. No records of [facility] in EMR and records have been requested. She will be admitted for further evaluation and treatment .        Date: 12/13/2020: The patient reports just feeling generally ill and she has had difficulty staying warm today, but she has had no fever, sweats, nausea or vomiting.  No chest pain, cough or shortness of breath.  She reports her diarrhea is improving.    12/14/2020  Alerted by night shift PA that pt having hypotension, didn't respond to 750cc bolus, now on dopamine drip and her cardiologist has been consulted. Pt is lethargic, but responsive. She denies any chest pains or abdominal pains, she continues to have SOA and diarrhea.    12/15/2020  Patient seen and examined.  She is alert and awake but confused in general.  Discussion with patient's daughter yesterday leads me to believe she has undiagnosed dementia underlying her behaviors.  Per nurse to very small bowel motions so far today, diarrhea seems to be resolving.    Review of Systems   Constitution: Positive for malaise/fatigue. Negative for chills and fever.   HENT: Negative.    Eyes: Negative.    Cardiovascular: Negative for chest pain and leg swelling.   Respiratory: Positive for shortness of breath. Negative for cough, sputum production and wheezing.    Skin: Negative.    Musculoskeletal: Negative.    Gastrointestinal: Positive for diarrhea. Negative for bloating, abdominal pain, constipation, nausea and vomiting.   Genitourinary: Negative.    Neurological: Negative.    Psychiatric/Behavioral: Negative.    All other systems reviewed and are negative.        Objective   Objective      Vital Signs  Temp:  [97.8 °F (36.6 °C)-99.1 °F (37.3 °C)] 98 °F (36.7 °C)  Heart Rate:  [] 119  Resp:  [16-20] 16  BP: ()/() 100/54  Oxygen Therapy  SpO2: 100 %  Pulse Oximetry Type: Intermittent  Device (Oxygen  "Therapy): nasal cannula  Flow (L/min): 2  Flowsheet Rows      First Filed Value   Admission Height  167.6 cm (66\") Documented at 12/11/2020 2338   Admission Weight  49.9 kg (110 lb) Documented at 12/11/2020 2338        Intake & Output (last 3 days)       12/12 0701 - 12/13 0700 12/13 0701 - 12/14 0700 12/14 0701 - 12/15 0700 12/15 0701 - 12/16 0700    P.O. 660 144  0    Total Intake(mL/kg) 660 (12.1) 144 (2.8)  0 (0)    Urine (mL/kg/hr)   300 (0.2)     Stool   0     Total Output   300     Net +660 +144 -300 0            Urine Unmeasured Occurrence 1 x 1 x 1 x     Stool Unmeasured Occurrence 2 x 3 x 3 x 3 x        Lines, Drains & Airways    Active LDAs     Name:   Placement date:   Placement time:   Site:   Days:    Peripheral IV 12/12/20 0401 Left Forearm   12/12/20 0401    Forearm   2    Peripheral IV 12/12/20 0401 Right Forearm   12/12/20 0401    Forearm   2                  Physical Exam:  General: well-developed, frail and cachectic NAD  HEENT: NC/AT, EOMI, PERRLA, +JVD  Heart: RRR. + murmur   Chest: Bilateral rales throughout, no wheezing or rhonchi, respiratory effort mildly labored  Abdominal: Soft. NT/ND. Bowel sounds present.  Musculoskeletal: Normal ROM.  No edema. No calf tenderness.  Neurological: Alert and awake, confused, no focal deficits  Skin: Skin is warm and dry. No rash.  Scattered ecchymosis  Psychiatric: Normal mood and affect, confused       Wounds (last 24 hours)      LDA Wound     Row Name 12/15/20 1200 12/15/20 0755 12/14/20 2005       Wound 12/12/20 0509 medial coccyx Pressure Injury    Wound - Properties Group Placement Date: 12/12/20  -DD Placement Time: 0509 -DD Orientation: medial  -DD Location: coccyx  -DD Primary Wound Type: Pressure inj  -DD Stage, Pressure Injury : Stage 1  -DD    Dressing Appearance  --  open to air  -NOE  open to air  -SE    Closure  -- barrier applied   -NOE  -- barrier applied   -NOE  --    Base  non-blanchable;maroon/purple MASD   -NOE  " non-blanchable;maroon/purple MASD   -NOE  non-blanchable;maroon/purple MASD   -SE    Drainage Amount  none  -NOE  none  -NOE  none  -SE    Care, Wound  --  barrier applied  -NOE  --    Retired Wound - Properties Group Date first assessed: 12/12/20  -DD Time first assessed: 0509 -DD Location: coccyx  -DD Primary Wound Type: Pressure inj  -DD    Row Name 12/14/20 1600             Wound 12/12/20 0509 medial coccyx Pressure Injury    Wound - Properties Group Placement Date: 12/12/20  -DD Placement Time: 0509 -DD Orientation: medial  -DD Location: coccyx  -DD Primary Wound Type: Pressure inj  -DD Stage, Pressure Injury : Stage 1  -DD    Dressing Appearance  open to air  -NOE      Closure  -- barrier applied  -NOE      Base  non-blanchable;maroon/purple MASD   -NOE      Drainage Amount  none  -NOE      Retired Wound - Properties Group Date first assessed: 12/12/20  -DD Time first assessed: 0509 -DD Location: coccyx  -DD Primary Wound Type: Pressure inj  -DD      User Key  (r) = Recorded By, (t) = Taken By, (c) = Cosigned By    Initials Name Provider Type    Cinda Torres RN Registered Nurse    Thania Mujica LPN Licensed Nurse    Josie Addison RN Registered Nurse          Procedures:           Results Review:     I reviewed the patient's new clinical results.    Results from last 7 days   Lab Units 12/15/20  0331 12/14/20  1045 12/13/20  0326 12/12/20  0539 12/12/20  0058   WBC 10*3/mm3 25.40* 27.00* 16.30* 16.40* 17.00*   HEMOGLOBIN g/dL 11.7* 11.1* 9.6* 9.7* 10.0*   HEMATOCRIT % 36.4 34.5 29.4* 30.5* 30.7*   PLATELETS 10*3/mm3 227 364 284 268 301     Results from last 7 days   Lab Units 12/15/20  0656 12/14/20  1830 12/14/20  0652 12/13/20  0326 12/12/20  0539 12/12/20  0058   SODIUM mmol/L 128* 132* 130* 131* 127* 128*   POTASSIUM mmol/L 4.1 4.3 4.9 4.5 5.2 5.4*   CHLORIDE mmol/L 91* 93* 95* 94* 92* 91*   CO2 mmol/L 27.0 30.0* 24.0 31.0* 29.0 31.0*   BUN mg/dL 22 25* 22 18 22 23   CREATININE mg/dL 1.20*  1.51* 1.47* 0.83 1.03* 1.24*   GLUCOSE mg/dL 73 75 103* 101* 88 100*   ALBUMIN g/dL 2.60*  --  2.40*  --   --  3.30*   BILIRUBIN mg/dL 0.4  --  0.4  --   --  0.6   ALK PHOS U/L 142*  --  86  --   --  90   AST (SGOT) U/L 26  --  25  --   --  22   ALT (SGPT) U/L 12  --  10  --   --  13   CALCIUM mg/dL 8.2* 8.4* 8.3* 8.5* 8.7 9.0     Cr Clearance Estimated Creatinine Clearance: 28.9 mL/min (A) (by C-G formula based on SCr of 1.2 mg/dL (H)).    Coag   Results from last 7 days   Lab Units 12/12/20 0058   INR  1.02   APTT seconds 26.4       HbA1C   Lab Results   Component Value Date    HGBA1C 5.5 09/30/2020    HGBA1C 5.90 (H) 05/21/2020     Blood Glucose       Troponin   Results from last 7 days   Lab Units 12/15/20  0407 12/14/20  0652 12/12/20 0058   TROPONIN T ng/mL  --   --  0.019   PROBNP pg/mL 14,913.0* 32,130.0* 4,443.0*     Lipids    Lab Results   Component Value Date    CHOL 127 08/05/2017    TRIG 90 08/05/2017    HDL 49 08/05/2017    LDL 62 08/05/2017       UA    Results from last 7 days   Lab Units 12/12/20 0134   NITRITE UA  Negative   WBC UA /HPF Too Numerous to Count*   BACTERIA UA /HPF None Seen   SQUAM EPITHEL UA /HPF 3-6*   URINECX  Yeast isolated*       Microbiology   Results from last 7 days   Lab Units 12/12/20 0134 12/12/20 0058   BLOODCX   --  No growth at 3 days  No growth at 3 days   URINECX  Yeast isolated*  --        ABG        EKG  ECG 12 Lead   Preliminary Result   HEART RATE= 115  bpm   RR Interval= 520  ms   LA Interval= 118  ms   P Horizontal Axis= -34  deg   P Front Axis= 68  deg   QRSD Interval= 127  ms   QT Interval= 356  ms   QRS Axis= 205  deg   T Wave Axis= 29  deg   - ABNORMAL ECG -   Atrial-sensed ventricular-paced rhythm   Electronically Signed By:    Date and Time of Study: 2020-12-15 06:11:30      ECG 12 Lead   Final Result   HEART RATE= 82  bpm   RR Interval= 731  ms   LA Interval= 122  ms   P Horizontal Axis= 12  deg   P Front Axis= -29  deg   QRSD Interval= 131  ms   QT  Interval= 419  ms   QRS Axis= 212  deg   T Wave Axis= 31  deg   - ABNORMAL ECG -   Atrial-sensed ventricular-paced rhythm   When compared with ECG of 30-Nov-2020 13:22:06,   No significant change   Electronically Signed By: Lawrence Viera (Tre) 13-Dec-2020 09:25:09   Date and Time of Study: 2020-12-12 03:22:32          Imaging:  Xr Chest 1 View    Result Date: 12/15/2020  1. Improved bilateral interstitial opacities likely related to resolving infection or edema. 2. Persistent left basilar airspace opacity.  Electronically Signed By-Hill Askew MD On:12/15/2020 9:04 AM This report was finalized on [id number] by  Hill Askew MD.    Xr Chest 1 View    Result Date: 12/12/2020  1.Bilateral airspace opacities appear slightly decreased. 2.Small pleural effusions also appears slightly decreased. 3.Cardiomegaly.  Electronically Signed By-Isidro Jackson MD On:12/12/2020 8:57 AM This report was finalized on [id number] by  Isidro Jackson MD.    Ct Chest Pulmonary Embolism    Result Date: 12/12/2020  1. No pulmonary embolism. 2. Reticular and ground glass densities diffusely may represent edema or pneumonia superimposed on chronic interstitial change. 3. Stable surgical changes and cardiomegaly. 4. Calcified left pleural plaque. Electronically signed by:  Isidro Staley M.D.  12/12/2020 1:11 AM    Xr Abdomen Kub    Result Date: 12/14/2020  Mild gaseous distention of small bowel loops is nonspecific and may reflect changes of ileus. No convincing evidence of high-grade small bowel obstruction this time.  Electronically Signed By-Tonja Chauhan MD On:12/14/2020 9:12 AM This report was finalized on [id number] by  Tonja Chauhan MD.    Results for orders placed during the hospital encounter of 11/30/20   Adult Transthoracic Echo Complete W/ Cont if Necessary Per Protocol    Narrative · Left ventricular ejection fraction appears to be 56 - 60%.  · Left ventricular wall thickness is consistent with moderate  concentric   hypertrophy.  · There is a TAVR valve present.  · Moderate tricuspid valve regurgitation is present.  · Left ventricular diastolic function is consistent with (grade I)   impaired relaxation.  · No pericardial effusion noted        Results for orders placed during the hospital encounter of 02/28/20   Duplex Carotid Ultrasound CAR    Narrative · Proximal right internal carotid artery moderate stenosis.  · Proximal left internal carotid artery mild stenosis.          Xrays, labs reviewed personally by physician.    Medication Review:   I have reviewed the patient's current medication list      Scheduled Meds  apixaban, 2.5 mg, Oral, Q12H  atorvastatin, 10 mg, Oral, Daily  colesevelam, 1,250 mg, Oral, BID With Meals  methylPREDNISolone sodium succinate, 20 mg, Intravenous, Daily  nystatin, , Topical, Q8H  QUEtiapine, 25 mg, Oral, Nightly  saccharomyces boulardii, 500 mg, Oral, BID  sodium chloride, 3 mL, Intravenous, Q12H  vancomycin, 250 mg, Oral, Q6H        Meds Infusions  DOPamine, 2-20 mcg/kg/min, Last Rate: 9.5 mcg/kg/min (12/15/20 0351)        Meds PRN  •  acetaminophen **OR** acetaminophen **OR** acetaminophen  •  magic butt paste  •  magnesium sulfate **OR** magnesium sulfate **OR** magnesium sulfate  •  nitroglycerin  •  ondansetron **OR** ondansetron  •  sodium chloride  •  sodium chloride      Assessment/Plan   Assessment/Plan     Active Hospital Problems    Diagnosis  POA   • **C. 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       MEDICAL DECISION MAKING COMPLEXITY BY PROBLEM:     C. difficile associated diarrhea  -Multiple rounds of antibiotics recent UTI  -Current UA does not indicate any presence of UTI  -UA shows yeast, this is contamination from the skin, discontinue fluconazole     -Add nystatin powder to perineum  -Increase oral vancomycin to 250 mg every 6h    -Diarrhea decreasing significantly with higher dose oral vancomycin, continue same    -Vanco enema may not be needed given her improvement.  -Increased dosing of Florastor and WelChol for optimization  -Magic Butt cream to be applied as needed    Leukocytosis  -Due to C. difficile associated diarrhea  -No signs or symptoms of sepsis at this time  -WBC increasing since patient has been started on steroids  -Continue to monitor CBC and WBC count    Hypotension  Syncope and collapse  -s/p 750 cc bolus earlier this morning  -Chronic low blood pressure due to diastolic CHF  -Midodrine added  -Dopamine drip initiated this morning  -Continue to monitor BP and vitals closely  -Cardiology following     --Appreciate input    Dyspnea  -Due to the above  -Fluid overloaded BNP greater than 32,000  -Will need to gently diuresis at some point if blood pressure allows  -Continuous pulse oximetry  -O2 supplementation to keep sats > 93%  -Pulmonology consulted overnight and following     -Solu-Medrol added     -Continue dopamine    Acute renal failure  -Creatinine 1.24 on admission, previously 0.6 on last admission  -Likely due to dehydration from diarrhea  -Gradually improving, currently 0.83  -Will need to cautiously diurese as noted above  --Nephrology following     --Appreciate input    Chronic diastolic congestive heart failure  Aortic stenosis  Sick sinus syndrome/tachybradycardia syndrome  Presence of cardiac pacemaker  Chronic coronary artery disease  -Last echo on 12/1/2020 reviewed and noted above  -Continue lisinopril, metoprolol,  atorvastatin  -Furosemide being held currently  -Cardiology to see    Essential hypertension  -Continue lisinopril and metoprolol, midodrine added for hypotension  -Continue to monitor BP and vitals    Medial coccyx stage I pressure injury  -Mepilex dressing  -Turn every 2 hours      VTE Prophylaxis  Mechanical Order History:      Ordered        12/12/20 0548  Place Sequential Compression Device  Once         12/12/20 0548  Maintain Sequential Compression Device  Continuous         12/12/20 0435  Place Sequential Compression Device  Once         12/12/20 0435  Maintain Sequential Compression Device  Continuous                 Pharmalogical Order History:      Ordered     Dose Route Frequency Stop    12/12/20 1302  apixaban (ELIQUIS) tablet 2.5 mg      2.5 mg PO Every 12 Hours --                Code Status  Code Status and Medical Interventions:   Ordered at: 12/14/20 1242     Limited Support to NOT Include:    Intubation     Level Of Support Discussed With:    Health Care Surrogate     Code Status:    No CPR     Medical Interventions (Level of Support Prior to Arrest):    Limited     Comments:    DNR/DNI       This patient has been examined wearing appropriate Personal Protective Equipment. 12/15/20      Discharge Planning    Pending clinical course.      Electronically signed by Balbina Sy MD, 12/15/20, 12:23 EST.    [facility] Hospitalist Team

## 2020-12-15 NOTE — PLAN OF CARE
Goal Outcome Evaluation:  Plan of Care Reviewed With: patient  Patient has been stable on dopamine today. HR has ranged from 105-120s. Dr. Savage consulted for possible levophed due to HR elevation. Patient has been more awake and oriented today. Pt was assisted to the chair for lunch with 2 assist. Continue to monitor on dopamine.      Problem: Skin Injury Risk Increased  Goal: Skin Health and Integrity  Outcome: Ongoing, Progressing  Intervention: Optimize Skin Protection  Recent Flowsheet Documentation  Taken 12/15/2020 0755 by Cinda Arellano, RN  Pressure Reduction Techniques:   heels elevated off bed   pressure points protected   positioned off wounds  Pressure Reduction Devices:   positioning supports utilized   pressure-redistributing mattress utilized   chair cushion utilized  Skin Protection: incontinence pads utilized

## 2020-12-16 NOTE — NURSING NOTE
Dopamine turned off at 1523 and midodrine started TID. BP have been stable off dopamine. Will continue to monitor.

## 2020-12-16 NOTE — PLAN OF CARE
Goal Outcome Evaluation:  Plan of Care Reviewed With: patient  Patient was on 9.5 mcg/min this morning. Patient weaned down to 5mcg/min this afternoon. When gtt turned to 2.5 patient's BP dropped to 80s/40s. Patient being started on midodrine TID. Dr. Kwok aware and will attempt to wean off dopamine gtt. Referral to [facility] for d/c planning.     Problem: Skin Injury Risk Increased  Goal: Skin Health and Integrity  Outcome: Ongoing, Progressing  Intervention: Optimize Skin Protection  Recent Flowsheet Documentation  Taken 12/16/2020 1000 by Cinda Arellano, RN  Head of Bed (HOB): HOB at 20-30 degrees  Taken 12/16/2020 0800 by Cinda Arellano, RN  Pressure Reduction Techniques:   heels elevated off bed   positioned off wounds   pressure points protected  Pressure Reduction Devices: positioning supports utilized  Skin Protection: incontinence pads utilized

## 2020-12-16 NOTE — PROGRESS NOTES
Continued Stay Note  JOANNE Jordan     Patient Name: Olga Curtis  MRN: [id number]  Today's Date: 12/16/2020    Admit Date: 12/11/2020    Discharge Plan     Row Name 12/16/20 1153       Plan    Plan Comments  Barrier to D/C : Pt remains on a Dopamine gtt. and waiting for [facility] to accept pt.        Discharge Codes    No documentation.       Expected Discharge Date and Time     Expected Discharge Date Expected Discharge Time    Dec 18, 2020             Tayler Trent RN

## 2020-12-16 NOTE — PROGRESS NOTES
PROGRESS NOTE      Patient Name: Olga Curtis  : 1935  MRN: [id number]  Primary Care Physician: Tigre Duran MD  Date of admission: 2020    Patient Care Team:  Tigre Duran MD as PCP - General  Tigre Duran MD as PCP - Family Medicine  Wyatt Kwok MD as Consulting Physician (Cardiology)        Subjective   Subjective:     Acute kidney injury better, patient is still complaining of weakness  Review of systems:  All other review of system unremarkable      Allergies:    Allergies   Allergen Reactions   • Sulfa Antibiotics Hives       Objective   Exam:     Vital Signs  Temp:  [97.2 °F (36.2 °C)-98 °F (36.7 °C)] 97.5 °F (36.4 °C)  Heart Rate:  [] 70  Resp:  [16-20] 16  BP: ()/(42-74) 129/48  SpO2:  [86 %-100 %] 96 %  on  Flow (L/min):  [2] 2;   Device (Oxygen Therapy): room air  Body mass index is 18.93 kg/m².    General: Elderly white  female in no acute distress.    Head:      Normocephalic and atraumatic.    Eyes:      PERRL/EOM intact, conjunctiva and sclera clear with out nystagmus.    Neck:      No masses, thyromegaly,  trachea central with normal respiratory effort   Lungs:    Clear bilaterally to auscultation.    Heart:      Regular rate and rhythm, no murmur no gallop  Abd:        Soft, nontender, not distended, bowel sounds positive, no shifting dullness   Pulses:   Pulses palpable  Extr:        No cyanosis or clubbing--mild edema.    Neuro:    No focal deficits.   alert oriented x3  Skin:       Intact without lesions or rashes.    Psych:    Alert and cooperative; normal mood and affect; .      Results Review:  I have personally reviewed most recent Data :  CBC    Results from last 7 days   Lab Units 20  0723 12/15/20  0331 20  1045 20  0326 20  0539 20  0058   WBC 10*3/mm3 28.50* 25.40* 27.00* 16.30* 16.40* 17.00*   HEMOGLOBIN g/dL 10.6* 11.7* 11.1* 9.6* 9.7* 10.0*   PLATELETS 10*3/mm3 383 227 364 284 268 301     CMP   Results from  last 7 days   Lab Units 12/16/20  0723 12/15/20  0656 12/14/20  1830 12/14/20  0652 12/13/20  0326 12/12/20  0539 12/12/20  0058   SODIUM mmol/L 126* 128* 132* 130* 131* 127* 128*   POTASSIUM mmol/L 4.4 4.1 4.3 4.9 4.5 5.2 5.4*   CHLORIDE mmol/L 90* 91* 93* 95* 94* 92* 91*   CO2 mmol/L 26.0 27.0 30.0* 24.0 31.0* 29.0 31.0*   BUN mg/dL 28* 22 25* 22 18 22 23   CREATININE mg/dL 1.07* 1.20* 1.51* 1.47* 0.83 1.03* 1.24*   GLUCOSE mg/dL 157* 73 75 103* 101* 88 100*   ALBUMIN g/dL  --  2.60*  --  2.40*  --   --  3.30*   BILIRUBIN mg/dL  --  0.4  --  0.4  --   --  0.6   ALK PHOS U/L  --  142*  --  86  --   --  90   AST (SGOT) U/L  --  26  --  25  --   --  22   ALT (SGPT) U/L  --  12  --  10  --   --  13     ABG      Xr Chest 1 View    Result Date: 12/15/2020  1. Improved bilateral interstitial opacities likely related to resolving infection or edema. 2. Persistent left basilar airspace opacity.  Electronically Signed By-Hill Askew MD On:12/15/2020 9:04 AM This report was finalized on [id number] by  Hill Askew MD.    Xr Chest 1 View    Result Date: 12/12/2020  1.Bilateral airspace opacities appear slightly decreased. 2.Small pleural effusions also appears slightly decreased. 3.Cardiomegaly.  Electronically Signed By-Isidro Jackson MD On:12/12/2020 8:57 AM This report was finalized on [id number] by  Isidro Jackson MD.    Ct Chest Pulmonary Embolism    Result Date: 12/12/2020  1. No pulmonary embolism. 2. Reticular and ground glass densities diffusely may represent edema or pneumonia superimposed on chronic interstitial change. 3. Stable surgical changes and cardiomegaly. 4. Calcified left pleural plaque. Electronically signed by:  Isidro Staley M.D.  12/12/2020 1:11 AM    Xr Abdomen Kub    Result Date: 12/14/2020  Mild gaseous distention of small bowel loops is nonspecific and may reflect changes of ileus. No convincing evidence of high-grade small bowel obstruction this time.  Electronically Signed BySami  MD Tien On:12/14/2020 9:12 AM This report was finalized on [id number] by  Tonja Chauhan MD.      Results for orders placed during the hospital encounter of 11/30/20   Adult Transthoracic Echo Complete W/ Cont if Necessary Per Protocol    Narrative · Left ventricular ejection fraction appears to be 56 - 60%.  · Left ventricular wall thickness is consistent with moderate concentric   hypertrophy.  · There is a TAVR valve present.  · Moderate tricuspid valve regurgitation is present.  · Left ventricular diastolic function is consistent with (grade I)   impaired relaxation.  · No pericardial effusion noted        Scheduled Meds:apixaban, 2.5 mg, Oral, Q12H  atorvastatin, 10 mg, Oral, Daily  colesevelam, 1,250 mg, Oral, BID With Meals  methylPREDNISolone sodium succinate, 20 mg, Intravenous, Daily  nystatin, , Topical, Q8H  QUEtiapine, 25 mg, Oral, Nightly  saccharomyces boulardii, 500 mg, Oral, BID  sodium chloride, 3 mL, Intravenous, Q12H  sodium chloride, 1 g, Oral, TID With Meals  vancomycin, 250 mg, Oral, Q6H      Continuous Infusions:DOPamine, 2-20 mcg/kg/min, Last Rate: 7 mcg/kg/min (12/16/20 0700)      PRN Meds:•  acetaminophen **OR** acetaminophen **OR** acetaminophen  •  magic butt paste  •  magnesium sulfate **OR** magnesium sulfate **OR** magnesium sulfate  •  nitroglycerin  •  ondansetron **OR** ondansetron  •  sodium chloride  •  sodium chloride    Assessment/Plan   Assessment and Plan:         C. difficile diarrhea    Chronic coronary artery disease    Essential hypertension    Presence of cardiac pacemaker    Shortness of breath    Sick sinus syndrome (CMS/HCC)    Tachy-martin syndrome (CMS/HCC)    Chronic diastolic congestive heart failure (CMS/HCC)    Syncope and collapse    Acute renal failure (ARF) (CMS/HCC)    Leukocytosis    ASSESSMENT:  · Hyponatremia likely because of the congestive heart failure and may be some volume overload  · Hyperkalemia NICK with some congestive heart  failure  · Acute kidney injury  · Significant leukocytosis rule out C. difficile colitis  · History of congestive heart failure but mainly diastolic dysfunctions  · History of aortic stenosis history of TAVR  · History of hypertension  · History of pacemaker insertion           Plan:      · Patient condition is slowly improving at this time  · Sodium level continues to decrease at this time.  I am going to give 500 cc of saline bolus in addition sodium chloride pill will be started.  · Etiology of low sodium likely related to patient C. difficile colitis  · BNP level is on the high side but better than 1214  · Hyperkalemia has improved NICK is improving  · Follow-up with a urine output  · Still significant leukocytosis  · Follow-up with repeat labs tomorrow morning  · Thank you for letting me evaluate  · Need to follow volume status closely especially the presence of significant diastolic failure  •           Note started  by Huy Carlin MD,   [facility] kidney consultant

## 2020-12-16 NOTE — PROGRESS NOTES
"Referring Provider: Hospitalist    Reason for follow-up: Diarrhea     Patient Care Team:  Tigre Duran MD as PCP - General  Tigre Duran MD as PCP - Family Medicine  Wyatt Kwok MD as Consulting Physician (Cardiology)    Subjective .  Patient not very verbal    Objective  Being in bed comfortably     Review of Systems   Unable to perform ROS: patient nonverbal       Sulfa antibiotics    Scheduled Meds:apixaban, 2.5 mg, Oral, Q12H  atorvastatin, 10 mg, Oral, Daily  colesevelam, 1,250 mg, Oral, BID With Meals  methylPREDNISolone sodium succinate, 20 mg, Intravenous, Daily  midodrine, 2.5 mg, Oral, TID AC  nystatin, , Topical, Q8H  QUEtiapine, 25 mg, Oral, Nightly  saccharomyces boulardii, 500 mg, Oral, BID  sodium chloride, 3 mL, Intravenous, Q12H  sodium chloride, 1 g, Oral, TID With Meals  vancomycin, 250 mg, Oral, Q6H      Continuous Infusions:DOPamine, 2-20 mcg/kg/min, Last Rate: 5 mcg/kg/min (12/16/20 1015)      PRN Meds:.•  acetaminophen **OR** acetaminophen **OR** acetaminophen  •  magic butt paste  •  magnesium sulfate **OR** magnesium sulfate **OR** magnesium sulfate  •  nitroglycerin  •  ondansetron **OR** ondansetron  •  sodium chloride  •  sodium chloride        VITAL SIGNS  Vitals:    12/16/20 1130 12/16/20 1325 12/16/20 1355 12/16/20 1430   BP: (!) 109/38 105/41 106/51 (!) 115/39   BP Location: Right arm      Patient Position: Sitting      Pulse: 70 70 70 70   Resp: 18      Temp: 96.9 °F (36.1 °C)      TempSrc: Axillary      SpO2: 93% 90% 98% 95%   Weight:       Height:           Flowsheet Rows      First Filed Value   Admission Height  167.6 cm (66\") Documented at 12/11/2020 2338   Admission Weight  49.9 kg (110 lb) Documented at 12/11/2020 2338           TELEMETRY: Ventricular pacemaker rhythm    Physical Exam:  Constitutional:       Appearance: Well-developed.   Eyes:      General: No scleral icterus.     Conjunctiva/sclera: Conjunctivae normal.   HENT:      Head: Normocephalic and " atraumatic.   Neck:      Musculoskeletal: Normal range of motion and neck supple.      Vascular: No carotid bruit or JVD.   Pulmonary:      Effort: Pulmonary effort is normal.      Breath sounds: Normal breath sounds. No wheezing. No rales.   Cardiovascular:      Normal rate. Regular rhythm.   Pulses:     Intact distal pulses.   Abdominal:      General: Bowel sounds are normal.      Palpations: Abdomen is soft.   Skin:     General: Skin is warm and dry.      Findings: No rash.   Neurological:      Mental Status: Alert.          Results Review:   I reviewed the patient's new clinical results.  Lab Results (last 24 hours)     Procedure Component Value Units Date/Time    Osmolality, Urine - Urine, Catheter [[id number]]  (Abnormal) Collected: 12/16/20 1000    Specimen: Urine, Catheter Updated: 12/16/20 1108     Osmolality, Urine 280 mOsm/kg     Sodium, Urine, Random - Urine, Catheter [[id number]] Collected: 12/16/20 1000    Specimen: Urine, Catheter Updated: 12/16/20 1047     Sodium, Urine 22 mmol/L     Narrative:      Reference intervals for random urine have not been established.  Clinical usage is dependent upon physician's interpretation in combination with other laboratory tests.       Creatinine, Urine, Random - Urine, Catheter [[id number]] Collected: 12/16/20 1000    Specimen: Urine, Catheter Updated: 12/16/20 1026    CBC & Differential [[id number]]  (Abnormal) Collected: 12/16/20 0723    Specimen: Blood Updated: 12/16/20 0843    Narrative:      The following orders were created for panel order CBC & Differential.  Procedure                               Abnormality         Status                     ---------                               -----------         ------                     Scan Slide[[id number]]                                       Final result               CBC Auto Differential[[id number]]        Abnormal            Final result                 Please view results for these tests on the individual  orders.    CBC Auto Differential [[id number]]  (Abnormal) Collected: 12/16/20 0723    Specimen: Blood Updated: 12/16/20 0843     WBC 28.50 10*3/mm3      RBC 3.55 10*6/mm3      Hemoglobin 10.6 g/dL      Hematocrit 33.8 %      MCV 95.2 fL      MCH 29.9 pg      MCHC 31.4 g/dL      RDW 14.1 %      RDW-SD 47.3 fl      MPV 8.2 fL      Platelets 383 10*3/mm3     Scan Slide [[id number]] Collected: 12/16/20 0723    Specimen: Blood Updated: 12/16/20 0843     Scan Slide --     Comment: See Manual Differential Results       Manual Differential [[id number]]  (Abnormal) Collected: 12/16/20 0723    Specimen: Blood Updated: 12/16/20 0843     Neutrophil % 86.0 %      Lymphocyte % 2.0 %      Monocyte % 2.0 %      Bands %  10.0 %      Neutrophils Absolute 27.36 10*3/mm3      Lymphocytes Absolute 0.57 10*3/mm3      Monocytes Absolute 0.57 10*3/mm3      Poikilocytes Slight/1+     WBC Morphology Normal     Platelet Estimate Adequate    Magnesium [[id number]]  (Normal) Collected: 12/16/20 0723    Specimen: Blood Updated: 12/16/20 0837     Magnesium 2.4 mg/dL     Basic Metabolic Panel [[id number]]  (Abnormal) Collected: 12/16/20 0723    Specimen: Blood Updated: 12/16/20 0833     Glucose 157 mg/dL      BUN 28 mg/dL      Creatinine 1.07 mg/dL      Sodium 126 mmol/L      Potassium 4.4 mmol/L      Chloride 90 mmol/L      CO2 26.0 mmol/L      Calcium 8.6 mg/dL      eGFR Non African Amer 49 mL/min/1.73      BUN/Creatinine Ratio 26.2     Anion Gap 10.0 mmol/L     Narrative:      GFR Normal >60  Chronic Kidney Disease <60  Kidney Failure <15      Blood Culture - Blood, Arm, Right [[id number]] Collected: 12/12/20 0058    Specimen: Blood from Arm, Right Updated: 12/16/20 0115     Blood Culture No growth at 4 days    Blood Culture - Blood, Arm, Left [[id number]] Collected: 12/12/20 0058    Specimen: Blood from Arm, Left Updated: 12/16/20 0115     Blood Culture No growth at 4 days          Imaging Results (Last 24 Hours)     ** No results found for the last  24 hours. **          EKG      I personally viewed and interpreted the patient's EKG/Telemetry data:    ECHOCARDIOGRAM:    STRESS MYOVIEW:    CARDIAC CATHETERIZATION:    OTHER:         Assessment/Plan     Principal Problem:    C. difficile diarrhea  Active Problems:    Chronic coronary artery disease    Essential hypertension    Presence of cardiac pacemaker    Shortness of breath    Sick sinus syndrome (CMS/HCC)    Tachy-martin syndrome (CMS/HCC)    Chronic diastolic congestive heart failure (CMS/HCC)    Syncope and collapse    Acute renal failure (ARF) (CMS/HCC)    Leukocytosis       Patient has significant diarrhea and dehydration and became less responsive and hypotensive  Patient presented with hypotension and was treated with IV fluid bolus  Patient has congestive heart failure diastolic dysfunction but no systolic dysfunction  Patient has a pacemaker is working very well  Patient blood pressure is stable now  Patient also has acute renal failure and nephrologist is following the patient  Patient recently had a echocardiogram and hence no further testing at this time    I discussed the patients findings and my recommendations with patient's nurse    Wyatt Kwok MD  12/16/20  14:48 EST

## 2020-12-16 NOTE — PLAN OF CARE
Goal Outcome Evaluation:  Plan of Care Reviewed With: patient  Progress: no change  Patient stable, but confused at times but, is easily orientate. Patient continues on Dopamine gtt;. HR runs paced/NS. Patient seen by Dr. Savage on 12/15/2020. No complaints or distress noted. Patient seemed to of got some well rest till 0300; then she laid quietly in bed. Patient doing much better tonight. Will continue to monitor.

## 2020-12-16 NOTE — PROGRESS NOTES
Enter Query Response Below      Query Response: Congestive heart failure with diastolic dysfunction             If applicable, please update the problem list.        Patient: Olga Curtis        : 1935  Account: [id number]           Admit Date:          Options to Respond to Query:    1. Access the Encounter     a. From the To-Do Side bar, click Respond With Note.     b. Click New Note     c. Answer query within the yellow box.                d. Update the Problem List if applicable.     Dr. Kwok,    Risk Factors: 86 y/o with a history of coronary artery disease, sick sinus syndrome, hypertension, paroxysmal atrial fibrillation, non sustained V tach , chronic diastolic heart failure and  hyperlipidemia.    Clinical Indicators: proBNP 13971.0 (12/15)    98155.0 ()    4443.0 ()  Treatment: Home medications: furosemide tab   Hospital medications: IV furosemide  1155, furosemide tab, lisinopril, Lopressor    Can this be further specified as:  - Chronic diastolic heart failure  - Acute on Chronic diastolic heart failure  - Other _____________________  - Unable to clinically determine    By submitting this query, we are merely seeking further clarification of documentation to accurately reflect all conditions that you are monitoring, evaluating, treating or that extend the hospitalization or utilize additional resources of care. Please utilize your independent clinical judgment when addressing the question(s) above.     This query and your response, once completed, will be entered into the legal medical record.    Sincerely,  Moira Cannon RN, BSN  [email or web address]  Clinical Documentation Integrity Program

## 2020-12-16 NOTE — PROGRESS NOTES
Continued Stay Note   Julian     Patient Name: Olga Curtis  MRN: [id number]  Today's Date: 12/16/2020    Admit Date: 12/11/2020    Discharge Plan     Row Name 12/16/20 1254       Plan    Plan  DC Plan: [facility] accepted pending bed vs. [facility] (current). PASRR approved. No pre-cert required.    Plan Comments  SW informed by [facility] liaison (Odilia) that pt is accepted pending bed at d/c after reviewing chest xray. Notified treatment team via secure chat.     Phone communication only - no physical contact with patient or family.    DORINDA Hopkins    Phone: [phone]  Cell: [phone]  Fax: [phone]  [email or web address]

## 2020-12-16 NOTE — PROGRESS NOTES
"      [facility] Medicine Services Daily Progress Note          Hospitalist Team  LOS 3 days      Patient Care Team:  Tigre Duran MD as PCP - General  Tigre Duran MD as PCP - Family Medicine  Wyatt Kwok MD as Consulting Physician (Cardiology)    Patient Location: 2109/1      Subjective   Subjective     Chief Complaint / Subjective  Chief Complaint   Patient presents with   • Hypotension     nausea.         Brief Synopsis of Hospital Course/HPI  85 year old female with PMH of CAD s/p CABG 1999, severe aortic stenosis s/p TAVR in May, SSS with cardiac pacemaker in situ, HTN, PAF, DJD post shoulder surgery, non sustained V tach, HLDpresents with complaints of progressive weakness and dyspnea. She is awake and alert and appropriate but lethargic. She denies chest pain or syncope but per ED report EMS reports she had a \"spell\" en route  with hypotension . In ED no hypotension or arrythmmia seen . Per ED report pacemaker interrogated with no unusual results. She is Covid-19 negative. Review of records shows she was just admitted here 11/30/20 to 12/6/20 for similar complaints and seen by both neurology and cardiology. Per notes it was felt to be metabolic encephalopathy from possible UTI and she was discharged on Cefdinir. She reports diarrhea past 2 days without hematochezia or melena or abdominal pain. C-Diff culture ordered and pending. UA shows no bacteria but too numerous to count wbc . She was started on IV Ceftriaxone in ED with urine and blood culture pending. D-Dimer was elevated. CT chest per radiology today:  \"IMPRESSION:  1. No pulmonary embolism.  2. Reticular and ground glass densities diffusely may represent edema or pneumonia superimposed on chronic interstitial change.  3. Stable surgical changes and cardiomegaly.  4. Calcified left pleural plaque.\"     Labs show wbc 17, K 5.4, Na 128, Cr 1.24 ( was 0.64), BNP 4400 was 13,000 12/5/20, Hgb 10, procalcitonin 1.40.      She " reports she left here after last admission and went to rehab then was at [facility] and just discharged yesterday. No records of [facility] in EMR and records have been requested. She will be admitted for further evaluation and treatment .        Date: 12/13/2020: The patient reports just feeling generally ill and she has had difficulty staying warm today, but she has had no fever, sweats, nausea or vomiting.  No chest pain, cough or shortness of breath.  She reports her diarrhea is improving.    12/14/2020  Alerted by night shift PA that pt having hypotension, didn't respond to 750cc bolus, now on dopamine drip and her cardiologist has been consulted. Pt is lethargic, but responsive. She denies any chest pains or abdominal pains, she continues to have SOA and diarrhea.    12/15/2020  Patient seen and examined.  She is alert and awake but confused in general.  Discussion with patient's daughter yesterday leads me to believe she has undiagnosed dementia underlying her behaviors.  Per nurse to very small bowel motions so far today, diarrhea seems to be resolving.    12/16/2020  Pt seen and examined.  She had an episode of emesis x1 today and upon questioning, pt thinks she may have choked on some of it - will need to monitor for s/s of aspiration. No diarrhea today per nurse.     Review of Systems   Constitution: Positive for malaise/fatigue. Negative for chills and fever.   HENT: Negative.    Eyes: Negative.    Cardiovascular: Negative for chest pain and leg swelling.   Respiratory: Positive for shortness of breath. Negative for cough, sputum production and wheezing.    Skin: Negative.    Musculoskeletal: Negative.    Gastrointestinal: Positive for diarrhea. Negative for bloating, abdominal pain, constipation, nausea and vomiting.   Genitourinary: Negative.    Neurological: Negative.    Psychiatric/Behavioral: Negative.    All other systems reviewed and are negative.        Objective   Objective      Vital  "Signs  Temp:  [96.9 °F (36.1 °C)-98 °F (36.7 °C)] 97.7 °F (36.5 °C)  Heart Rate:  [] 70  Resp:  [15-20] 15  BP: ()/(38-74) 107/53  Oxygen Therapy  SpO2: 96 %  Pulse Oximetry Type: Intermittent  Device (Oxygen Therapy): room air  Flow (L/min): 2  Flowsheet Rows      First Filed Value   Admission Height  167.6 cm (66\") Documented at 12/11/2020 2338   Admission Weight  49.9 kg (110 lb) Documented at 12/11/2020 2338        Intake & Output (last 3 days)       12/13 0701 - 12/14 0700 12/14 0701 - 12/15 0700 12/15 0701 - 12/16 0700 12/16 0701 - 12/17 0700    P.O. 144  480 720    Total Intake(mL/kg) 144 (2.8)  480 (9) 720 (13.5)    Urine (mL/kg/hr)  300 (0.2) 300 (0.2) 400 (0.9)    Emesis/NG output    0    Stool  0      Total Output  300 300 400    Net +144 -300 +180 +320            Urine Unmeasured Occurrence 1 x 1 x 1 x     Stool Unmeasured Occurrence 3 x 3 x 4 x     Emesis Unmeasured Occurrence    1 x        Lines, Drains & Airways    Active LDAs     Name:   Placement date:   Placement time:   Site:   Days:    Peripheral IV 12/12/20 0401 Left Forearm   12/12/20    0401    Forearm   2    Peripheral IV 12/12/20 0401 Right Forearm   12/12/20    0401    Forearm   2                  Physical Exam:  General: well-developed, frail and cachectic NAD  HEENT: NC/AT, EOMI, PERRLA, +JVD  Heart: RRR. + murmur   Chest: Bilateral rales throughout, no wheezing or rhonchi, respiratory effort mildly labored  Abdominal: Soft. NT/ND. Bowel sounds present.  Musculoskeletal: Normal ROM.  No edema. No calf tenderness.  Neurological: Alert and awake, confused, no focal deficits  Skin: Skin is warm and dry. No rash.  Scattered ecchymosis  Psychiatric: Normal mood and affect, confused       Wounds (last 24 hours)      LDA Wound     Row Name 12/16/20 1200 12/16/20 0800 12/16/20 0005       Wound 12/12/20 0509 medial coccyx Pressure Injury    Wound - Properties Group Placement Date: 12/12/20 -DD Placement Time: 0509 -DD Orientation: " medial  -DD Location: coccyx  -DD Primary Wound Type: Pressure inj  -DD Stage, Pressure Injury : Stage 1  -DD    Dressing Appearance  open to air  -NOE  open to air  -NOE  --    Closure  -- barrier applied   -NOE  -- barrier applied   -NOE  --  -SE    Base  non-blanchable;maroon/purple MASD   -NOE  non-blanchable;maroon/purple MASD   -NOE  --  -SE    Drainage Amount  none  -NOE  none  -NOE  --  -SE    Care, Wound  --  barrier applied  -NOE  --    Retired Wound - Properties Group Date first assessed: 12/12/20  -DD Time first assessed: 0509 -DD Location: coccyx  -DD Primary Wound Type: Pressure inj  -DD    Row Name 12/15/20 2050 12/15/20 1556          Wound 12/12/20 0509 medial coccyx Pressure Injury    Wound - Properties Group Placement Date: 12/12/20 -DD Placement Time: 0509 -DD Orientation: medial  -DD Location: coccyx  -DD Primary Wound Type: Pressure inj  -DD Stage, Pressure Injury : Stage 1  -DD    Dressing Appearance  --  open to air  -NOE     Closure  -- barrier applied   -SE  -- barrier applied   -NOE     Base  non-blanchable;maroon/purple MASD   -SE  non-blanchable;maroon/purple MASD   -NOE     Drainage Amount  none  -SE  none  -NOE     Care, Wound  barrier applied  -SE  --     Retired Wound - Properties Group Date first assessed: 12/12/20 -DD Time first assessed: 0509 -DD Location: coccyx  -DD Primary Wound Type: Pressure inj  -DD      User Key  (r) = Recorded By, (t) = Taken By, (c) = Cosigned By    Initials Name Provider Type    Cinda Torres, RN Registered Nurse    Thania Mujica LPN Licensed Nurse    Josie Addison RN Registered Nurse          Procedures:           Results Review:     I reviewed the patient's new clinical results.    Results from last 7 days   Lab Units 12/16/20  0723 12/15/20  0331 12/14/20  1045 12/13/20  0326 12/12/20  0539 12/12/20  0058   WBC 10*3/mm3 28.50* 25.40* 27.00* 16.30* 16.40* 17.00*   HEMOGLOBIN g/dL 10.6* 11.7* 11.1* 9.6* 9.7* 10.0*   HEMATOCRIT % 33.8* 36.4 34.5  29.4* 30.5* 30.7*   PLATELETS 10*3/mm3 383 227 364 284 268 301     Results from last 7 days   Lab Units 12/16/20  0723 12/15/20  0656 12/14/20  1830 12/14/20  0652 12/13/20  0326 12/12/20  0539 12/12/20  0058   SODIUM mmol/L 126* 128* 132* 130* 131* 127* 128*   POTASSIUM mmol/L 4.4 4.1 4.3 4.9 4.5 5.2 5.4*   CHLORIDE mmol/L 90* 91* 93* 95* 94* 92* 91*   CO2 mmol/L 26.0 27.0 30.0* 24.0 31.0* 29.0 31.0*   BUN mg/dL 28* 22 25* 22 18 22 23   CREATININE mg/dL 1.07* 1.20* 1.51* 1.47* 0.83 1.03* 1.24*   GLUCOSE mg/dL 157* 73 75 103* 101* 88 100*   ALBUMIN g/dL  --  2.60*  --  2.40*  --   --  3.30*   BILIRUBIN mg/dL  --  0.4  --  0.4  --   --  0.6   ALK PHOS U/L  --  142*  --  86  --   --  90   AST (SGOT) U/L  --  26  --  25  --   --  22   ALT (SGPT) U/L  --  12  --  10  --   --  13   CALCIUM mg/dL 8.6 8.2* 8.4* 8.3* 8.5* 8.7 9.0     Cr Clearance Estimated Creatinine Clearance: 32.9 mL/min (A) (by C-G formula based on SCr of 1.07 mg/dL (H)).    Coag   Results from last 7 days   Lab Units 12/12/20  0058   INR  1.02   APTT seconds 26.4       HbA1C   Lab Results   Component Value Date    HGBA1C 5.5 09/30/2020    HGBA1C 5.90 (H) 05/21/2020     Blood Glucose       Troponin   Results from last 7 days   Lab Units 12/15/20  0407 12/14/20  0652 12/12/20  0058   TROPONIN T ng/mL  --   --  0.019   PROBNP pg/mL 14,913.0* 32,130.0* 4,443.0*     Lipids    Lab Results   Component Value Date    CHOL 127 08/05/2017    TRIG 90 08/05/2017    HDL 49 08/05/2017    LDL 62 08/05/2017       UA    Results from last 7 days   Lab Units 12/12/20  0134   NITRITE UA  Negative   WBC UA /HPF Too Numerous to Count*   BACTERIA UA /HPF None Seen   SQUAM EPITHEL UA /HPF 3-6*   URINECX  Yeast isolated*       Microbiology   Results from last 7 days   Lab Units 12/12/20  0134 12/12/20  0058   BLOODCX   --  No growth at 4 days  No growth at 4 days   URINECX  Yeast isolated*  --        ABG        EKG  ECG 12 Lead   Preliminary Result   HEART RATE= 115  bpm   RR  Interval= 520  ms   ME Interval= 118  ms   P Horizontal Axis= -34  deg   P Front Axis= 68  deg   QRSD Interval= 127  ms   QT Interval= 356  ms   QRS Axis= 205  deg   T Wave Axis= 29  deg   - ABNORMAL ECG -   Atrial-sensed ventricular-paced rhythm   Electronically Signed By:    Date and Time of Study: 2020-12-15 06:11:30      ECG 12 Lead   Final Result   HEART RATE= 82  bpm   RR Interval= 731  ms   ME Interval= 122  ms   P Horizontal Axis= 12  deg   P Front Axis= -29  deg   QRSD Interval= 131  ms   QT Interval= 419  ms   QRS Axis= 212  deg   T Wave Axis= 31  deg   - ABNORMAL ECG -   Atrial-sensed ventricular-paced rhythm   When compared with ECG of 30-Nov-2020 13:22:06,   No significant change   Electronically Signed By: Lawrence Viera (Tre) 13-Dec-2020 09:25:09   Date and Time of Study: 2020-12-12 03:22:32          Imaging:  Xr Chest 1 View    Result Date: 12/15/2020  1. Improved bilateral interstitial opacities likely related to resolving infection or edema. 2. Persistent left basilar airspace opacity.  Electronically Signed By-Hill Askew MD On:12/15/2020 9:04 AM This report was finalized on [id number] by  Hill Askew MD.    Xr Chest 1 View    Result Date: 12/12/2020  1.Bilateral airspace opacities appear slightly decreased. 2.Small pleural effusions also appears slightly decreased. 3.Cardiomegaly.  Electronically Signed By-Isidro Jackson MD On:12/12/2020 8:57 AM This report was finalized on [id number] by  Isidro Jackson MD.    Ct Chest Pulmonary Embolism    Result Date: 12/12/2020  1. No pulmonary embolism. 2. Reticular and ground glass densities diffusely may represent edema or pneumonia superimposed on chronic interstitial change. 3. Stable surgical changes and cardiomegaly. 4. Calcified left pleural plaque. Electronically signed by:  Isidro Staley M.D.  12/12/2020 1:11 AM    Xr Abdomen Kub    Result Date: 12/14/2020  Mild gaseous distention of small bowel loops is nonspecific and may reflect  changes of ileus. No convincing evidence of high-grade small bowel obstruction this time.  Electronically Signed By-Tonja Chauhan MD On:12/14/2020 9:12 AM This report was finalized on [id number] by  Tonja Chauhan MD.    Results for orders placed during the hospital encounter of 11/30/20   Adult Transthoracic Echo Complete W/ Cont if Necessary Per Protocol    Narrative · Left ventricular ejection fraction appears to be 56 - 60%.  · Left ventricular wall thickness is consistent with moderate concentric   hypertrophy.  · There is a TAVR valve present.  · Moderate tricuspid valve regurgitation is present.  · Left ventricular diastolic function is consistent with (grade I)   impaired relaxation.  · No pericardial effusion noted        Results for orders placed during the hospital encounter of 02/28/20   Duplex Carotid Ultrasound CAR    Narrative · Proximal right internal carotid artery moderate stenosis.  · Proximal left internal carotid artery mild stenosis.          Xrays, labs reviewed personally by physician.    Medication Review:   I have reviewed the patient's current medication list      Scheduled Meds  apixaban, 2.5 mg, Oral, Q12H  atorvastatin, 10 mg, Oral, Daily  colesevelam, 1,250 mg, Oral, BID With Meals  midodrine, 2.5 mg, Oral, TID AC  nystatin, , Topical, Q8H  QUEtiapine, 25 mg, Oral, Nightly  saccharomyces boulardii, 500 mg, Oral, BID  sodium chloride, 3 mL, Intravenous, Q12H  sodium chloride, 1 g, Oral, TID With Meals  vancomycin, 250 mg, Oral, Q6H        Meds Infusions  DOPamine, 2-20 mcg/kg/min, Last Rate: Stopped (12/16/20 1523)        Meds PRN  •  acetaminophen **OR** acetaminophen **OR** acetaminophen  •  magic butt paste  •  magnesium sulfate **OR** magnesium sulfate **OR** magnesium sulfate  •  nitroglycerin  •  ondansetron **OR** ondansetron  •  sodium chloride  •  sodium chloride      Assessment/Plan   Assessment/Plan     Active Hospital Problems    Diagnosis  POA   • **C. 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       MEDICAL DECISION MAKING COMPLEXITY BY PROBLEM:     C. difficile associated diarrhea  -Multiple rounds of antibiotics recent UTI  -Current UA does not indicate any presence of UTI  -UA shows yeast, this is contamination from the skin, discontinue fluconazole     -Add nystatin powder to perineum  -Increase oral vancomycin to 250 mg every 6h    -Diarrhea decreasing significantly with higher dose oral vancomycin, continue same    -Vanco enema may not be needed given her improvement.  -Increased dosing of Florastor and WelChol for optimization  -Magic Butt cream to be applied as needed    Leukocytosis  -Due to C. difficile associated diarrhea  -No signs or symptoms of sepsis at this time  -WBC increasing since patient has been started on steroids    --steroids d/c'd with pulmonology pemission  -Continue to monitor CBC and WBC count    Hypotension  Syncope and collapse  -s/p 750 cc bolus earlier this morning  -Chronic low blood pressure due to diastolic CHF  -Midodrine was given only as single dose in past  -Dopamine drip continues  -Continue to monitor BP and vitals closely  -Cardiology following     --Appreciate input  --12/16/20 scheduled low dose Midodrine, weaned off Dopamine     --d/w Nephrology, agreed with tx as it will maintain her BP much better    Dyspnea  -Due to the above  -Fluid overloaded BNP greater than 32,000  -Will need to gently diuresis at some point if  blood pressure allows  -Continuous pulse oximetry  -O2 supplementation to keep sats > 93%  -Pulmonology consulted overnight and following  -Solu-Medrol discontinued per discussion with Pulmonology    Acute renal failure  -Creatinine 1.24 on admission, previously 0.6 on last admission  -Likely due to dehydration from diarrhea  -Gradually improving, currently 0.83  -Will need to cautiously diurese as noted above  --Nephrology following     --Appreciate input    Chronic diastolic congestive heart failure  Aortic stenosis  Sick sinus syndrome/tachybradycardia syndrome  Presence of cardiac pacemaker  Chronic coronary artery disease  -Last echo on 12/1/2020 reviewed and noted above  -Continue lisinopril, metoprolol, atorvastatin  -Furosemide being held currently  -Cardiology to see    Essential hypertension  -Continue lisinopril and metoprolol, midodrine added for hypotension  -Continue to monitor BP and vitals    Medial coccyx stage I pressure injury  -Mepilex dressing  -Turn every 2 hours      VTE Prophylaxis  Mechanical Order History:      Ordered        12/12/20 0548  Place Sequential Compression Device  Once         12/12/20 0548  Maintain Sequential Compression Device  Continuous         12/12/20 0435  Place Sequential Compression Device  Once         12/12/20 0435  Maintain Sequential Compression Device  Continuous                 Pharmalogical Order History:      Ordered     Dose Route Frequency Stop    12/12/20 1302  apixaban (ELIQUIS) tablet 2.5 mg      2.5 mg PO Every 12 Hours --                Code Status  Code Status and Medical Interventions:   Ordered at: 12/14/20 1242     Limited Support to NOT Include:    Intubation     Level Of Support Discussed With:    Health Care Surrogate     Code Status:    No CPR     Medical Interventions (Level of Support Prior to Arrest):    Limited     Comments:    DNR/DNI       This patient has been examined wearing appropriate Personal Protective Equipment.  12/16/20      Discharge Planning    Pending clinical course.      Electronically signed by Balbina Sy MD, 12/16/20, 15:29 EST.    [facility] Hospitalist Team

## 2020-12-16 NOTE — PROGRESS NOTES
Daily Progress Note    C. difficile diarrhea    Chronic coronary artery disease    Essential hypertension    Presence of cardiac pacemaker    Shortness of breath    Sick sinus syndrome (CMS/HCC)    Tachy-martin syndrome (CMS/HCC)    Chronic diastolic congestive heart failure (CMS/HCC)    Syncope and collapse    Acute renal failure (ARF) (CMS/HCC)    Leukocytosis    Assessment:     CT scan suggestive of pulmonary edema cannot rule out underlying interstitial lung disease   Presentation compatible with cardiac decompensation     Hypotension  And  Hyponatremia and renal insufficiency suggestive of low cardiac output most likely related to valvular heart disease  TAVR valve present      no evidence of pulmonary embolism on CT scan     COVID 19 and respiratory panel negative 12/12/20      C diff colitis     Recommendations:     Titrate dopamine drip off patient bp improved and heart rate in 70's   Anticoagulation eliquis     Low dose steroids      p.o.  vancomycin for C diff colitis        LOS: 3 days       Subjective         Objective     Vital signs for last 24 hours:  Vitals:    12/15/20 2309 12/16/20 0228 12/16/20 0449 12/16/20 0603   BP: 124/56 105/53  129/48   BP Location:       Patient Position:       Pulse: 70   70   Resp: 18 16  16   Temp: 97.4 °F (36.3 °C) 97.2 °F (36.2 °C)  97.5 °F (36.4 °C)   TempSrc: Oral Oral  Oral   SpO2: 100% 100%  96%   Weight:   53.2 kg (117 lb 4.6 oz)    Height:           Intake/Output last 3 shifts:  I/O last 3 completed shifts:  In: 480 [P.O.:480]  Out: 600 [Urine:600]  Intake/Output this shift:  I/O this shift:  In: 480 [P.O.:480]  Out: 400 [Urine:400]      Radiology  Imaging Results (Last 24 Hours)     ** No results found for the last 24 hours. **          Labs:  Results from last 7 days   Lab Units 12/16/20  0723   WBC 10*3/mm3 28.50*   HEMOGLOBIN g/dL 10.6*   HEMATOCRIT % 33.8*   PLATELETS 10*3/mm3 383     Results from last 7 days   Lab Units 12/16/20  0723 12/15/20  0656   SODIUM  mmol/L 126* 128*   POTASSIUM mmol/L 4.4 4.1   CHLORIDE mmol/L 90* 91*   CO2 mmol/L 26.0 27.0   BUN mg/dL 28* 22   CREATININE mg/dL 1.07* 1.20*   CALCIUM mg/dL 8.6 8.2*   BILIRUBIN mg/dL  --  0.4   ALK PHOS U/L  --  142*   ALT (SGPT) U/L  --  12   AST (SGOT) U/L  --  26   GLUCOSE mg/dL 157* 73         Results from last 7 days   Lab Units 12/15/20  0656 12/14/20  0652 12/12/20  0058   ALBUMIN g/dL 2.60* 2.40* 3.30*     Results from last 7 days   Lab Units 12/12/20  0058   TROPONIN T ng/mL 0.019         Results from last 7 days   Lab Units 12/16/20  0723   MAGNESIUM mg/dL 2.4     Results from last 7 days   Lab Units 12/12/20  0058   INR  1.02   APTT seconds 26.4               Meds:   SCHEDULE  apixaban, 2.5 mg, Oral, Q12H  atorvastatin, 10 mg, Oral, Daily  colesevelam, 1,250 mg, Oral, BID With Meals  methylPREDNISolone sodium succinate, 20 mg, Intravenous, Daily  nystatin, , Topical, Q8H  QUEtiapine, 25 mg, Oral, Nightly  saccharomyces boulardii, 500 mg, Oral, BID  sodium chloride, 3 mL, Intravenous, Q12H  sodium chloride, 1 g, Oral, TID With Meals  vancomycin, 250 mg, Oral, Q6H      Infusions  DOPamine, 2-20 mcg/kg/min, Last Rate: 5 mcg/kg/min (12/16/20 1015)      PRNs  •  acetaminophen **OR** acetaminophen **OR** acetaminophen  •  magic butt paste  •  magnesium sulfate **OR** magnesium sulfate **OR** magnesium sulfate  •  nitroglycerin  •  ondansetron **OR** ondansetron  •  sodium chloride  •  sodium chloride    Physical Exam:  Physical Exam  Vitals signs reviewed.   Pulmonary:      Breath sounds: Rhonchi present.   Skin:     General: Skin is warm and dry.   Neurological:      Mental Status: She is alert.         ROS  Review of Systems   Constitutional: Positive for activity change and fatigue.

## 2020-12-17 NOTE — PROGRESS NOTES
PROGRESS NOTE      Patient Name: Olga Curtis  : 1935  MRN: [id number]  Primary Care Physician: Tigre Duran MD  Date of admission: 2020    Patient Care Team:  Tigre Duran MD as PCP - General  Tigre Duran MD as PCP - Family Medicine  Wyatt Kwok MD as Consulting Physician (Cardiology)        Subjective   Subjective:     Acute kidney injury better, patient is still complaining of weakness  Review of systems:  All other review of system unremarkable      Allergies:    Allergies   Allergen Reactions   • Sulfa Antibiotics Hives       Objective   Exam:     Vital Signs  Temp:  [95.8 °F (35.4 °C)-97.7 °F (36.5 °C)] 97 °F (36.1 °C)  Heart Rate:  [70-80] 80  Resp:  [15-20] 18  BP: (101-144)/() 144/63  SpO2:  [90 %-98 %] 95 %  on   ;   Device (Oxygen Therapy): room air  Body mass index is 18.93 kg/m².    General: Elderly white  female in no acute distress.    Head:      Normocephalic and atraumatic.    Eyes:      PERRL/EOM intact, conjunctiva and sclera clear with out nystagmus.    Neck:      No masses, thyromegaly,  trachea central with normal respiratory effort   Lungs:    Clear bilaterally to auscultation.    Heart:      Regular rate and rhythm, no murmur no gallop  Abd:        Soft, nontender, not distended, bowel sounds positive, no shifting dullness   Pulses:   Pulses palpable  Extr:        No cyanosis or clubbing--mild edema.    Neuro:    No focal deficits.   alert oriented x3  Skin:       Intact without lesions or rashes.    Psych:    Alert and cooperative; normal mood and affect; .      Results Review:  I have personally reviewed most recent Data :  CBC    Results from last 7 days   Lab Units 20  0723 12/15/20  0331 20  1045 20  0326 20  0539 20  0058   WBC 10*3/mm3 28.50* 25.40* 27.00* 16.30* 16.40* 17.00*   HEMOGLOBIN g/dL 10.6* 11.7* 11.1* 9.6* 9.7* 10.0*   PLATELETS 10*3/mm3 383 227 364 284 268 301     CMP   Results from last 7 days   Lab  Units 12/17/20  0254 12/16/20  0723 12/15/20  0656 12/14/20  1830 12/14/20  0652 12/13/20  0326 12/12/20  0539 12/12/20  0058   SODIUM mmol/L 125* 126* 128* 132* 130* 131* 127* 128*   POTASSIUM mmol/L 5.0 4.4 4.1 4.3 4.9 4.5 5.2 5.4*   CHLORIDE mmol/L 92* 90* 91* 93* 95* 94* 92* 91*   CO2 mmol/L 23.0 26.0 27.0 30.0* 24.0 31.0* 29.0 31.0*   BUN mg/dL 36* 28* 22 25* 22 18 22 23   CREATININE mg/dL 1.22* 1.07* 1.20* 1.51* 1.47* 0.83 1.03* 1.24*   GLUCOSE mg/dL 142* 157* 73 75 103* 101* 88 100*   ALBUMIN g/dL 2.90*  --  2.60*  --  2.40*  --   --  3.30*   BILIRUBIN mg/dL 0.3  --  0.4  --  0.4  --   --  0.6   ALK PHOS U/L 198*  --  142*  --  86  --   --  90   AST (SGOT) U/L 23  --  26  --  25  --   --  22   ALT (SGPT) U/L 11  --  12  --  10  --   --  13     ABG      Xr Chest 1 View    Result Date: 12/15/2020  1. Improved bilateral interstitial opacities likely related to resolving infection or edema. 2. Persistent left basilar airspace opacity.  Electronically Signed By-Hill Askew MD On:12/15/2020 9:04 AM This report was finalized on [id number] by  Hill Askew MD.    Xr Chest 1 View    Result Date: 12/12/2020  1.Bilateral airspace opacities appear slightly decreased. 2.Small pleural effusions also appears slightly decreased. 3.Cardiomegaly.  Electronically Signed By-Isidro Jackson MD On:12/12/2020 8:57 AM This report was finalized on [id number] by  Isidro Jackson MD.    Ct Chest Pulmonary Embolism    Result Date: 12/12/2020  1. No pulmonary embolism. 2. Reticular and ground glass densities diffusely may represent edema or pneumonia superimposed on chronic interstitial change. 3. Stable surgical changes and cardiomegaly. 4. Calcified left pleural plaque. Electronically signed by:  Isidro Staley M.D.  12/12/2020 1:11 AM    Xr Abdomen Kub    Result Date: 12/14/2020  Mild gaseous distention of small bowel loops is nonspecific and may reflect changes of ileus. No convincing evidence of high-grade small bowel  obstruction this time.  Electronically Signed By-Tonja Chauhan MD On:12/14/2020 9:12 AM This report was finalized on [id number] by  Tonja Chauhan MD.      Results for orders placed during the hospital encounter of 11/30/20   Adult Transthoracic Echo Complete W/ Cont if Necessary Per Protocol    Narrative · Left ventricular ejection fraction appears to be 56 - 60%.  · Left ventricular wall thickness is consistent with moderate concentric   hypertrophy.  · There is a TAVR valve present.  · Moderate tricuspid valve regurgitation is present.  · Left ventricular diastolic function is consistent with (grade I)   impaired relaxation.  · No pericardial effusion noted        Scheduled Meds:apixaban, 2.5 mg, Oral, Q12H  atorvastatin, 10 mg, Oral, Daily  colesevelam, 1,250 mg, Oral, BID With Meals  midodrine, 2.5 mg, Oral, TID AC  nystatin, , Topical, Q8H  QUEtiapine, 25 mg, Oral, Nightly  saccharomyces boulardii, 500 mg, Oral, BID  sodium chloride, 3 mL, Intravenous, Q12H  sodium chloride, 1 g, Oral, TID With Meals  vancomycin, 250 mg, Oral, Q6H      Continuous Infusions:DOPamine, 2-20 mcg/kg/min, Last Rate: Stopped (12/16/20 1523)      PRN Meds:•  acetaminophen **OR** acetaminophen **OR** acetaminophen  •  magic butt paste  •  magnesium sulfate **OR** magnesium sulfate **OR** magnesium sulfate  •  nitroglycerin  •  ondansetron **OR** ondansetron  •  sodium chloride  •  sodium chloride    Assessment/Plan   Assessment and Plan:         C. difficile diarrhea    Chronic coronary artery disease    Essential hypertension    Presence of cardiac pacemaker    Shortness of breath    Sick sinus syndrome (CMS/HCC)    Tachy-martin syndrome (CMS/HCC)    Chronic diastolic congestive heart failure (CMS/HCC)    Syncope and collapse    Acute renal failure (ARF) (CMS/HCC)    Leukocytosis    ASSESSMENT:  · Hyponatremia likely because of the congestive heart failure and may be some volume overload  · Hyperkalemia NICK with some congestive  heart failure  · Acute kidney injury  · Significant leukocytosis rule out C. difficile colitis  · History of congestive heart failure but mainly diastolic dysfunctions  · History of aortic stenosis history of TAVR  · History of hypertension  · History of pacemaker insertion           Plan:      · Patient condition is again somewhat worsening with worsening leukocytosis  · Patient has significant hyponatremia that is still present  · Will hold fluid for now as hemodynamically patient is much better  · We will start patient on some albumin  · Respiratory status seems stable so far  · Continue sodium chloride pill  · Etiology of low sodium likely related to patient C. difficile colitis as urine sodium is only 22 and urine osmolality 280  · BNP level is on the high side but better than 14,000  · Potassium again increasing  · I will give a dose of Florinef that may help with patient hyperkalemia blood pressure  · Follow-up with a urine output  · Still significant leukocytosis  · Follow-up with repeat labs tomorrow morning  · Thank you for letting me evaluate  · Need to follow volume status closely especially the presence of significant diastolic failure  •           Note started  by Huy Carlin MD,   [facility] kidney consultant

## 2020-12-17 NOTE — PROGRESS NOTES
"Nutrition Services    Patient Name: Olga Curtis  YOB: 1935  MRN: [id number]  Admission date: 12/11/2020     Malnutrition (severe, acute) related to ongoing insufficient PO intake; as evidenced by NFPE revealing moderate muscle and fat wasting; with ongoing poor intake at meals meeting less than 50% of meals for at least the last five days (including prior to admission).     Current oral nutritional supplements:     Boost Plus TID provides 1080 kcals, 42 gm protein if consumed  Magic Cups L/D to provides 580 kcals, 18 gm protein if consumed     PPE Documentation        PPE Worn By Provider N/A RDN did not enter room on this date    PPE Worn By Patient  N/A      CLINICAL NUTRITION ASSESSMENT      Reason for Assessment Follow up protocol     12/14:  BMI, MST score 2+     H&P:  Per H&P \"85 year old female with PMH of CAD s/p CABG 1999, severe aortic stenosis s/p TAVR in May, SSS with cardiac pacemaker in situ, HTN, PAF, DJD post shoulder surgery, non sustained V tach, HLDpresents with complaints of progressive weakness and dyspnea.\"     Past Medical History:   Diagnosis Date   • Aortic stenosis 10/28/2014   • Arthritis    • Atrial premature complex 12/18/2015   • CHF (congestive heart failure) (CMS/HCC)    • Chronic coronary artery disease 10/28/2014   • Dyslipidemia 10/28/2014   • Elevated cholesterol    • History of transfusion    • Hyperlipidemia    • Hypertension 10/28/2014   • Nausea 09/2020   • Nonsustained ventricular tachycardia (CMS/HCC) 12/18/2015   • Partial paralysis of right hand (CMS/HCC)     states right arm only.  Cannot have sticks in arm, hand only   • Shoulder pain, left        Past Surgical History:   Procedure Laterality Date   • AORTIC VALVE REPAIR/REPLACEMENT N/A 5/26/2020    Procedure: TTE TRANSFEMORAL TRANSCATHETER AORTIC VALVE REPLACEMENT PERCUTANEOUS APPROACH;  Surgeon: J Carlos Leon MD;  Location: [facility] 18/19;  Service: Cardiothoracic;  Laterality: N/A;   • " AORTIC VALVE REPAIR/REPLACEMENT N/A 5/26/2020    Procedure: Transfemoral Transcatheter Aortic Valve Replacement w/Intra Op TTE and Possible Open Rescue Surgery;  Surgeon: Vasiliy Bruce MD;  Location: [facility] HYBRID OR 18/19;  Service: Cardiovascular;  Laterality: N/A;   • APPENDECTOMY     • CARDIAC CATHETERIZATION N/A 3/2/2020    Procedure: Left Heart Cath and coronary angiogram;  Surgeon: Wyatt Kwok MD;  Location: [facility] CATH INVASIVE LOCATION;  Service: Cardiovascular;  Laterality: N/A;   • CARDIAC CATHETERIZATION N/A 3/2/2020    Procedure: Saphenous Vein Graft;  Surgeon: Wyatt Kwok MD;  Location: [facility] CATH INVASIVE LOCATION;  Service: Cardiovascular;  Laterality: N/A;   • CARDIAC PACEMAKER PLACEMENT  2017   • CARDIAC SURGERY  1999    Bypass surgery   • EYE SURGERY Bilateral     cat ext   • HERNIA REPAIR     • TOTAL SHOULDER ARTHROPLASTY W/ DISTAL CLAVICLE EXCISION Left 10/6/2020    Procedure: TOTAL SHOULDER REVERSE ARTHROPLASTY;  Surgeon: Oli Barcenas MD;  Location: [facility] MAIN OR;  Service: Orthopedics;  Laterality: Left;   • TOTAL SHOULDER REVISION  10/06/2020    left        Current Problems:   C. difficile associated diarrhea  - Antibiotics & probiotic      Hypotension  - Improved      Dyspnea  - Pulmonology Following      Acute renal failure  - Nephrology following     Chronic diastolic congestive heart failure  - Furosemide being held currently  - Cardiology Following      Medial coccyx stage I pressure injury        Nutrition/Diet History         Narrative     12/17: Patient remains very confused today & not appropriate for interview. Secure chatted RN who just had shift change & unsure if the patient had been accepting her oral nutritional supplements ordered. RN stated she would highly encourage the Boost Plus.     12/14: Pt seen due to concern for malnutrition. Pt extremely drowsy at visit to room - had recently transferred to PCU due to hypotension. Performed limited  "NFPE based on ability with pt status; per assessment of upper body, pt with both muscle and fat wasting (orbital, temporal, buccal, clavicular, sternal/ribs, and dorsal hand.) Per family report, pt with diminished appetite prior to admission with poor intake; continues with poor intake since admission. Pt recently assessed by dietetic intern, Kadie Doyle on 12/02/2020; agree with Kadie's NFPE based on follow up today - pt still with signs of wasting. During that assessment, family reported pt willing to take Boost supplements -- will add these when diet resumes (pt currently NPO).       Functional Status Requires assistance with some ADLs; Receives home health care, as well as help from spouse/family      Food Allergies NKFA     Factors Affecting   Nutritional Intake Diminished appetite secondary to C. Diff colitis      Anthropometrics        Current Height, Weight Height: 167.6 cm (66\")  Weight: (pt up in chair, couldn't get weight) (12/17/20 0605)        Admit Height, Weight -    Flowsheet Rows      First Filed Value   Admission Height  167.6 cm (66\") Documented at 12/11/2020 2338   Admission Weight  49.9 kg (110 lb) Documented at 12/11/2020 2338             Ideal Body Weight (IBW) 136 lb   % Ideal Body Weight 83%-per 12/14       Usual Body Weight UTD   % Usual Body Weight UTD   Wt Change Observation Wt has, overall, been stable for the last several months, but pt chronically low weight.   Weight Hx    Wt Readings from Last 30 Encounters:   12/16/20 0449 53.2 kg (117 lb 4.6 oz)   12/15/20 0605 52.4 kg (115 lb 8.3 oz)   12/14/20 0408 51.7 kg (113 lb 15.7 oz)   12/13/20 0410 54.5 kg (120 lb 2.4 oz)   12/12/20 0445 53.6 kg (118 lb 2.7 oz)   12/11/20 2338 49.9 kg (110 lb)   12/06/20 0420 51 kg (112 lb 8 oz)   12/05/20 0408 52.5 kg (115 lb 12.8 oz)   12/04/20 0245 51.3 kg (113 lb 1.5 oz)   12/02/20 0346 49.3 kg (108 lb 11 oz)   12/01/20 1625 51.7 kg (114 lb)   12/01/20 0335 52 kg (114 lb 10.2 oz)   11/30/20 1951 52.1 kg " (114 lb 13.8 oz)   11/30/20 1241 49.9 kg (110 lb)   11/18/20 0959 60.3 kg (133 lb)   10/21/20 0857 51.3 kg (113 lb)   10/07/20 0005 51.3 kg (113 lb 3.2 oz)   10/06/20 0949 51.3 kg (113 lb)   09/28/20 1247 51.3 kg (113 lb)   07/27/20 0942 51.7 kg (114 lb)   07/02/20 1052 52.2 kg (115 lb)   07/02/20 1144 51.7 kg (114 lb)   06/25/20 1118 52.2 kg (115 lb)   06/03/20 1127 52.4 kg (115 lb 9.6 oz)   05/27/20 0710 52.2 kg (115 lb)   05/26/20 0807 52.2 kg (115 lb)   05/21/20 0733 51.7 kg (114 lb)   03/03/20 0526 56.8 kg (125 lb 3.5 oz)   03/01/20 0446 56.2 kg (124 lb)   02/29/20 1123 59.9 kg (132 lb)   02/29/20 0400 60.1 kg (132 lb 9.6 oz)   02/28/20 1700 59 kg (130 lb 1.1 oz)   02/28/20 1036 59.9 kg (132 lb)   02/24/20 0836 56.7 kg (125 lb)   12/09/19 1225 57.2 kg (126 lb 3.2 oz)   10/14/19 1421 59.2 kg (130 lb 8 oz)   10/09/19 0350 58.5 kg (128 lb 15.5 oz)   10/09/19 0021 59 kg (130 lb)   09/09/19 1357 60.2 kg (132 lb 12.8 oz)   01/17/19 1100 64.3 kg (141 lb 12 oz)   05/14/18 1557 65.7 kg (144 lb 12 oz)   09/25/17 1502 65.2 kg (143 lb 12 oz)   04/24/17 1341 68.5 kg (151 lb)   09/12/16 1252 70.8 kg (156 lb)   02/01/16 1030 69.4 kg (153 lb)        BMI kg/m2 Body mass index is 18.93 kg/m².     Labs/Medications         Pertinent Labs -   Results from last 7 days   Lab Units 12/17/20  1158 12/17/20  0254 12/16/20  0723 12/15/20  0656  12/14/20  0652   SODIUM mmol/L 128* 125* 126* 128*   < > 130*   POTASSIUM mmol/L 5.2 5.0 4.4 4.1   < > 4.9   CHLORIDE mmol/L 92* 92* 90* 91*   < > 95*   CO2 mmol/L 29.0 23.0 26.0 27.0   < > 24.0   BUN mg/dL 35* 36* 28* 22   < > 22   CREATININE mg/dL 1.17* 1.22* 1.07* 1.20*   < > 1.47*   CALCIUM mg/dL 9.0 8.8 8.6 8.2*   < > 8.3*   BILIRUBIN mg/dL  --  0.3  --  0.4  --  0.4   ALK PHOS U/L  --  198*  --  142*  --  86   ALT (SGPT) U/L  --  11  --  12  --  10   AST (SGOT) U/L  --  23  --  26  --  25   GLUCOSE mg/dL 99 142* 157* 73   < > 103*    < > = values in this interval not displayed.     Results  from last 7 days   Lab Units 12/17/20  1158 12/17/20  0254 12/16/20  0723  12/15/20  0407   MAGNESIUM mg/dL  --  2.2 2.4  --  1.6   PHOSPHORUS mg/dL  --  2.6  --    < >  --    HEMOGLOBIN g/dL 10.0*  --  10.6*  --   --    HEMATOCRIT % 31.8*  --  33.8*  --   --     < > = values in this interval not displayed.     COVID19   Date Value Ref Range Status   12/12/2020 Not Detected Not Detected - Ref. Range Final     Lab Results   Component Value Date    HGBA1C 5.5 09/30/2020         Pertinent Medications Lipitor, Welchol, Seroquel, Florastor, Vancomycin,      Physical Findings        Overall Physical   Appearance, MSA 12/17: Did not physically observe on this date     12/14: Appears malnourished on exam, NFPE completed    --  Edema  No edema observed     Gastrointestinal + 4 BMs on 12/16, + 1 BM so far today (12/17)   Cdiff infection Noted      Tubes No feeding tube      Oral/Mouth Cavity Pt with some missing teeth, but has dentures     Skin Stage 1 coccyx; incision    Wound care RN note reports moisture-associated dermatitis to buttock area      --  Current Nutrition Orders & Evaluation of Intake       Oral Nutrition     Current PO Diet Diet Regular   Supplement Boost Plus BID  Magic Cups L/D    PO Evaluation     % PO Intake 12/17: Avg meal intakes 35%, noted did eat 50% of meal today     12/14: When diet was active, intake was poor-fair - eating about 25-50% at meals    --  Clinical Course    Nutrition Course Details PO diet     12/12-12/13 Regular diet  12/14 NPO   12/15 to present (12/17) Regular      Nutritional Risk Screening        NRS-2002 Score   -       Nutrition Diagnosis         Nutrition Dx Problem 1 Malnutrition (severe, acute) related to ongoing insufficient PO intake; as evidenced by NFPE revealing moderate muscle and fat wasting; with ongoing poor intake at meals meeting less than 50% of meals for at least the last five days (including prior to admission).       Nutrition Dx Problem 2 -       Intervention  Goal         Intervention Goal(s) PO intakes at least 50% of meals    Pt to be accepting of ONS      Nutrition Intervention        RD/Tech Action Increasing Boost Plus from BID to TID      Nutrition Prescription          Diet Prescription Regular    Supplement Prescription Boost Plus TID   Magic Cups L/D    --  Monitor/Evaluation        Monitor PO intake, Supplement intake, Pertinent labs, Weight, Skin status, GI status     Electronically signed by:  Samantha Ozuna RD  12/17/20 14:15 EST

## 2020-12-17 NOTE — NURSING NOTE
Care taken over for Elda RN at 1315. Rounded on patient, she is still confused and sitting in chair. Reoriented patient. Chair alarm on and activated

## 2020-12-17 NOTE — PROGRESS NOTES
Referring Provider: Hospitalist    Reason for follow-up: Diarrhea     Patient Care Team:  Tigre Duran MD as PCP - General  Tigre Duran MD as PCP - Family Medicine  Wyatt Kwok MD as Consulting Physician (Cardiology)    Subjective .  Patient looking much better today    Objective  Sitting in chair comfortable     Review of Systems   Constitution: Negative for fever and malaise/fatigue.   Cardiovascular: Negative for chest pain, dyspnea on exertion and palpitations.   Respiratory: Negative for cough and shortness of breath.    Skin: Negative for rash.   Gastrointestinal: Negative for abdominal pain, nausea and vomiting.   Neurological: Negative for focal weakness and headaches.   All other systems reviewed and are negative.      Sulfa antibiotics    Scheduled Meds:apixaban, 2.5 mg, Oral, Q12H  atorvastatin, 10 mg, Oral, Daily  colesevelam, 1,250 mg, Oral, BID With Meals  midodrine, 2.5 mg, Oral, TID AC  nystatin, , Topical, Q8H  QUEtiapine, 25 mg, Oral, Nightly  saccharomyces boulardii, 500 mg, Oral, BID  sodium chloride, 3 mL, Intravenous, Q12H  sodium chloride, 1 g, Oral, TID With Meals  vancomycin, 250 mg, Oral, Q6H      Continuous Infusions:DOPamine, 2-20 mcg/kg/min, Last Rate: Stopped (12/16/20 1523)      PRN Meds:.•  acetaminophen **OR** acetaminophen **OR** acetaminophen  •  magic butt paste  •  magnesium sulfate **OR** magnesium sulfate **OR** magnesium sulfate  •  nitroglycerin  •  ondansetron **OR** ondansetron  •  sodium chloride  •  sodium chloride        VITAL SIGNS  Vitals:    12/16/20 1758 12/16/20 2232 12/17/20 0247 12/17/20 0605   BP: 102/46 120/46 144/63    BP Location: Right arm Right arm Left arm    Patient Position: Sitting Lying Sitting    Pulse: 70 80     Resp: 15 20 18 18   Temp: 97.4 °F (36.3 °C) 96.2 °F (35.7 °C) 95.8 °F (35.4 °C) 97 °F (36.1 °C)   TempSrc: Axillary Axillary Axillary Oral   SpO2: 95%      Weight:       Height:           Flowsheet Rows      First Filed Value    "  Admission Height  167.6 cm (66\") Documented at 12/11/2020 2338   Admission Weight  49.9 kg (110 lb) Documented at 12/11/2020 2338           TELEMETRY: Ventricular pacemaker rhythm    Physical Exam:  Constitutional:       Appearance: Well-developed.   Eyes:      General: No scleral icterus.     Conjunctiva/sclera: Conjunctivae normal.   HENT:      Head: Normocephalic and atraumatic.   Neck:      Musculoskeletal: Normal range of motion and neck supple.      Vascular: No carotid bruit or JVD.   Pulmonary:      Effort: Pulmonary effort is normal.      Breath sounds: Normal breath sounds. No wheezing. No rales.   Cardiovascular:      Normal rate. Regular rhythm.   Pulses:     Intact distal pulses.   Abdominal:      General: Bowel sounds are normal.      Palpations: Abdomen is soft.   Skin:     General: Skin is warm and dry.      Findings: No rash.   Neurological:      Mental Status: Alert.          Results Review:   I reviewed the patient's new clinical results.  Lab Results (last 24 hours)     Procedure Component Value Units Date/Time    Phosphorus [[id number]]  (Normal) Collected: 12/17/20 0254    Specimen: Blood Updated: 12/17/20 0358     Phosphorus 2.6 mg/dL     Comprehensive Metabolic Panel [[id number]]  (Abnormal) Collected: 12/17/20 0254    Specimen: Blood Updated: 12/17/20 0358     Glucose 142 mg/dL      BUN 36 mg/dL      Creatinine 1.22 mg/dL      Sodium 125 mmol/L      Potassium 5.0 mmol/L      Comment: Slight hemolysis detected by analyzer. Results may be affected.        Chloride 92 mmol/L      CO2 23.0 mmol/L      Calcium 8.8 mg/dL      Total Protein 6.3 g/dL      Albumin 2.90 g/dL      ALT (SGPT) 11 U/L      AST (SGOT) 23 U/L      Comment: Slight hemolysis detected by analyzer. Results may be affected.        Alkaline Phosphatase 198 U/L      Total Bilirubin 0.3 mg/dL      eGFR Non African Amer 42 mL/min/1.73      Globulin 3.4 gm/dL      A/G Ratio 0.9 g/dL      BUN/Creatinine Ratio 29.5     Anion Gap 10.0 " mmol/L     Narrative:      GFR Normal >60  Chronic Kidney Disease <60  Kidney Failure <15      Magnesium [[id number]]  (Normal) Collected: 12/17/20 0254    Specimen: Blood Updated: 12/17/20 0345     Magnesium 2.2 mg/dL     Blood Culture - Blood, Arm, Right [[id number]] Collected: 12/12/20 0058    Specimen: Blood from Arm, Right Updated: 12/17/20 0115     Blood Culture No growth at 5 days    Blood Culture - Blood, Arm, Left [[id number]] Collected: 12/12/20 0058    Specimen: Blood from Arm, Left Updated: 12/17/20 0115     Blood Culture No growth at 5 days    Creatinine, Urine, Random - Urine, Catheter [[id number]] Collected: 12/16/20 1000    Specimen: Urine, Catheter Updated: 12/16/20 1636     Creatinine, Urine 52.7 mg/dL     Narrative:      Reference intervals for random urine have not been established.  Clinical usage is dependent upon physician's interpretation in combination with other laboratory tests.       Osmolality, Urine - Urine, Catheter [[id number]]  (Abnormal) Collected: 12/16/20 1000    Specimen: Urine, Catheter Updated: 12/16/20 1108     Osmolality, Urine 280 mOsm/kg     Sodium, Urine, Random - Urine, Catheter [[id number]] Collected: 12/16/20 1000    Specimen: Urine, Catheter Updated: 12/16/20 1047     Sodium, Urine 22 mmol/L     Narrative:      Reference intervals for random urine have not been established.  Clinical usage is dependent upon physician's interpretation in combination with other laboratory tests.             Imaging Results (Last 24 Hours)     ** No results found for the last 24 hours. **          EKG      I personally viewed and interpreted the patient's EKG/Telemetry data:    ECHOCARDIOGRAM:    STRESS MYOVIEW:    CARDIAC CATHETERIZATION:    OTHER:         Assessment/Plan     Principal Problem:    C. difficile diarrhea  Active Problems:    Chronic coronary artery disease    Essential hypertension    Presence of cardiac pacemaker    Shortness of breath    Sick sinus syndrome (CMS/HCC)     Tachy-martin syndrome (CMS/HCC)    Chronic diastolic congestive heart failure (CMS/HCC)    Syncope and collapse    Acute renal failure (ARF) (CMS/HCC)    Leukocytosis       Patient has significant diarrhea and dehydration and became less responsive and hypotensive  Patient presented with hypotension and was treated with IV fluid bolus  Patient has congestive heart failure diastolic dysfunction but no systolic dysfunction  Patient has a pacemaker is working very well  Patient blood pressure is stable now  Patient also has acute renal failure and nephrologist is following the patient  Patient recently had a echocardiogram and hence no further testing at this time    I discussed the patients findings and my recommendations with patient's nurse    Wyatt Kwok MD  12/17/20  10:10 EST

## 2020-12-17 NOTE — PLAN OF CARE
Problem: Skin Injury Risk Increased  Goal: Skin Health and Integrity  Outcome: Ongoing, Progressing     Problem: Fall Injury Risk  Goal: Absence of Fall and Fall-Related Injury  Outcome: Ongoing, Progressing  Intervention: Promote Injury-Free Environment  Recent Flowsheet Documentation  Taken 12/17/2020 1600 by Poly Preciado RN  Safety Promotion/Fall Prevention: safety round/check completed  Taken 12/17/2020 1400 by Poly Preciado RN  Safety Promotion/Fall Prevention: safety round/check completed  Taken 12/17/2020 1315 by Poly Preciado RN  Safety Promotion/Fall Prevention: safety round/check completed     Problem: Diarrhea (Gastroenteritis)  Goal: Effective Diarrhea Management  Outcome: Ongoing, Progressing     Problem: Fluid Imbalance (Gastroenteritis)  Goal: Fluid Balance  Outcome: Ongoing, Progressing     Problem: Nausea and Vomiting (Gastroenteritis)  Goal: Nausea and Vomiting Relief  Outcome: Ongoing, Progressing     Problem: Adult Inpatient Plan of Care  Goal: Plan of Care Review  Outcome: Ongoing, Progressing  Goal: Patient-Specific Goal (Individualized)  Outcome: Ongoing, Progressing  Goal: Absence of Hospital-Acquired Illness or Injury  Outcome: Ongoing, Progressing  Intervention: Identify and Manage Fall Risk  Recent Flowsheet Documentation  Taken 12/17/2020 1600 by Poly Preciado RN  Safety Promotion/Fall Prevention: safety round/check completed  Taken 12/17/2020 1400 by Poly Preciado RN  Safety Promotion/Fall Prevention: safety round/check completed  Taken 12/17/2020 1315 by Poly Preciado RN  Safety Promotion/Fall Prevention: safety round/check completed  Goal: Optimal Comfort and Wellbeing  Outcome: Ongoing, Progressing  Goal: Readiness for Transition of Care  Outcome: Ongoing, Progressing     Problem: Malnutrition  Goal: Improved Nutritional Intake  Outcome: Ongoing, Progressing   Goal Outcome Evaluation:  Plan of Care Reviewed With: patient  Progress: declining

## 2020-12-17 NOTE — PROGRESS NOTES
Continued Stay Note  JOANNE Jordan     Patient Name: Olga Curtis  MRN: [id number]  Today's Date: 12/17/2020    Admit Date: 12/11/2020    Discharge Plan     Row Name 12/17/20 1258       Plan    Plan Comments  D/C Barriers: WBC 31.3, Sodium 128, repeat labs, albumin.            Expected Discharge Date and Time     Expected Discharge Date Expected Discharge Time    Dec 19, 2020             Grace Pena RN

## 2020-12-17 NOTE — PLAN OF CARE
Goal Outcome Evaluation:  Plan of Care Reviewed With: patient  Progress: declining  Outcome Summary: Pt sitting in chair when PT entered, requested to transfer to bedside commode. Pt required MOD A to come to standing, and MIN A for ambulation, pt with significant shuffled gait pattern this date. Once ready to sit on bedside commode, pt required assist with LB dressing and had a large bowel movement. Pt required MAX A for self care. Pt required MIN A for balance while nursing assisted with self care as well. Following self care pt assisted back to sitting EOB and reports fatigue. Recommendation is changed to IP rehab this date due to pt decline in functional mobilty, significant confusion and  decreased ability to care for self.

## 2020-12-17 NOTE — THERAPY TREATMENT NOTE
Patient Name: Olga Curtis  : 1935    MRN: [id number]                              Today's Date: 2020       Admit Date: 2020    Visit Dx:     ICD-10-CM ICD-9-CM   1. Chronic diastolic congestive heart failure (CMS/HCC)  I50.32 428.32     428.0   2. Syncope and collapse  R55 780.2   3. Acute UTI  N39.0 599.0   4. Diarrhea, unspecified type  R19.7 787.91     Patient Active Problem List   Diagnosis   • Atrial premature complex   • Chronic coronary artery disease   • Dyslipidemia   • Essential hypertension   • Nonsustained ventricular tachycardia (CMS/HCC)   • Presence of cardiac pacemaker   • Shortness of breath   • Sick sinus syndrome (CMS/HCC)   • Allergic reaction   • Tachy-martin syndrome (CMS/HCC)   • Dyspnea   • Paroxysmal atrial fibrillation (CMS/HCC)   • Nonrheumatic aortic valve stenosis   • Chronic diastolic congestive heart failure (CMS/HCC)   • S/P TAVR (transcatheter aortic valve replacement)   • Primary localized osteoarthrosis of left shoulder region   • DJD of left shoulder   • Severe malnutrition (CMS/HCC)   • Syncope and collapse   • Diarrhea   • Acute renal failure (ARF) (CMS/[facility])   • Leukocytosis   • C. 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  Cannot have sticks in arm, hand only   • Shoulder pain, left      Past Surgical History:   Procedure Laterality Date   • AORTIC VALVE REPAIR/REPLACEMENT N/A 2020    Procedure: TTE TRANSFEMORAL TRANSCATHETER AORTIC VALVE REPLACEMENT PERCUTANEOUS APPROACH;   Surgeon: J Carlos Leon MD;  Location: [facility] OR 18/19;  Service: Cardiothoracic;  Laterality: N/A;   • AORTIC VALVE REPAIR/REPLACEMENT N/A 5/26/2020    Procedure: Transfemoral Transcatheter Aortic Valve Replacement w/Intra Op TTE and Possible Open Rescue Surgery;  Surgeon: Vasiliy Bruce MD;  Location: [facility] OR 18/19;  Service: Cardiovascular;  Laterality: N/A;   • APPENDECTOMY     • CARDIAC CATHETERIZATION N/A 3/2/2020    Procedure: Left Heart Cath and coronary angiogram;  Surgeon: Wyatt Kwok MD;  Location: [facility] CATH INVASIVE LOCATION;  Service: Cardiovascular;  Laterality: N/A;   • CARDIAC CATHETERIZATION N/A 3/2/2020    Procedure: Saphenous Vein Graft;  Surgeon: Wyatt Kwok MD;  Location: [facility] CATH INVASIVE LOCATION;  Service: Cardiovascular;  Laterality: N/A;   • CARDIAC PACEMAKER PLACEMENT  2017   • CARDIAC SURGERY  1999    Bypass surgery   • EYE SURGERY Bilateral     cat ext   • HERNIA REPAIR     • TOTAL SHOULDER ARTHROPLASTY W/ DISTAL CLAVICLE EXCISION Left 10/6/2020    Procedure: TOTAL SHOULDER REVERSE ARTHROPLASTY;  Surgeon: Oli Barcenas MD;  Location: [facility] OR;  Service: Orthopedics;  Laterality: Left;   • TOTAL SHOULDER REVISION  10/06/2020    left     General Information     Row Name 12/17/20 1527          Physical Therapy Time and Intention    Document Type  therapy note (daily note)  -EL     Mode of Treatment  physical therapy  -     Row Name 12/17/20 1527          General Information    Patient Profile Reviewed  yes  -EL       User Key  (r) = Recorded By, (t) = Taken By, (c) = Cosigned By    Initials Name Provider Type    EL Dane Velazquez PT Physical Therapist        Mobility     Row Name 12/17/20 1527          Bed Mobility    Comment (Bed Mobility)  Sitting in chair when PT entered  -EL     Row Name 12/17/20 1527          Bed-Chair Transfer    Bed-Chair [place] (Transfers)  moderate assist (50% patient effort)  -EL     Assistive Device  (Bed-Chair Transfers)  -- A  -     Row Name 12/17/20 1527          Sit-Stand Transfer    Sit-Stand [place] (Transfers)  moderate assist (50% patient effort)  -EL     Assistive Device (Sit-Stand Transfers)  -- A  -     Row Name 12/17/20 1527          Gait/Stairs (Locomotion)    [place] Level (Gait)  minimum assist (75% patient effort)  -EL     Assistive Device (Gait)  -- HHA  -EL     Distance in Feet (Gait)  4 feet to chair  -EL     Deviations/Abnormal Patterns (Gait)  gait speed decreased;festinating/shuffling  -EL     Comment (Gait/Stairs)  significant shuffling pattern this date  -       User Key  (r) = Recorded By, (t) = Taken By, (c) = Cosigned By    Initials Name Provider Type    Dane Torres, PT Physical Therapist        Obj/Interventions    No documentation.       Goals/Plan    No documentation.       Clinical Impression     Row Name 12/17/20 1528          Pain Scale: Numbers Pre/Post-Treatment    Pretreatment Pain Rating  0/10 - no pain  -EL     Posttreatment Pain Rating  0/10 - no pain  -EL     Row Name 12/17/20 1528          Plan of Care Review    Plan of Care Reviewed With  patient  -EL     Progress  declining  -     Outcome Summary  Pt sitting in chair when PT entered, requested to transfer to bedside commode. Pt required MOD A to come to standing, and MIN A for ambulation, pt with significant shuffled gait pattern this date. Once ready to sit on bedside commode, pt required assist with LB dressing and had a large bowel movement. Pt required MAX A for self care. Pt required MIN A for balance while nursing assisted with self care as well. Following self care pt assisted back to sitting EOB and reports fatigue. Recommendation is changed to IP rehab this date due to pt decline in functional mobilty, significant confusion and  decreased ability to care for self.  -     Row Name 12/17/20 1528          Therapy Assessment/Plan (PT)    Rehab Potential (PT)  good, to achieve stated  therapy goals  -EL     Criteria for Skilled Interventions Met (PT)  yes;skilled treatment is necessary  -EL     Row Name 12/17/20 1528          Vital Signs    O2 Delivery Pre Treatment  room air  -EL     O2 Delivery Intra Treatment  room air  -EL     O2 Delivery Post Treatment  room air  -EL     Pre Patient Position  Sitting  -EL     Intra Patient Position  Standing  -EL     Post Patient Position  Sitting  -EL     Row Name 12/17/20 1528          Positioning and Restraints    Pre-Treatment Position  sitting in chair/recliner  -EL     Post Treatment Position  chair  -EL     In Chair  notified nsg;sitting;call light within reach;encouraged to call for assist;exit alarm on  -EL       User Key  (r) = Recorded By, (t) = Taken By, (c) = Cosigned By    Initials Name Provider Type    Dane Torres PT Physical Therapist        Outcome Measures     Row Name 12/17/20 1532          How much help from another person do you currently need...    Turning from your back to your side while in flat bed without using bedrails?  3  -EL     Moving from lying on back to sitting on the side of a flat bed without bedrails?  3  -EL     Moving to and from a bed to a chair (including a wheelchair)?  3  -EL     Standing up from a chair using your arms (e.g., wheelchair, bedside chair)?  2  -EL     Climbing 3-5 steps with a railing?  2  -EL     To walk in hospital room?  2  -EL     AM-PAC 6 Clicks Score (PT)  15  -EL     Row Name 12/17/20 1532          Functional Assessment    Outcome Measure Options  AM-PAC 6 Clicks Basic Mobility (PT)  -EL       User Key  (r) = Recorded By, (t) = Taken By, (c) = Cosigned By    Initials Name Provider Type    Dane Torres PT Physical Therapist        Physical Therapy Education                 Title: PT OT SLP Therapies (In Progress)     Topic: Physical Therapy (In Progress)     Point: Mobility training (In Progress)     Learning Progress Summary           Patient Acceptance, E,TB, NR by SAI at 12/17/2020 1532     Acceptance, E,TB, VU by  at 12/12/2020 7959                               User Key     Initials Effective Dates Name Provider Type [facility] 03/01/19 -  Kaykay Rios, PT Physical Therapist PT     06/23/20 -  Dane Velazquez PT Physical Therapist PT              PT Recommendation and Plan     Plan of Care Reviewed With: patient  Progress: declining  Outcome Summary: Pt sitting in chair when PT entered, requested to transfer to bedside commode. Pt required MOD A to come to standing, and MIN A for ambulation, pt with significant shuffled gait pattern this date. Once ready to sit on bedside commode, pt required assist with LB dressing and had a large bowel movement. Pt required MAX A for self care. Pt required MIN A for balance while nursing assisted with self care as well. Following self care pt assisted back to sitting EOB and reports fatigue. Recommendation is changed to IP rehab this date due to pt decline in functional mobilty, significant confusion and  decreased ability to care for self.     Time Calculation:   PT Charges     Row Name 12/17/20 1533             Time Calculation    Start Time  1006  -EL      Stop Time  1032  -EL      Time Calculation (min)  26 min  -EL      PT Received On  12/17/20  -EL      PT - Next Appointment  12/18/20  -EL         Time Calculation- PT    Total Timed Code Minutes- PT  26 minute(s)  -EL        User Key  (r) = Recorded By, (t) = Taken By, (c) = Cosigned By    Initials Name Provider Type    EL Dane Velazquez PT Physical Therapist        Therapy Charges for Today     Code Description Service Date Service Provider Modifiers Qty    53655231053 HC PT THERAPEUTIC ACT EA 15 MIN 12/17/2020 Dane Velazquez, PT GP 1    61365840722 HC PT SELF CARE/MGMT/TRAIN EA 15 MIN 12/17/2020 Dane Velazquez, PT GP 1          PT G-Codes  Outcome Measure Options: AM-PAC 6 Clicks Basic Mobility (PT)  AM-PAC 6 Clicks Score (PT): 15    Dane Velazquez PT  12/17/2020

## 2020-12-17 NOTE — PROGRESS NOTES
Daily Progress Note    C. difficile diarrhea    Chronic coronary artery disease    Essential hypertension    Presence of cardiac pacemaker    Shortness of breath    Sick sinus syndrome (CMS/HCC)    Tachy-martin syndrome (CMS/HCC)    Chronic diastolic congestive heart failure (CMS/HCC)    Syncope and collapse    Acute renal failure (ARF) (CMS/HCC)    Leukocytosis    Assessment:     CT scan suggestive of pulmonary edema cannot rule out underlying interstitial lung disease   Presentation compatible with cardiac decompensation   Dopamine off  Hypotension  And  Hyponatremia and renal insufficiency suggestive of low cardiac output most likely related to valvular heart disease  TAVR valve present      no evidence of pulmonary embolism on CT scan     COVID 19 and respiratory panel negative 12/12/20      C diff colitis     Recommendations:  Currently on room air  Off steroids  Anticoagulation eliquis  Cardiology and nephrology following   p.o.  vancomycin for C diff colitis        LOS: 4 days       Subjective         Objective     Vital signs for last 24 hours:  Vitals:    12/16/20 1758 12/16/20 2232 12/17/20 0247 12/17/20 0605   BP: 102/46 120/46 144/63    BP Location: Right arm Right arm Left arm    Patient Position: Sitting Lying Sitting    Pulse: 70 80     Resp: 15 20 18 18   Temp: 97.4 °F (36.3 °C) 96.2 °F (35.7 °C) 95.8 °F (35.4 °C) 97 °F (36.1 °C)   TempSrc: Axillary Axillary Axillary Oral   SpO2: 95%      Weight:       Height:           Intake/Output last 3 shifts:  I/O last 3 completed shifts:  In: 840 [P.O.:840]  Out: 800 [Urine:800]  Intake/Output this shift:  No intake/output data recorded.      Radiology  Imaging Results (Last 24 Hours)     ** No results found for the last 24 hours. **          Labs:  Results from last 7 days   Lab Units 12/16/20  0723   WBC 10*3/mm3 28.50*   HEMOGLOBIN g/dL 10.6*   HEMATOCRIT % 33.8*   PLATELETS 10*3/mm3 383     Results from last 7 days   Lab Units 12/17/20  0254   SODIUM mmol/L  125*   POTASSIUM mmol/L 5.0   CHLORIDE mmol/L 92*   CO2 mmol/L 23.0   BUN mg/dL 36*   CREATININE mg/dL 1.22*   CALCIUM mg/dL 8.8   BILIRUBIN mg/dL 0.3   ALK PHOS U/L 198*   ALT (SGPT) U/L 11   AST (SGOT) U/L 23   GLUCOSE mg/dL 142*         Results from last 7 days   Lab Units 12/17/20  0254 12/15/20  0656 12/14/20  0652   ALBUMIN g/dL 2.90* 2.60* 2.40*     Results from last 7 days   Lab Units 12/12/20  0058   TROPONIN T ng/mL 0.019         Results from last 7 days   Lab Units 12/17/20  0254   MAGNESIUM mg/dL 2.2     Results from last 7 days   Lab Units 12/12/20  0058   INR  1.02   APTT seconds 26.4               Meds:   SCHEDULE  apixaban, 2.5 mg, Oral, Q12H  atorvastatin, 10 mg, Oral, Daily  colesevelam, 1,250 mg, Oral, BID With Meals  fludrocortisone, 100 mcg, Oral, Once  midodrine, 2.5 mg, Oral, TID AC  nystatin, , Topical, Q8H  QUEtiapine, 25 mg, Oral, Nightly  saccharomyces boulardii, 500 mg, Oral, BID  sodium chloride, 3 mL, Intravenous, Q12H  sodium chloride, 1 g, Oral, TID With Meals  vancomycin, 250 mg, Oral, Q6H      Infusions  DOPamine, 2-20 mcg/kg/min, Last Rate: Stopped (12/16/20 1523)      PRNs  •  acetaminophen **OR** acetaminophen **OR** acetaminophen  •  magic butt paste  •  magnesium sulfate **OR** magnesium sulfate **OR** magnesium sulfate  •  nitroglycerin  •  ondansetron **OR** ondansetron  •  sodium chloride  •  sodium chloride    Physical Exam:  Physical Exam  Vitals signs reviewed.   Pulmonary:      Effort: Pulmonary effort is normal.      Breath sounds: Rhonchi present.   Skin:     General: Skin is warm and dry.   Neurological:      Mental Status: She is alert.         ROS  Review of Systems   Constitutional: Positive for activity change and fatigue.

## 2020-12-18 NOTE — THERAPY TREATMENT NOTE
Patient Name: Olga Curtis  : 1935    MRN: [id number]                              Today's Date: 2020       Admit Date: 2020    Visit Dx:     ICD-10-CM ICD-9-CM   1. Chronic diastolic congestive heart failure (CMS/HCC)  I50.32 428.32     428.0   2. Syncope and collapse  R55 780.2   3. Acute UTI  N39.0 599.0   4. Diarrhea, unspecified type  R19.7 787.91     Patient Active Problem List   Diagnosis   • Atrial premature complex   • Chronic coronary artery disease   • Dyslipidemia   • Essential hypertension   • Nonsustained ventricular tachycardia (CMS/HCC)   • Presence of cardiac pacemaker   • Shortness of breath   • Sick sinus syndrome (CMS/HCC)   • Allergic reaction   • Tachy-martin syndrome (CMS/HCC)   • Dyspnea   • Paroxysmal atrial fibrillation (CMS/HCC)   • Nonrheumatic aortic valve stenosis   • Chronic diastolic congestive heart failure (CMS/HCC)   • S/P TAVR (transcatheter aortic valve replacement)   • Primary localized osteoarthrosis of left shoulder region   • DJD of left shoulder   • Severe malnutrition (CMS/HCC)   • Syncope and collapse   • Diarrhea   • Acute renal failure (ARF) (CMS/[facility])   • Leukocytosis   • C. 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  Cannot have sticks in arm, hand only   • Shoulder pain, left      Past Surgical History:   Procedure Laterality Date   • AORTIC VALVE REPAIR/REPLACEMENT N/A 2020    Procedure: TTE TRANSFEMORAL TRANSCATHETER AORTIC VALVE REPLACEMENT PERCUTANEOUS APPROACH;   Surgeon: J Carlos Leon MD;  Location: [facility] OR 18/19;  Service: Cardiothoracic;  Laterality: N/A;   • AORTIC VALVE REPAIR/REPLACEMENT N/A 5/26/2020    Procedure: Transfemoral Transcatheter Aortic Valve Replacement w/Intra Op TTE and Possible Open Rescue Surgery;  Surgeon: Vasiliy Bruce MD;  Location: [facility] OR 18/19;  Service: Cardiovascular;  Laterality: N/A;   • APPENDECTOMY     • CARDIAC CATHETERIZATION N/A 3/2/2020    Procedure: Left Heart Cath and coronary angiogram;  Surgeon: Wyatt Kwok MD;  Location: [facility] CATH INVASIVE LOCATION;  Service: Cardiovascular;  Laterality: N/A;   • CARDIAC CATHETERIZATION N/A 3/2/2020    Procedure: Saphenous Vein Graft;  Surgeon: Wyatt Kwok MD;  Location: [facility] CATH INVASIVE LOCATION;  Service: Cardiovascular;  Laterality: N/A;   • CARDIAC PACEMAKER PLACEMENT  2017   • CARDIAC SURGERY  1999    Bypass surgery   • EYE SURGERY Bilateral     cat ext   • HERNIA REPAIR     • TOTAL SHOULDER ARTHROPLASTY W/ DISTAL CLAVICLE EXCISION Left 10/6/2020    Procedure: TOTAL SHOULDER REVERSE ARTHROPLASTY;  Surgeon: Oli Barcenas MD;  Location: [facility] OR;  Service: Orthopedics;  Laterality: Left;   • TOTAL SHOULDER REVISION  10/06/2020    left     General Information     Row Name 12/18/20 1454          Physical Therapy Time and Intention    Document Type  therapy note (daily note)  -     Mode of Treatment  physical therapy  -     Row Name 12/18/20 1454          General Information    Patient Profile Reviewed  yes  -     Existing Precautions/Restrictions  fall  -     Barriers to Rehab  cognitive status;medically complex  -     Row Name 12/18/20 1454          Cognition    Orientation Status (Cognition)  oriented to;person;time  -     Row Name 12/18/20 1457          Safety Issues, Functional Mobility    Safety Issues Affecting Function (Mobility)  insight into deficits/self-awareness;safety precaution awareness  -     Impairments  Affecting Function (Mobility)  balance;cognition;endurance/activity tolerance;strength;postural/trunk control  -HC       User Key  (r) = Recorded By, (t) = Taken By, (c) = Cosigned By    Initials Name Provider Type     Arline Silvestre, PT Physical Therapist        Mobility     Row Name 12/18/20 1455          Bed Mobility    Bed Mobility  -- up in chair  -HC     Row Name 12/18/20 1455          Sit-Stand Transfer    Sit-Stand [place] (Transfers)  moderate assist (50% patient effort)  -HC     Row Name 12/18/20 1455          Gait/Stairs (Locomotion)    [place] Level (Gait)  moderate assist (50% patient effort);maximum assist (25% patient effort)  -HC     Assistive Device (Gait)  -- HHA  -HC     Distance in Feet (Gait)  40 ft, 60 ft  -HC     Deviations/Abnormal Patterns (Gait)  gait speed decreased  -HC     Comment (Gait/Stairs)  decreased step length and impaired balance noted in lateral direction returning modA for postural control and HHA, high risk for falls, verbal cues for redirection  -HC       User Key  (r) = Recorded By, (t) = Taken By, (c) = Cosigned By    Initials Name Provider Type     Arline Silvestre, PT Physical Therapist        Obj/Interventions     Row Name 12/18/20 1456          Balance    Balance Assessment  sitting static balance;sitting dynamic balance;standing static balance;standing dynamic balance  -     Static Sitting Balance  WFL  -HC     Dynamic Sitting Balance  WFL  -HC     Static Standing Balance  moderate impairment  -     Dynamic Standing Balance  moderate impairment;severe impairment  -       User Key  (r) = Recorded By, (t) = Taken By, (c) = Cosigned By    Initials Name Provider Type     Arline Silvestre, PT Physical Therapist        Goals/Plan     Row Name 12/18/20 1458          Transfer Goal 1 (PT)    Progress/Outcome (Transfer Goal 1, PT)  progress slower than expected  -     Row Name 12/18/20 1458          Gait Training Goal 1 (PT)    Progress/Outcome (Gait  Training Goal 1, PT)  continuing progress toward goal  -HC       User Key  (r) = Recorded By, (t) = Taken By, (c) = Cosigned By    Initials Name Provider Type    Arline Beltre PT Physical Therapist        Clinical Impression     Row Name 12/18/20 1458          Pain    Additional Documentation  Pain Scale: Numbers Pre/Post-Treatment (Group)  -HC     Row Name 12/18/20 1458          Pain Scale: Numbers Pre/Post-Treatment    Pretreatment Pain Rating  0/10 - no pain  -HC     Posttreatment Pain Rating  0/10 - no pain  -HC     Row Name 12/18/20 1458          Therapy Assessment/Plan (PT)    Rehab Potential (PT)  good, to achieve stated therapy goals  -HC     Criteria for Skilled Interventions Met (PT)  yes  -HC       User Key  (r) = Recorded By, (t) = Taken By, (c) = Cosigned By    Initials Name Provider Type    Arline Beltre, PT Physical Therapist        Outcome Measures     Row Name 12/18/20 1458          How much help from another person do you currently need...    Turning from your back to your side while in flat bed without using bedrails?  3  -HC     Moving from lying on back to sitting on the side of a flat bed without bedrails?  3  -HC     Moving to and from a bed to a chair (including a wheelchair)?  2  -HC     Standing up from a chair using your arms (e.g., wheelchair, bedside chair)?  2  -HC     Climbing 3-5 steps with a railing?  2  -HC     To walk in hospital room?  2  -HC     AM-PAC 6 Clicks Score (PT)  14  -HC     Row Name 12/18/20 1452          Functional Assessment    Outcome Measure Options  AM-PAC 6 Clicks Basic Mobility (PT)  -HC       User Key  (r) = Recorded By, (t) = Taken By, (c) = Cosigned By    Initials Name Provider Type    Arline Beltre, PT Physical Therapist        Physical Therapy Education                 Title: PT OT SLP Therapies (In Progress)     Topic: Physical Therapy (In Progress)     Point: Mobility training (In Progress)     Learning Progress Summary            Patient Acceptance, E, NR by  at 12/18/2020 1459    Acceptance, E,TB, NR by  at 12/17/2020 1532    Acceptance, E,TB, VU by  at 12/12/2020 1659                               User Key     Initials Effective Dates Name Provider Type Discipline     04/17/20 -  Arline Silvestre, PT Physical Therapist PT     03/01/19 -  Kaykay Rios, PT Physical Therapist PT     06/23/20 -  Dane Velazquez, PT Physical Therapist PT              PT Recommendation and Plan     Plan of Care Reviewed With: patient  Outcome Summary: Pt presents pleasantly confused and wanting to ambulate.  Pt requiring modA for transfers and modA-maxA for ambulation with HHA.  Daughter present to assist with IV pole mgmt.  Pt with one LOB during ambulation requiring maxA to maintain upright standing.  Pt able to ambulate 40 ft and 60 ft this date with some SOA noted, however O2 measured 96% on room air.  Continue plan for IP rehab.  PPE donned: mask, goggles, gloves.     Time Calculation:   PT Charges     Row Name 12/18/20 1508             Time Calculation    Start Time  1357  -      Stop Time  1423  -      Time Calculation (min)  26 min  -      PT Received On  12/18/20  -      PT - Next Appointment  12/21/20  -         Time Calculation- PT    Total Timed Code Minutes- PT  26 minute(s)  -        User Key  (r) = Recorded By, (t) = Taken By, (c) = Cosigned By    Initials Name Provider Type     Arline Silvestre, PT Physical Therapist        Therapy Charges for Today     Code Description Service Date Service Provider Modifiers Qty    [id number]  GAIT TRAINING EA 15 MIN 12/18/2020 Arline Silvestre, PT GP 1    [id number]  PT THERAPEUTIC ACT EA 15 MIN 12/18/2020 Arline Silvestre, PT GP 1          PT G-Codes  Outcome Measure Options: AM-PAC 6 Clicks Basic Mobility (PT)  AM-PAC 6 Clicks Score (PT): 14    Arline Silvestre PT  12/18/2020

## 2020-12-18 NOTE — PLAN OF CARE
Pt still confuse will continue to monitor  Problem: Fall Injury Risk  Goal: Absence of Fall and Fall-Related Injury  Intervention: Promote Injury-Free Environment  Recent Flowsheet Documentation  Taken 12/18/2020 1532 by lAlen Bragg RN  Safety Promotion/Fall Prevention: safety round/check completed  Taken 12/18/2020 1313 by Allen Bragg RN  Safety Promotion/Fall Prevention: safety round/check completed  Taken 12/18/2020 1307 by Allen Bragg RN  Safety Promotion/Fall Prevention: safety round/check completed  Taken 12/18/2020 1121 by Allen Bragg, RN  Safety Promotion/Fall Prevention: safety round/check completed  Taken 12/18/2020 0911 by Allen Bargg RN  Safety Promotion/Fall Prevention: safety round/check completed  Taken 12/18/2020 0721 by Allen Bragg RN  Safety Promotion/Fall Prevention: safety round/check completed     Problem: Adult Inpatient Plan of Care  Goal: Absence of Hospital-Acquired Illness or Injury  Intervention: Identify and Manage Fall Risk  Recent Flowsheet Documentation  Taken 12/18/2020 1532 by Allen Bragg RN  Safety Promotion/Fall Prevention: safety round/check completed  Taken 12/18/2020 1313 by Allen Bragg RN  Safety Promotion/Fall Prevention: safety round/check completed  Taken 12/18/2020 1307 by Allen Bragg RN  Safety Promotion/Fall Prevention: safety round/check completed  Taken 12/18/2020 1121 by Allen Bragg RN  Safety Promotion/Fall Prevention: safety round/check completed  Taken 12/18/2020 0911 by Allen Bragg, RN  Safety Promotion/Fall Prevention: safety round/check completed  Taken 12/18/2020 0721 by Allen Bragg RN  Safety Promotion/Fall Prevention: safety round/check completed  Intervention: Prevent Infection  Recent Flowsheet Documentation  Taken 12/18/2020 0721 by Allen Bragg, RN  Infection Prevention: hand hygiene promoted   Goal Outcome Evaluation:  Plan of Care Reviewed With: patient  Progress: no  change

## 2020-12-18 NOTE — PROGRESS NOTES
Daily Progress Note    C. difficile diarrhea    Chronic coronary artery disease    Essential hypertension    Presence of cardiac pacemaker    Shortness of breath    Sick sinus syndrome (CMS/HCC)    Tachy-martin syndrome (CMS/HCC)    Chronic diastolic congestive heart failure (CMS/HCC)    Syncope and collapse    Acute renal failure (ARF) (CMS/HCC)    Leukocytosis    Assessment:     CT scan suggestive of pulmonary edema cannot rule out underlying interstitial lung disease   Presentation compatible with cardiac decompensation   Dopamine off  Hypotension  And  Hyponatremia and renal insufficiency suggestive of low cardiac output most likely related to valvular heart disease  TAVR valve present      no evidence of pulmonary embolism on CT scan     COVID 19 and respiratory panel negative 12/12/20      C diff colitis     Recommendations:  Currently on room air  Off steroids  Anticoagulation eliquis  Cardiology and nephrology following   p.o.  vancomycin for C diff colitis        LOS: 5 days       Subjective         Objective     Vital signs for last 24 hours:  Vitals:    12/18/20 0203 12/18/20 0540 12/18/20 1034 12/18/20 1515   BP: 143/55 136/44 125/46 133/52   BP Location: Left arm Left arm Left arm Left arm   Patient Position: Lying Lying Sitting Sitting   Pulse: 70 70 77    Resp: 28 24 16 18   Temp: 97.5 °F (36.4 °C) 97.4 °F (36.3 °C) 97.6 °F (36.4 °C) 96.3 °F (35.7 °C)   TempSrc: Axillary Axillary Oral Axillary   SpO2:  100%  96%   Weight:  54.9 kg (121 lb 0.5 oz)     Height:           Intake/Output last 3 shifts:  I/O last 3 completed shifts:  In: 120 [P.O.:120]  Out: 800 [Urine:800]  Intake/Output this shift:  I/O this shift:  In: 240 [P.O.:240]  Out: -       Radiology  Imaging Results (Last 24 Hours)     ** No results found for the last 24 hours. **          Labs:  Results from last 7 days   Lab Units 12/18/20  0233   WBC 10*3/mm3 20.00*   HEMOGLOBIN g/dL 9.6*   HEMATOCRIT % 29.6*   PLATELETS 10*3/mm3 410     Results  from last 7 days   Lab Units 12/18/20  0233  12/17/20  0254   SODIUM mmol/L 129*   < > 125*   POTASSIUM mmol/L 4.4   < > 5.0   CHLORIDE mmol/L 93*   < > 92*   CO2 mmol/L 28.0   < > 23.0   BUN mg/dL 39*   < > 36*   CREATININE mg/dL 1.03*   < > 1.22*   CALCIUM mg/dL 8.9   < > 8.8   BILIRUBIN mg/dL  --   --  0.3   ALK PHOS U/L  --   --  198*   ALT (SGPT) U/L  --   --  11   AST (SGOT) U/L  --   --  23   GLUCOSE mg/dL 88   < > 142*    < > = values in this interval not displayed.         Results from last 7 days   Lab Units 12/17/20  0254 12/15/20  0656 12/14/20  0652   ALBUMIN g/dL 2.90* 2.60* 2.40*     Results from last 7 days   Lab Units 12/12/20  0058   TROPONIN T ng/mL 0.019         Results from last 7 days   Lab Units 12/17/20  0254   MAGNESIUM mg/dL 2.2     Results from last 7 days   Lab Units 12/12/20  0058   INR  1.02   APTT seconds 26.4               Meds:   SCHEDULE  apixaban, 2.5 mg, Oral, Q12H  atorvastatin, 10 mg, Oral, Daily  colesevelam, 1,250 mg, Oral, BID With Meals  midodrine, 2.5 mg, Oral, TID AC  nystatin, , Topical, Q8H  QUEtiapine, 25 mg, Oral, Nightly  saccharomyces boulardii, 500 mg, Oral, BID  sodium chloride, 3 mL, Intravenous, Q12H  sodium chloride, 1 g, Oral, TID With Meals  vancomycin, 250 mg, Oral, Q6H      Infusions  DOPamine, 2-20 mcg/kg/min, Last Rate: Stopped (12/16/20 1523)  sodium chloride, 50 mL/hr, Last Rate: 50 mL/hr (12/18/20 1040)      PRNs  •  acetaminophen **OR** acetaminophen **OR** acetaminophen  •  magic butt paste  •  magnesium sulfate **OR** magnesium sulfate **OR** magnesium sulfate  •  nitroglycerin  •  ondansetron **OR** ondansetron  •  sodium chloride  •  sodium chloride    Physical Exam:  Physical Exam  Vitals signs reviewed.   Pulmonary:      Effort: Pulmonary effort is normal.      Breath sounds: Rhonchi present.   Skin:     General: Skin is warm and dry.   Neurological:      Mental Status: She is alert.         ROS  Review of Systems   Constitutional: Positive for  activity change and fatigue.

## 2020-12-18 NOTE — PROGRESS NOTES
"      [facility] Medicine Services Daily Progress Note          Hospitalist Team  LOS 5 days      Patient Care Team:  Tigre Duran MD as PCP - General  Tigre Duran MD as PCP - Family Medicine  Wyatt Kwok MD as Consulting Physician (Cardiology)    Patient Location: 2109/1      Subjective   Subjective     Chief Complaint / Subjective  Chief Complaint   Patient presents with   • Hypotension     nausea.         Brief Synopsis of Hospital Course/HPI  85 year old female with PMH of CAD s/p CABG 1999, severe aortic stenosis s/p TAVR in May, SSS with cardiac pacemaker in situ, HTN, PAF, DJD post shoulder surgery, non sustained V tach, HLDpresents with complaints of progressive weakness and dyspnea. She is awake and alert and appropriate but lethargic. She denies chest pain or syncope but per ED report EMS reports she had a \"spell\" en route  with hypotension . In ED no hypotension or arrythmmia seen . Per ED report pacemaker interrogated with no unusual results. She is Covid-19 negative. Review of records shows she was just admitted here 11/30/20 to 12/6/20 for similar complaints and seen by both neurology and cardiology. Per notes it was felt to be metabolic encephalopathy from possible UTI and she was discharged on Cefdinir. She reports diarrhea past 2 days without hematochezia or melena or abdominal pain. C-Diff culture ordered and pending. UA shows no bacteria but too numerous to count wbc . She was started on IV Ceftriaxone in ED with urine and blood culture pending. D-Dimer was elevated. CT chest per radiology today:  \"IMPRESSION:  1. No pulmonary embolism.  2. Reticular and ground glass densities diffusely may represent edema or pneumonia superimposed on chronic interstitial change.  3. Stable surgical changes and cardiomegaly.  4. Calcified left pleural plaque.\"     Labs show wbc 17, K 5.4, Na 128, Cr 1.24 ( was 0.64), BNP 4400 was 13,000 12/5/20, Hgb 10, procalcitonin 1.40.      She " reports she left here after last admission and went to rehab then was at [facility] and just discharged yesterday. No records of [facility] in EMR and records have been requested. She will be admitted for further evaluation and treatment .        Date: 12/13/2020: The patient reports just feeling generally ill and she has had difficulty staying warm today, but she has had no fever, sweats, nausea or vomiting.  No chest pain, cough or shortness of breath.  She reports her diarrhea is improving.    12/14/2020  Alerted by night shift PA that pt having hypotension, didn't respond to 750cc bolus, now on dopamine drip and her cardiologist has been consulted. Pt is lethargic, but responsive. She denies any chest pains or abdominal pains, she continues to have SOA and diarrhea.    12/15/2020  Patient seen and examined.  She is alert and awake but confused in general.  Discussion with patient's daughter yesterday leads me to believe she has undiagnosed dementia underlying her behaviors.  Per nurse to very small bowel motions so far today, diarrhea seems to be resolving.    12/16/2020  Pt seen and examined.  She had an episode of emesis x1 today and upon questioning, pt thinks she may have choked on some of it - will need to monitor for s/s of aspiration. No diarrhea today per nurse.    12/17.2020  Pt seen and examined. Pt's daughter reports the patient is much more confused and agitated than her normal baseline. And she gets like this when she has a repeat UTI. UA this afternoon was negative.    12/18/2020  Pt seen and examined. She has had loose stools again today per d/w nurse.    Review of Systems   Unable to perform ROS: dementia         Objective   Objective      Vital Signs  Temp:  [96.3 °F (35.7 °C)-97.8 °F (36.6 °C)] 96.3 °F (35.7 °C)  Heart Rate:  [70-77] 77  Resp:  [16-28] 18  BP: (125-147)/(40-55) 133/52  Oxygen Therapy  SpO2: 96 %  Pulse Oximetry Type: Intermittent  Device (Oxygen Therapy): room air  Flow (L/min):  "2  Flowsheet Rows      First Filed Value   Admission Height  167.6 cm (66\") Documented at 12/11/2020 2338   Admission Weight  49.9 kg (110 lb) Documented at 12/11/2020 2338        Intake & Output (last 3 days)       12/15 0701 - 12/16 0700 12/16 0701 - 12/17 0700 12/17 0701 - 12/18 0700 12/18 0701 - 12/19 0700    P.O. 480 840  240    Total Intake(mL/kg) 480 (9) 840 (15.3)  240 (4.4)    Urine (mL/kg/hr) 300 (0.2) 500 (0.4) 700 (0.5)     Emesis/NG output  0      Stool  0 0     Total Output 300 500 700     Net +180 +340 -700 +240            Urine Unmeasured Occurrence 1 x  1 x     Stool Unmeasured Occurrence 4 x 4 x 1 x 3 x    Emesis Unmeasured Occurrence  1 x          Lines, Drains & Airways    Active LDAs     Name:   Placement date:   Placement time:   Site:   Days:    Peripheral IV 12/12/20 0401 Left Forearm   12/12/20 0401    Forearm   2    Peripheral IV 12/12/20 0401 Right Forearm   12/12/20 0401    Forearm   2                  Physical Exam:  General: well-developed, frail and cachectic NAD  HEENT: NC/AT, EOMI, PERRLA, +JVD  Heart: RRR. + murmur   Chest: Bilateral rales throughout, no wheezing or rhonchi, respiratory effort mildly labored  Abdominal: Soft. NT/ND. Bowel sounds present.  Musculoskeletal: Normal ROM.  No edema. No calf tenderness.  Neurological: Alert and awake, confused, no focal deficits  Skin: Skin is warm and dry. No rash.  Scattered ecchymosis  Psychiatric: Confused.       Wounds (last 24 hours)      LDA Wound     Row Name 12/18/20 1121 12/18/20 0721 12/17/20 2030       Wound 12/12/20 0509 medial coccyx Pressure Injury    Wound - Properties Group Placement Date: 12/12/20  -DD Placement Time: 0509 -DD Orientation: medial  -DD Location: coccyx  -DD Primary Wound Type: Pressure inj  -DD Stage, Pressure Injury : Stage 1  -DD    Dressing Appearance  --  --  open to air  -KD    Base  non-blanchable;maroon/purple;moist  -RB  non-blanchable;maroon/purple;moist  -RB  " non-blanchable;maroon/purple;moist  -KD    Periwound  intact;dry;blanchable  -RB  intact;dry;blanchable  -RB  intact;dry;blanchable  -KD    Drainage Amount  none  -RB  none  -RB  none  -KD    Care, Wound  --  --  barrier applied  -KD    Periwound Care  --  --  dry periwound area maintained  -KD    Retired Wound - Properties Group Date first assessed: 12/12/20 -DD Time first assessed: 0509 -DD Location: coccyx  -DD Primary Wound Type: Pressure inj  -DD    Row Name 12/17/20 1600             Wound 12/12/20 0509 medial coccyx Pressure Injury    Wound - Properties Group Placement Date: 12/12/20  -DD Placement Time: 0509 -DD Orientation: medial  -DD Location: coccyx  -DD Primary Wound Type: Pressure inj  -DD Stage, Pressure Injury : Stage 1  -DD    Base  non-blanchable;maroon/purple  -SH      Retired Wound - Properties Group Date first assessed: 12/12/20  -DD Time first assessed: 0509 -DD Location: coccyx  -DD Primary Wound Type: Pressure inj  -DD      User Key  (r) = Recorded By, (t) = Taken By, (c) = Cosigned By    Initials Name Provider Type    Toma Espinoza RN Registered Nurse    Poly Collins RN Registered Nurse    Thania Mujica LPN Licensed Nurse    Allen Johnson RN Registered Nurse          Procedures:           Results Review:     I reviewed the patient's new clinical results.    Results from last 7 days   Lab Units 12/18/20  0233 12/17/20  1158 12/16/20  0723 12/15/20  0331 12/14/20  1045 12/13/20  0326 12/12/20  0539   WBC 10*3/mm3 20.00* 31.30* 28.50* 25.40* 27.00* 16.30* 16.40*   HEMOGLOBIN g/dL 9.6* 10.0* 10.6* 11.7* 11.1* 9.6* 9.7*   HEMATOCRIT % 29.6* 31.8* 33.8* 36.4 34.5 29.4* 30.5*   PLATELETS 10*3/mm3 410 454* 383 227 364 284 268     Results from last 7 days   Lab Units 12/18/20  0233 12/17/20  1158 12/17/20  0254 12/16/20  0723 12/15/20  0656 12/14/20  1830 12/14/20  0652  12/12/20  0058   SODIUM mmol/L 129* 128* 125* 126* 128* 132* 130*   < > 128*   POTASSIUM mmol/L  4.4 5.2 5.0 4.4 4.1 4.3 4.9   < > 5.4*   CHLORIDE mmol/L 93* 92* 92* 90* 91* 93* 95*   < > 91*   CO2 mmol/L 28.0 29.0 23.0 26.0 27.0 30.0* 24.0   < > 31.0*   BUN mg/dL 39* 35* 36* 28* 22 25* 22   < > 23   CREATININE mg/dL 1.03* 1.17* 1.22* 1.07* 1.20* 1.51* 1.47*   < > 1.24*   GLUCOSE mg/dL 88 99 142* 157* 73 75 103*   < > 100*   ALBUMIN g/dL  --   --  2.90*  --  2.60*  --  2.40*  --  3.30*   BILIRUBIN mg/dL  --   --  0.3  --  0.4  --  0.4  --  0.6   ALK PHOS U/L  --   --  198*  --  142*  --  86  --  90   AST (SGOT) U/L  --   --  23  --  26  --  25  --  22   ALT (SGPT) U/L  --   --  11  --  12  --  10  --  13   CALCIUM mg/dL 8.9 9.0 8.8 8.6 8.2* 8.4* 8.3*   < > 9.0    < > = values in this interval not displayed.     Cr Clearance Estimated Creatinine Clearance: 35.2 mL/min (A) (by C-G formula based on SCr of 1.03 mg/dL (H)).    Coag   Results from last 7 days   Lab Units 12/12/20  0058   INR  1.02   APTT seconds 26.4       HbA1C   Lab Results   Component Value Date    HGBA1C 5.5 09/30/2020    HGBA1C 5.90 (H) 05/21/2020     Blood Glucose       Troponin   Results from last 7 days   Lab Units 12/15/20  0407 12/14/20  0652 12/12/20 0058   TROPONIN T ng/mL  --   --  0.019   PROBNP pg/mL 14,913.0* 32,130.0* 4,443.0*     Lipids    Lab Results   Component Value Date    CHOL 127 08/05/2017    TRIG 90 08/05/2017    HDL 49 08/05/2017    LDL 62 08/05/2017       UA    Results from last 7 days   Lab Units 12/17/20  1553 12/12/20  0134   NITRITE UA  Negative Negative   WBC UA /HPF  --  Too Numerous to Count*   BACTERIA UA /HPF  --  None Seen   SQUAM EPITHEL UA /HPF  --  3-6*   URINECX   --  Yeast isolated*       Microbiology   Results from last 7 days   Lab Units 12/12/20  0134 12/12/20  0058   BLOODCX   --  No growth at 5 days  No growth at 5 days   URINECX  Yeast isolated*  --        ABG        EKG  ECG 12 Lead   Preliminary Result   HEART RATE= 115  bpm   RR Interval= 520  ms   DC Interval= 118  ms   P Horizontal Axis= -34   deg   P Front Axis= 68  deg   QRSD Interval= 127  ms   QT Interval= 356  ms   QRS Axis= 205  deg   T Wave Axis= 29  deg   - ABNORMAL ECG -   Atrial-sensed ventricular-paced rhythm   Electronically Signed By:    Date and Time of Study: 2020-12-15 06:11:30      ECG 12 Lead   Final Result   HEART RATE= 82  bpm   RR Interval= 731  ms   VA Interval= 122  ms   P Horizontal Axis= 12  deg   P Front Axis= -29  deg   QRSD Interval= 131  ms   QT Interval= 419  ms   QRS Axis= 212  deg   T Wave Axis= 31  deg   - ABNORMAL ECG -   Atrial-sensed ventricular-paced rhythm   When compared with ECG of 30-Nov-2020 13:22:06,   No significant change   Electronically Signed By: Lawrence iVera (Tre) 13-Dec-2020 09:25:09   Date and Time of Study: 2020-12-12 03:22:32          Imaging:  Xr Chest 1 View    Result Date: 12/15/2020  1. Improved bilateral interstitial opacities likely related to resolving infection or edema. 2. Persistent left basilar airspace opacity.  Electronically Signed By-Hill Askew MD On:12/15/2020 9:04 AM This report was finalized on [id number] by  Hill Askew MD.    Xr Chest 1 View    Result Date: 12/12/2020  1.Bilateral airspace opacities appear slightly decreased. 2.Small pleural effusions also appears slightly decreased. 3.Cardiomegaly.  Electronically Signed By-Iisdro Jackson MD On:12/12/2020 8:57 AM This report was finalized on [id number] by  Isidro Jackson MD.    Ct Chest Pulmonary Embolism    Result Date: 12/12/2020  1. No pulmonary embolism. 2. Reticular and ground glass densities diffusely may represent edema or pneumonia superimposed on chronic interstitial change. 3. Stable surgical changes and cardiomegaly. 4. Calcified left pleural plaque. Electronically signed by:  Isidro Staley M.D.  12/12/2020 1:11 AM    Xr Abdomen Kub    Result Date: 12/14/2020  Mild gaseous distention of small bowel loops is nonspecific and may reflect changes of ileus. No convincing evidence of high-grade small bowel  obstruction this time.  Electronically Signed By-Tonja Chauhan MD On:12/14/2020 9:12 AM This report was finalized on [id number] by  Tonja Chauhan MD.    Results for orders placed during the hospital encounter of 11/30/20   Adult Transthoracic Echo Complete W/ Cont if Necessary Per Protocol    Narrative · Left ventricular ejection fraction appears to be 56 - 60%.  · Left ventricular wall thickness is consistent with moderate concentric   hypertrophy.  · There is a TAVR valve present.  · Moderate tricuspid valve regurgitation is present.  · Left ventricular diastolic function is consistent with (grade I)   impaired relaxation.  · No pericardial effusion noted        Results for orders placed during the hospital encounter of 02/28/20   Duplex Carotid Ultrasound CAR    Narrative · Proximal right internal carotid artery moderate stenosis.  · Proximal left internal carotid artery mild stenosis.          Xrays, labs reviewed personally by physician.    Medication Review:   I have reviewed the patient's current medication list      Scheduled Meds  apixaban, 2.5 mg, Oral, Q12H  atorvastatin, 10 mg, Oral, Daily  colesevelam, 1,250 mg, Oral, BID With Meals  midodrine, 2.5 mg, Oral, TID AC  nystatin, , Topical, Q8H  QUEtiapine, 25 mg, Oral, Nightly  saccharomyces boulardii, 500 mg, Oral, BID  sodium chloride, 3 mL, Intravenous, Q12H  sodium chloride, 1 g, Oral, TID With Meals  vancomycin, 250 mg, Oral, Q6H        Meds Infusions  DOPamine, 2-20 mcg/kg/min, Last Rate: Stopped (12/16/20 1523)  sodium chloride, 50 mL/hr, Last Rate: 50 mL/hr (12/18/20 1040)        Meds PRN  •  acetaminophen **OR** acetaminophen **OR** acetaminophen  •  magic butt paste  •  magnesium sulfate **OR** magnesium sulfate **OR** magnesium sulfate  •  nitroglycerin  •  ondansetron **OR** ondansetron  •  sodium chloride  •  sodium chloride      Assessment/Plan   Assessment/Plan     Active Hospital Problems    Diagnosis  POA   • **C. 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       MEDICAL DECISION MAKING COMPLEXITY BY PROBLEM:     C. difficile associated diarrhea  -Multiple rounds of antibiotics recent UTI  -Current UA does not indicate any presence of UTI  -UA shows yeast, this is contamination from the skin, discontinue fluconazole     -Add nystatin powder to perineum  -Increase oral vancomycin to 250 mg every 6h    -Diarrhea decreasing significantly with higher dose oral vancomycin, continue same    -Vanco enema may not be needed given her improvement.  -Increased dosing of Florastor and WelChol for optimization  -Magic Butt cream to be applied as needed    Leukocytosis  -Due to C. difficile associated diarrhea  -No signs or symptoms of sepsis at this time  -WBC increasing since patient has been started on steroids    --steroids d/c'd with pulmonology pemission  -Continue to monitor CBC and WBC count    Altered mental status  -- likely multifactorial  --UA ruled out UTI  --possible due to ongoing diarrhea, meds, dehydratio, etc.  --Psychiatry input appreciated     -- delirium due to medical condition     -- might have underlying dementia     -- continue current dose of seroquel     -- may use additional seroquel PRN for agitation    Hypotension  Syncope and collapse  -s/p 750 cc bolus earlier this morning  -Chronic low blood pressure due to diastolic CHF  -Midodrine was given only as single dose in past  -Dopamine drip continues  -Continue to monitor BP  and vitals closely  -Cardiology following     --Appreciate input  --12/16/20 scheduled low dose Midodrine, weaned off Dopamine     --d/w Nephrology, agreed with tx as it will maintain her BP much better    Dyspnea  -Due to the above  -Fluid overloaded BNP greater than 32,000  -Will need to gently diuresis at some point if blood pressure allows  -Continuous pulse oximetry  -O2 supplementation to keep sats > 93%  -Pulmonology consulted overnight and following  -Solu-Medrol discontinued per discussion with Pulmonology    Acute renal failure  -Creatinine 1.24 on admission, previously 0.6 on last admission  -Likely due to dehydration from diarrhea  -Gradually improving, currently 0.83  -Will need to cautiously diurese as noted above  --Nephrology following     --Appreciate input    Chronic diastolic congestive heart failure  Aortic stenosis  Sick sinus syndrome/tachybradycardia syndrome  Presence of cardiac pacemaker  Chronic coronary artery disease  -Last echo on 12/1/2020 reviewed and noted above  -Continue lisinopril, metoprolol, atorvastatin  -Furosemide being held currently  -Cardiology to see    Essential hypertension  -Continue lisinopril and metoprolol, midodrine added for hypotension  -Continue to monitor BP and vitals    Medial coccyx stage I pressure injury  -Mepilex dressing  -Turn every 2 hours      VTE Prophylaxis  Mechanical Order History:      Ordered        12/12/20 0548  Place Sequential Compression Device  Once         12/12/20 0548  Maintain Sequential Compression Device  Continuous         12/12/20 0435  Place Sequential Compression Device  Once         12/12/20 0435  Maintain Sequential Compression Device  Continuous                 Pharmalogical Order History:      Ordered     Dose Route Frequency Stop    12/12/20 1302  apixaban (ELIQUIS) tablet 2.5 mg      2.5 mg PO Every 12 Hours --                Code Status  Code Status and Medical Interventions:   Ordered at: 12/14/20 1242     Limited Support  to NOT Include:    Intubation     Level Of Support Discussed With:    Health Care Surrogate     Code Status:    No CPR     Medical Interventions (Level of Support Prior to Arrest):    Limited     Comments:    DNR/DNI       This patient has been examined wearing appropriate Personal Protective Equipment. 12/18/20      Discharge Planning    Pending clinical course.      Electronically signed by Balbina Sy MD, 12/18/20, 15:37 EST.    [facility]

## 2020-12-18 NOTE — PLAN OF CARE
Goal Outcome Evaluation:  Plan of Care Reviewed With: patient  Progress: no change  Outcome Summary: Pt presents pleasantly confused and wanting to ambulate.  Pt requiring modA for transfers and modA-maxA for ambulation with HHA.  Daughter present to assist with IV pole mgmt.  Pt with one LOB during ambulation requiring maxA to maintain upright standing.  Pt able to ambulate 40 ft and 60 ft this date with some SOA noted, however O2 measured 96% on room air.  Continue plan for IP rehab.  PPE donned: mask, goggles, gloves., gown.

## 2020-12-18 NOTE — CONSULTS
"  Referring Provider: DR Sy   Reason for Consultation: confusion       Chief complaint \"I just fell off the ladder , leave me alone\"     Subjective .     History of present illness:  The patient is a 85 y.o. female who was admitted secondary to progressive weakness and dyspnea . PMH: CAD s/p CABG 1999, severe aortic stenosis s/p TAVR in May, SSS with cardiac pacemaker in situ, HTN, PAF, DJD post shoulder surgery, non sustained V tach.  Psychiatric consult was requested by Dr. Sy secondary to confusion.  The patient herself is very limited historian, she was very reluctant to talk, she was trying to eat breakfast, the patient had significant delay in responses, she was not oriented to situation, but she knew her age, did not know today's date.  The patient appears to be responding to internal stimuli, she believed it was a dog in the room.  The patient stated she did not have children, however she does have a daughter who is very involved in her care.  Patient did not answer questions about her mood, did not express any threats.  Past psychiatric history: Unremarkable      Review of Systems   Review of systems could not be obtained due to   patient confusion.    History    Past Medical History:   Diagnosis Date   • Aortic stenosis 10/28/2014   • Arthritis    • Atrial premature complex 12/18/2015   • CHF (congestive heart failure) (CMS/HCC)    • Chronic coronary artery disease 10/28/2014   • Dyslipidemia 10/28/2014   • Elevated cholesterol    • History of transfusion    • Hyperlipidemia    • Hypertension 10/28/2014   • Nausea 09/2020   • Nonsustained ventricular tachycardia (CMS/HCC) 12/18/2015   • Partial paralysis of right hand (CMS/HCC)     states right arm only.  Cannot have sticks in arm, hand only   • Shoulder pain, left           Family History   Problem Relation Age of Onset   • No Known Problems Mother    • No Known Problems Father    • No Known Problems Sister    • No Known Problems Brother    • " Malig Hyperthermia Neg Hx         Social History     Tobacco Use   • Smoking status: Never Smoker   • Smokeless tobacco: Never Used   • Tobacco comment: CAFFEINE USE: 1 CUP COFFEE DAILY   Substance Use Topics   • Alcohol use: No     Frequency: Never   • Drug use: No          Medications Prior to Admission   Medication Sig Dispense Refill Last Dose   • acebutolol (SECTRAL) 200 MG capsule TAKE ONE CAPSULE BY MOUTH TWICE A  capsule 2    • [] cefdinir (OMNICEF) 300 MG capsule Take 1 capsule by mouth 2 (Two) Times a Day for 5 days. 10 capsule 0    • ELIQUIS 2.5 MG tablet tablet TAKE ONE TABLET BY MOUTH EVERY 12 HOURS 180 tablet 3    • furosemide (LASIX) 20 MG tablet Take 20 mg by mouth.      • lisinopril (PRINIVIL,ZESTRIL) 20 MG tablet TAKE ONE TABLET BY MOUTH DAILY 90 tablet 1    • nystatin (MYCOSTATIN) 316439 UNIT/ML suspension Swish and swallow 500,000 Units.      • pravastatin (PRAVACHOL) 20 MG tablet TAKE ONE TABLET BY MOUTH DAILY 90 tablet 2    • QUEtiapine (SEROquel) 25 MG tablet Take 25 mg by mouth.           Scheduled Meds:  apixaban, 2.5 mg, Oral, Q12H  atorvastatin, 10 mg, Oral, Daily  colesevelam, 1,250 mg, Oral, BID With Meals  midodrine, 2.5 mg, Oral, TID AC  nystatin, , Topical, Q8H  QUEtiapine, 25 mg, Oral, Nightly  saccharomyces boulardii, 500 mg, Oral, BID  sodium chloride, 3 mL, Intravenous, Q12H  sodium chloride, 1 g, Oral, TID With Meals  vancomycin, 250 mg, Oral, Q6H         Continuous Infusions:  DOPamine, 2-20 mcg/kg/min, Last Rate: Stopped (20 1523)  sodium chloride, 75 mL/hr, Last Rate: 75 mL/hr (20 3855)        PRN Meds:  •  acetaminophen **OR** acetaminophen **OR** acetaminophen  •  magic butt paste  •  magnesium sulfate **OR** magnesium sulfate **OR** magnesium sulfate  •  nitroglycerin  •  ondansetron **OR** ondansetron  •  sodium chloride  •  sodium chloride      Allergies:  Sulfa antibiotics      Objective     Vital Signs   /44 (BP Location: Left arm,  "Patient Position: Lying)   Pulse 70   Temp 97.4 °F (36.3 °C) (Axillary)   Resp 24   Ht 167.6 cm (66\")   Wt 54.9 kg (121 lb 0.5 oz)   SpO2 100%   BMI 19.54 kg/m²     Physical Exam:     General Appearance:    In NAD    Head:    Normocephalic, without obvious abnormality, atraumatic                   Mental Status Exam:    Hygiene:   good  Cooperation:  limited   Eye Contact:  Poor  Psychomotor Behavior:  Slow  Affect:  Blunted  Hopelessness: Denies  Speech:  Monotone  Thought Progress:  Disorganized  Thought Content:  Bizarre  Suicidal:  None  Homicidal:  None  Hallucinations:  Visual  Delusion:  Unable to demonstrate  Memory:  Deficits  Orientation:  Person  Reliability:  poor  Insight:  Poor  Judgement:  Impaired  Impulse Control:  Fair  Physical/Medical Issues:  Yes      Lab Results   Component Value Date    GLUCOSE 88 12/18/2020    CALCIUM 8.9 12/18/2020     (L) 12/18/2020    K 4.4 12/18/2020    CO2 28.0 12/18/2020    CL 93 (L) 12/18/2020    BUN 39 (H) 12/18/2020    CREATININE 1.03 (H) 12/18/2020    EGFRIFNONA 51 (L) 12/18/2020    BCR 37.9 (H) 12/18/2020    ANIONGAP 8.0 12/18/2020       Last Urine Toxicity     LAST URINE TOXICITY RESULTS Latest Ref Rng & Units 12/16/2020    CREATININE UR mg/dL 52.7          No results found for: PHENYTOIN, PHENOBARB, VALPROATE, CBMZ    Lab Results   Component Value Date     (L) 12/18/2020    BUN 39 (H) 12/18/2020    CREATININE 1.03 (H) 12/18/2020    TSH 0.658 12/05/2020    WBC 20.00 (H) 12/18/2020       Brief Urine Lab Results  (Last result in the past 365 days)      Color   Clarity   Blood   Leuk Est   Nitrite   Protein   CREAT   Urine HCG        12/17/20 1553 Yellow Clear Negative Negative Negative Negative               Assessment/Plan       C. difficile diarrhea    Chronic coronary artery disease    Essential hypertension    Presence of cardiac pacemaker    Shortness of breath    Sick sinus syndrome (CMS/HCC)    Tachy-martin syndrome (CMS/HCC)    Chronic " diastolic congestive heart failure (CMS/HCC)    Syncope and collapse    Acute renal failure (ARF) (CMS/HCC)    Leukocytosis         Assessment: Delirium secondary to medical condition (TME) rule out dementia  Treatment Plan: The patient is confused, but not agitated, the patient was taking Seroquel prior to admission, continue on current dose.  The patient might have underlying dementia, her baseline is unknown.  Admission might improve with medical condition clears up.  If the patient gets agitated use extra Seroquel as needed for agitation  The patient can be discharged when medically stable  Treatment Plan discussed with: nursing     I discussed the patients findings and my recommendations with nursing staff    I have reviewed and approved the behavioral health treatment plans and problem list. Yes  Thank you for the consult   Referring MD has access to consult report and progress notes in EMR     Louise Cervantes MD  12/18/20  08:56 EST

## 2020-12-18 NOTE — PROGRESS NOTES
Referring Provider: Hospitalist    Reason for follow-up: Diarrhea     Patient Care Team:  Tigre Duran MD as PCP - General  Tigre Duran MD as PCP - Family Medicine  Wyatt Kwok MD as Consulting Physician (Cardiology)    Subjective .  Patient looking much better today    Objective  Sitting in chair comfortable     Review of Systems   Constitution: Negative for fever and malaise/fatigue.   Cardiovascular: Negative for chest pain, dyspnea on exertion and palpitations.   Respiratory: Negative for cough and shortness of breath.    Skin: Negative for rash.   Gastrointestinal: Negative for abdominal pain, nausea and vomiting.   Neurological: Negative for focal weakness and headaches.   All other systems reviewed and are negative.      Sulfa antibiotics    Scheduled Meds:apixaban, 2.5 mg, Oral, Q12H  atorvastatin, 10 mg, Oral, Daily  colesevelam, 1,250 mg, Oral, BID With Meals  midodrine, 2.5 mg, Oral, TID AC  nystatin, , Topical, Q8H  QUEtiapine, 25 mg, Oral, Nightly  saccharomyces boulardii, 500 mg, Oral, BID  sodium chloride, 3 mL, Intravenous, Q12H  sodium chloride, 1 g, Oral, TID With Meals  vancomycin, 250 mg, Oral, Q6H      Continuous Infusions:DOPamine, 2-20 mcg/kg/min, Last Rate: Stopped (12/16/20 1523)  sodium chloride, 50 mL/hr, Last Rate: 50 mL/hr (12/18/20 1040)      PRN Meds:.•  acetaminophen **OR** acetaminophen **OR** acetaminophen  •  magic butt paste  •  magnesium sulfate **OR** magnesium sulfate **OR** magnesium sulfate  •  nitroglycerin  •  ondansetron **OR** ondansetron  •  sodium chloride  •  sodium chloride        VITAL SIGNS  Vitals:    12/17/20 2147 12/18/20 0203 12/18/20 0540 12/18/20 1034   BP: 137/46 143/55 136/44 125/46   BP Location: Right arm Left arm Left arm Left arm   Patient Position:  Lying Lying Sitting   Pulse: 72 70 70 77   Resp: 18 28 24 16   Temp: 97.3 °F (36.3 °C) 97.5 °F (36.4 °C) 97.4 °F (36.3 °C) 97.6 °F (36.4 °C)   TempSrc: Axillary Axillary Axillary Oral   SpO2: 96%  " 100%    Weight:   54.9 kg (121 lb 0.5 oz)    Height:           Flowsheet Rows      First Filed Value   Admission Height  167.6 cm (66\") Documented at 12/11/2020 2338   Admission Weight  49.9 kg (110 lb) Documented at 12/11/2020 2338           TELEMETRY: Ventricular pacemaker rhythm    Physical Exam:  Constitutional:       Appearance: Well-developed.   Eyes:      General: No scleral icterus.     Conjunctiva/sclera: Conjunctivae normal.   HENT:      Head: Normocephalic and atraumatic.   Neck:      Musculoskeletal: Normal range of motion and neck supple.      Vascular: No carotid bruit or JVD.   Pulmonary:      Effort: Pulmonary effort is normal.      Breath sounds: Normal breath sounds. No wheezing. No rales.   Cardiovascular:      Normal rate. Regular rhythm.   Pulses:     Intact distal pulses.   Abdominal:      General: Bowel sounds are normal.      Palpations: Abdomen is soft.   Skin:     General: Skin is warm and dry.      Findings: No rash.   Neurological:      Mental Status: Alert.          Results Review:   I reviewed the patient's new clinical results.  Lab Results (last 24 hours)     Procedure Component Value Units Date/Time    Basic Metabolic Panel [[id number]]  (Abnormal) Collected: 12/18/20 0233    Specimen: Blood Updated: 12/18/20 0328     Glucose 88 mg/dL      BUN 39 mg/dL      Creatinine 1.03 mg/dL      Sodium 129 mmol/L      Potassium 4.4 mmol/L      Chloride 93 mmol/L      CO2 28.0 mmol/L      Calcium 8.9 mg/dL      eGFR Non African Amer 51 mL/min/1.73      BUN/Creatinine Ratio 37.9     Anion Gap 8.0 mmol/L     Narrative:      GFR Normal >60  Chronic Kidney Disease <60  Kidney Failure <15      CBC & Differential [[id number]]  (Abnormal) Collected: 12/18/20 0233    Specimen: Blood Updated: 12/18/20 0259    Narrative:      The following orders were created for panel order CBC & Differential.  Procedure                               Abnormality         Status                     ---------                  "              -----------         ------                     CBC Auto Differential[[id number]]        Abnormal            Final result                 Please view results for these tests on the individual orders.    CBC Auto Differential [[id number]]  (Abnormal) Collected: 12/18/20 0233    Specimen: Blood Updated: 12/18/20 0259     WBC 20.00 10*3/mm3      RBC 3.11 10*6/mm3      Hemoglobin 9.6 g/dL      Hematocrit 29.6 %      MCV 95.3 fL      MCH 30.8 pg      MCHC 32.3 g/dL      RDW 14.0 %      RDW-SD 46.8 fl      MPV 7.9 fL      Platelets 410 10*3/mm3      Neutrophil % 87.0 %      Lymphocyte % 3.3 %      Monocyte % 9.6 %      Eosinophil % 0.0 %      Basophil % 0.1 %      Neutrophils, Absolute 17.40 10*3/mm3      Lymphocytes, Absolute 0.70 10*3/mm3      Monocytes, Absolute 1.90 10*3/mm3      Eosinophils, Absolute 0.00 10*3/mm3      Basophils, Absolute 0.00 10*3/mm3      nRBC 0.0 /100 WBC     Urinalysis With Culture If Indicated - Urine, Catheter In/Out [[id number]]  (Normal) Collected: 12/17/20 1553    Specimen: Urine, Catheter In/Out Updated: 12/17/20 1609     Color, UA Yellow     Appearance, UA Clear     pH, UA 6.0     Specific Gravity, UA 1.010     Glucose, UA Negative     Ketones, UA Negative     Bilirubin, UA Negative     Blood, UA Negative     Protein, UA Negative     Leuk Esterase, UA Negative     Nitrite, UA Negative     Urobilinogen, UA 0.2 E.U./dL    Narrative:      Urine microscopic not indicated.    CBC & Differential [[id number]]  (Abnormal) Collected: 12/17/20 1158    Specimen: Blood Updated: 12/17/20 1300    Narrative:      The following orders were created for panel order CBC & Differential.  Procedure                               Abnormality         Status                     ---------                               -----------         ------                     Scan Slide[[id number]]                                       Final result               CBC Auto Differential[[id number]]        Abnormal             Final result                 Please view results for these tests on the individual orders.    CBC Auto Differential [[id number]]  (Abnormal) Collected: 12/17/20 1158    Specimen: Blood Updated: 12/17/20 1300     WBC 31.30 10*3/mm3      RBC 3.33 10*6/mm3      Hemoglobin 10.0 g/dL      Hematocrit 31.8 %      MCV 95.5 fL      MCH 30.2 pg      MCHC 31.6 g/dL      RDW 14.0 %      RDW-SD 46.8 fl      MPV 7.9 fL      Platelets 454 10*3/mm3     Scan Slide [[id number]] Collected: 12/17/20 1158    Specimen: Blood Updated: 12/17/20 1300     Scan Slide --     Comment: See Manual Differential Results       Manual Differential [[id number]]  (Abnormal) Collected: 12/17/20 1158    Specimen: Blood Updated: 12/17/20 1300     Neutrophil % 80.0 %      Lymphocyte % 1.0 %      Monocyte % 4.0 %      Bands %  15.0 %      Neutrophils Absolute 29.74 10*3/mm3      Lymphocytes Absolute 0.31 10*3/mm3      Monocytes Absolute 1.25 10*3/mm3      RBC Morphology Normal     Toxic Granulation Slight/1+     Platelet Estimate Increased    Basic Metabolic Panel [[id number]]  (Abnormal) Collected: 12/17/20 1158    Specimen: Blood Updated: 12/17/20 1251     Glucose 99 mg/dL      BUN 35 mg/dL      Creatinine 1.17 mg/dL      Sodium 128 mmol/L      Potassium 5.2 mmol/L      Chloride 92 mmol/L      CO2 29.0 mmol/L      Calcium 9.0 mg/dL      eGFR Non African Amer 44 mL/min/1.73      BUN/Creatinine Ratio 29.9     Anion Gap 7.0 mmol/L     Narrative:      GFR Normal >60  Chronic Kidney Disease <60  Kidney Failure <15            Imaging Results (Last 24 Hours)     ** No results found for the last 24 hours. **          EKG      I personally viewed and interpreted the patient's EKG/Telemetry data:    ECHOCARDIOGRAM:    STRESS MYOVIEW:    CARDIAC CATHETERIZATION:    OTHER:         Assessment/Plan     Principal Problem:    C. difficile diarrhea  Active Problems:    Chronic coronary artery disease    Essential hypertension    Presence of cardiac pacemaker    Shortness of  breath    Sick sinus syndrome (CMS/HCC)    Tachy-martin syndrome (CMS/HCC)    Chronic diastolic congestive heart failure (CMS/HCC)    Syncope and collapse    Acute renal failure (ARF) (CMS/HCC)    Leukocytosis       Patient has significant diarrhea and dehydration and became less responsive and hypotensive  Patient presented with hypotension and was treated with IV fluid bolus  Patient has congestive heart failure diastolic dysfunction but no systolic dysfunction  Patient has a pacemaker is working very well  Patient blood pressure is stable now  Patient also has acute renal failure and nephrologist is following the patient  Patient recently had a echocardiogram and hence no further testing at this time  We will follow her as an outpatient    I discussed the patients findings and my recommendations with patient's nurse    Wyatt Kwok MD  12/18/20  11:32 EST

## 2020-12-18 NOTE — PROGRESS NOTES
"      [facility] Medicine Services Daily Progress Note          Hospitalist Team  LOS 4 days      Patient Care Team:  Tigre Duran MD as PCP - General  Tigre Duran MD as PCP - Family Medicine  Wyatt Kwok MD as Consulting Physician (Cardiology)    Patient Location: 2109/1      Subjective   Subjective     Chief Complaint / Subjective  Chief Complaint   Patient presents with   • Hypotension     nausea.         Brief Synopsis of Hospital Course/HPI  85 year old female with PMH of CAD s/p CABG 1999, severe aortic stenosis s/p TAVR in May, SSS with cardiac pacemaker in situ, HTN, PAF, DJD post shoulder surgery, non sustained V tach, HLDpresents with complaints of progressive weakness and dyspnea. She is awake and alert and appropriate but lethargic. She denies chest pain or syncope but per ED report EMS reports she had a \"spell\" en route  with hypotension . In ED no hypotension or arrythmmia seen . Per ED report pacemaker interrogated with no unusual results. She is Covid-19 negative. Review of records shows she was just admitted here 11/30/20 to 12/6/20 for similar complaints and seen by both neurology and cardiology. Per notes it was felt to be metabolic encephalopathy from possible UTI and she was discharged on Cefdinir. She reports diarrhea past 2 days without hematochezia or melena or abdominal pain. C-Diff culture ordered and pending. UA shows no bacteria but too numerous to count wbc . She was started on IV Ceftriaxone in ED with urine and blood culture pending. D-Dimer was elevated. CT chest per radiology today:  \"IMPRESSION:  1. No pulmonary embolism.  2. Reticular and ground glass densities diffusely may represent edema or pneumonia superimposed on chronic interstitial change.  3. Stable surgical changes and cardiomegaly.  4. Calcified left pleural plaque.\"     Labs show wbc 17, K 5.4, Na 128, Cr 1.24 ( was 0.64), BNP 4400 was 13,000 12/5/20, Hgb 10, procalcitonin 1.40.      She " reports she left here after last admission and went to rehab then was at [facility] and just discharged yesterday. No records of [facility] in EMR and records have been requested. She will be admitted for further evaluation and treatment .        Date: 12/13/2020: The patient reports just feeling generally ill and she has had difficulty staying warm today, but she has had no fever, sweats, nausea or vomiting.  No chest pain, cough or shortness of breath.  She reports her diarrhea is improving.    12/14/2020  Alerted by night shift PA that pt having hypotension, didn't respond to 750cc bolus, now on dopamine drip and her cardiologist has been consulted. Pt is lethargic, but responsive. She denies any chest pains or abdominal pains, she continues to have SOA and diarrhea.    12/15/2020  Patient seen and examined.  She is alert and awake but confused in general.  Discussion with patient's daughter yesterday leads me to believe she has undiagnosed dementia underlying her behaviors.  Per nurse to very small bowel motions so far today, diarrhea seems to be resolving.    12/16/2020  Pt seen and examined.  She had an episode of emesis x1 today and upon questioning, pt thinks she may have choked on some of it - will need to monitor for s/s of aspiration. No diarrhea today per nurse.    12/17.2020  Pt seen and examined. Pt's daughter reports the patient is much more confused and agitated than her normal baseline. And she gets like this when she has a repeat UTI. UA this afternoon was negative.    Review of Systems   Constitution: Positive for malaise/fatigue. Negative for chills and fever.   HENT: Negative.    Eyes: Negative.    Cardiovascular: Negative for chest pain and leg swelling.   Respiratory: Positive for shortness of breath. Negative for cough, sputum production and wheezing.    Skin: Negative.    Musculoskeletal: Negative.    Gastrointestinal: Positive for diarrhea. Negative for bloating, abdominal pain,  "constipation, nausea and vomiting.   Genitourinary: Negative.    Neurological: Negative.    Psychiatric/Behavioral: Negative.    All other systems reviewed and are negative.        Objective   Objective      Vital Signs  Temp:  [95.8 °F (35.4 °C)-97.8 °F (36.6 °C)] 97.8 °F (36.6 °C)  Heart Rate:  [70-80] 70  Resp:  [16-20] 17  BP: (116-147)/(40-63) 147/40  Oxygen Therapy  SpO2: 95 %  Pulse Oximetry Type: Intermittent  Device (Oxygen Therapy): room air  Flow (L/min): 2  Flowsheet Rows      First Filed Value   Admission Height  167.6 cm (66\") Documented at 12/11/2020 2338   Admission Weight  49.9 kg (110 lb) Documented at 12/11/2020 2338        Intake & Output (last 3 days)       12/15 0701 - 12/16 0700 12/16 0701 - 12/17 0700 12/17 0701 - 12/18 0700    P.O. 480 840     Total Intake(mL/kg) 480 (9) 840 (15.8)     Urine (mL/kg/hr) 300 (0.2) 500 (0.4) 200 (0.3)    Emesis/NG output  0     Stool  0 0    Total Output 300 500 200    Net +180 +340 -200           Urine Unmeasured Occurrence 1 x  1 x    Stool Unmeasured Occurrence 4 x 4 x 1 x    Emesis Unmeasured Occurrence  1 x         Lines, Drains & Airways    Active LDAs     Name:   Placement date:   Placement time:   Site:   Days:    Peripheral IV 12/12/20 0401 Left Forearm   12/12/20 0401    Forearm   2    Peripheral IV 12/12/20 0401 Right Forearm   12/12/20 0401    Forearm   2                  Physical Exam:  General: well-developed, frail and cachectic NAD  HEENT: NC/AT, EOMI, PERRLA, +JVD  Heart: RRR. + murmur   Chest: Bilateral rales throughout, no wheezing or rhonchi, respiratory effort mildly labored  Abdominal: Soft. NT/ND. Bowel sounds present.  Musculoskeletal: Normal ROM.  No edema. No calf tenderness.  Neurological: Alert and awake, confused, no focal deficits  Skin: Skin is warm and dry. No rash.  Scattered ecchymosis  Psychiatric: Confused and agitated       Wounds (last 24 hours)      LDA Wound     Row Name 12/17/20 1600 12/17/20 1315 12/17/20 0800     "    Wound 12/12/20 0509 medial coccyx Pressure Injury    Wound - Properties Group Placement Date: 12/12/20  -DD Placement Time: 0509 -DD Orientation: medial  -DD Location: coccyx  -DD Primary Wound Type: Pressure inj  -DD Stage, Pressure Injury : Stage 1  -DD    Dressing Appearance  --  --  open to air  -MF    Base  non-blanchable;maroon/purple  -SH  non-blanchable;maroon/purple  -SH  non-blanchable;maroon/purple  -MF    Drainage Amount  --  --  none  -MF    Care, Wound  --  barrier applied  -SH  barrier applied  -MF    Retired Wound - Properties Group Date first assessed: 12/12/20  -DD Time first assessed: 0509 -DD Location: coccyx  -DD Primary Wound Type: Pressure inj  -DD    Row Name 12/17/20 0345 12/16/20 2355          Wound 12/12/20 0509 medial coccyx Pressure Injury    Wound - Properties Group Placement Date: 12/12/20  -DD Placement Time: 0509 -DD Orientation: medial  -DD Location: coccyx  -DD Primary Wound Type: Pressure inj  -DD Stage, Pressure Injury : Stage 1  -DD    Base  non-blanchable;maroon/purple MASD   -DM  non-blanchable;maroon/purple MASD   -DM     Drainage Amount  none  -DM  none  -DM     Retired Wound - Properties Group Date first assessed: 12/12/20  -DD Time first assessed: 0509 -DD Location: coccyx  -DD Primary Wound Type: Pressure inj  -DD      User Key  (r) = Recorded By, (t) = Taken By, (c) = Cosigned By    Initials Name Provider Type     Poly Preciado RN Registered Nurse    Thania Mujica LPN Licensed Nurse    Elda Farooq RN Registered Nurse    Loulou Álvarez, RN Registered Nurse          Procedures:           Results Review:     I reviewed the patient's new clinical results.    Results from last 7 days   Lab Units 12/17/20  1158 12/16/20  0723 12/15/20  0331 12/14/20  1045 12/13/20  0326 12/12/20  0539 12/12/20  0058   WBC 10*3/mm3 31.30* 28.50* 25.40* 27.00* 16.30* 16.40* 17.00*   HEMOGLOBIN g/dL 10.0* 10.6* 11.7* 11.1* 9.6* 9.7* 10.0*   HEMATOCRIT % 31.8*  33.8* 36.4 34.5 29.4* 30.5* 30.7*   PLATELETS 10*3/mm3 454* 383 227 364 284 268 301     Results from last 7 days   Lab Units 12/17/20  1158 12/17/20  0254 12/16/20  0723 12/15/20  0656 12/14/20  1830 12/14/20  0652 12/13/20  0326  12/12/20  0058   SODIUM mmol/L 128* 125* 126* 128* 132* 130* 131*   < > 128*   POTASSIUM mmol/L 5.2 5.0 4.4 4.1 4.3 4.9 4.5   < > 5.4*   CHLORIDE mmol/L 92* 92* 90* 91* 93* 95* 94*   < > 91*   CO2 mmol/L 29.0 23.0 26.0 27.0 30.0* 24.0 31.0*   < > 31.0*   BUN mg/dL 35* 36* 28* 22 25* 22 18   < > 23   CREATININE mg/dL 1.17* 1.22* 1.07* 1.20* 1.51* 1.47* 0.83   < > 1.24*   GLUCOSE mg/dL 99 142* 157* 73 75 103* 101*   < > 100*   ALBUMIN g/dL  --  2.90*  --  2.60*  --  2.40*  --   --  3.30*   BILIRUBIN mg/dL  --  0.3  --  0.4  --  0.4  --   --  0.6   ALK PHOS U/L  --  198*  --  142*  --  86  --   --  90   AST (SGOT) U/L  --  23  --  26  --  25  --   --  22   ALT (SGPT) U/L  --  11  --  12  --  10  --   --  13   CALCIUM mg/dL 9.0 8.8 8.6 8.2* 8.4* 8.3* 8.5*   < > 9.0    < > = values in this interval not displayed.     Cr Clearance Estimated Creatinine Clearance: 30.1 mL/min (A) (by C-G formula based on SCr of 1.17 mg/dL (H)).    Coag   Results from last 7 days   Lab Units 12/12/20  0058   INR  1.02   APTT seconds 26.4       HbA1C   Lab Results   Component Value Date    HGBA1C 5.5 09/30/2020    HGBA1C 5.90 (H) 05/21/2020     Blood Glucose       Troponin   Results from last 7 days   Lab Units 12/15/20  0407 12/14/20  0652 12/12/20  0058   TROPONIN T ng/mL  --   --  0.019   PROBNP pg/mL 14,913.0* 32,130.0* 4,443.0*     Lipids    Lab Results   Component Value Date    CHOL 127 08/05/2017    TRIG 90 08/05/2017    HDL 49 08/05/2017    LDL 62 08/05/2017       UA    Results from last 7 days   Lab Units 12/17/20  1553 12/12/20  0134   NITRITE UA  Negative Negative   WBC UA /HPF  --  Too Numerous to Count*   BACTERIA UA /HPF  --  None Seen   SQUAM EPITHEL UA /HPF  --  3-6*   URINECX   --  Yeast isolated*        Microbiology   Results from last 7 days   Lab Units 12/12/20  0134 12/12/20  0058   BLOODCX   --  No growth at 5 days  No growth at 5 days   URINECX  Yeast isolated*  --        ABG        EKG  ECG 12 Lead   Preliminary Result   HEART RATE= 115  bpm   RR Interval= 520  ms   TX Interval= 118  ms   P Horizontal Axis= -34  deg   P Front Axis= 68  deg   QRSD Interval= 127  ms   QT Interval= 356  ms   QRS Axis= 205  deg   T Wave Axis= 29  deg   - ABNORMAL ECG -   Atrial-sensed ventricular-paced rhythm   Electronically Signed By:    Date and Time of Study: 2020-12-15 06:11:30      ECG 12 Lead   Final Result   HEART RATE= 82  bpm   RR Interval= 731  ms   TX Interval= 122  ms   P Horizontal Axis= 12  deg   P Front Axis= -29  deg   QRSD Interval= 131  ms   QT Interval= 419  ms   QRS Axis= 212  deg   T Wave Axis= 31  deg   - ABNORMAL ECG -   Atrial-sensed ventricular-paced rhythm   When compared with ECG of 30-Nov-2020 13:22:06,   No significant change   Electronically Signed By: Lawrence Viera (Tre) 13-Dec-2020 09:25:09   Date and Time of Study: 2020-12-12 03:22:32          Imaging:  Xr Chest 1 View    Result Date: 12/15/2020  1. Improved bilateral interstitial opacities likely related to resolving infection or edema. 2. Persistent left basilar airspace opacity.  Electronically Signed By-Hill Askew MD On:12/15/2020 9:04 AM This report was finalized on [id number] by  Hill Askew MD.    Xr Chest 1 View    Result Date: 12/12/2020  1.Bilateral airspace opacities appear slightly decreased. 2.Small pleural effusions also appears slightly decreased. 3.Cardiomegaly.  Electronically Signed By-Isidro Jackson MD On:12/12/2020 8:57 AM This report was finalized on [id number] by  Isidro Jackson MD.    Ct Chest Pulmonary Embolism    Result Date: 12/12/2020  1. No pulmonary embolism. 2. Reticular and ground glass densities diffusely may represent edema or pneumonia superimposed on chronic interstitial change. 3. Stable  surgical changes and cardiomegaly. 4. Calcified left pleural plaque. Electronically signed by:  Isidro Staley M.D.  12/12/2020 1:11 AM    Xr Abdomen Kub    Result Date: 12/14/2020  Mild gaseous distention of small bowel loops is nonspecific and may reflect changes of ileus. No convincing evidence of high-grade small bowel obstruction this time.  Electronically Signed By-Tonja Chauhan MD On:12/14/2020 9:12 AM This report was finalized on [id number] by  Tonja Chauhan MD.    Results for orders placed during the hospital encounter of 11/30/20   Adult Transthoracic Echo Complete W/ Cont if Necessary Per Protocol    Narrative · Left ventricular ejection fraction appears to be 56 - 60%.  · Left ventricular wall thickness is consistent with moderate concentric   hypertrophy.  · There is a TAVR valve present.  · Moderate tricuspid valve regurgitation is present.  · Left ventricular diastolic function is consistent with (grade I)   impaired relaxation.  · No pericardial effusion noted        Results for orders placed during the hospital encounter of 02/28/20   Duplex Carotid Ultrasound CAR    Narrative · Proximal right internal carotid artery moderate stenosis.  · Proximal left internal carotid artery mild stenosis.          Xrays, labs reviewed personally by physician.    Medication Review:   I have reviewed the patient's current medication list      Scheduled Meds  apixaban, 2.5 mg, Oral, Q12H  atorvastatin, 10 mg, Oral, Daily  colesevelam, 1,250 mg, Oral, BID With Meals  midodrine, 2.5 mg, Oral, TID AC  nystatin, , Topical, Q8H  QUEtiapine, 25 mg, Oral, Nightly  saccharomyces boulardii, 500 mg, Oral, BID  sodium chloride, 3 mL, Intravenous, Q12H  sodium chloride, 1 g, Oral, TID With Meals  vancomycin, 250 mg, Oral, Q6H        Meds Infusions  DOPamine, 2-20 mcg/kg/min, Last Rate: Stopped (12/16/20 1523)        Meds PRN  •  acetaminophen **OR** acetaminophen **OR** acetaminophen  •  magic butt paste  •  magnesium  sulfate **OR** magnesium sulfate **OR** magnesium sulfate  •  nitroglycerin  •  ondansetron **OR** ondansetron  •  sodium chloride  •  sodium chloride      Assessment/Plan   Assessment/Plan     Active Hospital Problems    Diagnosis  POA   • **C. 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       MEDICAL DECISION MAKING COMPLEXITY BY PROBLEM:     C. difficile associated diarrhea  -Multiple rounds of antibiotics recent UTI  -Current UA does not indicate any presence of UTI  -UA shows yeast, this is contamination from the skin, discontinue fluconazole     -Add nystatin powder to perineum  -Increase oral vancomycin to 250 mg every 6h    -Diarrhea decreasing significantly with higher dose oral vancomycin, continue same    -Vanco enema may not be needed given her improvement.  -Increased dosing of Florastor and WelChol for optimization  -Magic Butt cream to be applied as needed    Leukocytosis  -Due to C. difficile associated diarrhea  -No signs or symptoms of sepsis at this time  -WBC increasing since patient has been started on steroids    --steroids d/c'd with pulmonology pemission  -Continue to monitor CBC and WBC count    Altered mental status  -- likely multifactorial  --UA ruled out UTI  --possible due to ongoing diarrhea, meds, dehydratio, etc.  --Psychiatry consult    Hypotension  Syncope and collapse  -s/p 750 cc bolus earlier this morning  -Chronic low blood pressure due to diastolic CHF  -Midodrine was  given only as single dose in past  -Dopamine drip continues  -Continue to monitor BP and vitals closely  -Cardiology following     --Appreciate input  --12/16/20 scheduled low dose Midodrine, weaned off Dopamine     --d/w Nephrology, agreed with tx as it will maintain her BP much better    Dyspnea  -Due to the above  -Fluid overloaded BNP greater than 32,000  -Will need to gently diuresis at some point if blood pressure allows  -Continuous pulse oximetry  -O2 supplementation to keep sats > 93%  -Pulmonology consulted overnight and following  -Solu-Medrol discontinued per discussion with Pulmonology    Acute renal failure  -Creatinine 1.24 on admission, previously 0.6 on last admission  -Likely due to dehydration from diarrhea  -Gradually improving, currently 0.83  -Will need to cautiously diurese as noted above  --Nephrology following     --Appreciate input    Chronic diastolic congestive heart failure  Aortic stenosis  Sick sinus syndrome/tachybradycardia syndrome  Presence of cardiac pacemaker  Chronic coronary artery disease  -Last echo on 12/1/2020 reviewed and noted above  -Continue lisinopril, metoprolol, atorvastatin  -Furosemide being held currently  -Cardiology to see    Essential hypertension  -Continue lisinopril and metoprolol, midodrine added for hypotension  -Continue to monitor BP and vitals    Medial coccyx stage I pressure injury  -Mepilex dressing  -Turn every 2 hours      VTE Prophylaxis  Mechanical Order History:      Ordered        12/12/20 0548  Place Sequential Compression Device  Once         12/12/20 0548  Maintain Sequential Compression Device  Continuous         12/12/20 0435  Place Sequential Compression Device  Once         12/12/20 0435  Maintain Sequential Compression Device  Continuous                 Pharmalogical Order History:      Ordered     Dose Route Frequency Stop    12/12/20 1302  apixaban (ELIQUIS) tablet 2.5 mg      2.5 mg PO Every 12 Hours --                Code  Status  Code Status and Medical Interventions:   Ordered at: 12/14/20 1242     Limited Support to NOT Include:    Intubation     Level Of Support Discussed With:    Health Care Surrogate     Code Status:    No CPR     Medical Interventions (Level of Support Prior to Arrest):    Limited     Comments:    DNR/DNI       This patient has been examined wearing appropriate Personal Protective Equipment. 12/17/20      Discharge Planning    Pending clinical course.      Electronically signed by Balbina Sy MD, 12/17/20, 20:24 EST.    [facility] Hospitalist Team

## 2020-12-18 NOTE — PLAN OF CARE
Goal Outcome Evaluation:  Plan of Care Reviewed With: patient  Progress: no change   The patient's vitals have been stable. The patient has been extremely restless the entire night, she was getting up from the chair without assistance and was climbing out of the bed all night. This RN did not see the patient sleeping tonight. The patient also went 12 hours without voiding and only had 360mL in her bladder when scanned. Patient was started on IV fluids. Will continue to monitor.

## 2020-12-18 NOTE — PROGRESS NOTES
PROGRESS NOTE      Patient Name: Olga Curtis  : 1935  MRN: [id number]  Primary Care Physician: Tigre Duran MD  Date of admission: 2020    Patient Care Team:  Tigre Duran MD as PCP - General  Tgire Duran MD as PCP - Family Medicine  Wyatt Kwok MD as Consulting Physician (Cardiology)        Subjective   Subjective:     Acute kidney injury better, patient is still complaining of weakness  Review of systems:  All other review of system unremarkable      Allergies:    Allergies   Allergen Reactions   • Sulfa Antibiotics Hives       Objective   Exam:     Vital Signs  Temp:  [97.3 °F (36.3 °C)-97.8 °F (36.6 °C)] 97.4 °F (36.3 °C)  Heart Rate:  [70-72] 70  Resp:  [16-28] 24  BP: (116-147)/(40-55) 136/44  SpO2:  [96 %-100 %] 100 %  on  Flow (L/min):  [2] 2;   Device (Oxygen Therapy): nasal cannula  Body mass index is 19.54 kg/m².    General: Elderly white  female in no acute distress.    Head:      Normocephalic and atraumatic.    Eyes:      PERRL/EOM intact, conjunctiva and sclera clear with out nystagmus.    Neck:      No masses, thyromegaly,  trachea central with normal respiratory effort   Lungs:    Clear bilaterally to auscultation.    Heart:      Regular rate and rhythm, no murmur no gallop  Abd:        Soft, nontender, not distended, bowel sounds positive, no shifting dullness   Pulses:   Pulses palpable  Extr:        No cyanosis or clubbing--mild edema.    Neuro:    No focal deficits.   alert oriented x3  Skin:       Intact without lesions or rashes.    Psych:    Alert and cooperative; normal mood and affect; .      Results Review:  I have personally reviewed most recent Data :  CBC    Results from last 7 days   Lab Units 20  0233 20  1158 20  0723 12/15/20  0331 20  1045 20  0326 20  0539   WBC 10*3/mm3 20.00* 31.30* 28.50* 25.40* 27.00* 16.30* 16.40*   HEMOGLOBIN g/dL 9.6* 10.0* 10.6* 11.7* 11.1* 9.6* 9.7*   PLATELETS 10*3/mm3 410 454* 383  227 364 284 268     CMP   Results from last 7 days   Lab Units 12/18/20  0233 12/17/20  1158 12/17/20  0254 12/16/20  0723 12/15/20  0656 12/14/20  1830 12/14/20  0652  12/12/20  0058   SODIUM mmol/L 129* 128* 125* 126* 128* 132* 130*   < > 128*   POTASSIUM mmol/L 4.4 5.2 5.0 4.4 4.1 4.3 4.9   < > 5.4*   CHLORIDE mmol/L 93* 92* 92* 90* 91* 93* 95*   < > 91*   CO2 mmol/L 28.0 29.0 23.0 26.0 27.0 30.0* 24.0   < > 31.0*   BUN mg/dL 39* 35* 36* 28* 22 25* 22   < > 23   CREATININE mg/dL 1.03* 1.17* 1.22* 1.07* 1.20* 1.51* 1.47*   < > 1.24*   GLUCOSE mg/dL 88 99 142* 157* 73 75 103*   < > 100*   ALBUMIN g/dL  --   --  2.90*  --  2.60*  --  2.40*  --  3.30*   BILIRUBIN mg/dL  --   --  0.3  --  0.4  --  0.4  --  0.6   ALK PHOS U/L  --   --  198*  --  142*  --  86  --  90   AST (SGOT) U/L  --   --  23  --  26  --  25  --  22   ALT (SGPT) U/L  --   --  11  --  12  --  10  --  13    < > = values in this interval not displayed.     ABG      Xr Chest 1 View    Result Date: 12/15/2020  1. Improved bilateral interstitial opacities likely related to resolving infection or edema. 2. Persistent left basilar airspace opacity.  Electronically Signed By-Hill Askew MD On:12/15/2020 9:04 AM This report was finalized on [id number] by  iHll Askew MD.    Xr Chest 1 View    Result Date: 12/12/2020  1.Bilateral airspace opacities appear slightly decreased. 2.Small pleural effusions also appears slightly decreased. 3.Cardiomegaly.  Electronically Signed By-Isidro Jackosn MD On:12/12/2020 8:57 AM This report was finalized on [id number] by  Isidro Jackson MD.    Ct Chest Pulmonary Embolism    Result Date: 12/12/2020  1. No pulmonary embolism. 2. Reticular and ground glass densities diffusely may represent edema or pneumonia superimposed on chronic interstitial change. 3. Stable surgical changes and cardiomegaly. 4. Calcified left pleural plaque. Electronically signed by:  Isidro Staley M.D.  12/12/2020 1:11 AM    Xr Abdomen  Kub    Result Date: 12/14/2020  Mild gaseous distention of small bowel loops is nonspecific and may reflect changes of ileus. No convincing evidence of high-grade small bowel obstruction this time.  Electronically Signed By-Tonja Chauhan MD On:12/14/2020 9:12 AM This report was finalized on [id number] by  Tonja Chauhan MD.      Results for orders placed during the hospital encounter of 11/30/20   Adult Transthoracic Echo Complete W/ Cont if Necessary Per Protocol    Narrative · Left ventricular ejection fraction appears to be 56 - 60%.  · Left ventricular wall thickness is consistent with moderate concentric   hypertrophy.  · There is a TAVR valve present.  · Moderate tricuspid valve regurgitation is present.  · Left ventricular diastolic function is consistent with (grade I)   impaired relaxation.  · No pericardial effusion noted        Scheduled Meds:apixaban, 2.5 mg, Oral, Q12H  atorvastatin, 10 mg, Oral, Daily  colesevelam, 1,250 mg, Oral, BID With Meals  midodrine, 2.5 mg, Oral, TID AC  nystatin, , Topical, Q8H  QUEtiapine, 25 mg, Oral, Nightly  saccharomyces boulardii, 500 mg, Oral, BID  sodium chloride, 3 mL, Intravenous, Q12H  sodium chloride, 1 g, Oral, TID With Meals  vancomycin, 250 mg, Oral, Q6H      Continuous Infusions:DOPamine, 2-20 mcg/kg/min, Last Rate: Stopped (12/16/20 1523)  sodium chloride, 50 mL/hr, Last Rate: 75 mL/hr (12/18/20 0345)      PRN Meds:•  acetaminophen **OR** acetaminophen **OR** acetaminophen  •  magic butt paste  •  magnesium sulfate **OR** magnesium sulfate **OR** magnesium sulfate  •  nitroglycerin  •  ondansetron **OR** ondansetron  •  sodium chloride  •  sodium chloride    Assessment/Plan   Assessment and Plan:         C. difficile diarrhea    Chronic coronary artery disease    Essential hypertension    Presence of cardiac pacemaker    Shortness of breath    Sick sinus syndrome (CMS/HCC)    Tachy-martin syndrome (CMS/HCC)    Chronic diastolic congestive heart failure  (CMS/[facility])    Syncope and collapse    Acute renal failure (ARF) (CMS/[facility])    Leukocytosis    ASSESSMENT:  · Hyponatremia likely because of the congestive heart failure and may be some volume overload  · Hyperkalemia NICK with some congestive heart failure  · Acute kidney injury  · Significant leukocytosis rule out C. difficile colitis  · History of congestive heart failure but mainly diastolic dysfunctions  · History of aortic stenosis history of TAVR  · History of hypertension  · History of pacemaker insertion           Plan:      · Patient is still confused and diarrhea is still present  · Sodium level and electrolytes are improving  · Continue IV fluid but decrease the rate to 50 cc an hour  · Renal functions are better than yesterday  · Continue salt DM colloid pill for another 24 hours then most likely we can taper it down  · I think 1 dose of Florinef did help with patient hyperkalemia and hyponatremia and hemodynamics  · Respiratory status seems stable so far  · Continue sodium chloride pill  · Etiology of low sodium likely related to patient C. difficile colitis as urine sodium is only 22 and urine osmolality 280  · BNP level of 14,000 we will recheck it again  · Potassium again increasing  · INeed to follow volume status closely especially the presence of significant diastolic failure  •           Note started  by Huy Carlin MD,   [facility] kidney consultant

## 2020-12-19 NOTE — PROGRESS NOTES
PROGRESS NOTE      Patient Name: Olga Curtis  : 1935  MRN: [id number]  Primary Care Physician: Tigre Duran MD  Date of admission: 2020    Patient Care Team:  Tigre Duran MD as PCP - General  Tigre Duran MD as PCP - Family Medicine  Wyatt Kwok MD as Consulting Physician (Cardiology)        Subjective   Subjective:     Acute kidney injury better, patient is still complaining of weakness no complaints of any chest pain or shortness of breath.  Review of systems:  All other review of system unremarkable      Allergies:    Allergies   Allergen Reactions   • Sulfa Antibiotics Hives       Objective   Exam:     Vital Signs  Temp:  [96.1 °F (35.6 °C)-97.9 °F (36.6 °C)] 97.7 °F (36.5 °C)  Heart Rate:  [70] 70  Resp:  [16-22] 22  BP: (121-163)/(49-70) 163/53  SpO2:  [93 %-100 %] 93 %  on  Flow (L/min):  [1-2] 2;   Device (Oxygen Therapy): nasal cannula  Body mass index is 19.5 kg/m².    General: Elderly white  female in no acute distress.    Head:      Normocephalic and atraumatic.    Eyes:      PERRL/EOM intact, conjunctiva and sclera clear with out nystagmus.    Neck:      No masses, thyromegaly,  trachea central with normal respiratory effort   Lungs:    Clear bilaterally to auscultation.    Heart:      Regular rate and rhythm, no murmur no gallop  Abd:        Soft, nontender, not distended, bowel sounds positive, no shifting dullness   Pulses:   Pulses palpable  Extr:        No cyanosis or clubbing--mild edema.    Neuro:    No focal deficits.   alert oriented x3  Skin:       Intact without lesions or rashes.    Psych:    Alert and cooperative; normal mood and affect; .      Results Review:  I have personally reviewed most recent Data :  CBC    Results from last 7 days   Lab Units 20  0246 20  0233 20  1158 20  0723 12/15/20  0331 20  1045 20  0326   WBC 10*3/mm3 9.30 20.00* 31.30* 28.50* 25.40* 27.00* 16.30*   HEMOGLOBIN g/dL 9.7* 9.6* 10.0* 10.6*  11.7* 11.1* 9.6*   PLATELETS 10*3/mm3 364 410 454* 383 227 364 284     CMP   Results from last 7 days   Lab Units 12/19/20  0246 12/18/20  0233 12/17/20  1158 12/17/20  0254 12/16/20  0723 12/15/20  0656 12/14/20  1830 12/14/20  0652   SODIUM mmol/L 131* 129* 128* 125* 126* 128* 132* 130*   POTASSIUM mmol/L 4.0 4.4 5.2 5.0 4.4 4.1 4.3 4.9   CHLORIDE mmol/L 99 93* 92* 92* 90* 91* 93* 95*   CO2 mmol/L 26.0 28.0 29.0 23.0 26.0 27.0 30.0* 24.0   BUN mg/dL 26* 39* 35* 36* 28* 22 25* 22   CREATININE mg/dL 0.81 1.03* 1.17* 1.22* 1.07* 1.20* 1.51* 1.47*   GLUCOSE mg/dL 467* 88 99 142* 157* 73 75 103*   ALBUMIN g/dL 2.60*  --   --  2.90*  --  2.60*  --  2.40*   BILIRUBIN mg/dL 0.3  --   --  0.3  --  0.4  --  0.4   ALK PHOS U/L 77  --   --  198*  --  142*  --  86   AST (SGOT) U/L 16  --   --  23  --  26  --  25   ALT (SGPT) U/L 10  --   --  11  --  12  --  10   AMMONIA umol/L 12  --   --   --   --   --   --   --      ABG    Results from last 7 days   Lab Units 12/19/20  0242   PH, ARTERIAL pH units 7.245*   PCO2, ARTERIAL mm Hg 66.1*   PO2 ART mm Hg 284.5*   O2 SATURATION ART % 99.8*   BASE EXCESS ART mmol/L 0.5     Xr Chest 1 View    Result Date: 12/15/2020  1. Improved bilateral interstitial opacities likely related to resolving infection or edema. 2. Persistent left basilar airspace opacity.  Electronically Signed By-Hill Askew MD On:12/15/2020 9:04 AM This report was finalized on [id number] by  Hill Askew MD.    Xr Chest 1 View    Result Date: 12/12/2020  1.Bilateral airspace opacities appear slightly decreased. 2.Small pleural effusions also appears slightly decreased. 3.Cardiomegaly.  Electronically Signed By-Isidro Jackson MD On:12/12/2020 8:57 AM This report was finalized on [id number] by  Isidro Jackson MD.    Ct Chest Pulmonary Embolism    Result Date: 12/12/2020  1. No pulmonary embolism. 2. Reticular and ground glass densities diffusely may represent edema or pneumonia superimposed on chronic  interstitial change. 3. Stable surgical changes and cardiomegaly. 4. Calcified left pleural plaque. Electronically signed by:  Isidro Staley M.D.  12/12/2020 1:11 AM    Xr Abdomen Kub    Result Date: 12/14/2020  Mild gaseous distention of small bowel loops is nonspecific and may reflect changes of ileus. No convincing evidence of high-grade small bowel obstruction this time.  Electronically Signed By-Tonja Chauhan MD On:12/14/2020 9:12 AM This report was finalized on [id number] by  Tonja Chauhan MD.      Results for orders placed during the hospital encounter of 11/30/20   Adult Transthoracic Echo Complete W/ Cont if Necessary Per Protocol    Narrative · Left ventricular ejection fraction appears to be 56 - 60%.  · Left ventricular wall thickness is consistent with moderate concentric   hypertrophy.  · There is a TAVR valve present.  · Moderate tricuspid valve regurgitation is present.  · Left ventricular diastolic function is consistent with (grade I)   impaired relaxation.  · No pericardial effusion noted        Scheduled Meds:apixaban, 2.5 mg, Oral, Q12H  atorvastatin, 10 mg, Oral, Daily  colesevelam, 1,250 mg, Oral, BID With Meals  dextrose, 50 mL, Intravenous, Once  midodrine, 2.5 mg, Oral, TID AC  nystatin, , Topical, Q8H  QUEtiapine, 25 mg, Oral, Nightly  saccharomyces boulardii, 500 mg, Oral, BID  sodium chloride, 3 mL, Intravenous, Q12H  sodium chloride, 1 g, Oral, TID With Meals  vancomycin, 250 mg, Oral, Q6H      Continuous Infusions:dextrose, 50 mL/hr, Last Rate: 50 mL/hr (12/19/20 1253)  DOPamine, 2-20 mcg/kg/min, Last Rate: Stopped (12/16/20 1523)      PRN Meds:•  acetaminophen **OR** acetaminophen **OR** acetaminophen  •  magic butt paste  •  magnesium sulfate **OR** magnesium sulfate **OR** magnesium sulfate  •  nitroglycerin  •  ondansetron **OR** ondansetron  •  sodium chloride  •  sodium chloride    Assessment/Plan   Assessment and Plan:         C. difficile diarrhea    Chronic coronary  artery disease    Essential hypertension    Presence of cardiac pacemaker    Shortness of breath    Sick sinus syndrome (CMS/HCC)    Tachy-martin syndrome (CMS/HCC)    Chronic diastolic congestive heart failure (CMS/HCC)    Syncope and collapse    Acute renal failure (ARF) (CMS/HCC)    Leukocytosis    ASSESSMENT:  · Hyponatremia likely because of the congestive heart failure and may be some volume overload  · Hyperkalemia NICK with some congestive heart failure  · Acute kidney injury  · Significant leukocytosis rule out C. difficile colitis  · History of congestive heart failure but mainly diastolic dysfunctions  · History of aortic stenosis history of TAVR  · History of hypertension  · History of pacemaker insertion           Plan:      · Patient is still confused   · Sodium level and electrolytes are improving  · Continue gentle IV hydration  · Renal functions are better than yesterday  · Serum sodium is gradually improving  · Potassium is within normal limits  · Respiratory status seems stable so far  · Continue sodium chloride pill  · Etiology of low sodium likely related to patient C. difficile colitis as urine sodium is only 22 and urine osmolality 280  · Need to follow volume status closely especially the presence of significant diastolic failure  • Discussed with family at bedside          Note started  by Huy Carlin MD,   [facility] kidney consultant

## 2020-12-19 NOTE — SIGNIFICANT NOTE
ABG's drawn per R.T.  Nasal oxygen decreased to 1L/min per R.T. Waiting for lab results. Will notify Angelo DUNCAN with all results.

## 2020-12-19 NOTE — SIGNIFICANT NOTE
Unable to arouse patient sufficiently to administer po Vancomycin. Skin cool and dry. B/P 135/99 HR-77 R-22  Hands cool to touch; unable to get O2 sat.  Patient has been pleasantly confused earlier this am. Accucheck done. Results stated too low for result. Notified Angelo DUNCAN. Baron Ibarra RN charge nurse at bedside. D50 IVP. Order to change IVF. Will continue to monitor closely.

## 2020-12-19 NOTE — PROGRESS NOTES
"  Chief complaint \"I am not ok\"    Subjective .     History of present illness:  The patient is a 85 y.o. female who was admitted secondary to progressive weakness and dyspnea . PMH: CAD s/p CABG , severe aortic stenosis s/p TAVR in May, SSS with cardiac pacemaker in situ, HTN, PAF, DJD post shoulder surgery, non sustained V tach.    Psychiatric consult was requested by Dr. Sy secondary to confusion.  20 The patient herself is very limited historian, she was very reluctant to talk, she was trying to eat breakfast, the patient had significant delay in responses, she was not oriented to situation, but she knew her age, did not know today's date.    20, today the patient is anxious but improved, still some confusion but knew that she was in the hospital where her daughter works, knew her daughters job at [employer],  confused about why she is in the hospital, referenced shoulder surgery as the reason.    Past psychiatric history: Unremarkable    Review of Systems   Unable to perform ROS: Mental status change       History      Medications Prior to Admission   Medication Sig Dispense Refill Last Dose   • acebutolol (SECTRAL) 200 MG capsule TAKE ONE CAPSULE BY MOUTH TWICE A  capsule 2    • [] cefdinir (OMNICEF) 300 MG capsule Take 1 capsule by mouth 2 (Two) Times a Day for 5 days. 10 capsule 0    • ELIQUIS 2.5 MG tablet tablet TAKE ONE TABLET BY MOUTH EVERY 12 HOURS 180 tablet 3    • furosemide (LASIX) 20 MG tablet Take 20 mg by mouth.      • lisinopril (PRINIVIL,ZESTRIL) 20 MG tablet TAKE ONE TABLET BY MOUTH DAILY 90 tablet 1    • nystatin (MYCOSTATIN) 277687 UNIT/ML suspension Swish and swallow 500,000 Units.      • pravastatin (PRAVACHOL) 20 MG tablet TAKE ONE TABLET BY MOUTH DAILY 90 tablet 2    • QUEtiapine (SEROquel) 25 MG tablet Take 25 mg by mouth.          Scheduled Meds:apixaban, 2.5 mg, Oral, Q12H  atorvastatin, 10 mg, Oral, Daily  colesevelam, 1,250 mg, Oral, BID With " "Meals  dextrose, 50 mL, Intravenous, Once  midodrine, 2.5 mg, Oral, TID AC  nystatin, , Topical, Q8H  QUEtiapine, 25 mg, Oral, Nightly  saccharomyces boulardii, 500 mg, Oral, BID  sodium chloride, 3 mL, Intravenous, Q12H  sodium chloride, 1 g, Oral, TID With Meals  vancomycin, 250 mg, Oral, Q6H      Continuous Infusions:dextrose, 50 mL/hr, Last Rate: 50 mL/hr (12/19/20 1253)  DOPamine, 2-20 mcg/kg/min, Last Rate: Stopped (12/16/20 1523)      PRN Meds:.•  acetaminophen **OR** acetaminophen **OR** acetaminophen  •  magic butt paste  •  magnesium sulfate **OR** magnesium sulfate **OR** magnesium sulfate  •  nitroglycerin  •  ondansetron **OR** ondansetron  •  sodium chloride  •  sodium chloride     Allergies:  Sulfa antibiotics      Objective     Vital Signs   /53   Pulse 70   Temp 97.7 °F (36.5 °C) (Axillary)   Resp 22   Ht 167.6 cm (66\")   Wt 54.8 kg (120 lb 13 oz)   SpO2 93%   BMI 19.50 kg/m²     Physical Exam:     General Appearance:    NAD   Head:    Normocephalic, without obvious abnormality, atraumatic   Eyes:            Lids and lashes normal, conjunctivae and sclerae normal, no   icterus, no pallor, corneas clear, PERRLA   Skin:   No bleeding, bruising or rash          Neurologic:   Cranial nerves 2 - 12 grossly intact, sensation intact, DTR       present and equal bilaterally         Mental Status Exam:   Hygiene:   good  Cooperation:  Limited  Eye Contact:  Fair  Psychomotor Behavior:  Restless  Affect:  Blunted  Mood: anxious  Hopelessness: Denies  Speech:  Normal  Thought Process:  More goal oriented  Thought Content:  Mood congruent  Suicidal:  None  Homicidal:  None  Hallucinations:  Not demonstrated today  Delusion:  Unable to demonstrate  Memory:  Deficits  Orientation:  Person and Place  Reliability:  poor  Insight:  Poor  Judgement:  Impaired  Impulse Control:  Fair  Physical/Medical Issues:  Yes     Medications and allergies were reviewed by this provider.    Lab Results   Component " Value Date    GLUCOSE 467 (C) 12/19/2020    CALCIUM 7.8 (L) 12/19/2020     (L) 12/19/2020    K 4.0 12/19/2020    CO2 26.0 12/19/2020    CL 99 12/19/2020    BUN 26 (H) 12/19/2020    CREATININE 0.81 12/19/2020    EGFRIFNONA 67 12/19/2020    BCR 32.1 (H) 12/19/2020    ANIONGAP 6.0 12/19/2020       Last Urine Toxicity     LAST URINE TOXICITY RESULTS Latest Ref Rng & Units 12/16/2020    CREATININE UR mg/dL 52.7          No results found for: PHENYTOIN, PHENOBARB, VALPROATE, CBMZ    Lab Results   Component Value Date     (L) 12/19/2020    BUN 26 (H) 12/19/2020    CREATININE 0.81 12/19/2020    TSH 0.658 12/05/2020    WBC 9.30 12/19/2020       Brief Urine Lab Results  (Last result in the past 365 days)      Color   Clarity   Blood   Leuk Est   Nitrite   Protein   CREAT   Urine HCG        12/17/20 1553 Yellow Clear Negative Negative Negative Negative               Assessment/Plan       C. difficile diarrhea    Chronic coronary artery disease    Essential hypertension    Presence of cardiac pacemaker    Shortness of breath    Sick sinus syndrome (CMS/HCC)    Tachy-martin syndrome (CMS/HCC)    Chronic diastolic congestive heart failure (CMS/HCC)    Syncope and collapse    Acute renal failure (ARF) (CMS/HCC)    Leukocytosis       LABS: Reviewed    Assessment:   Delirium secondary to medical condition (TME)  Rule out dementia    Treatment Plan:   The patient is confused, but not agitated.  The patient was taking Seroquel prior to admission, continue on current dose.  The patient might have underlying dementia, her baseline is unknown.  Cognition might improve when medical condition clears up.  If the patient gets agitated use extra Seroquel as needed for agitation  The patient can be discharged when medically stable, will see PRN    Treatment Plan discussed with: Patient and Nursing staff    I discussed the patients findings and my recommendations with patient and nursing staff    I have reviewed and approved the  behavioral health treatment plans and problem list. Yes     Referring MD has access to consult report and progress notes in EMR     Terra Sotelo PA-C  12/19/20  14:43 EST    Patient was seen wearing appropriate PPE.    EMR Dragon transcription disclaimer:  Some of this encounter note is an electronic transcription translation of spoken language to printed text. The electronic translation of spoken language may permit erroneous, or at times, nonsensical words or phrases to be inadvertently transcribed; Although I have reviewed the note for such errors some may still exist.

## 2020-12-19 NOTE — PLAN OF CARE
Goal Outcome Evaluation:  Plan of Care Reviewed With: patient  Progress: no change  Patient is alert and able to make most needs known to staff. Patient continues to be confused at time, requiring staff to anticipate patient needs.  Problem: Skin Injury Risk Increased  Goal: Skin Health and Integrity  Outcome: Ongoing, Progressing  Intervention: Optimize Skin Protection  Recent Flowsheet Documentation  Taken 12/19/2020 0800 by Suzan Temple RN  Pressure Reduction Techniques:   frequent weight shift encouraged   weight shift assistance provided  Head of Bed (HOB): HOB elevated  Pressure Reduction Devices: pressure-redistributing mattress utilized  Skin Protection:   adhesive use limited   incontinence pads utilized   tubing/devices free from skin contact     Problem: Fall Injury Risk  Goal: Absence of Fall and Fall-Related Injury  Outcome: Ongoing, Progressing  Intervention: Identify and Manage Contributors to Fall Injury Risk  Recent Flowsheet Documentation  Taken 12/19/2020 0800 by Suzan Temple RN  Medication Review/Management: medications reviewed  Intervention: Promote Injury-Free Environment  Recent Flowsheet Documentation  Taken 12/19/2020 0800 by Suzan Temple RN  Safety Promotion/Fall Prevention:   activity supervised   assistive device/personal items within reach   clutter free environment maintained   fall prevention program maintained   lighting adjusted   nonskid shoes/slippers when out of bed   room organization consistent   safety round/check completed     Problem: Diarrhea (Gastroenteritis)  Goal: Effective Diarrhea Management  Outcome: Ongoing, Progressing  Intervention: Manage Diarrhea  Recent Flowsheet Documentation  Taken 12/19/2020 0800 by Suzan Temple RN  Skin Protection:   adhesive use limited   incontinence pads utilized   tubing/devices free from skin contact     Problem: Fluid Imbalance (Gastroenteritis)  Goal: Fluid Balance  Outcome: Ongoing, Progressing  Intervention: Monitor and  Manage Fluid Balance  Recent Flowsheet Documentation  Taken 12/19/2020 0800 by Suzan Temple RN  Fluid/Electrolyte Management: fluids provided     Problem: Nausea and Vomiting (Gastroenteritis)  Goal: Nausea and Vomiting Relief  Outcome: Ongoing, Progressing  Intervention: Prevent and Manage Nausea and Vomiting  Recent Flowsheet Documentation  Taken 12/19/2020 0800 by Suzan Temple RN  Environmental Support: calm environment promoted     Problem: Adult Inpatient Plan of Care  Goal: Plan of Care Review  Outcome: Ongoing, Progressing  Goal: Patient-Specific Goal (Individualized)  Outcome: Ongoing, Progressing  Goal: Absence of Hospital-Acquired Illness or Injury  Outcome: Ongoing, Progressing  Intervention: Identify and Manage Fall Risk  Recent Flowsheet Documentation  Taken 12/19/2020 0800 by Suzan Temple RN  Safety Promotion/Fall Prevention:   activity supervised   assistive device/personal items within reach   clutter free environment maintained   fall prevention program maintained   lighting adjusted   nonskid shoes/slippers when out of bed   room organization consistent   safety round/check completed  Intervention: Prevent Skin Injury  Recent Flowsheet Documentation  Taken 12/19/2020 0800 by Suzan Temple RN  Body Position: weight shift assistance provided  Intervention: Prevent Infection  Recent Flowsheet Documentation  Taken 12/19/2020 0800 by Suzan Temple RN  Infection Prevention:   environmental surveillance performed   hand hygiene promoted   equipment surfaces disinfected   personal protective equipment utilized   rest/sleep promoted   single patient room provided   visitors restricted/screened  Goal: Optimal Comfort and Wellbeing  Outcome: Ongoing, Progressing  Intervention: Provide Person-Centered Care  Recent Flowsheet Documentation  Taken 12/19/2020 0800 by Suzan Temple RN  Trust Relationship/Rapport:   care explained   questions encouraged   reassurance provided   thoughts/feelings  acknowledged  Goal: Readiness for Transition of Care  Outcome: Ongoing, Progressing     Problem: Malnutrition  Goal: Improved Nutritional Intake  Outcome: Ongoing, Progressing   Patient has no complaints of pain or discomfort at this time. Patient continues to need assistance with ADLs and transfers. Will continue to monitor.

## 2020-12-19 NOTE — SIGNIFICANT NOTE
Patient does not have a history of of diabetes however patient is found to have low blood sugar and altered mental status tonight.  Patient switched from 50 mL/h normal saline to D5 half-normal saline at 50 mL/h for now. Check A1c and accuchecks TID AC.     Electronically signed by MIREILLE Gudino, 12/19/20, 1:01 AM EST.

## 2020-12-19 NOTE — SIGNIFICANT NOTE
Blood sugar checked. Result 182. D51/2 NS hung at 50cc/hr. Skin warm and dry. Patient more alert,but still not responding verbally. Skin warm and dry.  Oxygen sat 100%. Pupils 3mm and equal and responsive to light. Monitor shows paced rhythm. Will continue to monitor closely.

## 2020-12-20 NOTE — PLAN OF CARE
Problem: Skin Injury Risk Increased  Goal: Skin Health and Integrity  Outcome: Ongoing, Progressing     Problem: Fall Injury Risk  Goal: Absence of Fall and Fall-Related Injury  Outcome: Ongoing, Progressing  Intervention: Identify and Manage Contributors to Fall Injury Risk  Recent Flowsheet Documentation  Taken 12/19/2020 1936 by Apple Jon LPN  Medication Review/Management: medications reviewed  Intervention: Promote Injury-Free Environment  Recent Flowsheet Documentation  Taken 12/20/2020 0338 by Apple Jon LPN  Safety Promotion/Fall Prevention:   activity supervised   clutter free environment maintained   fall prevention program maintained   nonskid shoes/slippers when out of bed   safety round/check completed  Taken 12/19/2020 2300 by Apple Jon LPN  Safety Promotion/Fall Prevention:   assistive device/personal items within reach   clutter free environment maintained   fall prevention program maintained   nonskid shoes/slippers when out of bed   safety round/check completed  Taken 12/19/2020 1936 by Apple Jon LPN  Safety Promotion/Fall Prevention:   activity supervised   clutter free environment maintained   fall prevention program maintained   nonskid shoes/slippers when out of bed   safety round/check completed     Problem: Diarrhea (Gastroenteritis)  Goal: Effective Diarrhea Management  Outcome: Ongoing, Progressing     Problem: Fluid Imbalance (Gastroenteritis)  Goal: Fluid Balance  Outcome: Ongoing, Progressing  Intervention: Monitor and Manage Fluid Balance  Recent Flowsheet Documentation  Taken 12/19/2020 1936 by Apple Jon LPN  Fluid/Electrolyte Management: fluids provided     Problem: Nausea and Vomiting (Gastroenteritis)  Goal: Nausea and Vomiting Relief  Outcome: Ongoing, Progressing     Problem: Adult Inpatient Plan of Care  Goal: Plan of Care Review  Outcome: Ongoing, Progressing  Flowsheets (Taken 12/20/2020 0339)  Plan of Care Reviewed With:    patient   daughter  Goal: Patient-Specific Goal (Individualized)  Outcome: Ongoing, Progressing  Goal: Absence of Hospital-Acquired Illness or Injury  Outcome: Ongoing, Progressing  Intervention: Identify and Manage Fall Risk  Recent Flowsheet Documentation  Taken 12/20/2020 0338 by Apple Jon LPN  Safety Promotion/Fall Prevention:   activity supervised   clutter free environment maintained   fall prevention program maintained   nonskid shoes/slippers when out of bed   safety round/check completed  Taken 12/19/2020 2300 by Apple Jon LPN  Safety Promotion/Fall Prevention:   assistive device/personal items within reach   clutter free environment maintained   fall prevention program maintained   nonskid shoes/slippers when out of bed   safety round/check completed  Taken 12/19/2020 1936 by Apple Jon LPN  Safety Promotion/Fall Prevention:   activity supervised   clutter free environment maintained   fall prevention program maintained   nonskid shoes/slippers when out of bed   safety round/check completed  Intervention: Prevent and Manage VTE (venous thromboembolism) Risk  Recent Flowsheet Documentation  Taken 12/19/2020 1936 by Apple Jon LPN  VTE Prevention/Management: dorsiflexion/plantar flexion performed  Intervention: Prevent Infection  Recent Flowsheet Documentation  Taken 12/20/2020 0338 by Apple Jon LPN  Infection Prevention:   equipment surfaces disinfected   personal protective equipment utilized   single patient room provided  Taken 12/19/2020 2300 by Apple Jon LPN  Infection Prevention:   personal protective equipment utilized   single patient room provided   equipment surfaces disinfected  Taken 12/19/2020 1936 by Apple Jon LPN  Infection Prevention:   personal protective equipment utilized   single patient room provided  Goal: Optimal Comfort and Wellbeing  Outcome: Ongoing, Progressing  Intervention: Provide Person-Centered  Care  Recent Flowsheet Documentation  Taken 12/19/2020 2300 by Apple Jon LPN  Trust Relationship/Rapport:   care explained   questions answered   questions encouraged   reassurance provided   thoughts/feelings acknowledged  Taken 12/19/2020 1936 by Apple Jon LPN  Trust Relationship/Rapport:   care explained   questions answered   questions encouraged   reassurance provided   thoughts/feelings acknowledged  Goal: Readiness for Transition of Care  Outcome: Ongoing, Progressing     Problem: Malnutrition  Goal: Improved Nutritional Intake  Outcome: Ongoing, Progressing   Goal Outcome Evaluation:  Plan of Care Reviewed With: patient, daughter  Progress: no change

## 2020-12-20 NOTE — NURSING NOTE
Patient c/o not being able to breath. Patient O2 sats 100% on 2L O2 via nasal canula. Patient has bipap ordered. Patient continues to refuse bipap. This nurse educated patient on importance of wearing bipap epecially in regards to patients current c/o short of air. Patient verbalized understanding but continued to refuse bipap. Charge nurse notified of patients c/o short of air and refusal of bipap. Patients O2 sats continue to remain at 100% on 2L O2 via nasal canula. Will advise oncoming shift of situation,

## 2020-12-20 NOTE — CONSULTS
[facility]    Referring Provider: Dr. Balbina Sy  Reason for Consultation: Hypoglycemia evaluation & management.    Patient Care Team:  Tigre Duran MD as PCP - General  Tigre Duran MD as PCP - Family Medicine  Wyatt Kwok MD as Consulting Physician (Cardiology)    Chief complaint Hypotension (nausea.)      Subjective .     History of present illness:    This is a  85 y.o. female with hypoglycemia, worse today after stopping D5W IV fluids.  Admitted for further evaluation of progressive weakness.   Recently hospitalized. Then went to rehab.  Lost 20 lb in the last year.  Decreased intake, off & on confusion. No vomiting.  Had small dose 20 mg of methylprednisolone IV on 12/15 & 12/16. None since then.    Review of Systems  Review of Systems   Unable to perform ROS: Mental status change       History  Past Medical History:   Diagnosis Date   • Aortic stenosis 10/28/2014   • Arthritis    • Atrial premature complex 12/18/2015   • CHF (congestive heart failure) (CMS/HCC)    • Chronic coronary artery disease 10/28/2014   • Dyslipidemia 10/28/2014   • Elevated cholesterol    • History of transfusion    • Hyperlipidemia    • Hypertension 10/28/2014   • Nausea 09/2020   • Nonsustained ventricular tachycardia (CMS/HCC) 12/18/2015   • Partial paralysis of right hand (CMS/HCC)     states right arm only.  Cannot have sticks in arm, hand only   • Shoulder pain, left      Past Surgical History:   Procedure Laterality Date   • AORTIC VALVE REPAIR/REPLACEMENT N/A 5/26/2020    Procedure: TTE TRANSFEMORAL TRANSCATHETER AORTIC VALVE REPLACEMENT PERCUTANEOUS APPROACH;  Surgeon: J Carlos Leon MD;  Location: [facility] OR 18/19;  Service: Cardiothoracic;  Laterality: N/A;   • AORTIC VALVE REPAIR/REPLACEMENT N/A 5/26/2020    Procedure: Transfemoral Transcatheter Aortic Valve Replacement w/Intra Op TTE and Possible Open Rescue Surgery;  Surgeon: Vasiliy Bruce MD;  Location:   [facility] ;  Service: Cardiovascular;  Laterality: N/A;   • APPENDECTOMY     • CARDIAC CATHETERIZATION N/A 3/2/2020    Procedure: Left Heart Cath and coronary angiogram;  Surgeon: Wyatt Kwok MD;  Location: [facility] CATH INVASIVE LOCATION;  Service: Cardiovascular;  Laterality: N/A;   • CARDIAC CATHETERIZATION N/A 3/2/2020    Procedure: Saphenous Vein Graft;  Surgeon: Wyatt Kwok MD;  Location: [facility] CATH INVASIVE LOCATION;  Service: Cardiovascular;  Laterality: N/A;   • CARDIAC PACEMAKER PLACEMENT     • CARDIAC SURGERY      Bypass surgery   • EYE SURGERY Bilateral     cat ext   • HERNIA REPAIR     • TOTAL SHOULDER ARTHROPLASTY W/ DISTAL CLAVICLE EXCISION Left 10/6/2020    Procedure: TOTAL SHOULDER REVERSE ARTHROPLASTY;  Surgeon: Oli Barcenas MD;  Location: [facility] MAIN OR;  Service: Orthopedics;  Laterality: Left;   • TOTAL SHOULDER REVISION  10/06/2020    left     Family History   Problem Relation Age of Onset   • No Known Problems Mother    • No Known Problems Father    • No Known Problems Sister    • No Known Problems Brother    • Malig Hyperthermia Neg Hx      Social History     Tobacco Use   • Smoking status: Never Smoker   • Smokeless tobacco: Never Used   • Tobacco comment: CAFFEINE USE: 1 CUP COFFEE DAILY   Substance Use Topics   • Alcohol use: No     Frequency: Never   • Drug use: No     Medications Prior to Admission   Medication Sig Dispense Refill Last Dose   • acebutolol (SECTRAL) 200 MG capsule TAKE ONE CAPSULE BY MOUTH TWICE A  capsule 2    • [] cefdinir (OMNICEF) 300 MG capsule Take 1 capsule by mouth 2 (Two) Times a Day for 5 days. 10 capsule 0    • ELIQUIS 2.5 MG tablet tablet TAKE ONE TABLET BY MOUTH EVERY 12 HOURS 180 tablet 3    • furosemide (LASIX) 20 MG tablet Take 20 mg by mouth.      • lisinopril (PRINIVIL,ZESTRIL) 20 MG tablet TAKE ONE TABLET BY MOUTH DAILY 90 tablet 1    • nystatin (MYCOSTATIN) 321429 UNIT/ML suspension Swish and swallow  500,000 Units.      • pravastatin (PRAVACHOL) 20 MG tablet TAKE ONE TABLET BY MOUTH DAILY 90 tablet 2    • QUEtiapine (SEROquel) 25 MG tablet Take 25 mg by mouth.        Scheduled Meds:  apixaban, 2.5 mg, Oral, Q12H  atorvastatin, 10 mg, Oral, Daily  colesevelam, 1,250 mg, Oral, BID With Meals  [START ON 12/21/2020] cosyntropin, 0.25 mg, Intravenous, Once  dextrose, 50 mL, Intravenous, Once  midodrine, 2.5 mg, Oral, TID AC  nystatin, , Topical, Q8H  QUEtiapine, 25 mg, Oral, Nightly  saccharomyces boulardii, 500 mg, Oral, BID  sodium chloride, 3 mL, Intravenous, Q12H  vancomycin, 250 mg, Oral, Q6H      Continuous Infusions:  dextrose, 50 mL/hr, Last Rate: 50 mL/hr (12/20/20 1516)  DOPamine, 2-20 mcg/kg/min, Last Rate: Stopped (12/16/20 1523)      PRN Meds:  •  acetaminophen **OR** acetaminophen **OR** acetaminophen  •  dextrose  •  magic butt paste  •  magnesium sulfate **OR** magnesium sulfate **OR** magnesium sulfate  •  nitroglycerin  •  ondansetron **OR** ondansetron  •  sodium chloride  •  sodium chloride  Allergies:  Sulfa antibiotics    Objective     Vital Signs   Temp:  [97 °F (36.1 °C)-97.8 °F (36.6 °C)] 97.4 °F (36.3 °C)  Heart Rate:  [70-77] 70  Resp:  [20-24] 20  BP: (124-169)/(47-69) 169/47    Physical Exam:     General Appearance:    Awake, in no acute distress. Thin   Head:    Normocephalic, without obvious abnormality, atraumatic   Eyes:            Lids and lashes normal, conjunctivae and sclerae normal, no   icterus, no pallor, corneas clear, PERRLA   Throat:   No oral lesions,  oral mucosa moist. Dentures   Neck:   No adenopathy, supple,  no thyromegaly, no   carotid bruit   Lungs:     Clear    Heart:    Regular rhythm and normal rate   Chest Wall:    No abnormalities observed   Abdomen:     Normal bowel sounds, soft                 Extremities:   Moves all extremities well, no edema               Pulses:   Pulses palpable and equal bilaterally   Skin:   Dry. No hyperpigmentation   Neurologic:  DTR  1+       Results Review  I have reviewed the patient's new clinical results, labs & imaging.    Lab Results (last 24 hours)     Procedure Component Value Units Date/Time    POC Glucose Once [[id number]]  (Abnormal) Collected: 12/20/20 1241    Specimen: Blood Updated: 12/20/20 1242     Glucose 197 mg/dL      Comment: Serial Number: [id number]:  [id number]       POC Glucose Once [[id number]]  (Abnormal) Collected: 12/20/20 1212    Specimen: Blood Updated: 12/20/20 1213     Glucose 49 mg/dL      Comment: Serial Number: [id number]:  [id number]       POC Glucose Once [[id number]]  (Abnormal) Collected: 12/20/20 1208    Specimen: Blood Updated: 12/20/20 1211     Glucose 30 mg/dL      Comment: Serial Number: [id number]:  [id number]       POC Glucose Once [[id number]]  (Abnormal) Collected: 12/20/20 1154    Specimen: Blood Updated: 12/20/20 1154     Glucose 60 mg/dL      Comment: Serial Number: [id number]:  [id number]       POC Glucose Once [[id number]]  (Abnormal) Collected: 12/20/20 1139    Specimen: Blood Updated: 12/20/20 1153     Glucose 27 mg/dL      Comment: Serial Number: [id number]:  [id number]       POC Glucose Once [[id number]]  (Abnormal) Collected: 12/20/20 1124    Specimen: Blood Updated: 12/20/20 1127     Glucose 49 mg/dL      Comment: Serial Number: [id number]:  [id number]       POC Glucose Once [[id number]]  (Normal) Collected: 12/20/20 0728    Specimen: Blood Updated: 12/20/20 0729     Glucose 80 mg/dL      Comment: Serial Number: [id number]:  [id number]       Basic Metabolic Panel [[id number]]  (Abnormal) Collected: 12/20/20 0521    Specimen: Blood Updated: 12/20/20 0627     Glucose 83 mg/dL      BUN 17 mg/dL      Creatinine 0.56 mg/dL      Sodium 135 mmol/L      Potassium 5.0 mmol/L      Chloride 104 mmol/L      CO2 29.0 mmol/L      Calcium 8.7 mg/dL      eGFR Non African Amer 103 mL/min/1.73      BUN/Creatinine Ratio 30.4     Anion Gap 2.0 mmol/L     Narrative:      GFR  Normal >60  Chronic Kidney Disease <60  Kidney Failure <15      CBC & Differential [[id number]]  (Abnormal) Collected: 12/20/20 0336    Specimen: Blood Updated: 12/20/20 0432    Narrative:      The following orders were created for panel order CBC & Differential.  Procedure                               Abnormality         Status                     ---------                               -----------         ------                     CBC Auto Differential[[id number]]        Abnormal            Final result                 Please view results for these tests on the individual orders.    CBC Auto Differential [[id number]]  (Abnormal) Collected: 12/20/20 0336    Specimen: Blood Updated: 12/20/20 0432     WBC 9.80 10*3/mm3      RBC 2.98 10*6/mm3      Hemoglobin 9.4 g/dL      Hematocrit 28.5 %      MCV 95.7 fL      MCH 31.4 pg      MCHC 32.8 g/dL      RDW 14.5 %      RDW-SD 49.4 fl      MPV 8.0 fL      Platelets 353 10*3/mm3      Neutrophil % 74.3 %      Lymphocyte % 12.6 %      Monocyte % 9.4 %      Eosinophil % 3.5 %      Basophil % 0.2 %      Neutrophils, Absolute 7.30 10*3/mm3      Lymphocytes, Absolute 1.20 10*3/mm3      Monocytes, Absolute 0.90 10*3/mm3      Eosinophils, Absolute 0.30 10*3/mm3      Basophils, Absolute 0.00 10*3/mm3      nRBC 0.1 /100 WBC     POC Glucose Once [[id number]]  (Normal) Collected: 12/20/20 0159    Specimen: Blood Updated: 12/20/20 0200     Glucose 97 mg/dL      Comment: Serial Number: [id number]:  [id number]       POC Glucose Once [[id number]]  (Normal) Collected: 12/19/20 2025    Specimen: Blood Updated: 12/19/20 2026     Glucose 87 mg/dL      Comment: Serial Number: [id number]:  [id number]       POC Glucose Once [[id number]]  (Normal) Collected: 12/19/20 1622    Specimen: Blood Updated: 12/19/20 1626     Glucose 80 mg/dL      Comment: Serial Number: [id number]:  [id number]           Lab Results   Component Value Date    HGBA1C 5.5 09/30/2020     Lab Results   Component  Value Date    TSH 0.658 12/05/2020       Assessment/Plan     1. Hypoglycemia  2. C. Difficile diarrhea    Will order ACTH stimulation test to rule out adrenal insufficiency for completeness.  Most likely cause of hypoglycemia is malnutrition: she has decreased intake & low albumin.  Discussed with her nurse to encourage Boost 1 can at least 3x/d to maintain basic intake needs.  Continue D5W for now to keep blood sugar above 50 mg/dl.She becomes symptomatic below 40.  Will follow with you.  Thank you for the consult.      I discussed the patients findings and my recommendations with patient    James Rivera MD  12/20/20  15:35 EST

## 2020-12-20 NOTE — PROGRESS NOTES
"      [facility] Medicine Services Daily Progress Note          Hospitalist Team  LOS 7 days      Patient Care Team:  Tigre Duran MD as PCP - General  Tigre Duran MD as PCP - Family Medicine  Wyatt Kwok MD as Consulting Physician (Cardiology)    Patient Location: 2109/1      Subjective   Subjective     Chief Complaint / Subjective  Chief Complaint   Patient presents with   • Hypotension     nausea.         Brief Synopsis of Hospital Course/HPI  85 year old female with PMH of CAD s/p CABG 1999, severe aortic stenosis s/p TAVR in May, SSS with cardiac pacemaker in situ, HTN, PAF, DJD post shoulder surgery, non sustained V tach, HLDpresents with complaints of progressive weakness and dyspnea. She is awake and alert and appropriate but lethargic. She denies chest pain or syncope but per ED report EMS reports she had a \"spell\" en route  with hypotension . In ED no hypotension or arrythmmia seen . Per ED report pacemaker interrogated with no unusual results. She is Covid-19 negative. Review of records shows she was just admitted here 11/30/20 to 12/6/20 for similar complaints and seen by both neurology and cardiology. Per notes it was felt to be metabolic encephalopathy from possible UTI and she was discharged on Cefdinir. She reports diarrhea past 2 days without hematochezia or melena or abdominal pain. C-Diff culture ordered and pending. UA shows no bacteria but too numerous to count wbc . She was started on IV Ceftriaxone in ED with urine and blood culture pending. D-Dimer was elevated. CT chest per radiology today:  \"IMPRESSION:  1. No pulmonary embolism.  2. Reticular and ground glass densities diffusely may represent edema or pneumonia superimposed on chronic interstitial change.  3. Stable surgical changes and cardiomegaly.  4. Calcified left pleural plaque.\"     Labs show wbc 17, K 5.4, Na 128, Cr 1.24 ( was 0.64), BNP 4400 was 13,000 12/5/20, Hgb 10, procalcitonin 1.40.      She " reports she left here after last admission and went to rehab then was at [facility] and just discharged yesterday. No records of [place] in EMR and records have been requested. She will be admitted for further evaluation and treatment .        Date: 12/13/2020: The patient reports just feeling generally ill and she has had difficulty staying warm today, but she has had no fever, sweats, nausea or vomiting.  No chest pain, cough or shortness of breath.  She reports her diarrhea is improving.    12/14/2020  Alerted by night shift PA that pt having hypotension, didn't respond to 750cc bolus, now on dopamine drip and her cardiologist has been consulted. Pt is lethargic, but responsive. She denies any chest pains or abdominal pains, she continues to have SOA and diarrhea.    12/15/2020  Patient seen and examined.  She is alert and awake but confused in general.  Discussion with patient's daughter yesterday leads me to believe she has undiagnosed dementia underlying her behaviors.  Per nurse to very small bowel motions so far today, diarrhea seems to be resolving.    12/16/2020  Pt seen and examined.  She had an episode of emesis x1 today and upon questioning, pt thinks she may have choked on some of it - will need to monitor for s/s of aspiration. No diarrhea today per nurse.    12/17.2020  Pt seen and examined. Pt's daughter reports the patient is much more confused and agitated than her normal baseline. And she gets like this when she has a repeat UTI. UA this afternoon was negative.    12/18/2020  Pt seen and examined. She has had loose stools again today per d/w nurse.    12/19/2020  Pt seen and examined. No diarrhea.  Her blood glucose has been very low, she remains on D5 for now.    Review of Systems   Unable to perform ROS: dementia   Eyes: Positive for pain.         Objective   Objective      Vital Signs  Temp:  [97 °F (36.1 °C)-97.8 °F (36.6 °C)] 97.3 °F (36.3 °C)  Heart Rate:  [70-77] 70  Resp:  [20-24]  "20  BP: (124-164)/(53-69) 152/69  Oxygen Therapy  SpO2: 100 %  Pulse Oximetry Type: Continuous  Device (Oxygen Therapy): nasal cannula  Flow (L/min): 2  Flowsheet Rows      First Filed Value   Admission Height  167.6 cm (66\") Documented at 12/11/2020 2338   Admission Weight  49.9 kg (110 lb) Documented at 12/11/2020 2338        Intake & Output (last 3 days)       12/17 0701 - 12/18 0700 12/18 0701 - 12/19 0700 12/19 0701 - 12/20 0700 12/20 0701 - 12/21 0700    P.O.  240 360 360    Total Intake(mL/kg)  240 (4.4) 360 (6.4) 360 (6.4)    Urine (mL/kg/hr) 700 (0.5)  400 (0.3) 150 (0.5)    Emesis/NG output        Stool 0  0 0    Total Output 700  400 150    Net -700 +240 -40 +210            Urine Unmeasured Occurrence 1 x  2 x 1 x    Stool Unmeasured Occurrence 1 x 5 x 2 x 1 x        Lines, Drains & Airways    Active LDAs     Name:   Placement date:   Placement time:   Site:   Days:    Peripheral IV 12/12/20 0401 Left Forearm   12/12/20 0401    Forearm   2    Peripheral IV 12/12/20 0401 Right Forearm   12/12/20 0401    Forearm   2                  Physical Exam:  General: well-developed, frail and cachectic NAD  HEENT: NC/AT, EOMI, PERRLA, +JVD  Heart: RRR. + murmur   Chest: Bilateral rales throughout, no wheezing or rhonchi, respiratory effort mildly labored  Abdominal: Soft. NT/ND. Bowel sounds present.  Musculoskeletal: Normal ROM.  No edema. No calf tenderness.  Neurological: Alert and awake, confused, no focal deficits  Skin: Skin is warm and dry. No rash.  Scattered ecchymosis  Psychiatric: Confused.       Wounds (last 24 hours)      LDA Wound     Row Name 12/20/20 1133 12/20/20 0738 12/20/20 0338       Wound 12/12/20 0509 medial coccyx Pressure Injury    Wound - Properties Group Placement Date: 12/12/20  -DD Placement Time: 0509 -DD Orientation: medial  -DD Location: coccyx  -DD Primary Wound Type: Pressure inj  -DD Stage, Pressure Injury : Stage 1  -DD    Base  non-blanchable;maroon/purple;moist  -JG  " non-blanchable;maroon/purple;moist  -JG  non-blanchable;maroon/purple;moist  -TS    Periwound  intact;dry;blanchable  -JG  intact;dry;blanchable  -JG  intact;dry;blanchable  -TS    Drainage Amount  none  -JG  none  -JG  none  -TS    Retired Wound - Properties Group Date first assessed: 12/12/20 -DD Time first assessed: 0509 -DD Location: coccyx  -DD Primary Wound Type: Pressure inj  -DD    Row Name 12/19/20 2300 12/1936 12/19/20 1500       Wound 12/12/20 0509 medial coccyx Pressure Injury    Wound - Properties Group Placement Date: 12/12/20  -DD Placement Time: 0509 -DD Orientation: medial  -DD Location: coccyx  -DD Primary Wound Type: Pressure inj  -DD Stage, Pressure Injury : Stage 1  -DD    Base  non-blanchable;maroon/purple;moist  -TS  non-blanchable;maroon/purple;moist  -TS  non-blanchable;maroon/purple;moist  -JH    Periwound  intact;dry;blanchable  -TS  intact;dry;blanchable  -TS  intact;dry;blanchable  -JH    Drainage Amount  none  -TS  none  -TS  none  -JH    Retired Wound - Properties Group Date first assessed: 12/12/20 -DD Time first assessed: 0509 -DD Location: coccyx  -DD Primary Wound Type: Pressure inj  -DD      User Key  (r) = Recorded By, (t) = Taken By, (c) = Cosigned By    Initials Name Provider Type    Thania Mujica LPN Licensed Nurse    Apple Grullon LPN Licensed Nurse    Henny Coates RN Registered Nurse     Temple, Suzan, RN Registered Nurse          Procedures:           Results Review:     I reviewed the patient's new clinical results.    Results from last 7 days   Lab Units 12/20/20  0336 12/19/20  0246 12/18/20  0233 12/17/20  1158 12/16/20  0723 12/15/20  0331 12/14/20  1045   WBC 10*3/mm3 9.80 9.30 20.00* 31.30* 28.50* 25.40* 27.00*   HEMOGLOBIN g/dL 9.4* 9.7* 9.6* 10.0* 10.6* 11.7* 11.1*   HEMATOCRIT % 28.5* 30.6* 29.6* 31.8* 33.8* 36.4 34.5   PLATELETS 10*3/mm3 353 364 410 454* 383 227 364     Results from last 7 days   Lab Units 12/20/20  0521  12/19/20  0246 12/18/20  0233 12/17/20  1158 12/17/20  0254 12/16/20  0723 12/15/20  0656  12/14/20  0652   SODIUM mmol/L 135* 131* 129* 128* 125* 126* 128*   < > 130*   POTASSIUM mmol/L 5.0 4.0 4.4 5.2 5.0 4.4 4.1   < > 4.9   CHLORIDE mmol/L 104 99 93* 92* 92* 90* 91*   < > 95*   CO2 mmol/L 29.0 26.0 28.0 29.0 23.0 26.0 27.0   < > 24.0   BUN mg/dL 17 26* 39* 35* 36* 28* 22   < > 22   CREATININE mg/dL 0.56* 0.81 1.03* 1.17* 1.22* 1.07* 1.20*   < > 1.47*   GLUCOSE mg/dL 83 467* 88 99 142* 157* 73   < > 103*   ALBUMIN g/dL  --  2.60*  --   --  2.90*  --  2.60*  --  2.40*   BILIRUBIN mg/dL  --  0.3  --   --  0.3  --  0.4  --  0.4   ALK PHOS U/L  --  77  --   --  198*  --  142*  --  86   AST (SGOT) U/L  --  16  --   --  23  --  26  --  25   ALT (SGPT) U/L  --  10  --   --  11  --  12  --  10   AMMONIA umol/L  --  12  --   --   --   --   --   --   --    CALCIUM mg/dL 8.7 7.8* 8.9 9.0 8.8 8.6 8.2*   < > 8.3*    < > = values in this interval not displayed.     Cr Clearance Estimated Creatinine Clearance: 46.7 mL/min (A) (by C-G formula based on SCr of 0.56 mg/dL (L)).    Coag         HbA1C   Lab Results   Component Value Date    HGBA1C 5.5 09/30/2020    HGBA1C 5.90 (H) 05/21/2020     Blood Glucose   Results from last 7 days   Lab Units 12/20/20  1241 12/20/20  1212 12/20/20  1208 12/20/20  1154 12/20/20  1139 12/20/20  1124   GLUCOSE mg/dL 197* 49* 30* 60* 27* 49*       Troponin   Results from last 7 days   Lab Units 12/15/20  0407 12/14/20  0652   PROBNP pg/mL 14,913.0* 32,130.0*     Lipids    Lab Results   Component Value Date    CHOL 127 08/05/2017    TRIG 90 08/05/2017    HDL 49 08/05/2017    LDL 62 08/05/2017       UA    Results from last 7 days   Lab Units 12/17/20  1553   NITRITE UA  Negative       Microbiology         ABG    Results from last 7 days   Lab Units 12/19/20  0242   PH, ARTERIAL pH units 7.245*   PCO2, ARTERIAL mm Hg 66.1*   PO2 ART mm Hg 284.5*   O2 SATURATION ART % 99.8*   BASE EXCESS ART mmol/L 0.5          EKG  ECG 12 Lead   Preliminary Result   HEART RATE= 115  bpm   RR Interval= 520  ms   NH Interval= 118  ms   P Horizontal Axis= -34  deg   P Front Axis= 68  deg   QRSD Interval= 127  ms   QT Interval= 356  ms   QRS Axis= 205  deg   T Wave Axis= 29  deg   - ABNORMAL ECG -   Atrial-sensed ventricular-paced rhythm   Electronically Signed By:    Date and Time of Study: 2020-12-15 06:11:30      ECG 12 Lead   Final Result   HEART RATE= 82  bpm   RR Interval= 731  ms   NH Interval= 122  ms   P Horizontal Axis= 12  deg   P Front Axis= -29  deg   QRSD Interval= 131  ms   QT Interval= 419  ms   QRS Axis= 212  deg   T Wave Axis= 31  deg   - ABNORMAL ECG -   Atrial-sensed ventricular-paced rhythm   When compared with ECG of 30-Nov-2020 13:22:06,   No significant change   Electronically Signed By: Lawrence Viera (Tre) 13-Dec-2020 09:25:09   Date and Time of Study: 2020-12-12 03:22:32          Imaging:  Xr Chest 1 View    Result Date: 12/15/2020  1. Improved bilateral interstitial opacities likely related to resolving infection or edema. 2. Persistent left basilar airspace opacity.  Electronically Signed By-Hill Askew MD On:12/15/2020 9:04 AM This report was finalized on [id number] by  Hill Askew MD.    Xr Abdomen Kub    Result Date: 12/14/2020  Mild gaseous distention of small bowel loops is nonspecific and may reflect changes of ileus. No convincing evidence of high-grade small bowel obstruction this time.  Electronically Signed By-Tonja Chauhan MD On:12/14/2020 9:12 AM This report was finalized on [id number] by  Tonja Chauhan MD.    Results for orders placed during the hospital encounter of 11/30/20   Adult Transthoracic Echo Complete W/ Cont if Necessary Per Protocol    Narrative · Left ventricular ejection fraction appears to be 56 - 60%.  · Left ventricular wall thickness is consistent with moderate concentric   hypertrophy.  · There is a TAVR valve present.  · Moderate tricuspid valve  regurgitation is present.  · Left ventricular diastolic function is consistent with (grade I)   impaired relaxation.  · No pericardial effusion noted        Results for orders placed during the hospital encounter of 02/28/20   Duplex Carotid Ultrasound CAR    Narrative · Proximal right internal carotid artery moderate stenosis.  · Proximal left internal carotid artery mild stenosis.          Xrays, labs reviewed personally by physician.    Medication Review:   I have reviewed the patient's current medication list      Scheduled Meds  apixaban, 2.5 mg, Oral, Q12H  atorvastatin, 10 mg, Oral, Daily  colesevelam, 1,250 mg, Oral, BID With Meals  dextrose, 50 mL, Intravenous, Once  midodrine, 2.5 mg, Oral, TID AC  nystatin, , Topical, Q8H  QUEtiapine, 25 mg, Oral, Nightly  saccharomyces boulardii, 500 mg, Oral, BID  sodium chloride, 3 mL, Intravenous, Q12H  vancomycin, 250 mg, Oral, Q6H        Meds Infusions  dextrose, 50 mL/hr  DOPamine, 2-20 mcg/kg/min, Last Rate: Stopped (12/16/20 1523)        Meds PRN  •  acetaminophen **OR** acetaminophen **OR** acetaminophen  •  dextrose  •  magic butt paste  •  magnesium sulfate **OR** magnesium sulfate **OR** magnesium sulfate  •  nitroglycerin  •  ondansetron **OR** ondansetron  •  sodium chloride  •  sodium chloride      Assessment/Plan   Assessment/Plan     Active Hospital Problems    Diagnosis  POA   • **C. 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       MEDICAL  DECISION MAKING COMPLEXITY BY PROBLEM:     C. difficile associated diarrhea  -Multiple rounds of antibiotics recent UTI  -Current UA does not indicate any presence of UTI  -UA shows yeast, this is contamination from the skin, discontinue fluconazole     -Add nystatin powder to perineum  -Increase oral vancomycin to 250 mg every 6h    -Diarrhea decreasing significantly with higher dose oral vancomycin, continue same    -Vanco enema may not be needed given her improvement.  -Increased dosing of Florastor and WelChol for optimization  -Magic Butt cream to be applied as needed    Leukocytosis -- resolved  -Due to C. difficile associated diarrhea  -No signs or symptoms of sepsis at this time  -WBC increasing since patient has been started on steroids    --steroids d/c'd with pulmonology permission     --now with resolution of leukocytosis  -Continue to monitor CBC and WBC count    Hypoglycemia  --no hx of diabetes that I am aware of -- ? Po intake  --HgbA1c ordered overnight, but will not result until Monday  --continue on D5 as per nephrology for now  --if no better will consult endocrinology    Altered mental status  -- likely multifactorial  --UA ruled out UTI  --possible due to ongoing diarrhea, meds, dehydratio, etc.  --Psychiatry input appreciated     -- delirium due to medical condition     -- might have underlying dementia     -- continue current dose of seroquel     -- may use additional seroquel PRN for agitation    Hypotension  Syncope and collapse  -s/p 750 cc bolus earlier this morning  -Chronic low blood pressure due to diastolic CHF  -Midodrine was given only as single dose in past  -Dopamine drip continues  -Continue to monitor BP and vitals closely  -Cardiology following     --Appreciate input  --12/16/20 scheduled low dose Midodrine, weaned off Dopamine     --d/w Nephrology, agreed with tx as it will maintain her BP much better    Dyspnea  -Due to the above  -Fluid overloaded BNP greater than  32,000  -Will need to gently diuresis at some point if blood pressure allows  -Continuous pulse oximetry  -O2 supplementation to keep sats > 93%  -Pulmonology consulted overnight and following  -Solu-Medrol discontinued per discussion with Pulmonology    Acute renal failure  -Creatinine 1.24 on admission, previously 0.6 on last admission  -Likely due to dehydration from diarrhea  -Gradually improving, currently 0.83  -Will need to cautiously diurese as noted above  --Nephrology following     --Appreciate input    Chronic diastolic congestive heart failure  Aortic stenosis  Sick sinus syndrome/tachybradycardia syndrome  Presence of cardiac pacemaker  Chronic coronary artery disease  -Last echo on 12/1/2020 reviewed and noted above  -Continue lisinopril, metoprolol, atorvastatin  -Furosemide being held currently  -Cardiology to see    Essential hypertension  -Continue lisinopril and metoprolol, midodrine added for hypotension  -Continue to monitor BP and vitals    Medial coccyx stage I pressure injury  -Mepilex dressing  -Turn every 2 hours      VTE Prophylaxis  Mechanical Order History:      Ordered        12/12/20 0548  Place Sequential Compression Device  Once         12/12/20 0548  Maintain Sequential Compression Device  Continuous         12/12/20 0435  Place Sequential Compression Device  Once         12/12/20 0435  Maintain Sequential Compression Device  Continuous                 Pharmalogical Order History:      Ordered     Dose Route Frequency Stop    12/12/20 1302  apixaban (ELIQUIS) tablet 2.5 mg      2.5 mg PO Every 12 Hours --                Code Status  Code Status and Medical Interventions:   Ordered at: 12/14/20 1242     Limited Support to NOT Include:    Intubation     Level Of Support Discussed With:    Health Care Surrogate     Code Status:    No CPR     Medical Interventions (Level of Support Prior to Arrest):    Limited     Comments:    DNR/DNI       This patient has been examined wearing  appropriate Personal Protective Equipment. 12/20/20      Discharge Planning    Pending clinical course.      Electronically signed by Balbina Sy MD, 12/20/20, 12:45 EST.    [facility] Hospitalist Team

## 2020-12-20 NOTE — PROGRESS NOTES
PROGRESS NOTE      Patient Name: Olga Curtis  : 1935  MRN: [id number]  Primary Care Physician: Tigre Duran MD  Date of admission: 2020    Patient Care Team:  Tigre Duran MD as PCP - General  Tigre Duran MD as PCP - Family Medicine  Wyatt Kwok MD as Consulting Physician (Cardiology)        Subjective   Subjective:     Patient seen and examined, seems more awake than yesterday, no distress.  Review of systems:  All other review of system unremarkable      Allergies:    Allergies   Allergen Reactions   • Sulfa Antibiotics Hives       Objective   Exam:     Vital Signs  Temp:  [97 °F (36.1 °C)-97.8 °F (36.6 °C)] 97.5 °F (36.4 °C)  Heart Rate:  [70-77] 70  Resp:  [20-24] 24  BP: (124-164)/(53-60) 164/60  SpO2:  [93 %-100 %] 100 %  on  Flow (L/min):  [2] 2;   Device (Oxygen Therapy): nasal cannula  Body mass index is 20.1 kg/m².    General: Elderly white  female in no acute distress.    Head:      Normocephalic and atraumatic.    Eyes:      PERRL/EOM intact, conjunctiva and sclera clear with out nystagmus.    Neck:      No masses, thyromegaly,  trachea central with normal respiratory effort   Lungs:    Clear bilaterally to auscultation.    Heart:      Regular rate and rhythm, no murmur no gallop  Abd:        Soft, nontender, not distended, bowel sounds positive, no shifting dullness   Pulses:   Pulses palpable  Extr:        No cyanosis or clubbing--mild edema.    Neuro:    No focal deficits.   alert oriented x3  Skin:       Intact without lesions or rashes.    Psych:    Alert and cooperative; normal mood and affect; .      Results Review:  I have personally reviewed most recent Data :  CBC    Results from last 7 days   Lab Units 20  0336 20  0246 20  0233 20  1158 20  0723 12/15/20  0331 20  1045   WBC 10*3/mm3 9.80 9.30 20.00* 31.30* 28.50* 25.40* 27.00*   HEMOGLOBIN g/dL 9.4* 9.7* 9.6* 10.0* 10.6* 11.7* 11.1*   PLATELETS 10*3/mm3 353 364 410 454*  383 227 364     CMP   Results from last 7 days   Lab Units 12/20/20  0521 12/19/20  0246 12/18/20  0233 12/17/20  1158 12/17/20  0254 12/16/20  0723 12/15/20  0656  12/14/20  0652   SODIUM mmol/L 135* 131* 129* 128* 125* 126* 128*   < > 130*   POTASSIUM mmol/L 5.0 4.0 4.4 5.2 5.0 4.4 4.1   < > 4.9   CHLORIDE mmol/L 104 99 93* 92* 92* 90* 91*   < > 95*   CO2 mmol/L 29.0 26.0 28.0 29.0 23.0 26.0 27.0   < > 24.0   BUN mg/dL 17 26* 39* 35* 36* 28* 22   < > 22   CREATININE mg/dL 0.56* 0.81 1.03* 1.17* 1.22* 1.07* 1.20*   < > 1.47*   GLUCOSE mg/dL 83 467* 88 99 142* 157* 73   < > 103*   ALBUMIN g/dL  --  2.60*  --   --  2.90*  --  2.60*  --  2.40*   BILIRUBIN mg/dL  --  0.3  --   --  0.3  --  0.4  --  0.4   ALK PHOS U/L  --  77  --   --  198*  --  142*  --  86   AST (SGOT) U/L  --  16  --   --  23  --  26  --  25   ALT (SGPT) U/L  --  10  --   --  11  --  12  --  10   AMMONIA umol/L  --  12  --   --   --   --   --   --   --     < > = values in this interval not displayed.     ABG    Results from last 7 days   Lab Units 12/19/20  0242   PH, ARTERIAL pH units 7.245*   PCO2, ARTERIAL mm Hg 66.1*   PO2 ART mm Hg 284.5*   O2 SATURATION ART % 99.8*   BASE EXCESS ART mmol/L 0.5     Xr Chest 1 View    Result Date: 12/15/2020  1. Improved bilateral interstitial opacities likely related to resolving infection or edema. 2. Persistent left basilar airspace opacity.  Electronically Signed By-Hill Askew MD On:12/15/2020 9:04 AM This report was finalized on [id number] by  Hill Askew MD.    Xr Abdomen Kub    Result Date: 12/14/2020  Mild gaseous distention of small bowel loops is nonspecific and may reflect changes of ileus. No convincing evidence of high-grade small bowel obstruction this time.  Electronically Signed By-Tonja Chauhan MD On:12/14/2020 9:12 AM This report was finalized on [id number] by  Tonja Chauhan MD.      Results for orders placed during the hospital encounter of 11/30/20   Adult Transthoracic  Echo Complete W/ Cont if Necessary Per Protocol    Narrative · Left ventricular ejection fraction appears to be 56 - 60%.  · Left ventricular wall thickness is consistent with moderate concentric   hypertrophy.  · There is a TAVR valve present.  · Moderate tricuspid valve regurgitation is present.  · Left ventricular diastolic function is consistent with (grade I)   impaired relaxation.  · No pericardial effusion noted        Scheduled Meds:apixaban, 2.5 mg, Oral, Q12H  atorvastatin, 10 mg, Oral, Daily  colesevelam, 1,250 mg, Oral, BID With Meals  dextrose, 50 mL, Intravenous, Once  midodrine, 2.5 mg, Oral, TID AC  nystatin, , Topical, Q8H  QUEtiapine, 25 mg, Oral, Nightly  saccharomyces boulardii, 500 mg, Oral, BID  sodium chloride, 3 mL, Intravenous, Q12H  sodium chloride, 1 g, Oral, TID With Meals  vancomycin, 250 mg, Oral, Q6H      Continuous Infusions:dextrose, 50 mL/hr, Last Rate: 50 mL/hr (12/19/20 1253)  DOPamine, 2-20 mcg/kg/min, Last Rate: Stopped (12/16/20 1523)      PRN Meds:•  acetaminophen **OR** acetaminophen **OR** acetaminophen  •  magic butt paste  •  magnesium sulfate **OR** magnesium sulfate **OR** magnesium sulfate  •  nitroglycerin  •  ondansetron **OR** ondansetron  •  sodium chloride  •  sodium chloride    Assessment/Plan   Assessment and Plan:         C. difficile diarrhea    Chronic coronary artery disease    Essential hypertension    Presence of cardiac pacemaker    Shortness of breath    Sick sinus syndrome (CMS/HCC)    Tachy-martin syndrome (CMS/HCC)    Chronic diastolic congestive heart failure (CMS/HCC)    Syncope and collapse    Acute renal failure (ARF) (CMS/HCC)    Leukocytosis    ASSESSMENT:  · Hyponatremia likely because of the congestive heart failure and may be some volume overload  · Hyperkalemia NICK with some congestive heart failure  · Acute kidney injury  · Significant leukocytosis rule out C. difficile colitis  · History of congestive heart failure but mainly diastolic  dysfunctions  · History of aortic stenosis history of TAVR  · History of hypertension  · History of pacemaker insertion           Plan:      · Seems less confused than yesterday  · Sodium level and electrolytes are improving  · Discontinue IV fluids  · Renal function is  stable  · Serum sodium is stable  · Potassium is within normal limits  · Discontinue sodium chloride  · Etiology of low sodium likely related to patient C. difficile colitis as urine sodium is only 22 and urine osmolality 280  · Need to follow volume status closely especially the presence of significant diastolic failure  · We will continue to follow          Note started  by Huy Carlin MD,   [facility] kidney consultant

## 2020-12-20 NOTE — PROGRESS NOTES
Daily Progress Note    C. difficile diarrhea    Chronic coronary artery disease    Essential hypertension    Presence of cardiac pacemaker    Shortness of breath    Sick sinus syndrome (CMS/HCC)    Tachy-martin syndrome (CMS/HCC)    Chronic diastolic congestive heart failure (CMS/HCC)    Syncope and collapse    Acute renal failure (ARF) (CMS/HCC)    Leukocytosis    Assessment:     CT scan suggestive of pulmonary edema cannot rule out underlying interstitial lung disease   Presentation compatible with cardiac decompensation   Dopamine off  Hypotension  And  Hyponatremia and renal insufficiency suggestive of low cardiac output most likely related to valvular heart disease  TAVR valve present      no evidence of pulmonary embolism on CT scan     COVID 19 and respiratory panel negative 12/12/20      C diff colitis     Recommendations:  Currently on room air  Off steroids  Anticoagulation eliquis  Cardiology and nephrology following   p.o.  vancomycin for C diff colitis        LOS: 6 days       Subjective         Objective     Vital signs for last 24 hours:  Vitals:    12/19/20 0900 12/19/20 1000 12/19/20 1405 12/19/20 1746   BP:  147/57 163/53 124/59   BP Location:       Patient Position:       Pulse: 70 70 70 70   Resp:  16 22 20   Temp:  97.5 °F (36.4 °C) 97.7 °F (36.5 °C) 97.8 °F (36.6 °C)   TempSrc:  Axillary Axillary Axillary   SpO2: 99% 98% 93% 100%   Weight:       Height:           Intake/Output last 3 shifts:  I/O last 3 completed shifts:  In: 480 [P.O.:480]  Out: 300 [Urine:300]  Intake/Output this shift:  No intake/output data recorded.      Radiology  Imaging Results (Last 24 Hours)     ** No results found for the last 24 hours. **          Labs:  Results from last 7 days   Lab Units 12/19/20  0246   WBC 10*3/mm3 9.30   HEMOGLOBIN g/dL 9.7*   HEMATOCRIT % 30.6*   PLATELETS 10*3/mm3 364     Results from last 7 days   Lab Units 12/19/20  0246   SODIUM mmol/L 131*   POTASSIUM mmol/L 4.0   CHLORIDE mmol/L 99   CO2  mmol/L 26.0   BUN mg/dL 26*   CREATININE mg/dL 0.81   CALCIUM mg/dL 7.8*   BILIRUBIN mg/dL 0.3   ALK PHOS U/L 77   ALT (SGPT) U/L 10   AST (SGOT) U/L 16   GLUCOSE mg/dL 467*     Results from last 7 days   Lab Units 12/19/20  0242   PH, ARTERIAL pH units 7.245*   PO2 ART mm Hg 284.5*   PCO2, ARTERIAL mm Hg 66.1*   HCO3 ART mmol/L 28.7*     Results from last 7 days   Lab Units 12/19/20  0246 12/17/20  0254 12/15/20  0656   ALBUMIN g/dL 2.60* 2.90* 2.60*             Results from last 7 days   Lab Units 12/17/20  0254   MAGNESIUM mg/dL 2.2                   Meds:   SCHEDULE  apixaban, 2.5 mg, Oral, Q12H  atorvastatin, 10 mg, Oral, Daily  colesevelam, 1,250 mg, Oral, BID With Meals  dextrose, 50 mL, Intravenous, Once  midodrine, 2.5 mg, Oral, TID AC  nystatin, , Topical, Q8H  QUEtiapine, 25 mg, Oral, Nightly  saccharomyces boulardii, 500 mg, Oral, BID  sodium chloride, 3 mL, Intravenous, Q12H  sodium chloride, 1 g, Oral, TID With Meals  vancomycin, 250 mg, Oral, Q6H      Infusions  dextrose, 50 mL/hr, Last Rate: 50 mL/hr (12/19/20 1253)  DOPamine, 2-20 mcg/kg/min, Last Rate: Stopped (12/16/20 1523)      PRNs  •  acetaminophen **OR** acetaminophen **OR** acetaminophen  •  magic butt paste  •  magnesium sulfate **OR** magnesium sulfate **OR** magnesium sulfate  •  nitroglycerin  •  ondansetron **OR** ondansetron  •  sodium chloride  •  sodium chloride    Physical Exam:  Physical Exam  Vitals signs reviewed.   Pulmonary:      Effort: Pulmonary effort is normal.      Breath sounds: Rhonchi present.   Skin:     General: Skin is warm and dry.   Neurological:      Mental Status: She is alert.         ROS  Review of Systems   Constitutional: Positive for activity change and fatigue.

## 2020-12-20 NOTE — THERAPY TREATMENT NOTE
Patient Name: Olga Curtis  : 1935    MRN: [id number]                              Today's Date: 2020       Admit Date: 2020    Visit Dx:     ICD-10-CM ICD-9-CM   1. Chronic diastolic congestive heart failure (CMS/HCC)  I50.32 428.32     428.0   2. Syncope and collapse  R55 780.2   3. Acute UTI  N39.0 599.0   4. Diarrhea, unspecified type  R19.7 787.91     Patient Active Problem List   Diagnosis   • Atrial premature complex   • Chronic coronary artery disease   • Dyslipidemia   • Essential hypertension   • Nonsustained ventricular tachycardia (CMS/HCC)   • Presence of cardiac pacemaker   • Shortness of breath   • Sick sinus syndrome (CMS/HCC)   • Allergic reaction   • Tachy-martin syndrome (CMS/HCC)   • Dyspnea   • Paroxysmal atrial fibrillation (CMS/HCC)   • Nonrheumatic aortic valve stenosis   • Chronic diastolic congestive heart failure (CMS/HCC)   • S/P TAVR (transcatheter aortic valve replacement)   • Primary localized osteoarthrosis of left shoulder region   • DJD of left shoulder   • Severe malnutrition (CMS/HCC)   • Syncope and collapse   • Diarrhea   • Acute renal failure (ARF) (CMS/[facility])   • Leukocytosis   • C. 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  Cannot have sticks in arm, hand only   • Shoulder pain, left      Past Surgical History:   Procedure Laterality Date   • AORTIC VALVE REPAIR/REPLACEMENT N/A 2020    Procedure: TTE TRANSFEMORAL TRANSCATHETER AORTIC VALVE REPLACEMENT PERCUTANEOUS APPROACH;   Surgeon: J Carlos Leon MD;  Location: [facility] OR 18/19;  Service: Cardiothoracic;  Laterality: N/A;   • AORTIC VALVE REPAIR/REPLACEMENT N/A 5/26/2020    Procedure: Transfemoral Transcatheter Aortic Valve Replacement w/Intra Op TTE and Possible Open Rescue Surgery;  Surgeon: Vasiliy Bruce MD;  Location: [facility] OR 18/19;  Service: Cardiovascular;  Laterality: N/A;   • APPENDECTOMY     • CARDIAC CATHETERIZATION N/A 3/2/2020    Procedure: Left Heart Cath and coronary angiogram;  Surgeon: Wyatt Kwok MD;  Location: [facility] CATH INVASIVE LOCATION;  Service: Cardiovascular;  Laterality: N/A;   • CARDIAC CATHETERIZATION N/A 3/2/2020    Procedure: Saphenous Vein Graft;  Surgeon: Wyatt Kwok MD;  Location: [facility] CATH INVASIVE LOCATION;  Service: Cardiovascular;  Laterality: N/A;   • CARDIAC PACEMAKER PLACEMENT  2017   • CARDIAC SURGERY  1999    Bypass surgery   • EYE SURGERY Bilateral     cat ext   • HERNIA REPAIR     • TOTAL SHOULDER ARTHROPLASTY W/ DISTAL CLAVICLE EXCISION Left 10/6/2020    Procedure: TOTAL SHOULDER REVERSE ARTHROPLASTY;  Surgeon: Oli Barcenas MD;  Location: [facility] OR;  Service: Orthopedics;  Laterality: Left;   • TOTAL SHOULDER REVISION  10/06/2020    left     General Information     Row Name 12/20/20 1446          Physical Therapy Time and Intention    Document Type  therapy note (daily note)  -     Mode of Treatment  physical therapy  -     Row Name 12/20/20 1446          General Information    Patient Profile Reviewed  yes  -     Existing Precautions/Restrictions  fall;oxygen therapy device and L/min recent L TSR  -     Row Name 12/20/20 1446          Safety Issues, Functional Mobility    Impairments Affecting Function (Mobility)  balance;cognition;endurance/activity tolerance;postural/trunk control;strength;shortness of breath  -       User Key  (r) = Recorded By, (t) = Taken By, (c) = Cosigned By    Initials Name Provider Type      Marcia Carter PTA Physical Therapy Assistant        Mobility     Row Name 12/20/20 1447          Bed Mobility    Comment (Bed Mobility)  pt up in chair  -     Row Name 12/20/20 1447          Sit-Stand Transfer    Sit-Stand [place] (Transfers)  verbal cues;minimum assist (75% patient effort)  -     Assistive Device (Sit-Stand Transfers)  -- HHA  -     Row Name 12/20/20 1447          Gait/Stairs (Locomotion)    [place] Level (Gait)  minimum assist (75% patient effort)  -     Assistive Device (Gait)  -- [facility]     Comment (Gait/Stairs)  flexed posture, shuffle gait, dtr didn't want her to use rwx since she didn't at home. Less confused today and able to follow thru with tasks. Still SOA easily.Would benefit from rehab at [place]. Gown, gloves,mask/goggles worn for tx  -       User Key  (r) = Recorded By, (t) = Taken By, (c) = Cosigned By    Initials Name Provider Type     Marcia Carter PTA Physical Therapy Assistant        Obj/Interventions     Row Name 12/20/20 1450          Strength Comprehensive (MMT)    General Manual Muscle Testing (MMT) Assessment  lower extremity strength deficits identified  -     Row Name 12/20/20 1450          Motor Skills    Motor Skills  posture  -     Row Name 12/20/20 1450          Balance    Balance Assessment  sitting static balance;standing dynamic balance;sitting dynamic balance;standing static balance  -     Static Sitting Balance  WFL;sitting in chair  -     Dynamic Sitting Balance  WFL;sitting in chair  -     Static Standing Balance  mild impairment;supported  -     Dynamic Standing Balance  mild impairment;supported  -       User Key  (r) = Recorded By, (t) = Taken By, (c) = Cosigned By    Initials Name Provider Type     Marcia Carter PTA Physical Therapy Assistant        Goals/Plan     Row Name 12/20/20 9016          Transfer Goal 1 (PT)    Progress/Outcome (Transfer Goal 1, PT)  goal ongoing  -     Row Name 12/20/20 5188           Gait Training Goal 1 (PT)    Progress/Outcome (Gait Training Goal 1, PT)  goal ongoing  -     Row Name 12/20/20 1453          Patient Education Goal (PT)    Progress/Outcome (Patient Education Goal, PT)  goal ongoing  -       User Key  (r) = Recorded By, (t) = Taken By, (c) = Cosigned By    Initials Name Provider Type    Marcia Flores PTA Physical Therapy Assistant        Clinical Impression     Row Name 12/20/20 1451          Pain Scale: Numbers Pre/Post-Treatment    Pretreatment Pain Rating  0/10 - no pain  -     Row Name 12/20/20 1451          Plan of Care Review    Plan of Care Reviewed With  patient;daughter  -     Progress  improving  -     Outcome Summary  dtr stated pjt more alert and able to participate and do more for herself today. Needs brief to amb due to incontinent of stools. Amb with HHA x40' and was fatigued at end of session. Would benefit from rehab at [place]. Gown, gloves,mask/goggles worn for tx  -     Row Name 12/20/20 1451          Therapy Assessment/Plan (PT)    Rehab Potential (PT)  good, to achieve stated therapy goals  -     Criteria for Skilled Interventions Met (PT)  skilled treatment is necessary  -     Row Name 12/20/20 1451          Vital Signs    Posttreatment Heart Rate (beats/min)  70  -     O2 Delivery Pre Treatment  nasal cannula 2L  -     Post SpO2 (%)  100  -     Row Name 12/20/20 1451          Positioning and Restraints    Pre-Treatment Position  sitting in chair/recliner  -     Post Treatment Position  chair  -     In Chair  reclined;with family/caregiver;exit alarm on;call light within reach;RUE elevated;LUE elevated;legs elevated  -       User Key  (r) = Recorded By, (t) = Taken By, (c) = Cosigned By    Initials Name Provider Type    Marcia Flores PTA Physical Therapy Assistant        Outcome Measures     Row Name 12/20/20 145          How much help from another person do you currently need...    Turning from your back to your  side while in flat bed without using bedrails?  3  -MC     Moving from lying on back to sitting on the side of a flat bed without bedrails?  3  -MC     Moving to and from a bed to a chair (including a wheelchair)?  3  -MC     Standing up from a chair using your arms (e.g., wheelchair, bedside chair)?  3  -MC     Climbing 3-5 steps with a railing?  2  -MC     To walk in hospital room?  3  -MC     AM-PAC 6 Clicks Score (PT)  17  -       User Key  (r) = Recorded By, (t) = Taken By, (c) = Cosigned By    Initials Name Provider Type     Marcia Carter, ALPHONSE Physical Therapy Assistant        Physical Therapy Education                 Title: PT OT SLP Therapies (Done)     Topic: Physical Therapy (Done)     Point: Mobility training (Done)     Learning Progress Summary           Patient Acceptance, E,TB, VU,NR by  at 12/20/2020 1454    Acceptance, E, NR by  at 12/18/2020 1459    Acceptance, E,TB, NR by  at 12/17/2020 1532    Acceptance, E,TB, VU by  at 12/12/2020 1659   Family Acceptance, E,TB, VU,NR by  at 12/20/2020 1454                               User Key     Initials Effective Dates Name Provider Type Discipline     04/17/20 -  Arline Silvestre, PT Physical Therapist PT     03/01/19 -  Kaykay Rios, PT Physical Therapist PT     03/01/19 -  Marcia Carter PTA Physical Therapy Assistant PT    EL 06/23/20 -  Dane Velazquez, PT Physical Therapist PT              PT Recommendation and Plan  Planned Therapy Interventions (PT): balance training, bed mobility training, gait training, postural re-education, transfer training, patient/family education, strengthening  Plan of Care Reviewed With: patient, daughter  Progress: improving  Outcome Summary: dtr stated pjt more alert and able to participate and do more for herself today. Needs brief to amb due to incontinent of stools. Amb with HHA x40' and was fatigued at end of session. Would benefit from rehab at [place]. Gown, gloves,mask/goggles worn for tx      Time Calculation:   PT Charges     Row Name 12/20/20 1455             Time Calculation    Start Time  0840  -      Stop Time  0855  -      Time Calculation (min)  15 min  -      PT Received On  12/20/20  -      PT - Next Appointment  12/21/20  -         Time Calculation- PT    Total Timed Code Minutes- PT  15 minute(s)  -        User Key  (r) = Recorded By, (t) = Taken By, (c) = Cosigned By    Initials Name Provider Type     Marcia Carter PTA Physical Therapy Assistant        Therapy Charges for Today     Code Description Service Date Service Provider Modifiers Qty    [id number]  PT THERAPEUTIC ACT EA 15 MIN 12/20/2020 Marcia Carter PTA GP 1          PT G-Codes  Outcome Measure Options: AM-PAC 6 Clicks Basic Mobility (PT)  AM-PAC 6 Clicks Score (PT): 17    Marcia Carter PTA  12/20/2020

## 2020-12-20 NOTE — PROGRESS NOTES
"      [facility] Medicine Services Daily Progress Note          Hospitalist Team  LOS 7 days      Patient Care Team:  Tigre Duran MD as PCP - General  Tigre Duran MD as PCP - Family Medicine  Wyatt Kwok MD as Consulting Physician (Cardiology)    Patient Location: 2109/1      Subjective   Subjective     Chief Complaint / Subjective  Chief Complaint   Patient presents with   • Hypotension     nausea.         Brief Synopsis of Hospital Course/HPI  85 year old female with PMH of CAD s/p CABG 1999, severe aortic stenosis s/p TAVR in May, SSS with cardiac pacemaker in situ, HTN, PAF, DJD post shoulder surgery, non sustained V tach, HLDpresents with complaints of progressive weakness and dyspnea. She is awake and alert and appropriate but lethargic. She denies chest pain or syncope but per ED report EMS reports she had a \"spell\" en route  with hypotension . In ED no hypotension or arrythmmia seen . Per ED report pacemaker interrogated with no unusual results. She is Covid-19 negative. Review of records shows she was just admitted here 11/30/20 to 12/6/20 for similar complaints and seen by both neurology and cardiology. Per notes it was felt to be metabolic encephalopathy from possible UTI and she was discharged on Cefdinir. She reports diarrhea past 2 days without hematochezia or melena or abdominal pain. C-Diff culture ordered and pending. UA shows no bacteria but too numerous to count wbc . She was started on IV Ceftriaxone in ED with urine and blood culture pending. D-Dimer was elevated. CT chest per radiology today:  \"IMPRESSION:  1. No pulmonary embolism.  2. Reticular and ground glass densities diffusely may represent edema or pneumonia superimposed on chronic interstitial change.  3. Stable surgical changes and cardiomegaly.  4. Calcified left pleural plaque.\"     Labs show wbc 17, K 5.4, Na 128, Cr 1.24 ( was 0.64), BNP 4400 was 13,000 12/5/20, Hgb 10, procalcitonin 1.40.      She " reports she left here after last admission and went to rehab then was at [facility] and just discharged yesterday. No records of [facility] in EMR and records have been requested. She will be admitted for further evaluation and treatment .        Date: 12/13/2020: The patient reports just feeling generally ill and she has had difficulty staying warm today, but she has had no fever, sweats, nausea or vomiting.  No chest pain, cough or shortness of breath.  She reports her diarrhea is improving.    12/14/2020  Alerted by night shift PA that pt having hypotension, didn't respond to 750cc bolus, now on dopamine drip and her cardiologist has been consulted. Pt is lethargic, but responsive. She denies any chest pains or abdominal pains, she continues to have SOA and diarrhea.    12/15/2020  Patient seen and examined.  She is alert and awake but confused in general.  Discussion with patient's daughter yesterday leads me to believe she has undiagnosed dementia underlying her behaviors.  Per nurse to very small bowel motions so far today, diarrhea seems to be resolving.    12/16/2020  Pt seen and examined.  She had an episode of emesis x1 today and upon questioning, pt thinks she may have choked on some of it - will need to monitor for s/s of aspiration. No diarrhea today per nurse.    12/17.2020  Pt seen and examined. Pt's daughter reports the patient is much more confused and agitated than her normal baseline. And she gets like this when she has a repeat UTI. UA this afternoon was negative.    12/18/2020  Pt seen and examined. She has had loose stools again today per d/w nurse.    12/19/2020  Pt seen and examined. No diarrhea.  Her blood glucose has been very low, she remains on D5 for now.    12/20/2020  Pt seen and examined. No acute events.    Review of Systems   Unable to perform ROS: dementia   Eyes: Positive for pain.         Objective   Objective      Vital Signs  Temp:  [97 °F (36.1 °C)-97.8 °F (36.6 °C)] 97.6 °F  "(36.4 °C)  Heart Rate:  [70-77] 70  Resp:  [18-24] 18  BP: (152-182)/(47-69) 182/65  Oxygen Therapy  SpO2: 100 %  Pulse Oximetry Type: Continuous  Device (Oxygen Therapy): nasal cannula  Flow (L/min): 2  Flowsheet Rows      First Filed Value   Admission Height  167.6 cm (66\") Documented at 12/11/2020 2338   Admission Weight  49.9 kg (110 lb) Documented at 12/11/2020 2338        Intake & Output (last 3 days)       12/17 0701 - 12/18 0700 12/18 0701 - 12/19 0700 12/19 0701 - 12/20 0700 12/20 0701 - 12/21 0700    P.O.  240 360 600    Total Intake(mL/kg)  240 (4.4) 360 (6.4) 600 (10.6)    Urine (mL/kg/hr) 700 (0.5)  400 (0.3) 150 (0.2)    Emesis/NG output        Stool 0  0 0    Total Output 700  400 150    Net -700 +240 -40 +450            Urine Unmeasured Occurrence 1 x  2 x 2 x    Stool Unmeasured Occurrence 1 x 5 x 2 x 1 x        Lines, Drains & Airways    Active LDAs     Name:   Placement date:   Placement time:   Site:   Days:    Peripheral IV 12/12/20 0401 Left Forearm   12/12/20 0401    Forearm   2    Peripheral IV 12/12/20 0401 Right Forearm   12/12/20 0401    Forearm   2                  Physical Exam:  General: well-developed, frail and cachectic NAD  HEENT: NC/AT, EOMI, PERRLA, +JVD  Heart: RRR. + murmur   Chest: Bilateral rales throughout, no wheezing or rhonchi, respiratory effort mildly labored  Abdominal: Soft. NT/ND. Bowel sounds present.  Musculoskeletal: Normal ROM.  No edema. No calf tenderness.  Neurological: Alert and awake, confused, no focal deficits  Skin: Skin is warm and dry. No rash.  Scattered ecchymosis  Psychiatric: Confused.       Wounds (last 24 hours)      LDA Wound     Row Name 12/20/20 1552 12/20/20 1133 12/20/20 0738       Wound 12/12/20 0509 medial coccyx Pressure Injury    Wound - Properties Group Placement Date: 12/12/20  -DD Placement Time: 0509  -DD Orientation: medial  -DD Location: coccyx  -DD Primary Wound Type: Pressure inj  -DD Stage, Pressure Injury : Stage 1  -DD    " Base  non-blanchable;maroon/purple;moist  -JG  non-blanchable;maroon/purple;moist  -JG  non-blanchable;maroon/purple;moist  -JG    Periwound  intact;dry;blanchable  -JG  intact;dry;blanchable  -JG  intact;dry;blanchable  -JG    Drainage Amount  none  -JG  none  -JG  none  -JG    Retired Wound - Properties Group Date first assessed: 12/12/20 -DD Time first assessed: 0509 -DD Location: coccyx  -DD Primary Wound Type: Pressure inj  -DD    Row Name 12/20/20 0338 12/19/20 2300 12/1936       Wound 12/12/20 0509 medial coccyx Pressure Injury    Wound - Properties Group Placement Date: 12/12/20  -DD Placement Time: 0509 -DD Orientation: medial  -DD Location: coccyx  -DD Primary Wound Type: Pressure inj  -DD Stage, Pressure Injury : Stage 1  -DD    Base  non-blanchable;maroon/purple;moist  -TS  non-blanchable;maroon/purple;moist  -TS  non-blanchable;maroon/purple;moist  -TS    Periwound  intact;dry;blanchable  -TS  intact;dry;blanchable  -TS  intact;dry;blanchable  -TS    Drainage Amount  none  -TS  none  -TS  none  -TS    Retired Wound - Properties Group Date first assessed: 12/12/20  -DD Time first assessed: 0509 -DD Location: coccyx  -DD Primary Wound Type: Pressure inj  -DD      User Key  (r) = Recorded By, (t) = Taken By, (c) = Cosigned By    Initials Name Provider Type    Thania Mujica LPN Licensed Nurse    Apple Grullon LPN Licensed Nurse    Henny Coates RN Registered Nurse          Procedures:           Results Review:     I reviewed the patient's new clinical results.    Results from last 7 days   Lab Units 12/20/20  0336 12/19/20  0246 12/18/20  0233 12/17/20  1158 12/16/20  0723 12/15/20  0331 12/14/20  1045   WBC 10*3/mm3 9.80 9.30 20.00* 31.30* 28.50* 25.40* 27.00*   HEMOGLOBIN g/dL 9.4* 9.7* 9.6* 10.0* 10.6* 11.7* 11.1*   HEMATOCRIT % 28.5* 30.6* 29.6* 31.8* 33.8* 36.4 34.5   PLATELETS 10*3/mm3 353 364 410 454* 383 227 364     Results from last 7 days   Lab Units 12/20/20  0521  12/19/20  0246 12/18/20  0233 12/17/20  1158 12/17/20  0254 12/16/20  0723 12/15/20  0656  12/14/20  0652   SODIUM mmol/L 135* 131* 129* 128* 125* 126* 128*   < > 130*   POTASSIUM mmol/L 5.0 4.0 4.4 5.2 5.0 4.4 4.1   < > 4.9   CHLORIDE mmol/L 104 99 93* 92* 92* 90* 91*   < > 95*   CO2 mmol/L 29.0 26.0 28.0 29.0 23.0 26.0 27.0   < > 24.0   BUN mg/dL 17 26* 39* 35* 36* 28* 22   < > 22   CREATININE mg/dL 0.56* 0.81 1.03* 1.17* 1.22* 1.07* 1.20*   < > 1.47*   GLUCOSE mg/dL 83 467* 88 99 142* 157* 73   < > 103*   ALBUMIN g/dL  --  2.60*  --   --  2.90*  --  2.60*  --  2.40*   BILIRUBIN mg/dL  --  0.3  --   --  0.3  --  0.4  --  0.4   ALK PHOS U/L  --  77  --   --  198*  --  142*  --  86   AST (SGOT) U/L  --  16  --   --  23  --  26  --  25   ALT (SGPT) U/L  --  10  --   --  11  --  12  --  10   AMMONIA umol/L  --  12  --   --   --   --   --   --   --    CALCIUM mg/dL 8.7 7.8* 8.9 9.0 8.8 8.6 8.2*   < > 8.3*    < > = values in this interval not displayed.     Cr Clearance Estimated Creatinine Clearance: 46.7 mL/min (A) (by C-G formula based on SCr of 0.56 mg/dL (L)).    Coag         HbA1C   Lab Results   Component Value Date    HGBA1C 5.5 09/30/2020    HGBA1C 5.90 (H) 05/21/2020     Blood Glucose   Results from last 7 days   Lab Units 12/20/20  1607 12/20/20  1241 12/20/20  1212 12/20/20  1208 12/20/20  1154 12/20/20  1139   GLUCOSE mg/dL 105 197* 49* 30* 60* 27*       Troponin   Results from last 7 days   Lab Units 12/15/20  0407 12/14/20  0652   PROBNP pg/mL 14,913.0* 32,130.0*     Lipids    Lab Results   Component Value Date    CHOL 127 08/05/2017    TRIG 90 08/05/2017    HDL 49 08/05/2017    LDL 62 08/05/2017       UA    Results from last 7 days   Lab Units 12/17/20  1553   NITRITE UA  Negative       Microbiology         ABG    Results from last 7 days   Lab Units 12/19/20  0242   PH, ARTERIAL pH units 7.245*   PCO2, ARTERIAL mm Hg 66.1*   PO2 ART mm Hg 284.5*   O2 SATURATION ART % 99.8*   BASE EXCESS ART mmol/L 0.5          EKG  ECG 12 Lead   Preliminary Result   HEART RATE= 115  bpm   RR Interval= 520  ms   WA Interval= 118  ms   P Horizontal Axis= -34  deg   P Front Axis= 68  deg   QRSD Interval= 127  ms   QT Interval= 356  ms   QRS Axis= 205  deg   T Wave Axis= 29  deg   - ABNORMAL ECG -   Atrial-sensed ventricular-paced rhythm   Electronically Signed By:    Date and Time of Study: 2020-12-15 06:11:30      ECG 12 Lead   Final Result   HEART RATE= 82  bpm   RR Interval= 731  ms   WA Interval= 122  ms   P Horizontal Axis= 12  deg   P Front Axis= -29  deg   QRSD Interval= 131  ms   QT Interval= 419  ms   QRS Axis= 212  deg   T Wave Axis= 31  deg   - ABNORMAL ECG -   Atrial-sensed ventricular-paced rhythm   When compared with ECG of 30-Nov-2020 13:22:06,   No significant change   Electronically Signed By: Lawrence Viera (Tre) 13-Dec-2020 09:25:09   Date and Time of Study: 2020-12-12 03:22:32          Imaging:  Xr Chest 1 View    Result Date: 12/15/2020  1. Improved bilateral interstitial opacities likely related to resolving infection or edema. 2. Persistent left basilar airspace opacity.  Electronically Signed By-Hill Askew MD On:12/15/2020 9:04 AM This report was finalized on [id number] by  Hill Askew MD.    Xr Abdomen Kub    Result Date: 12/14/2020  Mild gaseous distention of small bowel loops is nonspecific and may reflect changes of ileus. No convincing evidence of high-grade small bowel obstruction this time.  Electronically Signed By-Tonja Chauhan MD On:12/14/2020 9:12 AM This report was finalized on [id number] by  Tonja Chauhan MD.    Results for orders placed during the hospital encounter of 11/30/20   Adult Transthoracic Echo Complete W/ Cont if Necessary Per Protocol    Narrative · Left ventricular ejection fraction appears to be 56 - 60%.  · Left ventricular wall thickness is consistent with moderate concentric   hypertrophy.  · There is a TAVR valve present.  · Moderate tricuspid valve  regurgitation is present.  · Left ventricular diastolic function is consistent with (grade I)   impaired relaxation.  · No pericardial effusion noted        Results for orders placed during the hospital encounter of 02/28/20   Duplex Carotid Ultrasound CAR    Narrative · Proximal right internal carotid artery moderate stenosis.  · Proximal left internal carotid artery mild stenosis.          Xrays, labs reviewed personally by physician.    Medication Review:   I have reviewed the patient's current medication list      Scheduled Meds  apixaban, 2.5 mg, Oral, Q12H  atorvastatin, 10 mg, Oral, Daily  colesevelam, 1,250 mg, Oral, BID With Meals  [START ON 12/21/2020] cosyntropin, 0.25 mg, Intravenous, Once  dextrose, 50 mL, Intravenous, Once  midodrine, 2.5 mg, Oral, TID AC  nystatin, , Topical, Q8H  QUEtiapine, 25 mg, Oral, Nightly  saccharomyces boulardii, 500 mg, Oral, BID  sodium chloride, 3 mL, Intravenous, Q12H  vancomycin, 250 mg, Oral, Q6H        Meds Infusions  dextrose, 50 mL/hr, Last Rate: 50 mL/hr (12/20/20 1516)  DOPamine, 2-20 mcg/kg/min, Last Rate: Stopped (12/16/20 1523)        Meds PRN  •  acetaminophen **OR** acetaminophen **OR** acetaminophen  •  dextrose  •  magic butt paste  •  magnesium sulfate **OR** magnesium sulfate **OR** magnesium sulfate  •  nitroglycerin  •  ondansetron **OR** ondansetron  •  sodium chloride  •  sodium chloride      Assessment/Plan   Assessment/Plan     Active Hospital Problems    Diagnosis  POA   • **C. 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       MEDICAL DECISION MAKING COMPLEXITY BY PROBLEM:     C. difficile associated diarrhea  -Multiple rounds of antibiotics recent UTI  -Current UA does not indicate any presence of UTI  -UA shows yeast, this is contamination from the skin, discontinue fluconazole     -Add nystatin powder to perineum  -Increase oral vancomycin to 250 mg every 6h    -Diarrhea decreasing significantly with higher dose oral vancomycin, continue same    -Vanco enema may not be needed given her improvement.  -Increased dosing of Florastor and WelChol for optimization  -Magic Butt cream to be applied as needed    Leukocytosis -- resolved  -Due to C. difficile associated diarrhea  -No signs or symptoms of sepsis at this time  -WBC increasing since patient has been started on steroids    --steroids d/c'd with pulmonology permission     --now with resolution of leukocytosis  -Continue to monitor CBC and WBC count    Hypoglycemia  --no hx of diabetes that I am aware of -- ? Po intake  --HgbA1c ordered overnight, but will not result until Monday  --continue on D5 as per nephrology for now  --if no better will consult endocrinology    Altered mental status  -- likely multifactorial  --UA ruled out UTI  --possible due to ongoing diarrhea, meds, dehydratio, etc.  --Psychiatry input appreciated     -- delirium due to medical condition     -- might have underlying dementia     -- continue current dose of seroquel     -- may use additional seroquel PRN for agitation    Hypotension  Syncope and collapse  -s/p 750 cc bolus earlier this morning  -Chronic low blood pressure due to diastolic CHF  -Midodrine was given only as single dose in past  -Dopamine drip continues  -Continue to monitor BP and vitals closely  -Cardiology following     --Appreciate input  --12/16/20 scheduled low dose Midodrine, weaned off Dopamine     --d/w Nephrology, agreed with tx as it will  maintain her BP much better    Dyspnea  -Due to the above  -Fluid overloaded BNP greater than 32,000  -Will need to gently diuresis at some point if blood pressure allows  -Continuous pulse oximetry  -O2 supplementation to keep sats > 93%  -Pulmonology consulted overnight and following  -Solu-Medrol discontinued per discussion with Pulmonology    Acute renal failure  -Creatinine 1.24 on admission, previously 0.6 on last admission  -Likely due to dehydration from diarrhea  -Gradually improving, currently 0.83  -Will need to cautiously diurese as noted above  --Nephrology following     --Appreciate input    Chronic diastolic congestive heart failure  Aortic stenosis  Sick sinus syndrome/tachybradycardia syndrome  Presence of cardiac pacemaker  Chronic coronary artery disease  -Last echo on 12/1/2020 reviewed and noted above  -Continue lisinopril, metoprolol, atorvastatin  -Furosemide being held currently  -Cardiology to see    Essential hypertension  -Continue lisinopril and metoprolol, midodrine added for hypotension  -Continue to monitor BP and vitals    Medial coccyx stage I pressure injury  -Mepilex dressing  -Turn every 2 hours      VTE Prophylaxis  Mechanical Order History:      Ordered        12/12/20 0548  Place Sequential Compression Device  Once         12/12/20 0548  Maintain Sequential Compression Device  Continuous         12/12/20 0435  Place Sequential Compression Device  Once         12/12/20 0435  Maintain Sequential Compression Device  Continuous                 Pharmalogical Order History:      Ordered     Dose Route Frequency Stop    12/12/20 1302  apixaban (ELIQUIS) tablet 2.5 mg      2.5 mg PO Every 12 Hours --                Code Status  Code Status and Medical Interventions:   Ordered at: 12/14/20 1242     Limited Support to NOT Include:    Intubation     Level Of Support Discussed With:    Health Care Surrogate     Code Status:    No CPR     Medical Interventions (Level of Support Prior to  Arrest):    Limited     Comments:    DNR/DNI       This patient has been examined wearing appropriate Personal Protective Equipment. 12/20/20      Discharge Planning    Pending clinical course.      Electronically signed by Balbina Sy MD, 12/20/20, 17:54 EST.    [facility] Hospitalist Team

## 2020-12-20 NOTE — NURSING NOTE
BG 27 and 1 amp of D50 given with 1/2 tuna sandwich, 1 cup OJ, 1 1/2 cup ice cream, and 1 cup sweet tea. Recheck continues to be between 30-50. D5 IVF restarted and another amp D50 given.

## 2020-12-20 NOTE — PLAN OF CARE
Goal Outcome Evaluation:  Plan of Care Reviewed With: patient, daughter  Progress: improving  Outcome Summary: dtr stated pjt more alert and able to participate and do more for herself today. Needs brief to amb due to incontinent of stools. Amb with HHA x40' and was fatigued at end of session. Would benefit from rehab at [place]. Gown, gloves,mask/goggles worn for tx

## 2020-12-21 NOTE — PROGRESS NOTES
"      [facility] Medicine Services Daily Progress Note      Hospitalist Team  LOS 8 days      Patient Care Team:  Tigre Duran MD as PCP - General  Tigre Duran MD as PCP - Family Medicine  Wyatt Kwok MD as Consulting Physician (Cardiology)    Patient Location: 2109/1      Subjective   Subjective     Chief Complaint / Subjective  Chief Complaint   Patient presents with   • Hypotension     nausea.         Brief Synopsis of Hospital Course/HPI  85 year old female with PMH of CAD s/p CABG 1999, severe aortic stenosis s/p TAVR in May, SSS with cardiac pacemaker in situ, HTN, PAF, DJD post shoulder surgery, non sustained V tach, HLDpresents with complaints of progressive weakness and dyspnea. She is awake and alert and appropriate but lethargic. She denies chest pain or syncope but per ED report EMS reports she had a \"spell\" en route  with hypotension . In ED no hypotension or arrythmmia seen . Per ED report pacemaker interrogated with no unusual results. She is Covid-19 negative. Review of records shows she was just admitted here 11/30/20 to 12/6/20 for similar complaints and seen by both neurology and cardiology. Per notes it was felt to be metabolic encephalopathy from possible UTI and she was discharged on Cefdinir. She reports diarrhea past 2 days without hematochezia or melena or abdominal pain. C-Diff culture ordered and pending. UA shows no bacteria but too numerous to count wbc . She was started on IV Ceftriaxone in ED with urine and blood culture pending. D-Dimer was elevated.    She reports she left here after last admission and went to rehab then was at [facility] and just discharged yesterday. No records of [place] in EMR and records have been requested. She will be admitted for further evaluation and treatment .      Date::    12/13/2020: The patient reports just feeling generally ill and she has had difficulty staying warm today, but she has had no fever, sweats, nausea or " "vomiting.  No chest pain, cough or shortness of breath.  She reports her diarrhea is improving.     12/14/2020  Alerted by night shift PA that pt having hypotension, didn't respond to 750cc bolus, now on dopamine drip and her cardiologist has been consulted. Pt is lethargic, but responsive. She denies any chest pains or abdominal pains, she continues to have SOA and diarrhea.     12/15/2020  Patient seen and examined.  She is alert and awake but confused in general.  Discussion with patient's daughter yesterday leads me to believe she has undiagnosed dementia underlying her behaviors.  Per nurse to very small bowel motions so far today, diarrhea seems to be resolving.     12/16/2020  Pt seen and examined.  She had an episode of emesis x1 today and upon questioning, pt thinks she may have choked on some of it - will need to monitor for s/s of aspiration. No diarrhea today per nurse.     12/17.2020  Pt seen and examined. Pt's daughter reports the patient is much more confused and agitated than her normal baseline. And she gets like this when she has a repeat UTI. UA this afternoon was negative.     12/18/2020  Pt seen and examined. She has had loose stools again today per d/w nurse.     12/19/2020  Pt seen and examined. No diarrhea.  Her blood glucose has been very low, she remains on D5 for now.    12/21/2020  Patient has been sundowning overnight and required a sitter for safety. No diarrhea overnight.      ROS  Unable to perform due to dementai    Objective   Objective      Vital Signs  Temp:  [96.4 °F (35.8 °C)-98.9 °F (37.2 °C)] 96.4 °F (35.8 °C)  Heart Rate:  [70] 70  Resp:  [16-22] 16  BP: (120-182)/(47-98) 130/53  Oxygen Therapy  SpO2: 100 %  Pulse Oximetry Type: Continuous  Device (Oxygen Therapy): nasal cannula  Flow (L/min): 2  Flowsheet Rows      First Filed Value   Admission Height  167.6 cm (66\") Documented at 12/11/2020 2338   Admission Weight  49.9 kg (110 lb) Documented at 12/11/2020 2338        "     Intake & Output (last 3 days)       12/18 0701 - 12/19 0700 12/19 0701 - 12/20 0700 12/20 0701 - 12/21 0700 12/21 0701 - 12/22 0700    P.O. 240 360 780 240    I.V. (mL/kg)    1000 (17.5)    Total Intake(mL/kg) 240 (4.4) 360 (6.4) 780 (13.6) 1240 (21.6)    Urine (mL/kg/hr)  400 (0.3) 650 (0.5) 1000 (2.3)    Stool  0 0 0    Total Output      Net +240 -40 +130 +240            Urine Unmeasured Occurrence  2 x 4 x 0 x    Stool Unmeasured Occurrence 5 x 2 x 2 x 1 x        Lines, Drains & Airways    Active LDAs     Name:   Placement date:   Placement time:   Site:   Days:    Peripheral IV 12/12/20 0401 Left Forearm   12/12/20 0401    Forearm   9                  Physical Exam:    Physical Exam  General: well-developed, frail and cachectic NAD, AAOx2 with poor perception  HEENT: NC/AT, EOMI, PERRLA, +JVD  Heart: RRR. + murmur   Chest: Bilateral rales throughout, no wheezing or rhonchi, respiratory effort mildly labored  Abdominal: Soft. NT/ND. Bowel sounds present.  Musculoskeletal: Normal ROM.  No edema. No calf tenderness.  Neurological: Alert and awake, confused, no focal deficits  Skin: Skin is warm and dry. No rash.  Scattered ecchymosis  Psychiatric: Confused.       Wounds (last 24 hours)      LDA Wound     Row Name 12/21/20 1200 12/21/20 0739 12/21/20 0400       Wound 12/12/20 0509 medial coccyx Pressure Injury    Wound - Properties Group Placement Date: 12/12/20  -DD Placement Time: 0509 -DD Orientation: medial  -DD Location: coccyx  -DD Primary Wound Type: Pressure inj  -DD Stage, Pressure Injury : Stage 1  -DD    Dressing Appearance  --  open to air  -CL  open to air  -JS    Base  non-blanchable;maroon/purple  -CL  non-blanchable;maroon/purple  -CL  --    Periwound  intact;dry  -CL  intact;dry  -CL  --    Drainage Amount  none  -CL  none  -CL  --    Care, Wound  --  barrier applied  -CL  --    Retired Wound - Properties Group Date first assessed: 12/12/20  -DD Time first assessed: 0509 -DD  Location: coccyx  -DD Primary Wound Type: Pressure inj  -DD    Row Name 12/20/20 1552             Wound 12/12/20 0509 medial coccyx Pressure Injury    Wound - Properties Group Placement Date: 12/12/20 -DD Placement Time: 0509 -DD Orientation: medial  -DD Location: coccyx  -DD Primary Wound Type: Pressure inj  -DD Stage, Pressure Injury : Stage 1  -DD    Base  non-blanchable;maroon/purple;moist  -JG      Periwound  intact;dry;blanchable  -JG      Drainage Amount  none  -JG      Retired Wound - Properties Group Date first assessed: 12/12/20 -DD Time first assessed: 0509 -DD Location: coccyx  -DD Primary Wound Type: Pressure inj  -DD      User Key  (r) = Recorded By, (t) = Taken By, (c) = Cosigned By    Initials Name Provider Type    Veronica Reece, RN Registered Nurse    Thania Mujica LPN Licensed Nurse    Henny Coates RN Registered Nurse    Cinda Vazquez RN Registered Nurse          Procedures:              Results Review:     I reviewed the patient's new clinical results.      Lab Results (last 24 hours)     Procedure Component Value Units Date/Time    POC Glucose Once [[id number]]  (Abnormal) Collected: 12/21/20 1127    Specimen: Blood Updated: 12/21/20 1133     Glucose 108 mg/dL      Comment: Serial Number: [id number]:  [id number]       Hemoglobin A1c [[id number]]  (Normal) Collected: 12/19/20 0246    Specimen: Blood Updated: 12/21/20 1116     Hemoglobin A1C 5.2 %     Narrative:      Hemoglobin A1C Reference Range:    <5.7 %        Normal  5.7-6.4 %     Increased risk for diabetes  > 6.4 %        Diabetes       These guidelines have been recommended by the American Diabetic Association for Hgb A1c.      The following 2010 guidelines have been recommended by the American Diabetes Association for Hemoglobin A1c.    HBA1c 5.7-6.4% Increased risk for future diabetes (pre-diabetes)  HBA1c     >6.4% Diabetes      Cortisol [[id number]] Collected: 12/21/20 1047    Specimen: Blood Updated:  12/21/20 1059    Cortisol [[id number]] Collected: 12/21/20 0942    Specimen: Blood Updated: 12/21/20 1036     Cortisol 17.51 mcg/dL     Narrative:      Cortisol Reference Ranges:    Cortisol 6AM - 10AM Range: 6.02-18.40 mcg/dl  Cortisol 4PM - 8PM Range: 2.68-10.50 mcg/dl      Results may be falsely increased if patient taking Biotin.      Cortisol [[id number]] Collected: 12/21/20 0853    Specimen: Blood Updated: 12/21/20 1026     Cortisol 13.89 mcg/dL     Narrative:      Cortisol Reference Ranges:    Cortisol 6AM - 10AM Range: 6.02-18.40 mcg/dl  Cortisol 4PM - 8PM Range: 2.68-10.50 mcg/dl      Results may be falsely increased if patient taking Biotin.      Basic Metabolic Panel [[id number]]  (Abnormal) Collected: 12/21/20 0853    Specimen: Blood Updated: 12/21/20 0946     Glucose 101 mg/dL      BUN 12 mg/dL      Creatinine 0.60 mg/dL      Sodium 128 mmol/L      Potassium 5.4 mmol/L      Comment: Slight hemolysis detected by analyzer. Results may be affected.        Chloride 98 mmol/L      CO2 25.0 mmol/L      Calcium 8.6 mg/dL      eGFR Non African Amer 95 mL/min/1.73      BUN/Creatinine Ratio 20.0     Anion Gap 5.0 mmol/L     Narrative:      GFR Normal >60  Chronic Kidney Disease <60  Kidney Failure <15      CBC & Differential [[id number]]  (Abnormal) Collected: 12/21/20 0853    Specimen: Blood Updated: 12/21/20 0916    Narrative:      The following orders were created for panel order CBC & Differential.  Procedure                               Abnormality         Status                     ---------                               -----------         ------                     CBC Auto Differential[[id number]]        Abnormal            Final result                 Please view results for these tests on the individual orders.    CBC Auto Differential [[id number]]  (Abnormal) Collected: 12/21/20 0853    Specimen: Blood Updated: 12/21/20 0916     WBC 11.80 10*3/mm3      RBC 3.26 10*6/mm3      Hemoglobin 10.0 g/dL       Hematocrit 31.8 %      MCV 97.7 fL      MCH 30.5 pg      MCHC 31.3 g/dL      RDW 14.2 %      RDW-SD 49.4 fl      MPV 7.1 fL      Platelets 365 10*3/mm3      Neutrophil % 78.0 %      Lymphocyte % 11.1 %      Monocyte % 8.1 %      Eosinophil % 2.6 %      Basophil % 0.2 %      Neutrophils, Absolute 9.20 10*3/mm3      Lymphocytes, Absolute 1.30 10*3/mm3      Monocytes, Absolute 1.00 10*3/mm3      Eosinophils, Absolute 0.30 10*3/mm3      Basophils, Absolute 0.00 10*3/mm3      nRBC 0.0 /100 WBC     POC Glucose Once [[id number]]  (Abnormal) Collected: 12/21/20 0707    Specimen: Blood Updated: 12/21/20 0708     Glucose 106 mg/dL      Comment: Serial Number: [id number]:  [id number]       POC Glucose Once [[id number]]  (Abnormal) Collected: 12/21/20 0402    Specimen: Blood Updated: 12/21/20 0403     Glucose 106 mg/dL      Comment: Serial Number: [id number]:  [id number]       POC Glucose Once [[id number]]  (Abnormal) Collected: 12/20/20 2126    Specimen: Blood Updated: 12/20/20 2127     Glucose 118 mg/dL      Comment: Serial Number: [id number]:  [id number]       POC Glucose Once [[id number]]  (Normal) Collected: 12/20/20 1607    Specimen: Blood Updated: 12/20/20 1608     Glucose 105 mg/dL      Comment: Serial Number: [id number]:  [id number]           Hemoglobin A1C   Date Value Ref Range Status   12/19/2020 5.2 3.5 - 5.6 % Final           Results from last 7 days   Lab Units 12/19/20  0242   PH, ARTERIAL pH units 7.245*   PO2 ART mm Hg 284.5*   PCO2, ARTERIAL mm Hg 66.1*   HCO3 ART mmol/L 28.7*     No results found for: LIPASE  Lab Results   Component Value Date    CHOL 127 08/05/2017    TRIG 90 08/05/2017    HDL 49 08/05/2017    LDL 62 08/05/2017       No results found for: INTRAOP, PREDX, FINALDX, COMDX    Microbiology Results (last 10 days)     Procedure Component Value - Date/Time    Gastrointestinal Panel, PCR - Stool, Per Rectum [[id number]]  (Normal) Collected: 12/12/20 0134    Lab Status: Final  result Specimen: Stool from Per Rectum Updated: 12/12/20 0743     Campylobacter Not Detected     Plesiomonas shigelloides Not Detected     Salmonella Not Detected     Vibrio Not Detected     Vibrio cholerae Not Detected     Yersinia enterocolitica Not Detected     Enteroaggregative E. coli (EAEC) Not Detected     Enteropathogenic E. coli (EPEC) Not Detected     Enterotoxigenic E. coli (ETEC) lt/st Not Detected     Shiga-like toxin-producing E. coli (STEC) stx1/stx2 Not Detected     Shigella/Enteroinvasive E. coli (EIEC) Not Detected     Cryptosporidium Not Detected     Cyclospora cayetanensis Not Detected     Entamoeba histolytica Not Detected     Giardia lamblia Not Detected     Adenovirus F40/41 Not Detected     Astrovirus Not Detected     Norovirus GI/GII Not Detected     Rotavirus A Not Detected     Sapovirus (I, II, IV or V) Not Detected    Narrative:      If Aeromonas, Staphylococcus aureus or Bacillus cereus are suspected, please order [id number]: Stool Culture, Aeromonas, S aureus, B Cereus.    Clostridium Difficile Toxin - Stool, Per Rectum [[id number]]  (Abnormal) Collected: 12/12/20 0134    Lab Status: Final result Specimen: Stool from Per Rectum Updated: 12/12/20 0708    Narrative:      The following orders were created for panel order Clostridium Difficile Toxin - Stool, Per Rectum.  Procedure                               Abnormality         Status                     ---------                               -----------         ------                     Clostridium Difficile EI...[[id number]]  Abnormal            Final result                 Please view results for these tests on the individual orders.    Clostridium Difficile EIA - Stool, Per Rectum [[id number]]  (Abnormal) Collected: 12/12/20 0134    Lab Status: Final result Specimen: Stool from Per Rectum Updated: 12/12/20 0708     C Diff GDH / Toxin Positive    Urine Culture - Urine, Urine, Catheter [[id number]]  (Abnormal) Collected: 12/12/20 0134    Lab  Status: Edited Result - FINAL Specimen: Urine, Catheter Updated: 12/13/20 1016     Urine Culture Yeast isolated     Comment: No further workup.       Blood Culture - Blood, Arm, Right [[id number]] Collected: 12/12/20 0058    Lab Status: Final result Specimen: Blood from Arm, Right Updated: 12/17/20 0115     Blood Culture No growth at 5 days    Blood Culture - Blood, Arm, Left [[id number]] Collected: 12/12/20 0058    Lab Status: Final result Specimen: Blood from Arm, Left Updated: 12/17/20 0115     Blood Culture No growth at 5 days    Respiratory Panel PCR w/COVID-19(SARS-CoV-2) REINIER/JOVANY/MAGO/PAD/COR/MAD/REAGAN In-House, NP Swab in UTM/VTM, 3-4 HR TAT - Swab, Nasopharynx [[id number]]  (Normal) Collected: 12/12/20 0023    Lab Status: Final result Specimen: Swab from Nasopharynx Updated: 12/12/20 0132     ADENOVIRUS, PCR Not Detected     Coronavirus 229E Not Detected     Coronavirus HKU1 Not Detected     Coronavirus NL63 Not Detected     Coronavirus OC43 Not Detected     COVID19 Not Detected     Human Metapneumovirus Not Detected     Human Rhinovirus/Enterovirus Not Detected     Influenza A PCR Not Detected     Influenza B PCR Not Detected     Parainfluenza Virus 1 Not Detected     Parainfluenza Virus 2 Not Detected     Parainfluenza Virus 3 Not Detected     Parainfluenza Virus 4 Not Detected     RSV, PCR Not Detected     Bordetella pertussis pcr Not Detected     Bordetella parapertussis PCR Not Detected     Chlamydophila pneumoniae PCR Not Detected     Mycoplasma pneumo by PCR Not Detected    Narrative:      Fact sheet for providers: https://docs.[employer].com/wp-content/uploads/TVF6588-3764-ZS6.1-EUA-Provider-Fact-Sheet-3.pdf    Fact sheet for patients: https://docs.[employer].com/wp-content/uploads/PNO2503-1790-FJ1.1-EUA-Patient-Fact-Sheet-1.pdf    Test performed by PCR.          ECG/EMG Results (most recent)     Procedure Component Value Units Date/Time    ECG 12 Lead [[id number]] Collected: 12/12/20 0322     Updated:  12/13/20 0927     QT Interval 419 ms     Narrative:      HEART RATE= 82  bpm  RR Interval= 731  ms  KS Interval= 122  ms  P Horizontal Axis= 12  deg  P Front Axis= -29  deg  QRSD Interval= 131  ms  QT Interval= 419  ms  QRS Axis= 212  deg  T Wave Axis= 31  deg  - ABNORMAL ECG -  Atrial-sensed ventricular-paced rhythm  When compared with ECG of 30-Nov-2020 13:22:06,  No significant change  Electronically Signed By: Lawrence Viera (Tre) 13-Dec-2020 09:25:09  Date and Time of Study: 2020-12-12 03:22:32    ECG 12 Lead [[id number]] Collected: 12/15/20 0611     Updated: 12/15/20 0612     QT Interval 356 ms     Narrative:      HEART RATE= 115  bpm  RR Interval= 520  ms  KS Interval= 118  ms  P Horizontal Axis= -34  deg  P Front Axis= 68  deg  QRSD Interval= 127  ms  QT Interval= 356  ms  QRS Axis= 205  deg  T Wave Axis= 29  deg  - ABNORMAL ECG -  Atrial-sensed ventricular-paced rhythm  Electronically Signed By:   Date and Time of Study: 2020-12-15 06:11:30          Results for orders placed during the hospital encounter of 02/28/20   Duplex Carotid Ultrasound CAR    Narrative · Proximal right internal carotid artery moderate stenosis.  · Proximal left internal carotid artery mild stenosis.          Results for orders placed during the hospital encounter of 11/30/20   Adult Transthoracic Echo Complete W/ Cont if Necessary Per Protocol    Narrative · Left ventricular ejection fraction appears to be 56 - 60%.  · Left ventricular wall thickness is consistent with moderate concentric   hypertrophy.  · There is a TAVR valve present.  · Moderate tricuspid valve regurgitation is present.  · Left ventricular diastolic function is consistent with (grade I)   impaired relaxation.  · No pericardial effusion noted          Xr Chest 1 View    Result Date: 12/15/2020  1. Improved bilateral interstitial opacities likely related to resolving infection or edema. 2. Persistent left basilar airspace opacity.  Electronically Signed ByAlena  MD Jamilah On:12/15/2020 9:04 AM This report was finalized on [id number] by  Hill Askew MD.    Xr Abdomen Kub    Result Date: 12/14/2020  Mild gaseous distention of small bowel loops is nonspecific and may reflect changes of ileus. No convincing evidence of high-grade small bowel obstruction this time.  Electronically Signed By-Tonja Chauhan MD On:12/14/2020 9:12 AM This report was finalized on [id number] by  Tonja Chauhan MD.          Xrays, labs reviewed personally by physician.    Medication Review:   I have reviewed the patient's current medication list      Scheduled Meds  apixaban, 2.5 mg, Oral, Q12H  atorvastatin, 10 mg, Oral, Daily  colesevelam, 1,250 mg, Oral, BID With Meals  [START ON 12/22/2020] cosyntropin, 0.25 mg, Intravenous, Once  dextrose, 50 mL, Intravenous, Once  midodrine, 2.5 mg, Oral, TID AC  nystatin, , Topical, Q8H  QUEtiapine, 25 mg, Oral, Nightly  saccharomyces boulardii, 500 mg, Oral, BID  sodium chloride, 3 mL, Intravenous, Q12H  vancomycin, 250 mg, Oral, Q6H        Meds Infusions  dextrose, 50 mL/hr, Last Rate: 50 mL/hr (12/21/20 1050)  DOPamine, 2-20 mcg/kg/min, Last Rate: Stopped (12/16/20 1523)        Meds PRN  •  acetaminophen **OR** acetaminophen **OR** acetaminophen  •  dextrose  •  haloperidol  •  hydrALAZINE  •  magic butt paste  •  magnesium sulfate **OR** magnesium sulfate **OR** magnesium sulfate  •  nitroglycerin  •  ondansetron **OR** ondansetron  •  sodium chloride  •  sodium chloride        Assessment/Plan   Assessment/Plan     Active Hospital Problems    Diagnosis  POA   • **C. 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       MEDICAL DECISION MAKING COMPLEXITY BY PROBLEM:     #C. difficile associated diarrhea  -Multiple rounds of antibiotics recent UTI  -Current UA does not indicate any presence of UTI  -UA shows yeast, this is contamination from the skin, discontinue fluconazole     -Add nystatin powder to perineum  -Increase oral vancomycin to 250 mg every 6h    -Diarrhea decreasing significantly with higher dose oral vancomycin, continue same    -Vanco enema may not be needed given her improvement.  -Increased dosing of Florastor and WelChol for optimization  -Magic Butt cream to be applied as needed     #Leukocytosis -- resolved  -Due to C. difficile associated diarrhea  -No signs or symptoms of sepsis at this time  -WBC increasing since patient has been started on steroids    --steroids d/c'd with pulmonology permission     --now with resolution of leukocytosis  -Continue to monitor CBC and WBC count     #Hypoglycemia    --no hx of diabetes that I am aware of -- ? Po intake    --HgbA1c ordered overnight, but will not result until Monday    --continue on D5 as per nephrology for now    --endocrinology working up     #Altered mental status  #Dementia  -- likely multifactorial  --UA ruled out UTI  --possible due to ongoing diarrhea, meds, dehydratio, etc.  --Psychiatry input appreciated     -- delirium due to medical condition     -- might have underlying dementia     -- continue current dose of seroquel     -- may use additional seroquel PRN for agitation  -As sundowning, start haldol PRN for sundowning  - d/c sitter     #Hypotension  #Syncope and collapse  -Chronic low blood pressure due to diastolic CHF  -Midodrine was given only as single dose in past  -Dopamine drip d/c  -Continue to monitor BP and vitals closely  -Cardiology following      --Appreciate input  --12/16/20 scheduled low dose Midodrine, weaned off Dopamine     --d/w Nephrology, agreed with tx as it will maintain her BP much better     #Dyspnea  -Due to the above  -Fluid overloaded BNP greater than 32,000  -Will need to gently diuresis at some point if blood pressure allows  -Continuous pulse oximetry  -O2 supplementation to keep sats > 93%  -Pulmonology consulted overnight and following  -Solu-Medrol discontinued per discussion with Pulmonology     #Acute renal failure  -Creatinine 1.24 on admission, previously 0.6 on last admission  -Likely due to dehydration from diarrhea  -resolved, currently 0.6  -Will need to cautiously diurese as noted above  --Nephrology following     --Appreciate input     #Chronic diastolic congestive heart failure  #Aortic stenosis  #Sick sinus syndrome/tachybradycardia syndrome  #Presence of cardiac pacemaker  #Chronic coronary artery disease  -Last echo on 12/1/2020 reviewed and noted above  -Continue lisinopril, metoprolol, atorvastatin  -Furosemide being held currently  -Cardiology to see     #Essential hypertension  -Continue lisinopril and metoprolol, midodrine added for hypotension  -Continue to monitor BP and vitals     #Medial coccyx stage I pressure injury  -Mepilex dressing  -Turn every 2 hours    VTE Prophylaxis -   Mechanical Order History:      Ordered        12/12/20 0548  Place Sequential Compression Device  Once         12/12/20 0548  Maintain Sequential Compression Device  Continuous         12/12/20 0435  Place Sequential Compression Device  Once         12/12/20 0435  Maintain Sequential Compression Device  Continuous                 Pharmalogical Order History:      Ordered     Dose Route Frequency Stop    12/12/20 1302  apixaban (ELIQUIS) tablet 2.5 mg      2.5 mg PO Every 12 Hours --                  Code Status -   Code Status and Medical Interventions:   Ordered at: 12/14/20 1242     Limited Support to NOT Include:    Intubation      Level Of Support Discussed With:    Health Care Surrogate     Code Status:    No CPR     Medical Interventions (Level of Support Prior to Arrest):    Limited     Comments:    DNR/DNI       This patient has been examined wearing appropriate Personal Protective Equipment and discussed with Patient. 12/21/20        Discharge Planning  SNF once sitter free for 24 hours        Electronically signed by Joe Johnson DO, 12/21/20, 14:29 EST.  [facility]ist Team

## 2020-12-21 NOTE — PROGRESS NOTES
Daily Progress Note    Patient Care Team:  Tigre Duran MD as PCP - General  Tigre Duran MD as PCP - Family Medicine  Wyatt Kwok MD as Consulting Physician (Cardiology)    Chief Complaint: Follow-up hyperglycemia    HPI: Patient seen and examined today.  Admitted with C. difficile colitis and endocrine was called for the low blood sugar.  ACTH stim test was ordered with Dr. Rivera, unfortunately only baseline at 30-minute cortisol level was done after cosyntropin.  Baseline was 13 and 30 minutes after cosyntropin was 17.5.  Patient is beginning to eat little bit better her blood sugars are stable.    ROS:   Constitutional:  Denies fatigue, tiredness.    Eyes:  Denies change in visual acuity   HENT:  Denies nasal congestion or sore throat   Respiratory: denies cough, shortness of breath.   Cardiovascular:  denies chest pain, edema   GI:  Denies abdominal pain, nausea, vomiting.   :  Denies polyuria and polydipsia  Musculoskeletal:  Denies back pain or joint pain   Integument:  Denies rash   Neurologic:  Denies headache, focal weakness or sensory changes   Endocrine:  Denies polyuria or polydipsia   Psychiatric:  Denies depression or anxiety       Vitals:    12/21/20 1100   BP: 130/53   Pulse: 70   Resp:    Temp:    SpO2: 100%       Physical Exam:  GEN: NAD, conversant  EYES: EOMI, PERRL, no conjunctival erythema  NECK: no thyromegaly, full ROM   CV: RRR, no murmurs/rubs/gallops, no peripheral edema  LUNG: CTAB, no wheezes/rales/ronchi  SKIN: no rashes, no acanthosis  MSK: no deformities, full ROM of all extremities  NEURO: no tremors, DTR normal  PSYCH: AOX3, appropriate mood, affect normal      Results Review:     I reviewed the patient's new clinical results.    Glucose   Date Value Ref Range Status   12/21/2020 101 (H) 65 - 99 mg/dL Final     Sodium   Date Value Ref Range Status   12/21/2020 128 (L) 136 - 145 mmol/L Final     Potassium   Date Value Ref Range Status   12/21/2020 5.4 (H)  3.5 - 5.2 mmol/L Final     Comment:     Slight hemolysis detected by analyzer. Results may be affected.     CO2   Date Value Ref Range Status   12/21/2020 25.0 22.0 - 29.0 mmol/L Final     Chloride   Date Value Ref Range Status   12/21/2020 98 98 - 107 mmol/L Final     Anion Gap   Date Value Ref Range Status   12/21/2020 5.0 5.0 - 15.0 mmol/L Final     Creatinine   Date Value Ref Range Status   12/21/2020 0.60 0.57 - 1.00 mg/dL Final     BUN   Date Value Ref Range Status   12/21/2020 12 8 - 23 mg/dL Final     BUN/Creatinine Ratio   Date Value Ref Range Status   12/21/2020 20.0 7.0 - 25.0 Final     Calcium   Date Value Ref Range Status   12/21/2020 8.6 8.6 - 10.5 mg/dL Final     eGFR Non  Amer   Date Value Ref Range Status   12/21/2020 95 >60 mL/min/1.73 Final     Alkaline Phosphatase   Date Value Ref Range Status   12/19/2020 77 39 - 117 U/L Final     Total Protein   Date Value Ref Range Status   12/19/2020 5.1 (L) 6.0 - 8.5 g/dL Final     ALT (SGPT)   Date Value Ref Range Status   12/19/2020 10 1 - 33 U/L Final     AST (SGOT)   Date Value Ref Range Status   12/19/2020 16 1 - 32 U/L Final     Total Bilirubin   Date Value Ref Range Status   12/19/2020 0.3 0.0 - 1.2 mg/dL Final     Albumin   Date Value Ref Range Status   12/19/2020 2.60 (L) 3.50 - 5.20 g/dL Final     Globulin   Date Value Ref Range Status   12/19/2020 2.5 gm/dL Final     Lab Results   Component Value Date    HGBA1C 5.2 12/19/2020    HGBA1C 5.5 09/30/2020    HGBA1C 5.90 (H) 05/21/2020     No results found for: GLUF, MICROALBUR  Results from last 7 days   Lab Units 12/21/20  1127 12/21/20  0707 12/21/20  0402 12/20/20  2126 12/20/20  1607 12/20/20  1241   GLUCOSE mg/dL 108* 106* 106* 118* 105 197*             Medication Review: Reviewed.     apixaban, 2.5 mg, Oral, Q12H  atorvastatin, 10 mg, Oral, Daily  colesevelam, 1,250 mg, Oral, BID With Meals  dextrose, 50 mL, Intravenous, Once  midodrine, 2.5 mg, Oral, TID AC  nystatin, , Topical,  Q8H  QUEtiapine, 25 mg, Oral, Nightly  saccharomyces boulardii, 500 mg, Oral, BID  sodium chloride, 3 mL, Intravenous, Q12H  vancomycin, 250 mg, Oral, Q6H          Assessment/Plan   1.  Hypoglycemia: ACTH stim test done however 60-minute cortisol was not done, I will get the cortisol level now, if it is more than 20 then that rules out Mason's disease however if it is below 20 then we may have to repeat ACTH stim test.  Continue to follow supportive care.    2.  C. difficile colitis: Currently on p.o. vancomycin  3.  Hyperlipidemia: On atorvastatin.             Solis Gaspar MD. FACE

## 2020-12-21 NOTE — PROGRESS NOTES
Continued Stay Note   Julian     Patient Name: Olga Curtis  MRN: [id number]  Today's Date: 12/21/2020    Admit Date: 12/11/2020    Discharge Plan     Row Name 12/21/20 1155       Plan    Plan  D/C Plan : [facility] accepting pending bed vs home with [facility](current) PASRR approved and no precert required. Pt needs to be sitter free for 24 hours prior to going to rehab .    Plan Comments  Barrier to D/C : Pt has a sitter .        Discharge Codes    No documentation.       Expected Discharge Date and Time     Expected Discharge Date Expected Discharge Time    Dec 19, 2020             Tayler Trent RN

## 2020-12-21 NOTE — PROGRESS NOTES
Referring Provider: Hospitalist    Reason for follow-up: Diarrhea     Patient Care Team:  Tigre Duran MD as PCP - General  Tigre Duran MD as PCP - Family Medicine  Wyatt Kwok MD as Consulting Physician (Cardiology)    Subjective .  Patient looking much better today    Objective  Sitting in chair comfortable     Review of Systems   Constitution: Negative for fever and malaise/fatigue.   Cardiovascular: Negative for chest pain, dyspnea on exertion and palpitations.   Respiratory: Negative for cough and shortness of breath.    Skin: Negative for rash.   Gastrointestinal: Negative for abdominal pain, nausea and vomiting.   Neurological: Negative for focal weakness and headaches.   All other systems reviewed and are negative.      Sulfa antibiotics    Scheduled Meds:apixaban, 2.5 mg, Oral, Q12H  atorvastatin, 10 mg, Oral, Daily  colesevelam, 1,250 mg, Oral, BID With Meals  [START ON 12/22/2020] cosyntropin, 0.25 mg, Intravenous, Once  dextrose, 50 mL, Intravenous, Once  nystatin, , Topical, Q8H  QUEtiapine, 25 mg, Oral, Q8H  saccharomyces boulardii, 500 mg, Oral, BID  sodium chloride, 3 mL, Intravenous, Q12H  vancomycin, 250 mg, Oral, Q6H      Continuous Infusions:dextrose, 50 mL/hr, Last Rate: 50 mL/hr (12/21/20 1050)  DOPamine, 2-20 mcg/kg/min, Last Rate: Stopped (12/16/20 1523)      PRN Meds:.•  acetaminophen **OR** acetaminophen **OR** acetaminophen  •  dextrose  •  hydrALAZINE  •  magic butt paste  •  magnesium sulfate **OR** magnesium sulfate **OR** magnesium sulfate  •  nitroglycerin  •  ondansetron **OR** ondansetron  •  sodium chloride  •  sodium chloride        VITAL SIGNS  Vitals:    12/21/20 1000 12/21/20 1030 12/21/20 1100 12/21/20 1435   BP: 126/67 127/54 130/53 160/61   BP Location: Right arm      Patient Position: Lying      Pulse: 70 70 70 70   Resp: 16   18   Temp: 96.4 °F (35.8 °C)   96.6 °F (35.9 °C)   TempSrc: Axillary   Axillary   SpO2: 100% 100% 100% 99%   Weight:       Height:         "      Flowsheet Rows      First Filed Value   Admission Height  167.6 cm (66\") Documented at 12/11/2020 2338   Admission Weight  49.9 kg (110 lb) Documented at 12/11/2020 2338           TELEMETRY: Ventricular pacemaker rhythm    Physical Exam:  Constitutional:       Appearance: Well-developed.   Eyes:      General: No scleral icterus.     Conjunctiva/sclera: Conjunctivae normal.   HENT:      Head: Normocephalic and atraumatic.   Neck:      Musculoskeletal: Normal range of motion and neck supple.      Vascular: No carotid bruit or JVD.   Pulmonary:      Effort: Pulmonary effort is normal.      Breath sounds: Normal breath sounds. No wheezing. No rales.   Cardiovascular:      Normal rate. Regular rhythm.   Pulses:     Intact distal pulses.   Abdominal:      General: Bowel sounds are normal.      Palpations: Abdomen is soft.   Skin:     General: Skin is warm and dry.      Findings: No rash.   Neurological:      Mental Status: Alert.          Results Review:   I reviewed the patient's new clinical results.  Lab Results (last 24 hours)     Procedure Component Value Units Date/Time    POC Glucose Once [[id number]]  (Normal) Collected: 12/21/20 1649    Specimen: Blood Updated: 12/21/20 1651     Glucose 92 mg/dL      Comment: Serial Number: [id number]:  [id number]       Cortisol [[id number]] Collected: 12/21/20 1047    Specimen: Blood Updated: 12/21/20 1604     Cortisol 22.16 mcg/dL     Narrative:      Cortisol Reference Ranges:    Cortisol 6AM - 10AM Range: 6.02-18.40 mcg/dl  Cortisol 4PM - 8PM Range: 2.68-10.50 mcg/dl      Results may be falsely increased if patient taking Biotin.      POC Glucose Once [[id number]]  (Abnormal) Collected: 12/21/20 1127    Specimen: Blood Updated: 12/21/20 1133     Glucose 108 mg/dL      Comment: Serial Number: [id number]:  [id number]       Hemoglobin A1c [[id number]]  (Normal) Collected: 12/19/20 0246    Specimen: Blood Updated: 12/21/20 1116     Hemoglobin A1C 5.2 %     Narrative: "      Hemoglobin A1C Reference Range:    <5.7 %        Normal  5.7-6.4 %     Increased risk for diabetes  > 6.4 %        Diabetes       These guidelines have been recommended by the American Diabetic Association for Hgb A1c.      The following 2010 guidelines have been recommended by the American Diabetes Association for Hemoglobin A1c.    HBA1c 5.7-6.4% Increased risk for future diabetes (pre-diabetes)  HBA1c     >6.4% Diabetes      Cortisol [[id number]] Collected: 12/21/20 0942    Specimen: Blood Updated: 12/21/20 1036     Cortisol 17.51 mcg/dL     Narrative:      Cortisol Reference Ranges:    Cortisol 6AM - 10AM Range: 6.02-18.40 mcg/dl  Cortisol 4PM - 8PM Range: 2.68-10.50 mcg/dl      Results may be falsely increased if patient taking Biotin.      Cortisol [[id number]] Collected: 12/21/20 0853    Specimen: Blood Updated: 12/21/20 1026     Cortisol 13.89 mcg/dL     Narrative:      Cortisol Reference Ranges:    Cortisol 6AM - 10AM Range: 6.02-18.40 mcg/dl  Cortisol 4PM - 8PM Range: 2.68-10.50 mcg/dl      Results may be falsely increased if patient taking Biotin.      Basic Metabolic Panel [[id number]]  (Abnormal) Collected: 12/21/20 0853    Specimen: Blood Updated: 12/21/20 0946     Glucose 101 mg/dL      BUN 12 mg/dL      Creatinine 0.60 mg/dL      Sodium 128 mmol/L      Potassium 5.4 mmol/L      Comment: Slight hemolysis detected by analyzer. Results may be affected.        Chloride 98 mmol/L      CO2 25.0 mmol/L      Calcium 8.6 mg/dL      eGFR Non African Amer 95 mL/min/1.73      BUN/Creatinine Ratio 20.0     Anion Gap 5.0 mmol/L     Narrative:      GFR Normal >60  Chronic Kidney Disease <60  Kidney Failure <15      CBC & Differential [[id number]]  (Abnormal) Collected: 12/21/20 0853    Specimen: Blood Updated: 12/21/20 0916    Narrative:      The following orders were created for panel order CBC & Differential.  Procedure                               Abnormality         Status                     ---------                                -----------         ------                     CBC Auto Differential[[id number]]        Abnormal            Final result                 Please view results for these tests on the individual orders.    CBC Auto Differential [[id number]]  (Abnormal) Collected: 12/21/20 0853    Specimen: Blood Updated: 12/21/20 0916     WBC 11.80 10*3/mm3      RBC 3.26 10*6/mm3      Hemoglobin 10.0 g/dL      Hematocrit 31.8 %      MCV 97.7 fL      MCH 30.5 pg      MCHC 31.3 g/dL      RDW 14.2 %      RDW-SD 49.4 fl      MPV 7.1 fL      Platelets 365 10*3/mm3      Neutrophil % 78.0 %      Lymphocyte % 11.1 %      Monocyte % 8.1 %      Eosinophil % 2.6 %      Basophil % 0.2 %      Neutrophils, Absolute 9.20 10*3/mm3      Lymphocytes, Absolute 1.30 10*3/mm3      Monocytes, Absolute 1.00 10*3/mm3      Eosinophils, Absolute 0.30 10*3/mm3      Basophils, Absolute 0.00 10*3/mm3      nRBC 0.0 /100 WBC     POC Glucose Once [[id number]]  (Abnormal) Collected: 12/21/20 0707    Specimen: Blood Updated: 12/21/20 0708     Glucose 106 mg/dL      Comment: Serial Number: [id number]:  [id number]       POC Glucose Once [[id number]]  (Abnormal) Collected: 12/21/20 0402    Specimen: Blood Updated: 12/21/20 0403     Glucose 106 mg/dL      Comment: Serial Number: [id number]:  [id number]       POC Glucose Once [[id number]]  (Abnormal) Collected: 12/20/20 2126    Specimen: Blood Updated: 12/20/20 2127     Glucose 118 mg/dL      Comment: Serial Number: [id number]:  [id number]             Imaging Results (Last 24 Hours)     ** No results found for the last 24 hours. **          EKG      I personally viewed and interpreted the patient's EKG/Telemetry data:    ECHOCARDIOGRAM:    STRESS MYOVIEW:    CARDIAC CATHETERIZATION:    OTHER:         Assessment/Plan     Principal Problem:    C. difficile diarrhea  Active Problems:    Chronic coronary artery disease    Essential hypertension    Presence of cardiac pacemaker    Shortness of  breath    Sick sinus syndrome (CMS/HCC)    Tachy-martin syndrome (CMS/HCC)    Chronic diastolic congestive heart failure (CMS/HCC)    Syncope and collapse    Acute renal failure (ARF) (CMS/HCC)    Leukocytosis       Patient has significant diarrhea and dehydration and became less responsive and hypotensive  Patient presented with hypotension and was treated with IV fluid bolus  Patient has congestive heart failure diastolic dysfunction but no systolic dysfunction  Patient has a pacemaker is working very well  Patient blood pressure is stable now  Patient also has acute renal failure and nephrologist is following the patient  Patient recently had a echocardiogram and hence no further testing at this time  We will follow her as an outpatient    I discussed the patients findings and my recommendations with patient's nurse    Wyatt Kwok MD  12/21/20  17:35 EST

## 2020-12-21 NOTE — PLAN OF CARE
Goal Outcome Evaluation:  Plan of Care Reviewed With: patient, daughter  Progress: improving  Outcome Summary: Patient has been confused at night and had a sitter last night and part of the day today. She is confused to situation today. She has gotten up to the chair several times today and has had less bms today She is on oral vanc and welchol. Her blood pressures are now in the 130-160's systolically and midodrine has been discontinued. Patient needs to be sitter free for 24 hours. Haldol was given once today and primary care md ordered scheduled seroquel. Primary md is not ready to downgrade patient tonight. Keep to monitor and if no issues, downgrade to med surg and prepare for discharge soon. Continue to monitor.

## 2020-12-21 NOTE — PLAN OF CARE
Goal Outcome Evaluation:  Plan of Care Reviewed With: patient, daughter  Progress: improving   Patient confused over night and very restless. Daughter Conchis notified several times. Patient re- oriented throughout the night.

## 2020-12-21 NOTE — PROGRESS NOTES
PROGRESS NOTE      Patient Name: Olga Curtis  : 1935  MRN: [id number]  Primary Care Physician: Tigre Duran MD  Date of admission: 2020    Patient Care Team:  Tigre Duran MD as PCP - General  Tigre Duran MD as PCP - Family Medicine  Wyatt Kwok MD as Consulting Physician (Cardiology)        Subjective   Subjective:     Acute kidney injury better, patient is still complaining of weakness  Review of systems:  All other review of system unremarkable      Allergies:    Allergies   Allergen Reactions   • Sulfa Antibiotics Hives       Objective   Exam:     Vital Signs  Temp:  [97.1 °F (36.2 °C)-98.9 °F (37.2 °C)] 97.1 °F (36.2 °C)  Heart Rate:  [70] 70  Resp:  [18-22] 22  BP: (120-182)/(47-98) 142/53  SpO2:  [100 %] 100 %  on  Flow (L/min):  [2] 2;   Device (Oxygen Therapy): nasal cannula  Body mass index is 20.39 kg/m².    General: Elderly white  female in no acute distress.    Head:      Normocephalic and atraumatic.    Eyes:      PERRL/EOM intact, conjunctiva and sclera clear with out nystagmus.    Neck:      No masses, thyromegaly,  trachea central with normal respiratory effort   Lungs:    Clear bilaterally to auscultation.    Heart:      Regular rate and rhythm, no murmur no gallop  Abd:        Soft, nontender, not distended, bowel sounds positive, no shifting dullness   Pulses:   Pulses palpable  Extr:        No cyanosis or clubbing--mild edema.    Neuro:    No focal deficits.   alert oriented x3  Skin:       Intact without lesions or rashes.    Psych:    Alert and cooperative; normal mood and affect; .      Results Review:  I have personally reviewed most recent Data :  CBC    Results from last 7 days   Lab Units 20  0853 20  0336 20  0246 20  0233 20  1158 20  0723 12/15/20  0331   WBC 10*3/mm3 11.80* 9.80 9.30 20.00* 31.30* 28.50* 25.40*   HEMOGLOBIN g/dL 10.0* 9.4* 9.7* 9.6* 10.0* 10.6* 11.7*   PLATELETS 10*3/mm3 365 353 364 410 454* 383  227     CMP   Results from last 7 days   Lab Units 12/20/20  0521 12/19/20  0246 12/18/20  0233 12/17/20  1158 12/17/20  0254 12/16/20  0723 12/15/20  0656   SODIUM mmol/L 135* 131* 129* 128* 125* 126* 128*   POTASSIUM mmol/L 5.0 4.0 4.4 5.2 5.0 4.4 4.1   CHLORIDE mmol/L 104 99 93* 92* 92* 90* 91*   CO2 mmol/L 29.0 26.0 28.0 29.0 23.0 26.0 27.0   BUN mg/dL 17 26* 39* 35* 36* 28* 22   CREATININE mg/dL 0.56* 0.81 1.03* 1.17* 1.22* 1.07* 1.20*   GLUCOSE mg/dL 83 467* 88 99 142* 157* 73   ALBUMIN g/dL  --  2.60*  --   --  2.90*  --  2.60*   BILIRUBIN mg/dL  --  0.3  --   --  0.3  --  0.4   ALK PHOS U/L  --  77  --   --  198*  --  142*   AST (SGOT) U/L  --  16  --   --  23  --  26   ALT (SGPT) U/L  --  10  --   --  11  --  12   AMMONIA umol/L  --  12  --   --   --   --   --      ABG    Results from last 7 days   Lab Units 12/19/20  0242   PH, ARTERIAL pH units 7.245*   PCO2, ARTERIAL mm Hg 66.1*   PO2 ART mm Hg 284.5*   O2 SATURATION ART % 99.8*   BASE EXCESS ART mmol/L 0.5     Xr Chest 1 View    Result Date: 12/15/2020  1. Improved bilateral interstitial opacities likely related to resolving infection or edema. 2. Persistent left basilar airspace opacity.  Electronically Signed By-Hill Askew MD On:12/15/2020 9:04 AM This report was finalized on [id number] by  Hill Askew MD.    Xr Abdomen Kub    Result Date: 12/14/2020  Mild gaseous distention of small bowel loops is nonspecific and may reflect changes of ileus. No convincing evidence of high-grade small bowel obstruction this time.  Electronically Signed By-Tonja Chauhan MD On:12/14/2020 9:12 AM This report was finalized on [id number] by  Tonja Chauhan MD.      Results for orders placed during the hospital encounter of 11/30/20   Adult Transthoracic Echo Complete W/ Cont if Necessary Per Protocol    Narrative · Left ventricular ejection fraction appears to be 56 - 60%.  · Left ventricular wall thickness is consistent with moderate concentric      hypertrophy.  · There is a TAVR valve present.  · Moderate tricuspid valve regurgitation is present.  · Left ventricular diastolic function is consistent with (grade I)   impaired relaxation.  · No pericardial effusion noted        Scheduled Meds:apixaban, 2.5 mg, Oral, Q12H  atorvastatin, 10 mg, Oral, Daily  colesevelam, 1,250 mg, Oral, BID With Meals  dextrose, 50 mL, Intravenous, Once  midodrine, 2.5 mg, Oral, TID AC  nystatin, , Topical, Q8H  QUEtiapine, 25 mg, Oral, Nightly  saccharomyces boulardii, 500 mg, Oral, BID  sodium chloride, 3 mL, Intravenous, Q12H  vancomycin, 250 mg, Oral, Q6H      Continuous Infusions:dextrose, 50 mL/hr, Last Rate: 50 mL/hr (12/20/20 1516)  DOPamine, 2-20 mcg/kg/min, Last Rate: Stopped (12/16/20 1523)      PRN Meds:•  acetaminophen **OR** acetaminophen **OR** acetaminophen  •  dextrose  •  hydrALAZINE  •  magic butt paste  •  magnesium sulfate **OR** magnesium sulfate **OR** magnesium sulfate  •  nitroglycerin  •  ondansetron **OR** ondansetron  •  sodium chloride  •  sodium chloride    Assessment/Plan   Assessment and Plan:         C. difficile diarrhea    Chronic coronary artery disease    Essential hypertension    Presence of cardiac pacemaker    Shortness of breath    Sick sinus syndrome (CMS/HCC)    Tachy-martin syndrome (CMS/HCC)    Chronic diastolic congestive heart failure (CMS/HCC)    Syncope and collapse    Acute renal failure (ARF) (CMS/HCC)    Leukocytosis    ASSESSMENT:  · Hyponatremia likely because of the congestive heart failure and may be some volume overload  · Hyperkalemia NICK with some congestive heart failure  · Acute kidney injury  · Significant leukocytosis rule out C. difficile colitis  · History of congestive heart failure but mainly diastolic dysfunctions  · History of aortic stenosis history of TAVR  · History of hypertension  · History of pacemaker insertion           Plan:      · Patient is still confused and diarrhea is still present  · Sodium level  and electrolytes are improving potassium level is still borderline  · Etiology of borderline potassium is uncertain  · Continue IV fluid but decrease the rate to 50 cc an hour  · I will discontinue IV fluids once patient start eating and drinking better  · Renal function significantly better  · Respiratory status seems stable so far  · Sodium chloride pill has been discontinued patient on needed as sodium level is stable  · Etiology of low sodium likely related to patient C. difficile colitis as urine sodium is only 22 and urine osmolality 280  · BNP level of 14,000 we will recheck it again  · Potassium again increasing  · INeed to follow volume status closely especially the presence of significant diastolic failure  •           Note started  by Huy Carlin MD,   [facility] kidney consultant

## 2020-12-22 NOTE — PROGRESS NOTES
Continued Stay Note   Julian     Patient Name: Olga Curtis  MRN: [id number]  Today's Date: 12/22/2020    Admit Date: 12/11/2020    Discharge Plan     Row Name 12/22/20 1220       Plan    Plan  D/C Plan : [facility] has accepted and has a bed today for pt . Daughter aware and if pt does not qualify for a ambulance daughter (Conchis) can take the pt.        Discharge Codes    No documentation.       Expected Discharge Date and Time     Expected Discharge Date Expected Discharge Time    Dec 22, 2020             Tayler Trent RN

## 2020-12-22 NOTE — PROGRESS NOTES
"Nutrition Services    Patient Name: Olga Curtis  YOB: 1935  MRN: [id number]  Admission date: 12/11/2020     Malnutrition (severe, acute) related to ongoing insufficient PO intake; as evidenced by NFPE revealing moderate muscle and fat wasting; with ongoing poor intake at meals meeting less than 50% of meals for at least the last five days (including prior to admission).     Current oral nutritional supplements:     Boost Plus TID provides 1080 kcals, 42 gm protein if consumed  Magic Cups L/D to provides 580 kcals, 18 gm protein if consumed     PPE Documentation        PPE Worn By Provider N/A RDN did not enter room on this date    PPE Worn By Patient  N/A      CLINICAL NUTRITION ASSESSMENT      Reason for Assessment Follow up protocol     12/14:  BMI, MST score 2+     H&P:  Per H&P \"85 year old female with PMH of CAD s/p CABG 1999, severe aortic stenosis s/p TAVR in May, SSS with cardiac pacemaker in situ, HTN, PAF, DJD post shoulder surgery, non sustained V tach, HLDpresents with complaints of progressive weakness and dyspnea.\"     Past Medical History:   Diagnosis Date   • Aortic stenosis 10/28/2014   • Arthritis    • Atrial premature complex 12/18/2015   • CHF (congestive heart failure) (CMS/HCC)    • Chronic coronary artery disease 10/28/2014   • Dyslipidemia 10/28/2014   • Elevated cholesterol    • History of transfusion    • Hyperlipidemia    • Hypertension 10/28/2014   • Nausea 09/2020   • Nonsustained ventricular tachycardia (CMS/HCC) 12/18/2015   • Partial paralysis of right hand (CMS/HCC)     states right arm only.  Cannot have sticks in arm, hand only   • Shoulder pain, left        Past Surgical History:   Procedure Laterality Date   • AORTIC VALVE REPAIR/REPLACEMENT N/A 5/26/2020    Procedure: TTE TRANSFEMORAL TRANSCATHETER AORTIC VALVE REPLACEMENT PERCUTANEOUS APPROACH;  Surgeon: J Carlos Leon MD;  Location: [facility] 18/19;  Service: Cardiothoracic;  Laterality: N/A;   • " AORTIC VALVE REPAIR/REPLACEMENT N/A 5/26/2020    Procedure: Transfemoral Transcatheter Aortic Valve Replacement w/Intra Op TTE and Possible Open Rescue Surgery;  Surgeon: Vasiliy Bruce MD;  Location: [facility] HYBRID OR 18/19;  Service: Cardiovascular;  Laterality: N/A;   • APPENDECTOMY     • CARDIAC CATHETERIZATION N/A 3/2/2020    Procedure: Left Heart Cath and coronary angiogram;  Surgeon: Wyatt Kwok MD;  Location: [facility] CATH INVASIVE LOCATION;  Service: Cardiovascular;  Laterality: N/A;   • CARDIAC CATHETERIZATION N/A 3/2/2020    Procedure: Saphenous Vein Graft;  Surgeon: Wyatt Kwok MD;  Location: [facility] CATH INVASIVE LOCATION;  Service: Cardiovascular;  Laterality: N/A;   • CARDIAC PACEMAKER PLACEMENT  2017   • CARDIAC SURGERY  1999    Bypass surgery   • EYE SURGERY Bilateral     cat ext   • HERNIA REPAIR     • TOTAL SHOULDER ARTHROPLASTY W/ DISTAL CLAVICLE EXCISION Left 10/6/2020    Procedure: TOTAL SHOULDER REVERSE ARTHROPLASTY;  Surgeon: Oli Barcenas MD;  Location: [facility] MAIN OR;  Service: Orthopedics;  Laterality: Left;   • TOTAL SHOULDER REVISION  10/06/2020    left        Current Problems:   C. difficile associated diarrhea  - Antibiotics & probiotic     Leukocytosis  -Resolved     AMS with hx of dementia      Hypotension  - Improved     Syncope/Collapse      Dyspnea  - Pulmonology Following   - Acute Respiratory distress  - Possible exacerbation of COPD      Acute renal failure  - Nephrology following     Chronic diastolic congestive heart failure  - Cardiology Following      Medial coccyx stage I pressure injury        Nutrition/Diet History         Narrative     12/22: Pt remains confused & not appropriate for education. Noted slight improvement in PO intakes. RN reports pt is drinking the Boost but unsure about the magic cups. RN reports pt to DC soon.     12/17: Patient remains very confused today & not appropriate for interview. Secure chatted RN who just had shift  "change & unsure if the patient had been accepting her oral nutritional supplements ordered. RN stated she would highly encourage the Boost Plus.     12/14: Pt seen due to concern for malnutrition. Pt extremely drowsy at visit to room - had recently transferred to PCU due to hypotension. Performed limited NFPE based on ability with pt status; per assessment of upper body, pt with both muscle and fat wasting (orbital, temporal, buccal, clavicular, sternal/ribs, and dorsal hand.) Per family report, pt with diminished appetite prior to admission with poor intake; continues with poor intake since admission. Pt recently assessed by dietetic intern, Kadie Doyle on 12/02/2020; agree with Kadie's NFPE based on follow up today - pt still with signs of wasting. During that assessment, family reported pt willing to take Boost supplements -- will add these when diet resumes (pt currently NPO).       Functional Status Requires assistance with some ADLs; Receives home health care, as well as help from spouse/family      Food Allergies NKFA     Factors Affecting   Nutritional Intake Diminished appetite secondary to C. Diff colitis      Anthropometrics        Current Height, Weight Height: 167.6 cm (66\")  Weight: 57.2 kg (126 lb 1.7 oz) (12/22/20 0500)        Admit Height, Weight -    Flowsheet Rows      First Filed Value   Admission Height  167.6 cm (66\") Documented at 12/11/2020 2338   Admission Weight  49.9 kg (110 lb) Documented at 12/11/2020 2338             Ideal Body Weight (IBW) 136 lb   % Ideal Body Weight 92%        Usual Body Weight UTD   % Usual Body Weight UTD   Wt Change Observation 12/14: Wt has, overall, been stable for the last several months, but pt chronically low weight.   Weight Hx    Wt Readings from Last 30 Encounters:   12/22/20 0500 57.2 kg (126 lb 1.7 oz)   12/21/20 0500 57.3 kg (126 lb 5.2 oz)   12/20/20 0555 56.5 kg (124 lb 9 oz)   12/20/20 0500 56.5 kg (124 lb 9 oz)   12/19/20 0500 54.8 kg (120 lb 13 oz) "   12/18/20 0540 54.9 kg (121 lb 0.5 oz)   12/16/20 2232 54.9 kg (121 lb 0.5 oz)   12/16/20 0449 53.2 kg (117 lb 4.6 oz)   12/15/20 0605 52.4 kg (115 lb 8.3 oz)   12/14/20 0408 51.7 kg (113 lb 15.7 oz)   12/13/20 0410 54.5 kg (120 lb 2.4 oz)   12/12/20 0445 53.6 kg (118 lb 2.7 oz)   12/11/20 2338 49.9 kg (110 lb)   12/06/20 0420 51 kg (112 lb 8 oz)   12/05/20 0408 52.5 kg (115 lb 12.8 oz)   12/04/20 0245 51.3 kg (113 lb 1.5 oz)   12/02/20 0346 49.3 kg (108 lb 11 oz)   12/01/20 1625 51.7 kg (114 lb)   12/01/20 0335 52 kg (114 lb 10.2 oz)   11/30/20 1951 52.1 kg (114 lb 13.8 oz)   11/30/20 1241 49.9 kg (110 lb)   11/18/20 0959 60.3 kg (133 lb)   10/21/20 0857 51.3 kg (113 lb)   10/07/20 0005 51.3 kg (113 lb 3.2 oz)   10/06/20 0949 51.3 kg (113 lb)   09/28/20 1247 51.3 kg (113 lb)   07/27/20 0942 51.7 kg (114 lb)   07/02/20 1052 52.2 kg (115 lb)   07/02/20 1144 51.7 kg (114 lb)   06/25/20 1118 52.2 kg (115 lb)   06/03/20 1127 52.4 kg (115 lb 9.6 oz)   05/27/20 0710 52.2 kg (115 lb)   05/26/20 0807 52.2 kg (115 lb)   05/21/20 0733 51.7 kg (114 lb)   03/03/20 0526 56.8 kg (125 lb 3.5 oz)   03/01/20 0446 56.2 kg (124 lb)   02/29/20 1123 59.9 kg (132 lb)   02/29/20 0400 60.1 kg (132 lb 9.6 oz)   02/28/20 1700 59 kg (130 lb 1.1 oz)   02/28/20 1036 59.9 kg (132 lb)   02/24/20 0836 56.7 kg (125 lb)   12/09/19 1225 57.2 kg (126 lb 3.2 oz)   10/14/19 1421 59.2 kg (130 lb 8 oz)   10/09/19 0350 58.5 kg (128 lb 15.5 oz)   10/09/19 0021 59 kg (130 lb)   09/09/19 1357 60.2 kg (132 lb 12.8 oz)   01/17/19 1100 64.3 kg (141 lb 12 oz)   05/14/18 1557 65.7 kg (144 lb 12 oz)   09/25/17 1502 65.2 kg (143 lb 12 oz)   04/24/17 1341 68.5 kg (151 lb)   09/12/16 1252 70.8 kg (156 lb)   02/01/16 1030 69.4 kg (153 lb)        BMI kg/m2 Body mass index is 20.35 kg/m².     Labs/Medications         Pertinent Labs -   Results from last 7 days   Lab Units 12/22/20  0518 12/21/20  0853 12/20/20  0521 12/19/20  0246  12/17/20  0254   SODIUM mmol/L 131*  128* 135* 131*   < > 125*   POTASSIUM mmol/L 5.2 5.4* 5.0 4.0   < > 5.0   CHLORIDE mmol/L 96* 98 104 99   < > 92*   CO2 mmol/L 31.0* 25.0 29.0 26.0   < > 23.0   BUN mg/dL 11 12 17 26*   < > 36*   CREATININE mg/dL 0.52* 0.60 0.56* 0.81   < > 1.22*   CALCIUM mg/dL 8.6 8.6 8.7 7.8*   < > 8.8   BILIRUBIN mg/dL  --   --   --  0.3  --  0.3   ALK PHOS U/L  --   --   --  77  --  198*   ALT (SGPT) U/L  --   --   --  10  --  11   AST (SGOT) U/L  --   --   --  16  --  23   GLUCOSE mg/dL 126* 101* 83 467*   < > 142*    < > = values in this interval not displayed.     Results from last 7 days   Lab Units 12/22/20  0518  12/17/20  0254 12/16/20  0723   MAGNESIUM mg/dL  --   --  2.2 2.4   PHOSPHORUS mg/dL  --   --  2.6  --    HEMOGLOBIN g/dL 8.9*   < >  --  10.6*   HEMATOCRIT % 28.1*   < >  --  33.8*    < > = values in this interval not displayed.     COVID19   Date Value Ref Range Status   12/12/2020 Not Detected Not Detected - Ref. Range Final     Lab Results   Component Value Date    HGBA1C 5.2 12/19/2020         Pertinent Medications Lipitor, Welchol, Seroquel, Florastor, Vancomycin,      Physical Findings        Overall Physical   Appearance, MSA 12/22: Did not physically observe on this date     12/17: Did not physically observe on this date     12/14: Appears malnourished on exam, NFPE completed    --  Edema  No edema observed     Gastrointestinal + 3 BMs on 12/21, + 1 BM so far today (12/22)   Cdiff infection Noted      Tubes No feeding tube      Oral/Mouth Cavity Pt with some missing teeth, but has dentures     Skin Stage 1 coccyx; incision    Wound care RN note reports moisture-associated dermatitis to buttock area      --  Current Nutrition Orders & Evaluation of Intake       Oral Nutrition     Current PO Diet Diet Regular   Supplement Boost Plus TID   Magic Cups L/D    PO Evaluation     % PO Intake 12/22: PO intakes over past couple days improved to 50%. Though noted 25% up until that point. Is ordering full meals      12/17: Avg meal intakes 35%, noted did eat 50% of meal today     12/14: When diet was active, intake was poor-fair - eating about 25-50% at meals    --  Clinical Course    Nutrition Course Details PO diet     12/12-12/13 Regular diet  12/14 NPO   12/15 to present (12/22) Regular      Nutritional Risk Screening        NRS-2002 Score   -       Nutrition Diagnosis         Nutrition Dx Problem 1 Malnutrition (severe, acute) related to ongoing insufficient PO intake; as evidenced by NFPE revealing moderate muscle and fat wasting; with ongoing poor intake at meals meeting less than 50% of meals for at least the last five days (including prior to admission).       Nutrition Dx Problem 2 -       Intervention Goal         Intervention Goal(s) PO to improve to 65% of meals    Pt to be accepting of ONS      Nutrition Intervention        RD/Tech Action Continue with ONS as ordered      Nutrition Prescription          Diet Prescription Regular    Supplement Prescription Boost Plus TID   Magic Cups L/D    --  Monitor/Evaluation        Monitor PO intake, Supplement intake, Pertinent labs, Weight, Skin status, GI status     Electronically signed by:  Samantha Ozuna RD  12/22/20 10:35 EST

## 2020-12-22 NOTE — NURSING NOTE
Patient will discharge to [facility]. I called report to Angela at [facility] [phone]. I called and spoke to Conchis (daughter). IV taken out and pressure dressing applied. Patient does not have any belongings other than her partial upper that she has in her mouth. Patient will go on 2 liters of oxygen. Called EMS with a  time around 1400

## 2020-12-22 NOTE — PROGRESS NOTES
PROGRESS NOTE      Patient Name: Olga Curtis  : 1935  MRN: [id number]  Primary Care Physician: Tigre Duran MD  Date of admission: 2020    Patient Care Team:  Tigre Duran MD as PCP - General  Tigre Duran MD as PCP - Family Medicine  Wyatt Kwok MD as Consulting Physician (Cardiology)        Subjective   Subjective:     Acute kidney injury better, patient is still complaining of weakness  Review of systems:  All other review of system unremarkable      Allergies:    Allergies   Allergen Reactions   • Sulfa Antibiotics Hives       Objective   Exam:     Vital Signs  Temp:  [96.4 °F (35.8 °C)-97.3 °F (36.3 °C)] 96.7 °F (35.9 °C)  Heart Rate:  [69-70] 70  Resp:  [16-22] 22  BP: (126-175)/(46-69) 141/46  SpO2:  [99 %-100 %] 100 %  on  Flow (L/min):  [2] 2;   Device (Oxygen Therapy): nasal cannula  Body mass index is 20.39 kg/m².    General: Elderly white  female in no acute distress.    Head:      Normocephalic and atraumatic.    Eyes:      PERRL/EOM intact, conjunctiva and sclera clear with out nystagmus.    Neck:      No masses, thyromegaly,  trachea central with normal respiratory effort   Lungs:    Clear bilaterally to auscultation.    Heart:      Regular rate and rhythm, no murmur no gallop  Abd:        Soft, nontender, not distended, bowel sounds positive, no shifting dullness   Pulses:   Pulses palpable  Extr:        No cyanosis or clubbing--mild edema.    Neuro:    No focal deficits.   alert oriented x3  Skin:       Intact without lesions or rashes.    Psych:    Alert and cooperative; normal mood and affect; .      Results Review:  I have personally reviewed most recent Data :  CBC    Results from last 7 days   Lab Units 20  0518 20  0853 20  0336 20  0246 20  0233 20  1158 20  0723   WBC 10*3/mm3 12.60* 11.80* 9.80 9.30 20.00* 31.30* 28.50*   HEMOGLOBIN g/dL 8.9* 10.0* 9.4* 9.7* 9.6* 10.0* 10.6*   PLATELETS 10*3/mm3 355 365 353 364 410  454* 383     CMP   Results from last 7 days   Lab Units 12/22/20  0518 12/21/20  0853 12/20/20  0521 12/19/20  0246 12/18/20  0233 12/17/20  1158 12/17/20  0254  12/15/20  0656   SODIUM mmol/L 131* 128* 135* 131* 129* 128* 125*   < > 128*   POTASSIUM mmol/L 5.2 5.4* 5.0 4.0 4.4 5.2 5.0   < > 4.1   CHLORIDE mmol/L 96* 98 104 99 93* 92* 92*   < > 91*   CO2 mmol/L 31.0* 25.0 29.0 26.0 28.0 29.0 23.0   < > 27.0   BUN mg/dL 11 12 17 26* 39* 35* 36*   < > 22   CREATININE mg/dL 0.52* 0.60 0.56* 0.81 1.03* 1.17* 1.22*   < > 1.20*   GLUCOSE mg/dL 126* 101* 83 467* 88 99 142*   < > 73   ALBUMIN g/dL  --   --   --  2.60*  --   --  2.90*  --  2.60*   BILIRUBIN mg/dL  --   --   --  0.3  --   --  0.3  --  0.4   ALK PHOS U/L  --   --   --  77  --   --  198*  --  142*   AST (SGOT) U/L  --   --   --  16  --   --  23  --  26   ALT (SGPT) U/L  --   --   --  10  --   --  11  --  12   AMMONIA umol/L  --   --   --  12  --   --   --   --   --     < > = values in this interval not displayed.     ABG    Results from last 7 days   Lab Units 12/19/20  0242   PH, ARTERIAL pH units 7.245*   PCO2, ARTERIAL mm Hg 66.1*   PO2 ART mm Hg 284.5*   O2 SATURATION ART % 99.8*   BASE EXCESS ART mmol/L 0.5     Xr Chest 1 View    Result Date: 12/15/2020  1. Improved bilateral interstitial opacities likely related to resolving infection or edema. 2. Persistent left basilar airspace opacity.  Electronically Signed By-Hill Askew MD On:12/15/2020 9:04 AM This report was finalized on [id number] by  Hill Askew MD.    Xr Abdomen Kub    Result Date: 12/14/2020  Mild gaseous distention of small bowel loops is nonspecific and may reflect changes of ileus. No convincing evidence of high-grade small bowel obstruction this time.  Electronically Signed By-Tonja Chauhan MD On:12/14/2020 9:12 AM This report was finalized on [id number] by  Tonja Chauhan MD.      Results for orders placed during the hospital encounter of 11/30/20   Adult Transthoracic  Echo Complete W/ Cont if Necessary Per Protocol    Narrative · Left ventricular ejection fraction appears to be 56 - 60%.  · Left ventricular wall thickness is consistent with moderate concentric   hypertrophy.  · There is a TAVR valve present.  · Moderate tricuspid valve regurgitation is present.  · Left ventricular diastolic function is consistent with (grade I)   impaired relaxation.  · No pericardial effusion noted        Scheduled Meds:apixaban, 2.5 mg, Oral, Q12H  atorvastatin, 10 mg, Oral, Daily  colesevelam, 1,250 mg, Oral, BID With Meals  cosyntropin, 0.25 mg, Intravenous, Once  dextrose, 50 mL, Intravenous, Once  nystatin, , Topical, Q8H  QUEtiapine, 25 mg, Oral, Q8H  saccharomyces boulardii, 500 mg, Oral, BID  sodium chloride, 3 mL, Intravenous, Q12H  vancomycin, 250 mg, Oral, Q6H      Continuous Infusions:dextrose, 50 mL/hr, Last Rate: 50 mL/hr (12/21/20 1050)  DOPamine, 2-20 mcg/kg/min, Last Rate: Stopped (12/16/20 1523)      PRN Meds:•  acetaminophen **OR** acetaminophen **OR** acetaminophen  •  dextrose  •  hydrALAZINE  •  magic butt paste  •  magnesium sulfate **OR** magnesium sulfate **OR** magnesium sulfate  •  nitroglycerin  •  ondansetron **OR** ondansetron  •  sodium chloride  •  sodium chloride    Assessment/Plan   Assessment and Plan:         C. difficile diarrhea    Chronic coronary artery disease    Essential hypertension    Presence of cardiac pacemaker    Shortness of breath    Sick sinus syndrome (CMS/HCC)    Tachy-martin syndrome (CMS/HCC)    Chronic diastolic congestive heart failure (CMS/HCC)    Syncope and collapse    Acute renal failure (ARF) (CMS/HCC)    Leukocytosis    ASSESSMENT:  · Hyponatremia likely because of the congestive heart failure and may be some volume overload  · Hyperkalemia NICK with some congestive heart failure  · Acute kidney injury  · Significant leukocytosis rule out C. difficile colitis  · History of congestive heart failure but mainly diastolic  dysfunctions  · History of aortic stenosis history of TAVR  · History of hypertension  · History of pacemaker insertion           Plan:      · Patient is still confused and diarrhea is still present  · Sodium level again dropped yesterday   · Significant hyperkalemia still present   · R/O  hypoaldo manuel with hyperkalemia and low BP persistantly  · Stop IV fluid at this time as patient is eating and drinking better than before  · Renal function significantly better  · Respiratory status seems stable so far  · Etiology of low sodium likely related to patient C. difficile colitis as urine sodium is only 22 and urine osmolality 280  · BNP level of 14,000 we will recheck it again tumor board potassium again increasing  · INeed to follow volume status closely especially the presence of significant diastolic failure  •           Note started  by Huy Carlin MD,   [facility] kidney consultant

## 2020-12-22 NOTE — DISCHARGE SUMMARY
[facility]  DISCHARGE SUMMARY        Prepared For PCP:  Tigre Duran MD    Patient Name: Olga Curtis  : 1935  MRN: [id number]      Date of Admission:   2020    Date of Discharge:  2020    Length of stay:  LOS: 9 days     Hospital Course     Presenting Problem:   Syncope and collapse [R55]  Acute UTI [N39.0]  Diarrhea, unspecified type [R19.7]  Syncope and collapse [R55]  Hypotension [I95.9]      Active Hospital Problems    Diagnosis  POA   • **C. 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           Hospital Course:  Olga Curtis is a 85 y.o. female admitted don 20 due to progressive dyspnea and weakness along with concern for syncopal episode.  Had recently been discharged on abx for uti and had been having diarrhea.  She was started on Flagyl for C. Diff before being switch to PO Vancomycin once PCR was positive.  Became hypotensive and had to be started on dopamine drip, which was weaned and replaced with midodrine.  Wound care addressed perineal wound.  Cardiology interrogated pacemaker, normal, and nephrology consulted for elevatd BNP. Patient slowly started to improve with treatment. Electrolyte disturbances 2/2 c. Diff colitis was corrected.  Patient became delirious and required sitter which resolved with scheduled seroquel.  She also became hypoglycemic which endo assessed to not be due to alina's, D5 was started then later  stoped.  On 12/22/2020, Patient was of sound mind and judgement without symptoms and clear for discharge to SNF.  She was discharged to SNF with plan below.          Recommendation for Outpatient Providers:     - Follow up with PCP in 5-7 days  - Follow up with Dr. Mcfarlane / Cardiology within one month  - Follow up with Dr. Gaspar / endocrinology within one month  - Take Fludrocortisone 0.1mg daily  - Use Nystatin powder and Magic Butt Paste on Perineum as directed  - Take Vancomycin 250mg every 6 hours for 10 more doses  - Take Seroquel 25mg every 8 hours  - Puree diet with thin liquids, advance as tolerated        Reasons For Change In Medications and Indications for New Medications:        Day of Discharge     HPI:   Patient AAOx3, no overnight events, clear for discharge    Vital Signs:   Temp:  [96.5 °F (35.8 °C)-97.6 °F (36.4 °C)] 97.6 °F (36.4 °C)  Heart Rate:  [69-70] 70  Resp:  [17-22] 17  BP: (131-175)/(46-69) 171/63     Physical Exam:  Physical Exam  Constitutional:       General: She is not in acute distress.     Appearance: Normal appearance.   HENT:      Head: Normocephalic and atraumatic.      Nose: Nose normal.      Mouth/Throat:      Pharynx: Oropharynx is clear.   Eyes:      General: No scleral icterus.  Neck:      Musculoskeletal: Normal range of motion.   Cardiovascular:      Rate and Rhythm: Normal rate and regular rhythm.      Heart sounds: No murmur. No friction rub. No gallop.    Pulmonary:      Effort: No respiratory distress.      Breath sounds: No wheezing or rales.   Abdominal:      General: There is no distension.      Tenderness: There is no abdominal tenderness. There is no guarding.   Musculoskeletal:         General: No swelling, tenderness or deformity.   Skin:     Coloration: Skin is not jaundiced.      Findings: No lesion.   Neurological:      General: No focal deficit present.      Mental Status: She is alert and oriented to person, place, and time.      Motor: No weakness.      Psychiatric:         Mood and Affect: Mood normal.         Behavior: Behavior normal.         Thought Content: Thought content normal.         Judgment: Judgment normal.         Pertinent  and/or Most Recent Results     Results from last 7 days   Lab Units 12/22/20  0518 12/21/20  0853 12/20/20  0521 12/20/20  0336 12/19/20  0246 12/18/20  0233 12/17/20  1158 12/17/20  0254 12/16/20  0723   WBC 10*3/mm3 12.60* 11.80*  --  9.80 9.30 20.00* 31.30*  --  28.50*   HEMOGLOBIN g/dL 8.9* 10.0*  --  9.4* 9.7* 9.6* 10.0*  --  10.6*   HEMATOCRIT % 28.1* 31.8*  --  28.5* 30.6* 29.6* 31.8*  --  33.8*   PLATELETS 10*3/mm3 355 365  --  353 364 410 454*  --  383   SODIUM mmol/L 131* 128* 135*  --  131* 129* 128* 125* 126*   POTASSIUM mmol/L 5.2 5.4* 5.0  --  4.0 4.4 5.2 5.0 4.4   CHLORIDE mmol/L 96* 98 104  --  99 93* 92* 92* 90*   CO2 mmol/L 31.0* 25.0 29.0  --  26.0 28.0 29.0 23.0 26.0   BUN mg/dL 11 12 17  --  26* 39* 35* 36* 28*   CREATININE mg/dL 0.52* 0.60 0.56*  --  0.81 1.03* 1.17* 1.22* 1.07*   GLUCOSE mg/dL 126* 101* 83  --  467* 88 99 142* 157*   CALCIUM mg/dL 8.6 8.6 8.7  --  7.8* 8.9 9.0 8.8 8.6     Results from last 7 days   Lab Units 12/19/20  0246 12/17/20  0254   BILIRUBIN mg/dL 0.3 0.3   ALK PHOS U/L 77 198*   ALT (SGPT) U/L 10 11   AST (SGOT) U/L 16 23           Invalid input(s): TG, LDLCALC, LDLREALC  Results from last 7 days   Lab Units 12/21/20  1047 12/21/20  0942 12/21/20  0853 12/19/20  0246   CORTISOL mcg/dL 22.16 17.51 13.89  --    HEMOGLOBIN A1C %  --   --   --  5.2       Brief Urine Lab Results  (Last result in the past 365 days)      Color   Clarity   Blood   Leuk Est   Nitrite   Protein   CREAT   Urine HCG        12/17/20 1553 Yellow Clear Negative Negative Negative Negative               Microbiology Results Abnormal     Procedure Component Value - Date/Time    Blood Culture - Blood, Arm, Right [[id number]] Collected: 12/12/20 0058    Lab Status: Final result Specimen: Blood from Arm, Right Updated:  12/17/20 0115     Blood Culture No growth at 5 days    Blood Culture - Blood, Arm, Left [[id number]] Collected: 12/12/20 0058    Lab Status: Final result Specimen: Blood from Arm, Left Updated: 12/17/20 0115     Blood Culture No growth at 5 days    Urine Culture - Urine, Urine, Catheter [[id number]]  (Abnormal) Collected: 12/12/20 0134    Lab Status: Edited Result - FINAL Specimen: Urine, Catheter Updated: 12/13/20 1016     Urine Culture Yeast isolated     Comment: No further workup.       Gastrointestinal Panel, PCR - Stool, Per Rectum [[id number]]  (Normal) Collected: 12/12/20 0134    Lab Status: Final result Specimen: Stool from Per Rectum Updated: 12/12/20 0743     Campylobacter Not Detected     Plesiomonas shigelloides Not Detected     Salmonella Not Detected     Vibrio Not Detected     Vibrio cholerae Not Detected     Yersinia enterocolitica Not Detected     Enteroaggregative E. coli (EAEC) Not Detected     Enteropathogenic E. coli (EPEC) Not Detected     Enterotoxigenic E. coli (ETEC) lt/st Not Detected     Shiga-like toxin-producing E. coli (STEC) stx1/stx2 Not Detected     Shigella/Enteroinvasive E. coli (EIEC) Not Detected     Cryptosporidium Not Detected     Cyclospora cayetanensis Not Detected     Entamoeba histolytica Not Detected     Giardia lamblia Not Detected     Adenovirus F40/41 Not Detected     Astrovirus Not Detected     Norovirus GI/GII Not Detected     Rotavirus A Not Detected     Sapovirus (I, II, IV or V) Not Detected    Narrative:      If Aeromonas, Staphylococcus aureus or Bacillus cereus are suspected, please order [id number]: Stool Culture, Aeromonas, S aureus, B Cereus.    Clostridium Difficile Toxin - Stool, Per Rectum [[id number]]  (Abnormal) Collected: 12/12/20 0134    Lab Status: Final result Specimen: Stool from Per Rectum Updated: 12/12/20 0708    Narrative:      The following orders were created for panel order Clostridium Difficile Toxin - Stool, Per Rectum.  Procedure                                Abnormality         Status                     ---------                               -----------         ------                     Clostridium Difficile EI...[[id number]]  Abnormal            Final result                 Please view results for these tests on the individual orders.    Clostridium Difficile EIA - Stool, Per Rectum [[id number]]  (Abnormal) Collected: 12/12/20 0134    Lab Status: Final result Specimen: Stool from Per Rectum Updated: 12/12/20 0708     C Diff GDH / Toxin Positive    Respiratory Panel PCR w/COVID-19(SARS-CoV-2) [facility]AD/REAGAN In-House, NP Swab in UTM/VTM, 3-4 HR TAT - Swab, Nasopharynx [[id number]]  (Normal) Collected: 12/12/20 0023    Lab Status: Final result Specimen: Swab from Nasopharynx Updated: 12/12/20 0132     ADENOVIRUS, PCR Not Detected     Coronavirus 229E Not Detected     Coronavirus HKU1 Not Detected     Coronavirus NL63 Not Detected     Coronavirus OC43 Not Detected     COVID19 Not Detected     Human Metapneumovirus Not Detected     Human Rhinovirus/Enterovirus Not Detected     Influenza A PCR Not Detected     Influenza B PCR Not Detected     Parainfluenza Virus 1 Not Detected     Parainfluenza Virus 2 Not Detected     Parainfluenza Virus 3 Not Detected     Parainfluenza Virus 4 Not Detected     RSV, PCR Not Detected     Bordetella pertussis pcr Not Detected     Bordetella parapertussis PCR Not Detected     Chlamydophila pneumoniae PCR Not Detected     Mycoplasma pneumo by PCR Not Detected    Narrative:      Fact sheet for providers: https://docs.iwoca/wp-content/uploads/OSS9629-0205-JN2.1-EUA-Provider-Fact-Sheet-3.pdf    Fact sheet for patients: https://docs.iwoca/wp-content/uploads/QWM0013-8191-TD0.1-EUA-Patient-Fact-Sheet-1.pdf    Test performed by PCR.          Xr Shoulder 2+ View Left    Result Date: 11/30/2020  Impression: No acute left shoulder findings. The left shoulder prosthesis appears intact. No definite acute  periprosthetic fracture is seen. No dislocation.  Electronically Signed By-Tonja Chauhan MD On:11/30/2020 2:12 PM This report was finalized on [id number] by  Tonja Chauhan MD.    Ct Head Without Contrast    Result Date: 12/2/2020  Impression: 1. No acute process is demonstrated. Negative for hemorrhage or mass effect. 2. There is moderately severe white matter abnormality, increased from 2014. This is most often due to chronic small vessel disease. 3. There is an area of encephalomalacia in the right temporal lobe which is slightly larger than on the prior. 4. If acute ischemia is suspected clinically, would recommend consideration of MR.  Electronically Signed By-Theresa Santillan MD On:12/2/2020 8:20 PM This report was finalized on [id number] by  Theresa Santillan MD.    Xr Chest 1 View    Result Date: 12/22/2020  Impression:  1. Worsening mixed interstitial/airspace disease which can be seen with multifocal pneumonia or pulmonary edema. 2. The dense consolidation in the left lower chest is unchanged. 3. A small left pleural effusion is suspected.  Electronically Signed By-Kevin Jj MD On:12/22/2020 7:46 AM This report was finalized on [id number] by  Kevin Jj MD.    Xr Chest 1 View    Result Date: 12/15/2020  Impression: 1. Improved bilateral interstitial opacities likely related to resolving infection or edema. 2. Persistent left basilar airspace opacity.  Electronically Signed By-Hill Askew MD On:12/15/2020 9:04 AM This report was finalized on [id number] by  Hill Askew MD.    Xr Chest 1 View    Result Date: 12/12/2020  Impression: 1.Bilateral airspace opacities appear slightly decreased. 2.Small pleural effusions also appears slightly decreased. 3.Cardiomegaly.  Electronically Signed By-Isidro Jackson MD On:12/12/2020 8:57 AM This report was finalized on [id number] by  Isidro Jackson MD.    Xr Chest 1 View    Result Date: 11/30/2020  Impression: Chronic interstitial fibrotic changes  in both lungs. Stable cardiomegaly. No acute chest findings.  Electronically Signed By-Tonja Chauhan MD On:11/30/2020 2:10 PM This report was finalized on [id number] by  Tonja Chauhan MD.    Ct Chest Pulmonary Embolism    Result Date: 12/12/2020  Impression: 1. No pulmonary embolism. 2. Reticular and ground glass densities diffusely may represent edema or pneumonia superimposed on chronic interstitial change. 3. Stable surgical changes and cardiomegaly. 4. Calcified left pleural plaque. Electronically signed by:  Isidro Staley M.D.  12/12/2020 1:11 AM    Ct Chest Pulmonary Embolism    Result Date: 12/1/2020  Impression:  1. No pulmonary embolism. 2. FINDINGS suggestive of mild interstitial and alveolar pulmonary edema superimposed upon emphysema. 3. Stable cardiac megaly with aortic valve replacement and CABG. 4. Calcified pleural plaque. Correlate for history of asbestos exposure. 5. Chronic T4 and L1 vertebral body compression fractures.    Electronically Signed By-Tonja Chauhan MD On:12/1/2020 12:49 PM This report was finalized on [id number] by  Tonja Chauhan MD.    Xr Abdomen Kub    Result Date: 12/14/2020  Impression: Mild gaseous distention of small bowel loops is nonspecific and may reflect changes of ileus. No convincing evidence of high-grade small bowel obstruction this time.  Electronically Signed By-Tonja Chauhan MD On:12/14/2020 9:12 AM This report was finalized on [id number] by  Tonja Chauhan MD.    Xr Chest Pa & Lateral    Result Date: 12/4/2020  Impression: 1.  Moderate bilateral pulmonary infiltrates, edema versus pneumonia. 2.  Small bilateral pleural effusions. Electronically signed by:  Deanna Smith M.D.  12/4/2020 10:23 PM      Results for orders placed during the hospital encounter of 02/28/20   Duplex Carotid Ultrasound CAR    Narrative · Proximal right internal carotid artery moderate stenosis.  · Proximal left internal carotid artery mild stenosis.          Results for  orders placed during the hospital encounter of 02/28/20   Duplex Carotid Ultrasound CAR    Narrative · Proximal right internal carotid artery moderate stenosis.  · Proximal left internal carotid artery mild stenosis.          Results for orders placed during the hospital encounter of 11/30/20   Adult Transthoracic Echo Complete W/ Cont if Necessary Per Protocol    Narrative · Left ventricular ejection fraction appears to be 56 - 60%.  · Left ventricular wall thickness is consistent with moderate concentric   hypertrophy.  · There is a TAVR valve present.  · Moderate tricuspid valve regurgitation is present.  · Left ventricular diastolic function is consistent with (grade I)   impaired relaxation.  · No pericardial effusion noted                  Test Results Pending at Discharge  Pending Labs     Order Current Status    Aldosterone / Renin Ratio In process            Procedures Performed           Consults:   Consults     Date and Time Order Name Status Description    12/20/2020 1218 Inpatient Endocrinology Consult Completed     12/17/2020 1430 Inpatient Psychiatrist Consult Completed     12/15/2020 0552 Inpatient Pulmonology Consult Completed     12/14/2020 1109 Inpatient Palliative Care MD Consult      12/14/2020 0929 Inpatient Nephrology Consult      12/14/2020 0724 Inpatient Cardiology Consult Completed     12/4/2020 0917 Inpatient Neurology Consult General Completed     11/30/2020 1723 Inpatient Cardiology Consult Completed     11/30/2020 1449 Hospitalist (on-call MD unless specified) Completed             Discharge Details        Discharge Medications      New Medications      Instructions Start Date   fludrocortisone 0.1 MG tablet   0.1 mg, Oral, Daily   Start Date: December 23, 2020     magic butt paste   1 g, Topical, As Needed      nystatin 412779 UNIT/GM powder  Commonly known as: MYCOSTATIN   Topical, Every 8 Hours Scheduled      vancomycin 250 MG capsule  Commonly known as: VANCOCIN   250 mg, Oral,  Every 6 Hours Scheduled         Changes to Medications      Instructions Start Date   QUEtiapine 25 MG tablet  Commonly known as: SEROquel  What changed: when to take this   25 mg, Oral, Every 8 Hours Scheduled         Continue These Medications      Instructions Start Date   acebutolol 200 MG capsule  Commonly known as: SECTRAL   TAKE ONE CAPSULE BY MOUTH TWICE A DAY      Eliquis 2.5 MG tablet tablet  Generic drug: apixaban   TAKE ONE TABLET BY MOUTH EVERY 12 HOURS      furosemide 20 MG tablet  Commonly known as: LASIX   20 mg, Oral      lisinopril 20 MG tablet  Commonly known as: PRINIVIL,ZESTRIL   TAKE ONE TABLET BY MOUTH DAILY      nystatin 894556 UNIT/ML suspension  Commonly known as: MYCOSTATIN   500,000 Units, Swish & Swallow      pravastatin 20 MG tablet  Commonly known as: PRAVACHOL   TAKE ONE TABLET BY MOUTH DAILY         Stop These Medications    cefdinir 300 MG capsule  Commonly known as: OMNICEF            Allergies   Allergen Reactions   • Sulfa Antibiotics Hives         Discharge Disposition:  Skilled Nursing Facility (DC - External)    Diet:  Hospital:  Diet Order   Procedures   • Diet Regular         Discharge Activity:   Activity Instructions     Activity as Tolerated              CODE STATUS:    Code Status and Medical Interventions:   Ordered at: 12/14/20 1242     Limited Support to NOT Include:    Intubation     Level Of Support Discussed With:    Health Care Surrogate     Code Status:    No CPR     Medical Interventions (Level of Support Prior to Arrest):    Limited     Comments:    DNR/DNI         Follow-up Appointments  Future Appointments   Date Time Provider Department Center   [facility] 11:30 [facility]   1/7/2021 12:10 PM Wyatt Kwok MD [facility] NA   3/18/2021 10:45 AM Oli Barcenas MD [facility] NA [facility]   3/31/2021 10:45 AM [facility]   3/31/2021 11:30 AM Kevin Ricci MD [facility] CD  [facility] None   7/7/2021 11:15 AM Kevin Ricci MD [facility] None       Additional Instructions for the Follow-ups that You Need to Schedule     Ambulatory Referral to Home Health   As directed      caretenders h/h resumption of care    Order Comments: caretenders h/h resumption of care     Face to Face Visit Date: 12/15/2020    Follow-up provider for Plan of Care?: I treated the patient in an acute care facility and will not continue treatment after discharge.    Follow-up provider: TIGRE WRIGHT [[id number]]    Reason/Clinical Findings: resumption of care    Describe mobility limitations that make leaving home difficult: resumption of care    Nursing/Therapeutic Services Requested: Other    Frequency: 1 Week 1         Call MD With Problems / Concerns   As directed      Instructions: Call [phone] or email [email or web address] for problems or concerns.    Order Comments: Instructions: Call [phone] or email [email or web address] for problems or concerns.          Discharge Follow-up with PCP   As directed       Currently Documented PCP:    Tigre Wright MD    PCP Phone Number:    [phone]     Follow Up Details: Follow up with PCP in 5-7 days         Discharge Follow-up with Specified Provider: Dr. Gaspar; 1 Month   As directed      To: Dr. Gaspar    Follow Up: 1 Month    Follow Up Details: Follow up with endocrinology within one month         Discharge Follow-up with Specified Provider: Dr. Mcfarlane; 1 Month   As directed      To: Dr. Mcfarlane    Follow Up: 1 Month    Follow Up Details: Follow up with cardiology within one month                 Condition on Discharge:      Stable      This patient has been examined wearing appropriate Personal Protective Equipment and discussed with Patient. 12/22/20      Electronically signed by Joe Johnson DO, 12/22/20, 11:34 AM EST.      Time: I spent  40  minutes on this discharge activity which included face-to-face encounter with the  patient/reviewing the data in the system/coordination of the care with the nursing staff as well as consultants/documentation/entering orders.

## 2020-12-22 NOTE — PROGRESS NOTES
Continued Stay Note  JOANNE Jordan     Patient Name: Olga Curtis  MRN: [id number]  Today's Date: 12/22/2020    Admit Date: 12/11/2020    Discharge Plan     Row Name 12/22/20 1459       Plan    Plan Comments  Secure chat to MD/RN this AM regarding [facility] having bed available today, 12/22, per liaison (Odilia). Anticipate d/c today.     Phone communication only - no physical contact with patient or family.    DORINDA Hopkins    Phone: [phone]  Cell: [phone]  Fax: [phone]  [email or web address]

## 2020-12-22 NOTE — PROGRESS NOTES
"PULMONARY CRITICAL CARE Progress  NOTE      PATIENT IDENTIFICATION:  Name: Olga Curtis  MRN: [id number]  :  1935     Age: 85 y.o.  Sex: female    DATE OF Note:  2020   Referring Physician: Joe Johnson DO                  Subjective:    no SOB no chest pain, no nausea or vomiting, no change in bowel habit, no dysuria,  no new  skin rash or itching.      Objective:  tMax 24 hrs: Temp (24hrs), Av.3 °F (36.3 °C), Min:96.4 °F (35.8 °C), Max:98.9 °F (37.2 °C)      Vitals Ranges:   Temp:  [96.4 °F (35.8 °C)-98.9 °F (37.2 °C)] 97.3 °F (36.3 °C)  Heart Rate:  [70] 70  Resp:  [16-22] 18  BP: (120-160)/(47-98) 156/69    Intake and Output Last 3 Shifts:   I/O last 3 completed shifts:  In:  [P.O.:1020; I.V.:1000]  Out:  [Urine:1650]    Exam:  /69 (BP Location: Right arm, Patient Position: Sitting)   Pulse 70   Temp 97.3 °F (36.3 °C) (Axillary)   Resp 18   Ht 167.6 cm (66\")   Wt 57.3 kg (126 lb 5.2 oz)   SpO2 100%   BMI 20.39 kg/m²     General Appearance:     HEENT:  Normocephalic, without obvious abnormality, Conjunctiva/corneas clear,.  Normal external ear canals, Nares normal, no drainage     Neck:  Supple, symmetrical, trachea midline. No JVD.  Lungs /Chest wall:   Bilateral basal rhonchi, respirations unlabored symmetrical wall movement.     Heart:  Regular rate and rhythm, systolic murmur PMI left sternal border  Abdomen: Soft, non-tender, no masses, no organomegaly.    Extremities: Trace edema no clubbing or Cyanosis        Medications:    Current Facility-Administered Medications:   •  acetaminophen (TYLENOL) tablet 650 mg, 650 mg, Oral, Q4H PRN, 650 mg at 20 **OR** acetaminophen (TYLENOL) 160 MG/5ML solution 650 mg, 650 mg, Oral, Q4H PRN **OR** acetaminophen (TYLENOL) suppository 650 mg, 650 mg, Rectal, Q4H PRN, Sammi Wylie APRN  •  apixaban (ELIQUIS) tablet 2.5 mg, 2.5 mg, Oral, Q12H, Charity Hinojosa MD, 2.5 mg at 20 0906  •  atorvastatin " (LIPITOR) tablet 10 mg, 10 mg, Oral, Daily, Charity Hinojosa MD, 10 mg at 12/21/20 0906  •  colesevelam (WELCHOL) tablet 1,250 mg, 1,250 mg, Oral, BID With Meals, Balbina Sy MD, 1,250 mg at 12/21/20 1736  •  [CANCELED] ACTH, , , Once **AND** [START ON 12/22/2020] Cortisol, , , Q30 Min **AND** [START ON 12/22/2020] cosyntropin (CORTROSYN) injection 0.25 mg, 0.25 mg, Intravenous, Once, Solis Gaspar MD  •  dextrose (D50W) 25 g/ 50mL Intravenous Solution 50 mL, 50 mL, Intravenous, Once, Balbina Sy MD  •  dextrose (D50W) 25 g/ 50mL Intravenous Solution 50 mL, 50 mL, Intravenous, Q1H PRN, Balbina Sy MD, 50 mL at 12/20/20 1331  •  dextrose (D5W) 5 % infusion, 50 mL/hr, Intravenous, Continuous, Balbina Sy MD, Last Rate: 50 mL/hr at 12/21/20 1050, 50 mL/hr at 12/21/20 1050  •  DOPamine 400 mg in 250 mL D5W infusion, 2-20 mcg/kg/min, Intravenous, Titrated, Sammi Wylie APRN, Stopped at 12/16/20 1523  •  hydrALAZINE (APRESOLINE) injection 10 mg, 10 mg, Intravenous, Q6H PRN, Balbina Sy MD  •  magic butt paste, , Topical, PRN, Balbina Sy MD  •  Magnesium Sulfate 2 gram Bolus, followed by 8 gram infusion (total Mg dose 10 grams)- Mg less than or equal to 1mg/dL, 2 g, Intravenous, PRN **OR** Magnesium Sulfate 2 gram / 50mL Infusion (GIVE X 3 BAGS TO EQUAL 6GM TOTAL DOSE) - Mg 1.1 - 1.5 mg/dl, 2 g, Intravenous, PRN **OR** Magnesium Sulfate 4 gram infusion- Mg 1.6-1.9 mg/dL, 4 g, Intravenous, PRN, Elias Newton PA-C, Last Rate: 25 mL/hr at 12/15/20 0739, 4 g at 12/15/20 0739  •  nitroglycerin (NITROSTAT) SL tablet 0.4 mg, 0.4 mg, Sublingual, Q5 Min PRN, Sammi Wylie APRN  •  nystatin (MYCOSTATIN) powder, , Topical, Q8H, Balbina Sy MD, Given at 12/21/20 1352  •  ondansetron (ZOFRAN) tablet 4 mg, 4 mg, Oral, Q6H PRN **OR** ondansetron (ZOFRAN) injection 4 mg, 4 mg, Intravenous, Q6H PRN, Sammi Wylie APRN, 4 mg at 12/16/20 1118  •   QUEtiapine (SEROquel) tablet 25 mg, 25 mg, Oral, Q8H, Joe Johnson, DO  •  saccharomyces boulardii (FLORASTOR) capsule 500 mg, 500 mg, Oral, BID, Balbina Sy MD, 500 mg at 12/21/20 0906  •  sodium chloride 0.9 % flush 10 mL, 10 mL, Intravenous, PRN, Lawrence Viera MD  •  sodium chloride 0.9 % flush 3 mL, 3 mL, Intravenous, Q12H, Sammi Wylie APRN, 10 mL at 12/21/20 0907  •  sodium chloride 0.9 % flush 3-10 mL, 3-10 mL, Intravenous, PRN, Sammi Wylie APRN  •  vancomycin (VANCOCIN) capsule 250 mg, 250 mg, Oral, Q6H, Balbina Sy MD, 250 mg at 12/21/20 1553    Data Review:  All labs (24hrs):   Recent Results (from the past 24 hour(s))   POC Glucose Once    Collection Time: 12/20/20  9:26 PM    Specimen: Blood   Result Value Ref Range    Glucose 118 (H) 70 - 105 mg/dL   POC Glucose Once    Collection Time: 12/21/20  4:02 AM    Specimen: Blood   Result Value Ref Range    Glucose 106 (H) 70 - 105 mg/dL   POC Glucose Once    Collection Time: 12/21/20  7:07 AM    Specimen: Blood   Result Value Ref Range    Glucose 106 (H) 70 - 105 mg/dL   Basic Metabolic Panel    Collection Time: 12/21/20  8:53 AM    Specimen: Blood   Result Value Ref Range    Glucose 101 (H) 65 - 99 mg/dL    BUN 12 8 - 23 mg/dL    Creatinine 0.60 0.57 - 1.00 mg/dL    Sodium 128 (L) 136 - 145 mmol/L    Potassium 5.4 (H) 3.5 - 5.2 mmol/L    Chloride 98 98 - 107 mmol/L    CO2 25.0 22.0 - 29.0 mmol/L    Calcium 8.6 8.6 - 10.5 mg/dL    eGFR Non African Amer 95 >60 mL/min/1.73    BUN/Creatinine Ratio 20.0 7.0 - 25.0    Anion Gap 5.0 5.0 - 15.0 mmol/L   CBC Auto Differential    Collection Time: 12/21/20  8:53 AM    Specimen: Blood   Result Value Ref Range    WBC 11.80 (H) 3.40 - 10.80 10*3/mm3    RBC 3.26 (L) 3.77 - 5.28 10*6/mm3    Hemoglobin 10.0 (L) 12.0 - 15.9 g/dL    Hematocrit 31.8 (L) 34.0 - 46.6 %    MCV 97.7 (H) 79.0 - 97.0 fL    MCH 30.5 26.6 - 33.0 pg    MCHC 31.3 (L) 31.5 - 35.7 g/dL    RDW 14.2 12.3 - 15.4 %     RDW-SD 49.4 37.0 - 54.0 fl    MPV 7.1 6.0 - 12.0 fL    Platelets 365 140 - 450 10*3/mm3    Neutrophil % 78.0 (H) 42.7 - 76.0 %    Lymphocyte % 11.1 (L) 19.6 - 45.3 %    Monocyte % 8.1 5.0 - 12.0 %    Eosinophil % 2.6 0.3 - 6.2 %    Basophil % 0.2 0.0 - 1.5 %    Neutrophils, Absolute 9.20 (H) 1.70 - 7.00 10*3/mm3    Lymphocytes, Absolute 1.30 0.70 - 3.10 10*3/mm3    Monocytes, Absolute 1.00 (H) 0.10 - 0.90 10*3/mm3    Eosinophils, Absolute 0.30 0.00 - 0.40 10*3/mm3    Basophils, Absolute 0.00 0.00 - 0.20 10*3/mm3    nRBC 0.0 0.0 - 0.2 /100 WBC   Cortisol    Collection Time: 12/21/20  8:53 AM    Specimen: Blood   Result Value Ref Range    Cortisol 13.89   mcg/dL   Cortisol    Collection Time: 12/21/20  9:42 AM    Specimen: Blood   Result Value Ref Range    Cortisol 17.51   mcg/dL   Cortisol    Collection Time: 12/21/20 10:47 AM    Specimen: Blood   Result Value Ref Range    Cortisol 22.16   mcg/dL   POC Glucose Once    Collection Time: 12/21/20 11:27 AM    Specimen: Blood   Result Value Ref Range    Glucose 108 (H) 70 - 105 mg/dL   POC Glucose Once    Collection Time: 12/21/20  4:49 PM    Specimen: Blood   Result Value Ref Range    Glucose 92 70 - 105 mg/dL        Imaging:  XR Chest 1 View  Narrative: EXAMINATION: XR CHEST 1 VW-     DATE OF EXAM: 12/15/2020 8:44 AM     INDICATION: pulmonary edema; R55-Syncope and collapse; N39.0-Urinary  tract infection, site not specified; R19.7-Diarrhea, unspecified.     COMPARISON: Chest radiograph dated 12/12/2020     TECHNIQUE: Portable AP view of the chest was obtained.     FINDINGS:  There is a left chest wall pacemaker with leads in similar position.  There is a cardiac valve replacement and postsurgical changes of prior  CABG. The cardiac silhouette is unchanged. There is aortic arch  atherosclerotic calcification. There is decreasing interstitial  opacities likely related to resolving pulmonary edema or infection.  There is a persistent left basilar airspace opacity. There  "is no  significant pleural effusion. No evidence of pneumothorax. Advanced  degenerative changes of the right glenohumeral joint and a left reverse  total shoulder prosthesis.     Impression: 1. Improved bilateral interstitial opacities likely related to resolving  infection or edema.  2. Persistent left basilar airspace opacity.     Electronically Signed By-Hill Askew MD On:12/15/2020 9:04 AM  This report was finalized on [id number] by  Hill Askew MD.       ASSESSMENT:  Acute respiratory distress  Pneumonia  Possible COPD exacerbation    Essential hypertension    Presence of cardiac pacemaker    Shortness of breath    Sick sinus syndrome (CMS/HCC)    Tachy-martin syndrome (CMS/HCC)    Chronic diastolic congestive heart failure (CMS/HCC)    Syncope and collapse    Acute renal failure (ARF) (CMS/HCC)    Leukocytosis  Hyponatremia  Anemia    PLAN:  \"Repeat chest x-ray  Uncontrolled on antibiotics for now and reevaluate chest x-ray in the morning  Bronchodilator  Inhaled corticosteroids  Electrolytes/ glycemic control  DVT and GI prophylaxis.    Total Critical care time in direct medical management (   ) minutes  Farrukh Nolasco MD. D, ABSM.     12/21/2020  20:37 EST   "

## 2020-12-22 NOTE — PLAN OF CARE
Goal Outcome Evaluation:  Plan of Care Reviewed With: patient, daughter  Progress: improving    Patient has been sitter free since 1330. Pt is pleasantly confused and resting comfortably. Receiving Seroquel q8 per psych. Will redraw cortisol level this AM. VSS, will continue to monitor.

## 2020-12-22 NOTE — PROGRESS NOTES
Referring Provider: Hospitalist    Reason for follow-up: Diarrhea     Patient Care Team:  Tigre Duran MD as PCP - General  Tigre Duran MD as PCP - Family Medicine  Wyatt Kwok MD as Consulting Physician (Cardiology)    Subjective .  Patient looking much better today    Objective  Sitting in chair comfortable     Review of Systems   Constitution: Negative for fever and malaise/fatigue.   Cardiovascular: Negative for chest pain, dyspnea on exertion and palpitations.   Respiratory: Negative for cough and shortness of breath.    Skin: Negative for rash.   Gastrointestinal: Negative for abdominal pain, nausea and vomiting.   Neurological: Negative for focal weakness and headaches.   All other systems reviewed and are negative.      Sulfa antibiotics    Scheduled Meds:apixaban, 2.5 mg, Oral, Q12H  atorvastatin, 10 mg, Oral, Daily  colesevelam, 1,250 mg, Oral, BID With Meals  cosyntropin, 0.25 mg, Intravenous, Once  dextrose, 50 mL, Intravenous, Once  fludrocortisone, 100 mcg, Oral, Daily  nystatin, , Topical, Q8H  QUEtiapine, 25 mg, Oral, Q8H  saccharomyces boulardii, 500 mg, Oral, BID  sodium chloride, 3 mL, Intravenous, Q12H  vancomycin, 250 mg, Oral, Q6H      Continuous Infusions:dextrose, 50 mL/hr, Last Rate: 50 mL/hr (12/21/20 1050)  DOPamine, 2-20 mcg/kg/min, Last Rate: Stopped (12/16/20 1523)      PRN Meds:.•  acetaminophen **OR** acetaminophen **OR** acetaminophen  •  dextrose  •  hydrALAZINE  •  magic butt paste  •  magnesium sulfate **OR** magnesium sulfate **OR** magnesium sulfate  •  nitroglycerin  •  ondansetron **OR** ondansetron  •  sodium chloride  •  sodium chloride        VITAL SIGNS  Vitals:    12/22/20 0148 12/22/20 0500 12/22/20 0555 12/22/20 1000   BP: 131/47  141/46 171/63   BP Location:       Patient Position:       Pulse: 70  70 70   Resp: 20 22 17   Temp: 96.5 °F (35.8 °C)  96.7 °F (35.9 °C) 97.6 °F (36.4 °C)   TempSrc: Axillary  Axillary    SpO2: 100%  100% 100%   Weight:  57.2 kg  "(126 lb 1.7 oz)     Height:           Flowsheet Rows      First Filed Value   Admission Height  167.6 cm (66\") Documented at 12/11/2020 2338   Admission Weight  49.9 kg (110 lb) Documented at 12/11/2020 2338           TELEMETRY: Ventricular pacemaker rhythm    Physical Exam:  Constitutional:       Appearance: Well-developed.   Eyes:      General: No scleral icterus.     Conjunctiva/sclera: Conjunctivae normal.   HENT:      Head: Normocephalic and atraumatic.   Neck:      Musculoskeletal: Normal range of motion and neck supple.      Vascular: No carotid bruit or JVD.   Pulmonary:      Effort: Pulmonary effort is normal.      Breath sounds: Normal breath sounds. No wheezing. No rales.   Cardiovascular:      Normal rate. Regular rhythm.   Pulses:     Intact distal pulses.   Abdominal:      General: Bowel sounds are normal.      Palpations: Abdomen is soft.   Skin:     General: Skin is warm and dry.      Findings: No rash.   Neurological:      Mental Status: Alert.          Results Review:   I reviewed the patient's new clinical results.  Lab Results (last 24 hours)     Procedure Component Value Units Date/Time    POC Glucose Once [[id number]]  (Normal) Collected: 12/22/20 1129    Specimen: Blood Updated: 12/22/20 1137     Glucose 95 mg/dL      Comment: Serial Number: [id number]:  [id number]       Aldosterone / Renin Ratio [[id number]] Collected: 12/22/20 0858    Specimen: Blood Updated: 12/22/20 0911    POC Glucose Once [[id number]]  (Normal) Collected: 12/22/20 0741    Specimen: Blood Updated: 12/22/20 0744     Glucose 101 mg/dL      Comment: Serial Number: [id number]:  [id number]       Basic Metabolic Panel [[id number]]  (Abnormal) Collected: 12/22/20 0518    Specimen: Blood Updated: 12/22/20 0613     Glucose 126 mg/dL      BUN 11 mg/dL      Creatinine 0.52 mg/dL      Sodium 131 mmol/L      Potassium 5.2 mmol/L      Chloride 96 mmol/L      CO2 31.0 mmol/L      Calcium 8.6 mg/dL      eGFR Non African Amer 112 " mL/min/1.73      BUN/Creatinine Ratio 21.2     Anion Gap 4.0 mmol/L     Narrative:      GFR Normal >60  Chronic Kidney Disease <60  Kidney Failure <15      CBC & Differential [[id number]]  (Abnormal) Collected: 12/22/20 0518    Specimen: Blood Updated: 12/22/20 0547    Narrative:      The following orders were created for panel order CBC & Differential.  Procedure                               Abnormality         Status                     ---------                               -----------         ------                     CBC Auto Differential[[id number]]        Abnormal            Final result                 Please view results for these tests on the individual orders.    CBC Auto Differential [[id number]]  (Abnormal) Collected: 12/22/20 0518    Specimen: Blood Updated: 12/22/20 0547     WBC 12.60 10*3/mm3      RBC 2.94 10*6/mm3      Hemoglobin 8.9 g/dL      Hematocrit 28.1 %      MCV 95.8 fL      MCH 30.4 pg      MCHC 31.8 g/dL      RDW 14.2 %      RDW-SD 48.1 fl      MPV 7.0 fL      Platelets 355 10*3/mm3      Neutrophil % 79.6 %      Lymphocyte % 8.9 %      Monocyte % 9.4 %      Eosinophil % 1.7 %      Basophil % 0.4 %      Neutrophils, Absolute 10.10 10*3/mm3      Lymphocytes, Absolute 1.10 10*3/mm3      Monocytes, Absolute 1.20 10*3/mm3      Eosinophils, Absolute 0.20 10*3/mm3      Basophils, Absolute 0.00 10*3/mm3      nRBC 0.0 /100 WBC     POC Glucose Once [[id number]]  (Abnormal) Collected: 12/22/20 0212    Specimen: Blood Updated: 12/22/20 0213     Glucose 131 mg/dL      Comment: Serial Number: [id number]:  [id number]       POC Glucose Once [[id number]]  (Abnormal) Collected: 12/21/20 2043    Specimen: Blood Updated: 12/21/20 2044     Glucose 148 mg/dL      Comment: Serial Number: [id number]:  [id number]       POC Glucose Once [[id number]]  (Normal) Collected: 12/21/20 1649    Specimen: Blood Updated: 12/21/20 1651     Glucose 92 mg/dL      Comment: Serial Number: [id number]:  [id number]        Cortisol [[id number]] Collected: 12/21/20 1047    Specimen: Blood Updated: 12/21/20 1604     Cortisol 22.16 mcg/dL     Narrative:      Cortisol Reference Ranges:    Cortisol 6AM - 10AM Range: 6.02-18.40 mcg/dl  Cortisol 4PM - 8PM Range: 2.68-10.50 mcg/dl      Results may be falsely increased if patient taking Biotin.            Imaging Results (Last 24 Hours)     Procedure Component Value Units Date/Time    XR Chest 1 View [[id number]] Collected: 12/22/20 0744     Updated: 12/22/20 0749    Narrative:      DATE OF EXAM:  12/22/2020 5:41 AM     PROCEDURE:  XR CHEST 1 VW-     INDICATIONS:  Shortness of breath, urinary tract infection, heart disease.      COMPARISON:  12/15/2020.     TECHNIQUE:   Single radiographic view of the chest was obtained.     FINDINGS:  The heart is enlarged with postsurgical changes apparent. There is a  stable left-sided transvenous pacemaker in place. There is worsening  mixed interstitial/airspace disease. The dense consolidation in the left  lower chest which has not significantly changed. The patient's also felt  to have a small left pleural effusion. The right pleural space is clear.  Diagnostic considerations would include multifocal pneumonia versus  pulmonary edema.  There are chronic age-related changes involving the  bony thorax and thoracic aorta. The patient's had a previous left  shoulder replacement surgery.       Impression:         1. Worsening mixed interstitial/airspace disease which can be seen with  multifocal pneumonia or pulmonary edema.  2. The dense consolidation in the left lower chest is unchanged.  3. A small left pleural effusion is suspected.     Electronically Signed By-Kevin Jj MD On:12/22/2020 7:46 AM  This report was finalized on [id number] by  Kevin Jj MD.          EKG      I personally viewed and interpreted the patient's EKG/Telemetry data:    ECHOCARDIOGRAM:    STRESS MYOVIEW:    CARDIAC CATHETERIZATION:    OTHER:         Assessment/Plan          Principal Problem:    C. difficile diarrhea  Active Problems:    Chronic coronary artery disease    Essential hypertension    Presence of cardiac pacemaker    Shortness of breath    Sick sinus syndrome (CMS/HCC)    Tachy-martin syndrome (CMS/HCC)    Chronic diastolic congestive heart failure (CMS/HCC)    Syncope and collapse    Acute renal failure (ARF) (CMS/HCC)    Leukocytosis       Patient has significant diarrhea and dehydration and became less responsive and hypotensive  Patient presented with hypotension and was treated with IV fluid bolus  Patient has congestive heart failure diastolic dysfunction but no systolic dysfunction  Patient has a pacemaker is working very well  Patient blood pressure is stable now  Patient also has acute renal failure and nephrologist is following the patient  Patient recently had a echocardiogram and hence no further testing at this time  We will follow her as an outpatient    I discussed the patients findings and my recommendations with patient's nurse    Wyatt Kwok MD  12/22/20  12:24 EST

## 2020-12-23 PROBLEM — I50.33 ACUTE ON CHRONIC DIASTOLIC CHF (CONGESTIVE HEART FAILURE) (HCC): Status: ACTIVE | Noted: 2020-01-01

## 2020-12-23 PROBLEM — E87.5 HYPERKALEMIA: Status: ACTIVE | Noted: 2020-01-01

## 2020-12-23 PROBLEM — J96.00 ACUTE RESPIRATORY FAILURE (HCC): Status: ACTIVE | Noted: 2020-01-01

## 2020-12-23 PROBLEM — E87.1 HYPONATREMIA: Status: ACTIVE | Noted: 2020-01-01

## 2020-12-23 PROBLEM — Z95.2 S/P TAVR (TRANSCATHETER AORTIC VALVE REPLACEMENT): Chronic | Status: ACTIVE | Noted: 2020-01-01

## 2020-12-23 PROBLEM — G47.00 INSOMNIA: Chronic | Status: ACTIVE | Noted: 2020-01-01

## 2020-12-23 NOTE — PROGRESS NOTES
Case Management Discharge Note      Final Note: [facility]    Provided Post Acute Provider List?: Yes  Post Acute Provider List: Inpatient Rehab  N/A Provider List Comment: pt declining IP rehab and is current with [facility]  Provided Post Acute Provider Quality & Resource List?: Yes  Post Acute Provider Quality and Resource List: Inpatient Rehab  N/A Quality & Resource List Comment: patient is current with [facility] health  Delivered To: Patient, Support Person  Support Person: larissa Balderrama.    Selected Continued Care - Discharged on 12/22/2020 Admission date: 12/11/2020 - Discharge disposition: Skilled Nursing Facility (DC - External)              Selected Continued Care - Prior Encounters Includes selections from prior encounters from 9/12/2020 to 12/22/2020    Discharged on 12/6/2020 Admission date: 11/30/2020 - Discharge disposition: Rehab Facility or Unit (DC - External)    Destination     Service Provider Selected Services Address Phone Fax Patient Preferred    [facility]  Inpatient Rehabilitation [address], [place], [facility] [place] [address] [phone] --          Home Medical Care     Service Provider Selected Services Address Phone Fax Patient Preferred    [facility] [address] [place] [address] [phone] -- --                Discharged on 10/7/2020 Admission date: 10/6/2020 - Discharge disposition: Home-Health Care c    Home Medical Care     Service Provider Selected Services Address Phone Fax Patient Preferred    [facility] [address] [place] [id number] [phone] -- --                         Final Discharge Disposition Code: 03 - skilled nursing facility (SNF)

## 2020-12-27 LAB
GLUCOSE BLDC GLUCOMTR-MCNC: 150 MG/DL (ref 70–105)
GLUCOSE BLDC GLUCOMTR-MCNC: 81 MG/DL (ref 70–105)

## 2020-12-28 LAB — BACTERIA SPEC AEROBE CULT: NORMAL

## 2020-12-29 LAB
BACTERIA SPEC AEROBE CULT: NORMAL
QT INTERVAL: 356 MS

## 2021-03-16 LAB
ALDOST SERPL-MCNC: 1.3 NG/DL (ref 0–30)
ALDOST/RENIN PLAS-RTO: 0.6 {RATIO} (ref 0–30)
RENIN PLAS-CCNC: 2.11 NG/ML/HR (ref 0.17–5.38)
